# Patient Record
Sex: FEMALE | Race: WHITE | NOT HISPANIC OR LATINO | Employment: OTHER | URBAN - METROPOLITAN AREA
[De-identification: names, ages, dates, MRNs, and addresses within clinical notes are randomized per-mention and may not be internally consistent; named-entity substitution may affect disease eponyms.]

---

## 2017-01-23 ENCOUNTER — TELEPHONE (OUTPATIENT)
Dept: INTERNAL MEDICINE | Facility: CLINIC | Age: 62
End: 2017-01-23

## 2017-01-23 DIAGNOSIS — J02.9 ACUTE PHARYNGITIS, UNSPECIFIED ETIOLOGY: Primary | ICD-10-CM

## 2017-01-23 NOTE — TELEPHONE ENCOUNTER
Spoke with pt.  Advised her of PCP's message below.    Has not been exposed [place] strep [place]     Req lab only appt to r/o strep.    Order in chart and pt will schedule appt.

## 2017-01-23 NOTE — TELEPHONE ENCOUNTER
Has she been exposed to strep? It is most likely this is a virus. If she does a strep as lab only and it is negative there would not be any treatment given, so if she thinks is okay to go home and treat with otc, can do a lab only. If she thinks she may need other advice or evaluation, she would need appt. I can not fit her in.

## 2017-01-23 NOTE — TELEPHONE ENCOUNTER
Pt calling.  C/o sore throat x 2 days, chest leonila x 3 days.  Denies fever.    Asking to come in for a lab only appt today to r/o strep.  Or work pt into schedule today?    No appts available with any other provider.    Last OV 10-12-16     Please advise, thanks.

## 2017-03-12 ENCOUNTER — OFFICE VISIT (OUTPATIENT)
Dept: URGENT CARE | Facility: URGENT CARE | Age: 62
End: 2017-03-12
Payer: COMMERCIAL

## 2017-03-12 VITALS
TEMPERATURE: 98.9 F | DIASTOLIC BLOOD PRESSURE: 96 MMHG | OXYGEN SATURATION: 98 % | HEART RATE: 97 BPM | SYSTOLIC BLOOD PRESSURE: 140 MMHG | BODY MASS INDEX: 29.05 KG/M2 | WEIGHT: 164 LBS

## 2017-03-12 DIAGNOSIS — J01.90 ACUTE SINUSITIS WITH COEXISTING CONDITION, NEED PROPHYLACTIC TREATMENT: Primary | ICD-10-CM

## 2017-03-12 DIAGNOSIS — H10.32 ACUTE BACTERIAL CONJUNCTIVITIS OF LEFT EYE: ICD-10-CM

## 2017-03-12 PROCEDURE — 99214 OFFICE O/P EST MOD 30 MIN: CPT | Performed by: PHYSICIAN ASSISTANT

## 2017-03-12 RX ORDER — DOXYCYCLINE 100 MG/1
100 CAPSULE ORAL 2 TIMES DAILY
Qty: 20 CAPSULE | Refills: 0 | Status: SHIPPED | OUTPATIENT
Start: 2017-03-12 | End: 2017-07-13

## 2017-03-12 RX ORDER — TOBRAMYCIN 3 MG/ML
1 SOLUTION/ DROPS OPHTHALMIC EVERY 4 HOURS
Qty: 1 BOTTLE | Refills: 0 | Status: SHIPPED | OUTPATIENT
Start: 2017-03-12 | End: 2017-03-19

## 2017-03-12 NOTE — MR AVS SNAPSHOT
"              After Visit Summary   3/12/2017    Krystyna Peña    MRN: [id number]           Patient Information     Date Of Birth          1955        Visit Information        Provider Department      3/12/2017 10:00 AM Dorita Bernal PA-C [facility]        Today's Diagnoses     Acute sinusitis with coexisting condition, need prophylactic treatment    -  1    Acute bacterial conjunctivitis of left eye           Follow-ups after your visit        Who to contact     If you have questions or need follow up information about today's clinic visit or your schedule please contact [facility] directly at [phone].  Normal or non-critical lab and imaging results will be communicated to you by MyChart, letter or phone within 4 business days after the clinic has received the results. If you do not hear from us within 7 days, please contact the clinic through MyChart or phone. If you have a critical or abnormal lab result, we will notify you by phone as soon as possible.  Submit refill requests through [employer] or call your pharmacy and they will forward the refill request to us. Please allow 3 business days for your refill to be completed.          Additional Information About Your Visit        MyChart Information     [email or web address] lets you send messages to your doctor, view your test results, renew your prescriptions, schedule appointments and more. To sign up, go to [email or web address] . Click on \"Log in\" on the left side of the screen, which will take you to the Welcome page. Then click on \"Sign up Now\" on the right side of the page.     You will be asked to enter the access code listed below, as well as some personal information. Please follow the directions to create your username and password.     Your access code is: [email or web address]  Expires: 6/10/2017 10:54 AM     Your access code will  in 90 days. If you need help or a new code, please " call your [place] clinic or [phone].        Care EveryWhere ID     This is your Care EveryWhere ID. This could be used by other organizations to access your [place] medical records  [id number]        Your Vitals Were     Pulse Temperature Last Period Pulse Oximetry BMI (Body Mass Index)       97 98.9  F (37.2  C) (Oral) 10/01/2003 98% 29.05 kg/m2        Blood Pressure from Last 3 Encounters:   03/12/17 (!) 140/96   12/14/16 (!) 178/100   10/12/16 124/82    Weight from Last 3 Encounters:   03/12/17 164 lb (74.4 kg)   12/14/16 167 lb 12.8 oz (76.1 kg)   10/12/16 164 lb 11.2 oz (74.7 kg)              Today, you had the following     No orders found for display         Today's Medication Changes          These changes are accurate as of: 3/12/17 10:54 AM.  If you have any questions, ask your nurse or doctor.               Start taking these medicines.        Dose/Directions    doxycycline 100 MG capsule   Commonly known as:  VIBRAMYCIN   Used for:  Acute sinusitis with coexisting condition, need prophylactic treatment   Started by:  Dorita Bernal PA-C        Dose:  100 mg   Take 1 capsule (100 mg) by mouth 2 times daily   Quantity:  20 capsule   Refills:  0       tobramycin 0.3 % ophthalmic solution   Commonly known as:  TOBREX   Used for:  Acute bacterial conjunctivitis of left eye   Started by:  Dorita Bernal PA-C        Dose:  1 drop   Apply 1 drop to eye every 4 hours for 7 days   Quantity:  1 Bottle   Refills:  0         Stop taking these medicines if you haven't already. Please contact your care team if you have questions.     amoxicillin 500 MG capsule   Commonly known as:  AMOXIL   Stopped by:  Dorita Bernal PA-C                Where to get your medicines      These medications were sent to [facility] [address] [facility] [address] [facility] [address] [facility] [address]  [address] [facility] [address]     Phone:  [phone]     doxycycline  100 MG capsule    tobramycin 0.3 % ophthalmic solution                Primary Care Provider Office Phone # Fax #    Viri Savage [phone]       [facility] [address] [facility] [address]  [facility] [address]        Thank you!     Thank you for choosing [facility]  for your care. Our goal is always to provide you with excellent care. Hearing back from our patients is one way we can continue to improve our services. Please take a few minutes to complete the written survey that you may receive in the mail after your visit with us. Thank you!             Your Updated Medication List - Protect others around you: Learn how to safely use, store and throw away your medicines at www.disposemymeds.org.          This list is accurate as of: 3/12/17 10:54 AM.  Always use your most recent med list.                   Brand Name Dispense Instructions for use    ASPIRIN PO      Take 81 mg by mouth daily       celecoxib 200 MG capsule    celeBREX    60 capsule    Take 1 capsule (200 mg) by mouth daily       doxycycline 100 MG capsule    VIBRAMYCIN    20 capsule    Take 1 capsule (100 mg) by mouth 2 times daily       GINKOBA PO          nystatin-triamcinolone ointment    MYCOLOG    15 g    Apply topically 2 times daily       tobramycin 0.3 % ophthalmic solution    TOBREX    1 Bottle    Apply 1 drop to eye every 4 hours for 7 days       triamterene-hydrochlorothiazide 37.5-25 MG per tablet    MAXZIDE-25    30 tablet    Take 1 tablet by mouth daily       valACYclovir 500 MG tablet    VALTREX    6 tablet    Take 1 tablet (500 mg) by mouth 2 times daily

## 2017-03-12 NOTE — NURSING NOTE
"Chief Complaint   Patient presents with     Eye Problem     cough,congestion for past week,lt eye red and swollen started yesterday     URI       Initial BP (!) 140/96 (BP Location: Right arm, Patient Position: Chair, Cuff Size: Adult Regular)  Pulse 97  Temp 98.9  F (37.2  C) (Oral)  Wt 164 lb (74.4 kg)  LMP 10/01/2003  SpO2 98%  BMI 29.05 kg/m2 Estimated body mass index is 29.05 kg/(m^2) as calculated from the following:    Height as of 10/12/16: 5' 3\" (1.6 m).    Weight as of this encounter: 164 lb (74.4 kg).  Medication Reconciliation: complete   Timothy FORMAN      "

## 2017-03-12 NOTE — LETTER
[facility]  [address] [address]  [phone]      March 12, 2017    RE:  Krystyna Peña                                                                                                                                                       [address] [place] [address]            To whom it may concern:    Krystyna Peña was seen in clinic today for illness.  She may return to work on Tuesday.        Sincerely,        Dorita Colin [facility]

## 2017-03-12 NOTE — PROGRESS NOTES
SUBJECTIVE:   Krystyna Peña is a 61 year old female presenting with a chief complaint of   1) sinus congestion for the past week, worsening with sinus pressure  2) left eye is red with yellow drainage since yesterday.  Denies any eye pain or decreased vision  3) slight cough.  Onset of symptoms was as above.  Course of illness is worsening.    Severity moderate  Current and Associated symptoms: as above  Treatment measures tried include OTC meds.  Predisposing factors include none    SH: just returned from a cruise to [place].    Past Medical History   Diagnosis Date     Arthritis      Asymptomatic varicose veins      Benign neoplasm of colon 11/06, 7/13     Adenoma     Cardiac arrest (H) 2003     after knee replacement     Hypertension      Major depression      Pneumonia, organism unspecified 1986     Patient Active Problem List   Diagnosis     Asymptomatic varicose veins     CARDIOVASCULAR SCREENING; LDL GOAL LESS THAN 160     Back pain     Urinary frequency     Benign essential hypertension     Edema, unspecified edema     Advanced directives, counseling/discussion     Social History   Substance Use Topics     Smoking status: Former Smoker     Packs/day: 1.00     Types: Cigarettes, Cigars     Quit date: 4/1/2013     Smokeless tobacco: Never Used     Alcohol use Yes      Comment: 1 per day       ROS:  CONSTITUTIONAL:NEGATIVE for fever, chills, change in weight  INTEGUMENTARY/SKIN: NEGATIVE for worrisome rashes, moles or lesions  EYES: as per HPI  ENT/MOUTH: as per HPI  RESP:as per HPI  CV: NEGATIVE for chest pain, palpitations or peripheral edema  GI: NEGATIVE for nausea, abdominal pain, heartburn, or change in bowel habits  MUSCULOSKELETAL: NEGATIVE for significant arthralgias or myalgia    OBJECTIVE  :BP (!) 140/96 (BP Location: Right arm, Patient Position: Chair, Cuff Size: Adult Regular)  Pulse 97  Temp 98.9  F (37.2  C) (Oral)  Wt 164 lb (74.4 kg)  LMP 10/01/2003  SpO2 98%  BMI 29.05 kg/m2  GENERAL  APPEARANCE: healthy, alert and no distress  EYES: EOMI,  PERRL, conjunctiva clear  EYES: conjunctiva is injected on left with purulent drainage  HENT: ear canals and TM's normal.  Nose with purulent drainage and erythematous turbinated, and mouth without ulcers, erythema or lesions  NECK: supple, nontender, no lymphadenopathy  RESP: lungs clear to auscultation - no rales, rhonchi or wheezes  CV: regular rates and rhythm, normal S1 S2, no murmur noted  ABDOMEN:  soft, nontender, no HSM or masses and bowel sounds normal  NEURO: Normal strength and tone, sensory exam grossly normal,  normal speech and mentation  SKIN: no suspicious lesions or rashes    (J01.90) Acute sinusitis with coexisting condition, need prophylactic treatment  (primary encounter diagnosis)  Comment:   Plan: doxycycline (VIBRAMYCIN) 100 MG capsule        Saline nasal spray    (H10.32) Acute bacterial conjunctivitis of left eye  Comment:   Plan: tobramycin (TOBREX) 0.3 % ophthalmic solution        Warm compress to eye TID    F/U with PCP should symptoms persist or worsen.    Patient expresses understanding and agreement with the assessment and plan as above.

## 2017-05-25 DIAGNOSIS — I10 BENIGN ESSENTIAL HYPERTENSION: ICD-10-CM

## 2017-05-25 NOTE — TELEPHONE ENCOUNTER
triamterene-hydrochlorothiazide       Last Written Prescription Date: 11/17/16  Last Fill Quantity: 30, # refills: 5  Last Office Visit with G, P or [facility] prescribing provider: 10/12/16       Potassium   Date Value Ref Range Status   03/29/2016 4.4 3.4 - 5.3 mmol/L Final     Creatinine   Date Value Ref Range Status   03/29/2016 1.24 (H) 0.52 - 1.04 mg/dL Final     BP Readings from Last 3 Encounters:   03/12/17 (!) 140/96   12/14/16 (!) 178/100   10/12/16 124/82

## 2017-05-26 RX ORDER — TRIAMTERENE/HYDROCHLOROTHIAZID 37.5-25 MG
1 TABLET ORAL DAILY
Qty: 30 TABLET | Refills: 0 | Status: SHIPPED | OUTPATIENT
Start: 2017-05-26 | End: 2017-06-30

## 2017-06-01 NOTE — TELEPHONE ENCOUNTER
Attempted to contact pt. No answer, no VM. Phone rings a few times, then goes dead. Tried calling a couple of times.     Please try again later.

## 2017-06-05 NOTE — TELEPHONE ENCOUNTER
Called pt, states she has a summer cold right now that is making her feel not well. Asking to schedule appt at later time. Relay she may. Pt denies being out of med at this time.

## 2017-06-30 DIAGNOSIS — B00.9 HSV (HERPES SIMPLEX VIRUS) INFECTION: ICD-10-CM

## 2017-06-30 DIAGNOSIS — I10 BENIGN ESSENTIAL HYPERTENSION: ICD-10-CM

## 2017-06-30 RX ORDER — TRIAMTERENE/HYDROCHLOROTHIAZID 37.5-25 MG
1 TABLET ORAL DAILY
Qty: 30 TABLET | Refills: 0 | Status: SHIPPED | OUTPATIENT
Start: 2017-06-30 | End: 2017-07-15

## 2017-06-30 NOTE — TELEPHONE ENCOUNTER
triamterene-hydrochlorothiazide       Last Written Prescription Date: 05/26/17  Last Fill Quantity: 30, # refills: 0  Last Office Visit with G, P or [facility] prescribing provider: 10/12/16       Potassium   Date Value Ref Range Status   03/29/2016 4.4 3.4 - 5.3 mmol/L Final     Creatinine   Date Value Ref Range Status   03/29/2016 1.24 (H) 0.52 - 1.04 mg/dL Final     BP Readings from Last 3 Encounters:   03/12/17 (!) 140/96   12/14/16 (!) 178/100   10/12/16 124/82

## 2017-06-30 NOTE — TELEPHONE ENCOUNTER
Pt still has not scheduled. She was called beginning of June to schedule.    Call to pt and scheduled on 7/13, at 3 pm per pt.     Prescription approved per [facility] Refill Protocol x 1.

## 2017-07-13 ENCOUNTER — OFFICE VISIT (OUTPATIENT)
Dept: INTERNAL MEDICINE | Facility: CLINIC | Age: 62
End: 2017-07-13
Payer: COMMERCIAL

## 2017-07-13 VITALS
TEMPERATURE: 99 F | HEART RATE: 84 BPM | WEIGHT: 168.5 LBS | SYSTOLIC BLOOD PRESSURE: 142 MMHG | DIASTOLIC BLOOD PRESSURE: 92 MMHG | OXYGEN SATURATION: 97 % | BODY MASS INDEX: 29.85 KG/M2

## 2017-07-13 DIAGNOSIS — R60.9 EDEMA, UNSPECIFIED TYPE: ICD-10-CM

## 2017-07-13 DIAGNOSIS — Z11.59 SCREENING FOR VIRAL DISEASE: ICD-10-CM

## 2017-07-13 DIAGNOSIS — M54.5 LOW BACK PAIN, UNSPECIFIED BACK PAIN LATERALITY, UNSPECIFIED CHRONICITY, WITH SCIATICA PRESENCE UNSPECIFIED: ICD-10-CM

## 2017-07-13 DIAGNOSIS — I10 BENIGN ESSENTIAL HYPERTENSION: Primary | ICD-10-CM

## 2017-07-13 PROCEDURE — 99214 OFFICE O/P EST MOD 30 MIN: CPT | Performed by: INTERNAL MEDICINE

## 2017-07-13 PROCEDURE — 80048 BASIC METABOLIC PNL TOTAL CA: CPT | Performed by: INTERNAL MEDICINE

## 2017-07-13 PROCEDURE — 82043 UR ALBUMIN QUANTITATIVE: CPT | Performed by: INTERNAL MEDICINE

## 2017-07-13 PROCEDURE — 86803 HEPATITIS C AB TEST: CPT | Performed by: INTERNAL MEDICINE

## 2017-07-13 PROCEDURE — 36415 COLL VENOUS BLD VENIPUNCTURE: CPT | Performed by: INTERNAL MEDICINE

## 2017-07-13 RX ORDER — TRIAMTERENE/HYDROCHLOROTHIAZID 37.5-25 MG
1 TABLET ORAL DAILY
Status: CANCELLED | OUTPATIENT
Start: 2017-07-13

## 2017-07-13 NOTE — MR AVS SNAPSHOT
"              After Visit Summary   2017    Krystyna Peña    MRN: [id number]           Patient Information     Date Of Birth          1955        Visit Information        Provider Department      2017 3:00 PM Viri Savage MD [facility]        Today's Diagnoses     Benign essential hypertension    -  1    Screening for viral disease           Follow-ups after your visit        Follow-up notes from your care team     Return for BP Recheck at the pharmacy.      Who to contact     If you have questions or need follow up information about today's clinic visit or your schedule please contact [facility] directly at [phone].  Normal or non-critical lab and imaging results will be communicated to you by MyChart, letter or phone within 4 business days after the clinic has received the results. If you do not hear from us within 7 days, please contact the clinic through [employer] or phone. If you have a critical or abnormal lab result, we will notify you by phone as soon as possible.  Submit refill requests through [employer] or call your pharmacy and they will forward the refill request to us. Please allow 3 business days for your refill to be completed.          Additional Information About Your Visit        MyChart Information     [employer] lets you send messages to your doctor, view your test results, renew your prescriptions, schedule appointments and more. To sign up, go to [email or web address] . Click on \"Log in\" on the left side of the screen, which will take you to the Welcome page. Then click on \"Sign up Now\" on the right side of the page.     You will be asked to enter the access code listed below, as well as some personal information. Please follow the directions to create your username and password.     Your access code is: [email or web address]  Expires: 10/11/2017  3:47 PM     Your access code will  in 90 days. If you need help or a new code, please call your " [facility] or [phone].        Care EveryWhere ID     This is your Care EveryWhere ID. This could be used by other organizations to access your [place] medical records  [id number]        Your Vitals Were     Pulse Temperature Last Period Pulse Oximetry BMI (Body Mass Index)       84 99  F (37.2  C) (Oral) 10/01/2003 97% 29.85 kg/m2        Blood Pressure from Last 3 Encounters:   07/13/17 (!) 142/92   03/12/17 (!) 140/96   12/14/16 (!) 178/100    Weight from Last 3 Encounters:   07/13/17 168 lb 8 oz (76.4 kg)   03/12/17 164 lb (74.4 kg)   12/14/16 167 lb 12.8 oz (76.1 kg)              We Performed the Following     Albumin Random Urine Quantitative     Basic metabolic panel     Hepatitis C Screen Reflex to HCV RNA Quant and Genotype        Primary Care Provider Office Phone # Fax #    Viri Savage [phone]       [facility] [address] [facility] [address]  [facility] [address]        Equal Access to Services     ARMIDA HANSON : Hadii aad ku hadasho Soomaali, waaxda luqadaha, qaybta kaalmada adeegyabaudilio, jhoana bustamante . So [facility] [phone].    ATENCIÓN: Si habla español, tiene a arguelles disposición servicios gratuitos de asistencia lingüística. Llame al [phone].    We comply with applicable federal civil rights laws and [place] laws. We do not discriminate on the basis of race, color, national origin, age, disability sex, sexual orientation or gender identity.            Thank you!     Thank you for choosing [facility]  for your care. Our goal is always to provide you with excellent care. Hearing back from our patients is one way we can continue to improve our services. Please take a few minutes to complete the written survey that you may receive in the mail after your visit with us. Thank you!             Your Updated Medication List - Protect others around you: Learn how to safely use, store and throw away your medicines at  www.disposemymeds.org.          This list is accurate as of: 7/13/17  3:47 PM.  Always use your most recent med list.                   Brand Name Dispense Instructions for use Diagnosis    ASPIRIN PO      Take 81 mg by mouth daily        celecoxib 200 MG capsule    celeBREX    60 capsule    Take 1 capsule (200 mg) by mouth daily    Status post total replacement of right hip       nystatin-triamcinolone ointment    MYCOLOG    15 g    Apply topically 2 times daily    Vaginitis and vulvovaginitis       triamterene-hydrochlorothiazide 37.5-25 MG per tablet    MAXZIDE-25    30 tablet    Take 1 tablet by mouth daily    Benign essential hypertension

## 2017-07-13 NOTE — NURSING NOTE
"Chief Complaint   Patient presents with     Hypertension     LOV 9/16/16       Initial BP (!) 142/92 (BP Location: Right arm, Patient Position: Sitting, Cuff Size: Adult Regular)  Pulse 84  Temp 99  F (37.2  C) (Oral)  Wt 168 lb 8 oz (76.4 kg)  LMP 10/01/2003  SpO2 97%  BMI 29.85 kg/m2 Estimated body mass index is 29.85 kg/(m^2) as calculated from the following:    Height as of 10/12/16: 5' 3\" (1.6 m).    Weight as of this encounter: 168 lb 8 oz (76.4 kg).  Medication Reconciliation: complete     "

## 2017-07-13 NOTE — PROGRESS NOTES
SUBJECTIVE:                                                    Krystyna HUMPHREYS White is a 61 year old female who presents to clinic today for the following health issues:      Hypertension Follow-up      [facility] are [facility] but has not been checking otherwise. It was high at other appointment a few months ago.     Low Salt Diet: not monitoring salt      Amount of exercise or physical activity: None    Problems taking medications regularly: No    Medication side effects: none    Diet: regular (no restrictions)    Other problems:   1. Edema: minimal lately  2. Back pain: stable on celebrex.     Current Concerns: none    Patient Active Problem List   Diagnosis     Asymptomatic varicose veins     CARDIOVASCULAR SCREENING; LDL GOAL LESS THAN 160     Back pain     Urinary frequency     Benign essential hypertension     Edema, unspecified edema     Advanced directives, counseling/discussion       Current Outpatient Prescriptions   Medication Sig Dispense Refill     triamterene-hydrochlorothiazide (MAXZIDE-25) 37.5-25 MG per tablet Take 1 tablet by mouth daily 30 tablet 0     ASPIRIN PO Take 81 mg by mouth daily       celecoxib (CELEBREX) 200 MG capsule Take 1 capsule (200 mg) by mouth daily 60 capsule 1     nystatin-triamcinolone (MYCOLOG) ointment Apply topically 2 times daily (Patient not taking: Reported on 3/12/2017) 15 g 1         Social History   Substance Use Topics     Smoking status: Former Smoker     Packs/day: 1.00     Types: Cigarettes, Cigars     Quit date: 4/1/2013     Smokeless tobacco: Never Used     Alcohol use Yes      Comment: 1 per day        Reviewed and updated as needed this visit by clinical staff       Reviewed and updated as needed this visit by Provider         ROS:  No fever, chills, no dyspnea on exertion, no chest pain, palpitations, PND, orthopnea, edema, syncope, headache, abdominal pain     OBJECTIVE:     BP (!) 142/92 (BP Location: Right arm,  Patient Position: Sitting, Cuff Size: Adult Regular)  Pulse 84  Temp 99  F (37.2  C) (Oral)  Wt 168 lb 8 oz (76.4 kg)  LMP 10/01/2003  SpO2 97%  BMI 29.85 kg/m2  Body mass index is 29.85 kg/(m^2).    Lungs: clear  CV: normal S1, S2 without murmur, S3 or S4.       ASSESSMENT/PLAN:             1. Benign essential hypertension  High today and last visit, likely is not well controlled. She is reluctant to change or add med, concerned she has white coat syndrome, so recommend we get more data by having her do BP readings at the pharmacy starting in 2 weeks. Advised that we can get good readings and they will be sent to me per protocol. If persistently over 140/90 over 1-2 months, would add ACE-I.   - Albumin Random Urine Quantitative  - Basic metabolic panel  - triamterene-hydrochlorothiazide (MAXZIDE-25) 37.5-25 MG per tablet; Take 1 tablet by mouth daily  Dispense: 90 tablet; Refill: 1    2. Low back pain, unspecified back pain laterality, unspecified chronicity, with sciatica presence unspecified  stable    3. Edema, unspecified type  No significant edema with med    4. Screening for viral disease    - Hepatitis C Screen Reflex to HCV RNA Quant and Genotype        Viri Savage MD  [facility]

## 2017-07-14 ENCOUNTER — OFFICE VISIT (OUTPATIENT)
Dept: URGENT CARE | Facility: URGENT CARE | Age: 62
End: 2017-07-14
Payer: COMMERCIAL

## 2017-07-14 VITALS
BODY MASS INDEX: 29.76 KG/M2 | TEMPERATURE: 97.8 F | OXYGEN SATURATION: 97 % | SYSTOLIC BLOOD PRESSURE: 140 MMHG | DIASTOLIC BLOOD PRESSURE: 90 MMHG | HEART RATE: 91 BPM | WEIGHT: 168 LBS

## 2017-07-14 DIAGNOSIS — S01.01XA LACERATION OF SCALP WITHOUT FOREIGN BODY, INITIAL ENCOUNTER: Primary | ICD-10-CM

## 2017-07-14 LAB
ANION GAP SERPL CALCULATED.3IONS-SCNC: 13 MMOL/L (ref 3–14)
BUN SERPL-MCNC: 24 MG/DL (ref 7–30)
CALCIUM SERPL-MCNC: 9.4 MG/DL (ref 8.5–10.1)
CHLORIDE SERPL-SCNC: 101 MMOL/L (ref 94–109)
CO2 SERPL-SCNC: 25 MMOL/L (ref 20–32)
CREAT SERPL-MCNC: 1.32 MG/DL (ref 0.52–1.04)
CREAT UR-MCNC: 107 MG/DL
GFR SERPL CREATININE-BSD FRML MDRD: 41 ML/MIN/1.7M2
GLUCOSE SERPL-MCNC: 99 MG/DL (ref 70–99)
HCV AB SERPL QL IA: NORMAL
MICROALBUMIN UR-MCNC: 19 MG/L
MICROALBUMIN/CREAT UR: 17.38 MG/G CR (ref 0–25)
POTASSIUM SERPL-SCNC: 3.9 MMOL/L (ref 3.4–5.3)
SODIUM SERPL-SCNC: 139 MMOL/L (ref 133–144)

## 2017-07-14 PROCEDURE — 12002 RPR S/N/AX/GEN/TRNK2.6-7.5CM: CPT | Performed by: STUDENT IN AN ORGANIZED HEALTH CARE EDUCATION/TRAINING PROGRAM

## 2017-07-14 NOTE — MR AVS SNAPSHOT
"              After Visit Summary   2017    Krystyna Peña    MRN: [id number]           Patient Information     Date Of Birth          1955        Visit Information        Provider Department      2017 7:10 PM Alireza Orellana PA-C [facility]        Today's Diagnoses     Laceration of scalp without foreign body, initial encounter    -  1       Follow-ups after your visit        Who to contact     If you have questions or need follow up information about today's clinic visit or your schedule please contact [facility] directly at [phone].  Normal or non-critical lab and imaging results will be communicated to you by vozerohart, letter or phone within 4 business days after the clinic has received the results. If you do not hear from us within 7 days, please contact the clinic through vozerohart or phone. If you have a critical or abnormal lab result, we will notify you by phone as soon as possible.  Submit refill requests through Stilnest or call your pharmacy and they will forward the refill request to us. Please allow 3 business days for your refill to be completed.          Additional Information About Your Visit        MyChart Information     Stilnest lets you send messages to your doctor, view your test results, renew your prescriptions, schedule appointments and more. To sign up, go to [email or web address] . Click on \"Log in\" on the left side of the screen, which will take you to the Welcome page. Then click on \"Sign up Now\" on the right side of the page.     You will be asked to enter the access code listed below, as well as some personal information. Please follow the directions to create your username and password.     Your access code is: [email or web address]  Expires: 10/11/2017  3:47 PM     Your access code will  in 90 days. If you need help or a new code, please call your [place] clinic or [phone].        Care " EveryWhere ID     This is your Care EveryWhere ID. This could be used by other organizations to access your [place] medical records  [id number]        Your Vitals Were     Pulse Temperature Last Period Pulse Oximetry BMI (Body Mass Index)       91 97.8  F (36.6  C) (Oral) 10/01/2003 97% 29.76 kg/m2        Blood Pressure from Last 3 Encounters:   07/14/17 140/90   07/13/17 (!) 142/92   03/12/17 (!) 140/96    Weight from Last 3 Encounters:   07/14/17 168 lb (76.2 kg)   07/13/17 168 lb 8 oz (76.4 kg)   03/12/17 164 lb (74.4 kg)              We Performed the Following     Between 2.6cm - 7.5 cm        Primary Care Provider Office Phone # Fax #    Viri Savage [phone]       [facility] [address]  [facility] [address]        Equal Access to Services     [facility] Center[facility] : Hadii aad ku hadasho Soomaali, waaxda luqadaha, qaybta kaalmada adeegyada, waxay idiin hayaan darlin bustamante . So [facility] [phone].    ATENCIÓN: Si habla español, tiene a arguelles disposición servicios gratuitos de asistencia lingüística. Llame al [phone].    We comply with applicable federal civil rights laws and [place] laws. We do not discriminate on the basis of race, color, national origin, age, disability sex, sexual orientation or gender identity.            Thank you!     Thank you for choosing [facility]  for your care. Our goal is always to provide you with excellent care. Hearing back from our patients is one way we can continue to improve our services. Please take a few minutes to complete the written survey that you may receive in the mail after your visit with us. Thank you!             Your Updated Medication List - Protect others around you: Learn how to safely use, store and throw away your medicines at www.disposemymeds.org.          This list is accurate as of: 7/14/17  8:15 PM.  Always use your most recent med list.                   Brand Name Dispense  Instructions for use Diagnosis    ASPIRIN PO      Take 81 mg by mouth daily        celecoxib 200 MG capsule    celeBREX    60 capsule    Take 1 capsule (200 mg) by mouth daily    Status post total replacement of right hip       nystatin-triamcinolone ointment    MYCOLOG    15 g    Apply topically 2 times daily    Vaginitis and vulvovaginitis       triamterene-hydrochlorothiazide 37.5-25 MG per tablet    MAXZIDE-25    30 tablet    Take 1 tablet by mouth daily    Benign essential hypertension

## 2017-07-15 PROBLEM — R60.9 EDEMA, UNSPECIFIED TYPE: Status: ACTIVE | Noted: 2017-07-15

## 2017-07-15 RX ORDER — TRIAMTERENE/HYDROCHLOROTHIAZID 37.5-25 MG
1 TABLET ORAL DAILY
Qty: 90 TABLET | Refills: 1 | Status: SHIPPED | OUTPATIENT
Start: 2017-07-15 | End: 2018-02-10

## 2017-07-15 NOTE — PROGRESS NOTES
SUBJECTIVE:   61 year old female sustained laceration of forehead 1/2 hours ago. Nature of injury: patient reports having a drop foot. She was walking and tripped. She ended up hitting her head on the floor. Denies LOC, headache, nausea, vomiting.  was at the house when this occurred. He notes there was no LOC, or changes in cognitive function. Tetanus vaccination status reviewed: tetanus re-vaccination not indicated.    The patient reports taking a 81 mg Aspirin daily. No other blood thinning medications. No history of TIA of CVA. Denies Diabetes. Does not smoke.     OBJECTIVE:   /90 (BP Location: Left arm, Patient Position: Chair, Cuff Size: Adult Regular)  Pulse 91  Temp 97.8  F (36.6  C) (Oral)  Wt 168 lb (76.2 kg)  LMP 10/01/2003  SpO2 97%  BMI 29.76 kg/m2  Patient appears well, vitals are normal. Laceration 3 cm noted.  Description: clean wound edges, no foreign bodies. Neurovascular and tendon structures are intact.  Neuro: CN 2-12 intact.  CV: RRR  Lungs: CTAB      ASSESSMENT:   Laceration as described.    PLAN:   Anesthesia with Lidocaine 1% with epinephrine. Wound cleansed, debrided of visible foreign material and necrotic tissue, and sutured. Dressing applied.  Wound care instructions provided.  Observe for any signs of infection or other problems.  Return for suture removal in 5 days. 6 interrupted sutures placed. The patient was educated on red-flags to present to the ER. These consist of, but are not limited to fever, nausea, vomiting, headache, and changes in cognitive function.      According to the [place] head CT rule, the patient was low risk.    Alireza Orellana PA-C

## 2017-07-15 NOTE — NURSING NOTE
"Chief Complaint   Patient presents with     Laceration     fell and cut forhead        Initial /90 (BP Location: Left arm, Patient Position: Chair, Cuff Size: Adult Regular)  Pulse 91  Temp 97.8  F (36.6  C) (Oral)  Wt 168 lb (76.2 kg)  LMP 10/01/2003  SpO2 97%  BMI 29.76 kg/m2 Estimated body mass index is 29.76 kg/(m^2) as calculated from the following:    Height as of 10/12/16: 5' 3\" (1.6 m).    Weight as of this encounter: 168 lb (76.2 kg).  Medication Reconciliation: complete    "

## 2017-07-16 ASSESSMENT — PATIENT HEALTH QUESTIONNAIRE - PHQ9: SUM OF ALL RESPONSES TO PHQ QUESTIONS 1-9: 0

## 2017-07-21 ENCOUNTER — OFFICE VISIT (OUTPATIENT)
Dept: URGENT CARE | Facility: URGENT CARE | Age: 62
End: 2017-07-21
Payer: COMMERCIAL

## 2017-07-21 VITALS — DIASTOLIC BLOOD PRESSURE: 91 MMHG | SYSTOLIC BLOOD PRESSURE: 167 MMHG | HEART RATE: 93 BPM | TEMPERATURE: 98.7 F

## 2017-07-21 DIAGNOSIS — Z48.02 ENCOUNTER FOR REMOVAL OF SUTURES: Primary | ICD-10-CM

## 2017-07-21 PROCEDURE — 99207 ZZC NO CHARGE NURSE ONLY: CPT

## 2017-07-21 NOTE — NURSING NOTE
"Chief Complaint   Patient presents with     Suture Removal       Initial BP (!) 167/91 (BP Location: Left arm, Patient Position: Chair, Cuff Size: Adult Regular)  Pulse 93  Temp 98.7  F (37.1  C) (Oral)  LMP 10/01/2003 Estimated body mass index is 29.76 kg/(m^2) as calculated from the following:    Height as of 10/12/16: 5' 3\" (1.6 m).    Weight as of 7/14/17: 168 lb (76.2 kg).  Medication Reconciliation: complete    "

## 2017-07-21 NOTE — MR AVS SNAPSHOT
"              After Visit Summary   2017    Krystyna Peña    MRN: [id number]           Patient Information     Date Of Birth          1955        Visit Information        Provider Department      2017 2:00 PM Provider, Hamzah Choudhury MD [facility]        Today's Diagnoses     Encounter for removal of sutures    -  1       Follow-ups after your visit        Who to contact     If you have questions or need follow up information about today's clinic visit or your schedule please contact [facility] directly at [phone].  Normal or non-critical lab and imaging results will be communicated to you by VGBiohart, letter or phone within 4 business days after the clinic has received the results. If you do not hear from us within 7 days, please contact the clinic through VGBiohart or phone. If you have a critical or abnormal lab result, we will notify you by phone as soon as possible.  Submit refill requests through Securant or call your pharmacy and they will forward the refill request to us. Please allow 3 business days for your refill to be completed.          Additional Information About Your Visit        MyChart Information     Securant lets you send messages to your doctor, view your test results, renew your prescriptions, schedule appointments and more. To sign up, go to [email or web address] . Click on \"Log in\" on the left side of the screen, which will take you to the Welcome page. Then click on \"Sign up Now\" on the right side of the page.     You will be asked to enter the access code listed below, as well as some personal information. Please follow the directions to create your username and password.     Your access code is: [email or web address]  Expires: 10/11/2017  3:47 PM     Your access code will  in 90 days. If you need help or a new code, please call your [place] clinic or [phone].        Care EveryWhere ID     This is your Care " EveryWhere ID. This could be used by other organizations to access your [place] medical records  [id number]        Your Vitals Were     Pulse Temperature Last Period             93 98.7  F (37.1  C) (Oral) 10/01/2003          Blood Pressure from Last 3 Encounters:   07/21/17 (!) 167/91   07/14/17 140/90   07/13/17 (!) 142/92    Weight from Last 3 Encounters:   07/14/17 168 lb (76.2 kg)   07/13/17 168 lb 8 oz (76.4 kg)   03/12/17 164 lb (74.4 kg)              Today, you had the following     No orders found for display       Primary Care Provider Office Phone # Fax #    Viri Savage [phone]       [facility] [address] [facility] [address]  [facility] [address]        Equal Access to Services     LEW HANSON : Hadii aad ku hadasho Soomaali, waaxda luqadaha, qaybta kaalmada adeegyada, jhoana gabriel haymandeep bustamante . So [facility] [phone].    ATENCIÓN: Si habla español, tiene a arguelles disposición servicios gratuitos de asistencia lingüística. Juan al [phone].    We comply with applicable federal civil rights laws and [place] laws. We do not discriminate on the basis of race, color, national origin, age, disability sex, sexual orientation or gender identity.            Thank you!     Thank you for choosing [facility]  for your care. Our goal is always to provide you with excellent care. Hearing back from our patients is one way we can continue to improve our services. Please take a few minutes to complete the written survey that you may receive in the mail after your visit with us. Thank you!             Your Updated Medication List - Protect others around you: Learn how to safely use, store and throw away your medicines at www.disposemymeds.org.          This list is accurate as of: 7/21/17  7:46 PM.  Always use your most recent med list.                   Brand Name Dispense Instructions for use Diagnosis    ASPIRIN PO      Take 81 mg by mouth daily         celecoxib 200 MG capsule    celeBREX    60 capsule    Take 1 capsule (200 mg) by mouth daily    Status post total replacement of right hip       nystatin-triamcinolone ointment    MYCOLOG    15 g    Apply topically 2 times daily    Vaginitis and vulvovaginitis       triamterene-hydrochlorothiazide 37.5-25 MG per tablet    MAXZIDE-25    90 tablet    Take 1 tablet by mouth daily    Benign essential hypertension

## 2017-07-22 ENCOUNTER — ALLIED HEALTH/NURSE VISIT (OUTPATIENT)
Dept: INTERNAL MEDICINE | Facility: CLINIC | Age: 62
End: 2017-07-22
Payer: COMMERCIAL

## 2017-07-22 VITALS — SYSTOLIC BLOOD PRESSURE: 142 MMHG | DIASTOLIC BLOOD PRESSURE: 86 MMHG

## 2017-07-22 DIAGNOSIS — Z01.30 BP CHECK: Primary | ICD-10-CM

## 2017-07-22 PROCEDURE — 99207 ZZC NO CHARGE NURSE ONLY: CPT | Performed by: INTERNAL MEDICINE

## 2017-07-22 NOTE — PROGRESS NOTES
Krystyna Peña is enrolled/participating in the retail pharmacy Blood Pressure Goals Achievement Program (BPGAP).  Krystyna Peña was evaluated at [facility] on July 22, 2017 at which time her blood pressure was:    BP Readings from Last 3 Encounters:   07/22/17 142/86   07/21/17 (!) 167/91   07/14/17 140/90     Reviewed lifestyle modifications for blood pressure control and reduction: including making healthy food choices, managing weight, getting regular exercise, smoking cessation, reducing alcohol consumption, monitoring blood pressure regularly.       Completed by: Sonia Reveles xu

## 2017-07-22 NOTE — MR AVS SNAPSHOT
"              After Visit Summary   2017    Krystyna Peña    MRN: [id number]           Patient Information     Date Of Birth          1955        Visit Information        Provider Department      2017 10:48 AM Viri Savage MD [facility]        Today's Diagnoses     BP check    -  1       Follow-ups after your visit        Who to contact     If you have questions or need follow up information about today's clinic visit or your schedule please contact [facility] directly at [phone].  Normal or non-critical lab and imaging results will be communicated to you by MyChart, letter or phone within 4 business days after the clinic has received the results. If you do not hear from us within 7 days, please contact the clinic through Teespringhart or phone. If you have a critical or abnormal lab result, we will notify you by phone as soon as possible.  Submit refill requests through I-Market or call your pharmacy and they will forward the refill request to us. Please allow 3 business days for your refill to be completed.          Additional Information About Your Visit        MyChart Information     I-Market lets you send messages to your doctor, view your test results, renew your prescriptions, schedule appointments and more. To sign up, go to [email or web address] . Click on \"Log in\" on the left side of the screen, which will take you to the Welcome page. Then click on \"Sign up Now\" on the right side of the page.     You will be asked to enter the access code listed below, as well as some personal information. Please follow the directions to create your username and password.     Your access code is: [email or web address]  Expires: 10/11/2017  3:47 PM     Your access code will  in 90 days. If you need help or a new code, please call your [facility] or [phone].        Care EveryWhere ID     This is your Care EveryWhere ID. This could be used by other organizations to " access your [place] medical records  [id number]        Your Vitals Were     Last Period                   10/01/2003            Blood Pressure from Last 3 Encounters:   07/22/17 142/86   07/21/17 (!) 167/91   07/14/17 140/90    Weight from Last 3 Encounters:   07/14/17 76.2 kg (168 lb)   07/13/17 76.4 kg (168 lb 8 oz)   03/12/17 74.4 kg (164 lb)              Today, you had the following     No orders found for display       Primary Care Provider Office Phone # Fax #    Viri Savage [phone]       [facility] [address] [facility] [address]  [facility] [address]        Equal Access to Services     LEW HANSON : Hadii alma morriso Soobdulio, waaxda luqadaha, qaybta kaalmada adeegyada, jhoana bustamante . So [facility] [phone].    ATENCIÓN: Si habla español, tiene a arguelles disposición servicios gratuitos de asistencia lingüística. LlKettering Health [facility] [phone].    We comply with applicable federal civil rights laws and [place] laws. We do not discriminate on the basis of race, color, national origin, age, disability sex, sexual orientation or gender identity.            Thank you!     Thank you for choosing [facility]  for your care. Our goal is always to provide you with excellent care. Hearing back from our patients is one way we can continue to improve our services. Please take a few minutes to complete the written survey that you may receive in the mail after your visit with us. Thank you!             Your Updated Medication List - Protect others around you: Learn how to safely use, store and throw away your medicines at www.disposemymeds.org.          This list is accurate as of: 7/22/17 11:03 AM.  Always use your most recent med list.                   Brand Name Dispense Instructions for use Diagnosis    ASPIRIN PO      Take 81 mg by mouth daily        celecoxib 200 MG capsule    celeBREX    60 capsule    Take 1 capsule (200 mg) by mouth daily    Status  post total replacement of right hip       nystatin-triamcinolone ointment    MYCOLOG    15 g    Apply topically 2 times daily    Vaginitis and vulvovaginitis       triamterene-hydrochlorothiazide 37.5-25 MG per tablet    MAXZIDE-25    90 tablet    Take 1 tablet by mouth daily    Benign essential hypertension

## 2017-07-29 ENCOUNTER — ALLIED HEALTH/NURSE VISIT (OUTPATIENT)
Dept: INTERNAL MEDICINE | Facility: CLINIC | Age: 62
End: 2017-07-29
Payer: COMMERCIAL

## 2017-07-29 VITALS — DIASTOLIC BLOOD PRESSURE: 88 MMHG | SYSTOLIC BLOOD PRESSURE: 158 MMHG

## 2017-07-29 DIAGNOSIS — I10 BENIGN ESSENTIAL HYPERTENSION: Primary | ICD-10-CM

## 2017-07-29 PROCEDURE — 99207 ZZC NO CHARGE NURSE ONLY: CPT | Performed by: INTERNAL MEDICINE

## 2017-07-29 NOTE — Clinical Note
Please review and follow up with patient, she is expecting fu regarding monitoring plan and or starting and ACEI, thanks!  Florence Solis, PharmD  [facility] Phone:  [phone] Fax:  [phone]

## 2017-07-29 NOTE — MR AVS SNAPSHOT
"              After Visit Summary   2017    Krystyna Peña    MRN: [id number]           Patient Information     Date Of Birth          1955        Visit Information        Provider Department      2017 1:49 PM Viri Savage MD [facility]        Today's Diagnoses     Benign essential hypertension    -  1       Follow-ups after your visit        Who to contact     If you have questions or need follow up information about today's clinic visit or your schedule please contact [facility] directly at [phone].  Normal or non-critical lab and imaging results will be communicated to you by MyChart, letter or phone within 4 business days after the clinic has received the results. If you do not hear from us within 7 days, please contact the clinic through Mizzen+Mainhart or phone. If you have a critical or abnormal lab result, we will notify you by phone as soon as possible.  Submit refill requests through Evolution Nutrition or call your pharmacy and they will forward the refill request to us. Please allow 3 business days for your refill to be completed.          Additional Information About Your Visit        MyChart Information     Evolution Nutrition lets you send messages to your doctor, view your test results, renew your prescriptions, schedule appointments and more. To sign up, go to [email or web address] . Click on \"Log in\" on the left side of the screen, which will take you to the Welcome page. Then click on \"Sign up Now\" on the right side of the page.     You will be asked to enter the access code listed below, as well as some personal information. Please follow the directions to create your username and password.     Your access code is: [email or web address]  Expires: 10/11/2017  3:47 PM     Your access code will  in 90 days. If you need help or a new code, please call your [facility] or [phone].        Care EveryWhere ID     This is your Care EveryWhere ID. This could be used by " other organizations to access your [place] medical records  [id number]        Your Vitals Were     Last Period                   10/01/2003            Blood Pressure from Last 3 Encounters:   07/29/17 158/88   07/22/17 142/86   07/21/17 (!) 167/91    Weight from Last 3 Encounters:   07/14/17 76.2 kg (168 lb)   07/13/17 76.4 kg (168 lb 8 oz)   03/12/17 74.4 kg (164 lb)              Today, you had the following     No orders found for display       Primary Care Provider Office Phone # Fax #    Viri Savage [phone]       [facility] [address] [facility] [address]  [facility] [address]        Equal Access to Services     LEW HANSON : Hadii alma ventura hadasho Sokushalali, waaxda luqadaha, qaybta kaalmada adeegyada, waxodilon bustamante . So [facility] [place] [phone].    ATENCIÓN: Si habla español, tiene a arguelles disposición servicios gratuitos de asistencia lingüística. Juan al [phone].    We comply with applicable federal civil rights laws and [place] laws. We do not discriminate on the basis of race, color, national origin, age, disability sex, sexual orientation or gender identity.            Thank you!     Thank you for choosing [facility]  for your care. Our goal is always to provide you with excellent care. Hearing back from our patients is one way we can continue to improve our services. Please take a few minutes to complete the written survey that you may receive in the mail after your visit with us. Thank you!             Your Updated Medication List - Protect others around you: Learn how to safely use, store and throw away your medicines at www.disposemymeds.org.          This list is accurate as of: 7/29/17  2:02 PM.  Always use your most recent med list.                   Brand Name Dispense Instructions for use Diagnosis    ASPIRIN PO      Take 81 mg by mouth daily        celecoxib 200 MG capsule    celeBREX    60 capsule    Take 1 capsule (200 mg) by  mouth daily    Status post total replacement of right hip       nystatin-triamcinolone ointment    MYCOLOG    15 g    Apply topically 2 times daily    Vaginitis and vulvovaginitis       triamterene-hydrochlorothiazide 37.5-25 MG per tablet    MAXZIDE-25    90 tablet    Take 1 tablet by mouth daily    Benign essential hypertension

## 2017-07-29 NOTE — PROGRESS NOTES
Krystyna Peña is enrolled/participating in the retail pharmacy Blood Pressure Goals Achievement Program (BPGAP).  Krystyna Peña was evaluated at [facility] on July 29, 2017 at which time her blood pressure was:    BP Readings from Last 3 Encounters:   07/29/17 158/88   07/22/17 142/86   07/21/17 (!) 167/91     Reviewed lifestyle modifications for blood pressure control and reduction: including making healthy food choices, managing weight, getting regular exercise, smoking cessation, reducing alcohol consumption, monitoring blood pressure regularly.     Krystyna Peña is not experiencing symptoms.    Follow-Up: BP is not at goal of < 150/90 mmHg (patient 60+ years of age without diabetes, MD specified < 140/90 mmHg), Recommended follow-up with PCP.  Routing to PCP for further review.    Recommendation to Provider: BP has consistently been above designated goal, consider adding ACEI as planned or obtaining one more BP and if still elevated then initiate ACEI.  Krystyna is expecting a follow up call from clinic regarding BP monitoring plan and any med changes.     Krystyna Peña was evaluated for enrollment into the PGEN study today.    Patient eligible for enrollment:  No - 2 med categories of med (triam/hctz)  Patient interested in enrollment:  Unknown  - not eligible    Completed by: Florence Solis PharmD    [facility]  Phone:  [phone]  Fax:  [phone]

## 2017-08-07 ENCOUNTER — ALLIED HEALTH/NURSE VISIT (OUTPATIENT)
Dept: INTERNAL MEDICINE | Facility: CLINIC | Age: 62
End: 2017-08-07
Payer: COMMERCIAL

## 2017-08-07 VITALS — SYSTOLIC BLOOD PRESSURE: 154 MMHG | DIASTOLIC BLOOD PRESSURE: 86 MMHG

## 2017-08-07 DIAGNOSIS — I10 BENIGN ESSENTIAL HYPERTENSION: Primary | ICD-10-CM

## 2017-08-07 PROCEDURE — 99207 ZZC NO CHARGE NURSE ONLY: CPT | Performed by: INTERNAL MEDICINE

## 2017-08-07 NOTE — PROGRESS NOTES
Krystyna Peña is enrolled/participating in the retail pharmacy Blood Pressure Goals Achievement Program (BPGAP).  Krystyna Peña was evaluated at [facility] on August 7, 2017 at which time her blood pressure was:    BP Readings from Last 3 Encounters:   08/07/17 154/86   07/29/17 158/88   07/22/17 142/86     Reviewed lifestyle modifications for blood pressure control and reduction: including making healthy food choices, managing weight, getting regular exercise, smoking cessation, reducing alcohol consumption, monitoring blood pressure regularly.     Krystyna Peña is not experiencing symptoms.    Follow-Up: BP is not at goal of < 140/90mmHg (patient 18+ years of age with or without diabetes), Recommended follow-up in 1 month at the pharmacy. Routing to PCP as an FYI.    Recommendation to Provider: patient will continue to monitory bp.    Krystyna Peña was evaluated for enrollment into the PGEN study today.    Patient eligible for enrollment:  No  Patient interested in enrollment:  Unknown    Completed by: Lnidy HALE.Ph.  [facility]   [phone]

## 2017-08-07 NOTE — MR AVS SNAPSHOT
"              After Visit Summary   2017    Krystyna Peña    MRN: [id number]           Patient Information     Date Of Birth          1955        Visit Information        Provider Department      2017 2:01 PM Viri Savage MD [facility]        Today's Diagnoses     Benign essential hypertension    -  1       Follow-ups after your visit        Who to contact     If you have questions or need follow up information about today's clinic visit or your schedule please contact [facility] directly at [phone].  Normal or non-critical lab and imaging results will be communicated to you by MyChart, letter or phone within 4 business days after the clinic has received the results. If you do not hear from us within 7 days, please contact the clinic through PBJ Conciergehart or phone. If you have a critical or abnormal lab result, we will notify you by phone as soon as possible.  Submit refill requests through Data Expedition or call your pharmacy and they will forward the refill request to us. Please allow 3 business days for your refill to be completed.          Additional Information About Your Visit        MyChart Information     Data Expedition lets you send messages to your doctor, view your test results, renew your prescriptions, schedule appointments and more. To sign up, go to [email or web address] . Click on \"Log in\" on the left side of the screen, which will take you to the Welcome page. Then click on \"Sign up Now\" on the right side of the page.     You will be asked to enter the access code listed below, as well as some personal information. Please follow the directions to create your username and password.     Your access code is: [email or web address]  Expires: 10/11/2017  3:47 PM     Your access code will  in 90 days. If you need help or a new code, please call your [facility] or [phone].        Care EveryWhere ID     This is your Care EveryWhere ID. This could be used by other " organizations to access your [place] medical records  [id number]        Your Vitals Were     Last Period                   10/01/2003            Blood Pressure from Last 3 Encounters:   08/07/17 154/86   07/29/17 158/88   07/22/17 142/86    Weight from Last 3 Encounters:   07/14/17 168 lb (76.2 kg)   07/13/17 168 lb 8 oz (76.4 kg)   03/12/17 164 lb (74.4 kg)              Today, you had the following     No orders found for display       Primary Care Provider Office Phone # Fax #    Viri Savage [phone]       [facility] [address] [facility] [address]  [facility] [address]        Equal Access to Services     LEW HANSON : Hadii alma morriso Soobdulio, waaxda luqadaha, qaybta kaalmada adeegyada, jhoana bangura. So [facility] [phone].    ATENCIÓN: Si habla español, tiene a arguelles disposición servicios gratuitos de asistencia lingüística. Llame al [phone].    We comply with applicable federal civil rights laws and [place] laws. We do not discriminate on the basis of race, color, national origin, age, disability sex, sexual orientation or gender identity.            Thank you!     Thank you for choosing [facility]  for your care. Our goal is always to provide you with excellent care. Hearing back from our patients is one way we can continue to improve our services. Please take a few minutes to complete the written survey that you may receive in the mail after your visit with us. Thank you!             Your Updated Medication List - Protect others around you: Learn how to safely use, store and throw away your medicines at www.disposemymeds.org.          This list is accurate as of: 8/7/17  2:02 PM.  Always use your most recent med list.                   Brand Name Dispense Instructions for use Diagnosis    ASPIRIN PO      Take 81 mg by mouth daily        celecoxib 200 MG capsule    celeBREX    60 capsule    Take 1 capsule (200 mg) by mouth daily     Status post total replacement of right hip       nystatin-triamcinolone ointment    MYCOLOG    15 g    Apply topically 2 times daily    Vaginitis and vulvovaginitis       triamterene-hydrochlorothiazide 37.5-25 MG per tablet    MAXZIDE-25    90 tablet    Take 1 tablet by mouth daily    Benign essential hypertension

## 2017-08-25 ENCOUNTER — ALLIED HEALTH/NURSE VISIT (OUTPATIENT)
Dept: INTERNAL MEDICINE | Facility: CLINIC | Age: 62
End: 2017-08-25
Payer: COMMERCIAL

## 2017-08-25 VITALS — SYSTOLIC BLOOD PRESSURE: 144 MMHG | DIASTOLIC BLOOD PRESSURE: 86 MMHG

## 2017-08-25 DIAGNOSIS — Z01.30 BP CHECK: Primary | ICD-10-CM

## 2017-08-25 PROCEDURE — 99207 ZZC NO CHARGE NURSE ONLY: CPT | Performed by: INTERNAL MEDICINE

## 2017-08-25 NOTE — PROGRESS NOTES
Krystyna Peña is enrolled/participating in the retail pharmacy Blood Pressure Goals Achievement Program (BPGAP).  Krystyna Peña was evaluated at [facility] on August 25, 2017 at which time her blood pressure was:    BP Readings from Last 3 Encounters:   08/25/17 144/86   08/07/17 154/86   07/29/17 158/88     Reviewed lifestyle modifications for blood pressure control and reduction: including making healthy food choices, managing weight, getting regular exercise, smoking cessation, reducing alcohol consumption, monitoring blood pressure regularly.     Krystyna Peña is not experiencing symptoms.    Follow-Up: BP is not at goal of < 140/90mmHg (patient 18+ years of age with or without diabetes), Recommended follow-up with PCP.  Routing to PCP for further review.        Completed by: Samanta [facility]ford [facility]   [phone]

## 2017-08-25 NOTE — MR AVS SNAPSHOT
"              After Visit Summary   2017    Krystyna Peña    MRN: [id number]           Patient Information     Date Of Birth          1955        Visit Information        Provider Department      2017 10:20 AM Viri Savage MD [facility]        Today's Diagnoses     BP check    -  1       Follow-ups after your visit        Who to contact     If you have questions or need follow up information about today's clinic visit or your schedule please contact [facility] directly at [phone].  Normal or non-critical lab and imaging results will be communicated to you by MyChart, letter or phone within 4 business days after the clinic has received the results. If you do not hear from us within 7 days, please contact the clinic through Gold Capitalhart or phone. If you have a critical or abnormal lab result, we will notify you by phone as soon as possible.  Submit refill requests through MyCube or call your pharmacy and they will forward the refill request to us. Please allow 3 business days for your refill to be completed.          Additional Information About Your Visit        MyChart Information     MyCube lets you send messages to your doctor, view your test results, renew your prescriptions, schedule appointments and more. To sign up, go to [email or web address] . Click on \"Log in\" on the left side of the screen, which will take you to the Welcome page. Then click on \"Sign up Now\" on the right side of the page.     You will be asked to enter the access code listed below, as well as some personal information. Please follow the directions to create your username and password.     Your access code is: [email or web address]  Expires: 10/11/2017  3:47 PM     Your access code will  in 90 days. If you need help or a new code, please call your [facility] or [phone].        Care EveryWhere ID     This is your Care EveryWhere ID. This could be used by other organizations to " access your [place] medical records  [id number]        Your Vitals Were     Last Period                   10/01/2003            Blood Pressure from Last 3 Encounters:   08/25/17 144/86   08/07/17 154/86   07/29/17 158/88    Weight from Last 3 Encounters:   07/14/17 168 lb (76.2 kg)   07/13/17 168 lb 8 oz (76.4 kg)   03/12/17 164 lb (74.4 kg)              Today, you had the following     No orders found for display       Primary Care Provider Office Phone # Fax #    Viri Savage [phone]       [address] [facility] [address]  [facility] [address]        Equal Access to Services     ARMIDA [facility] : Hadii alma morriso Soobdulio, wabuckda silvano, qaybta kaalmada adekielyada, jhoana bustamante . So [facility] [phone].    ATENCIÓN: Si habla español, tiene a arguelles disposición servicios gratuitos de asistencia lingüística. Llame al [phone].    We comply with applicable federal civil rights laws and [place] laws. We do not discriminate on the basis of race, color, national origin, age, disability sex, sexual orientation or gender identity.            Thank you!     Thank you for choosing [facility]  for your care. Our goal is always to provide you with excellent care. Hearing back from our patients is one way we can continue to improve our services. Please take a few minutes to complete the written survey that you may receive in the mail after your visit with us. Thank you!             Your Updated Medication List - Protect others around you: Learn how to safely use, store and throw away your medicines at www.disposemymeds.org.          This list is accurate as of: 8/25/17 10:25 AM.  Always use your most recent med list.                   Brand Name Dispense Instructions for use Diagnosis    ASPIRIN PO      Take 81 mg by mouth daily        celecoxib 200 MG capsule    celeBREX    60 capsule    Take 1 capsule (200 mg) by mouth daily    Status post total replacement of  right hip       nystatin-triamcinolone ointment    MYCOLOG    15 g    Apply topically 2 times daily    Vaginitis and vulvovaginitis       triamterene-hydrochlorothiazide 37.5-25 MG per tablet    MAXZIDE-25    90 tablet    Take 1 tablet by mouth daily    Benign essential hypertension

## 2017-10-06 ENCOUNTER — ALLIED HEALTH/NURSE VISIT (OUTPATIENT)
Dept: INTERNAL MEDICINE | Facility: CLINIC | Age: 62
End: 2017-10-06
Payer: COMMERCIAL

## 2017-10-06 VITALS — SYSTOLIC BLOOD PRESSURE: 148 MMHG | DIASTOLIC BLOOD PRESSURE: 84 MMHG

## 2017-10-06 DIAGNOSIS — I10 BENIGN ESSENTIAL HYPERTENSION: Primary | ICD-10-CM

## 2017-10-06 PROCEDURE — 99207 ZZC NO CHARGE NURSE ONLY: CPT | Performed by: INTERNAL MEDICINE

## 2017-10-06 NOTE — MR AVS SNAPSHOT
"              After Visit Summary   10/6/2017    Krystyna Peña    MRN: [id number]           Patient Information     Date Of Birth          1955        Visit Information        Provider Department      10/6/2017 12:14 PM Viri Savage MD [facility]        Today's Diagnoses     Benign essential hypertension    -  1       Follow-ups after your visit        Who to contact     If you have questions or need follow up information about today's clinic visit or your schedule please contact [facility] directly at [phone].  Normal or non-critical lab and imaging results will be communicated to you by MyChart, letter or phone within 4 business days after the clinic has received the results. If you do not hear from us within 7 days, please contact the clinic through Medalliahart or phone. If you have a critical or abnormal lab result, we will notify you by phone as soon as possible.  Submit refill requests through [email or web address] or call your pharmacy and they will forward the refill request to us. Please allow 3 business days for your refill to be completed.          Additional Information About Your Visit        MyChart Information     [email or web address] lets you send messages to your doctor, view your test results, renew your prescriptions, schedule appointments and more. To sign up, go to [email or web address] . Click on \"Log in\" on the left side of the screen, which will take you to the Welcome page. Then click on \"Sign up Now\" on the right side of the page.     You will be asked to enter the access code listed below, as well as some personal information. Please follow the directions to create your username and password.     Your access code is: [email or web address]  Expires: 10/11/2017  3:47 PM     Your access code will  in 90 days. If you need help or a new code, please call your [facility]] or [phone].        Care EveryWhere ID     This is your Care EveryWhere ID. This could be used by " other organizations to access your [place] medical records  [id number]        Your Vitals Were     Last Period                   10/01/2003            Blood Pressure from Last 3 Encounters:   10/06/17 148/84   08/25/17 144/86   08/07/17 154/86    Weight from Last 3 Encounters:   07/14/17 168 lb (76.2 kg)   07/13/17 168 lb 8 oz (76.4 kg)   03/12/17 164 lb (74.4 kg)              Today, you had the following     No orders found for display       Primary Care Provider Office Phone # Fax #    Viri Savage [phone]       [address] [facility] [address]  [facility] [address]        Equal Access to Services     LEW HANSON : Hadii alma rahman Soobdulio, wajm schaefer, qaybta kaalmada adekielyabaudilio, jhoana bustamante . So [facility] [phone].    ATENCIÓN: Si habla español, tiene a arguelles disposición servicios gratuitos de asistencia lingüística. Llame al [phone].    We comply with applicable federal civil rights laws and [place] laws. We do not discriminate on the basis of race, color, national origin, age, disability, sex, sexual orientation, or gender identity.            Thank you!     Thank you for choosing [facility]  for your care. Our goal is always to provide you with excellent care. Hearing back from our patients is one way we can continue to improve our services. Please take a few minutes to complete the written survey that you may receive in the mail after your visit with us. Thank you!             Your Updated Medication List - Protect others around you: Learn how to safely use, store and throw away your medicines at www.disposemymeds.org.          This list is accurate as of: 10/6/17 12:17 PM.  Always use your most recent med list.                   Brand Name Dispense Instructions for use Diagnosis    ASPIRIN PO      Take 81 mg by mouth daily        celecoxib 200 MG capsule    celeBREX    60 capsule    Take 1 capsule (200 mg) by mouth daily    Status post  total replacement of right hip       nystatin-triamcinolone ointment    MYCOLOG    15 g    Apply topically 2 times daily    Vaginitis and vulvovaginitis       triamterene-hydrochlorothiazide 37.5-25 MG per tablet    MAXZIDE-25    90 tablet    Take 1 tablet by mouth daily    Benign essential hypertension

## 2017-10-06 NOTE — PROGRESS NOTES
Krystyna Peña is enrolled/participating in the retail pharmacy Blood Pressure Goals Achievement Program (BPGAP).  Krystyna Peña was evaluated at [facility] on October 6, 2017 at which time her blood pressure was:    BP Readings from Last 3 Encounters:   10/06/17 148/84   08/25/17 144/86   08/07/17 154/86     Reviewed lifestyle modifications for blood pressure control and reduction: including making healthy food choices, managing weight, getting regular exercise, smoking cessation, reducing alcohol consumption, monitoring blood pressure regularly.     Krystyna Peña is not experiencing symptoms.    Follow-Up: BP is not at goal of < 140/90mmHg (patient 18+ years of age with or without diabetes), Recommended follow-up in 1 month at the pharmacy.     Recommendation to Provider:     Krystyna Peña was evaluated for enrollment into the PGEN study today.    Patient eligible for enrollment:  Unknown  Patient interested in enrollment:  Unknown    Completed by: Lindy RICEPh.  [facility]   [phone]

## 2017-10-13 ENCOUNTER — OFFICE VISIT (OUTPATIENT)
Dept: INTERNAL MEDICINE | Facility: CLINIC | Age: 62
End: 2017-10-13
Payer: COMMERCIAL

## 2017-10-13 ENCOUNTER — TELEPHONE (OUTPATIENT)
Dept: INTERNAL MEDICINE | Facility: CLINIC | Age: 62
End: 2017-10-13

## 2017-10-13 ENCOUNTER — HOSPITAL ENCOUNTER (OUTPATIENT)
Dept: ULTRASOUND IMAGING | Facility: CLINIC | Age: 62
Discharge: HOME | End: 2017-10-13
Attending: FAMILY MEDICINE | Admitting: FAMILY MEDICINE
Payer: COMMERCIAL

## 2017-10-13 VITALS
BODY MASS INDEX: 29.87 KG/M2 | DIASTOLIC BLOOD PRESSURE: 84 MMHG | WEIGHT: 168.6 LBS | SYSTOLIC BLOOD PRESSURE: 148 MMHG | HEART RATE: 96 BPM | TEMPERATURE: 98.9 F | OXYGEN SATURATION: 96 %

## 2017-10-13 DIAGNOSIS — R22.1 NECK SWELLING: Primary | ICD-10-CM

## 2017-10-13 DIAGNOSIS — R22.1 NECK SWELLING: ICD-10-CM

## 2017-10-13 PROCEDURE — 76536 US EXAM OF HEAD AND NECK: CPT

## 2017-10-13 PROCEDURE — 99214 OFFICE O/P EST MOD 30 MIN: CPT | Performed by: FAMILY MEDICINE

## 2017-10-13 NOTE — NURSING NOTE
"Chief Complaint   Patient presents with     Mass     Pt has a lump on Rt side of her neck. Noticed it 10/11/17       Initial /84  Pulse 96  Temp 98.9  F (37.2  C) (Oral)  Wt 168 lb 9.6 oz (76.5 kg)  LMP 10/01/2003  SpO2 96%  BMI 29.87 kg/m2 Estimated body mass index is 29.87 kg/(m^2) as calculated from the following:    Height as of 10/12/16: 5' 3\" (1.6 m).    Weight as of this encounter: 168 lb 9.6 oz (76.5 kg).  Medication Reconciliation: complete   Ten Robles MA    "

## 2017-10-13 NOTE — PROGRESS NOTES
SUBJECTIVE:   Krystyna Peña is a 62 year old female who presents to clinic today for the following health issues:    Patient noted neck swelling on the R and some odd sensation of fullness in the area with swallowing.   Former smoker.  Been present for a week or so.   Not better with liquids or solids.   No ongoing dyspepsia or dysphagia.   No fever or URI sx and no sore throat.       ROS:  General, neuro, sleep, psych, musculoskeletal system otherwise negative.    GENERAL: healthy, alert and no distress  EYES: Eyes grossly normal to inspection, PERRL and conjunctivae and sclerae normal  HENT: ear canals and TM's normal, nose and mouth without ulcers or lesions  NECK: no adenopathy, no asymmetry, large masses, or scars and thyroid normal to palpation  Some mild tenderness R anterior neck  RESP: lungs clear to auscultation - no rales, rhonchi or wheezes  CV: regular rate and rhythm, normal S1 S2, no S3 or S4, no murmur, click or rub, no peripheral edema and peripheral pulses strong  MS: no gross musculoskeletal defects noted, no edema  SKIN: no suspicious lesions or rashes    ASSESSMENT:  1. Neck swelling  given smoking hx and swallowing issue, will start with USD    Discussed need for possible CT in the future.   Moderate risk  - US Head Neck Soft Tissue; Future

## 2017-10-13 NOTE — MR AVS SNAPSHOT
"              After Visit Summary   10/13/2017    Krystyna Peña    MRN: [id number]           Patient Information     Date Of Birth          1955        Visit Information        Provider Department      10/13/2017 10:40 AM Leonid Reyes MD [facility]        Today's Diagnoses     Neck swelling    -  1       Follow-ups after your visit        Future tests that were ordered for you today     Open Future Orders        Priority Expected Expires Ordered    US Head Neck Soft Tissue Routine  10/13/2018 10/13/2017            Who to contact     If you have questions or need follow up information about today's clinic visit or your schedule please contact [facility] directly at [phone].  Normal or non-critical lab and imaging results will be communicated to you by MyChart, letter or phone within 4 business days after the clinic has received the results. If you do not hear from us within 7 days, please contact the clinic through MyChart or phone. If you have a critical or abnormal lab result, we will notify you by phone as soon as possible.  Submit refill requests through Vdopia or call your pharmacy and they will forward the refill request to us. Please allow 3 business days for your refill to be completed.          Additional Information About Your Visit        MyChart Information     Vdopia lets you send messages to your doctor, view your test results, renew your prescriptions, schedule appointments and more. To sign up, go to [email or web address] . Click on \"Log in\" on the left side of the screen, which will take you to the Welcome page. Then click on \"Sign up Now\" on the right side of the page.     You will be asked to enter the access code listed below, as well as some personal information. Please follow the directions to create your username and password.     Your access code is: [email or web address]  Expires: 2018 10:47 AM     Your access code will  in 90 days. " If you need help or a new code, please call your [place] clinic or [phone].        Care EveryWhere ID     This is your Care EveryWhere ID. This could be used by other organizations to access your [place] medical records  [id number]        Your Vitals Were     Pulse Temperature Last Period Pulse Oximetry BMI (Body Mass Index)       96 98.9  F (37.2  C) (Oral) 10/01/2003 96% 29.87 kg/m2        Blood Pressure from Last 3 Encounters:   10/13/17 148/84   10/06/17 148/84   08/25/17 144/86    Weight from Last 3 Encounters:   10/13/17 168 lb 9.6 oz (76.5 kg)   07/14/17 168 lb (76.2 kg)   07/13/17 168 lb 8 oz (76.4 kg)               Primary Care Provider Office Phone # Fax #    Viri Savage [phone]       [address] [facility] [address]  [facility] [address]        Equal Access to Services     [facility]W [facility] : Hadii aad ku hadasho Soomaali, waaxda luqadaha, qaybta kaalmada adeegyada, waxay idiin hayaan adeeg kharabecky laldea . So [facility] [phone].    ATENCIÓN: Si habla español, tiene a arguelles disposición servicios gratuitos de asistencia lingüística. Juan al [phone].    We comply with applicable federal civil rights laws and [place] laws. We do not discriminate on the basis of race, color, national origin, age, disability, sex, sexual orientation, or gender identity.            Thank you!     Thank you for choosing [facility]  for your care. Our goal is always to provide you with excellent care. Hearing back from our patients is one way we can continue to improve our services. Please take a few minutes to complete the written survey that you may receive in the mail after your visit with us. Thank you!             Your Updated Medication List - Protect others around you: Learn how to safely use, store and throw away your medicines at www.disposemymeds.org.          This list is accurate as of: 10/13/17 10:47 AM.  Always use your most recent med list.                   Brand Name Dispense  Instructions for use Diagnosis    ASPIRIN PO      Take 81 mg by mouth daily        celecoxib 200 MG capsule    celeBREX    60 capsule    Take 1 capsule (200 mg) by mouth daily    Status post total replacement of right hip       nystatin-triamcinolone ointment    MYCOLOG    15 g    Apply topically 2 times daily    Vaginitis and vulvovaginitis       triamterene-hydrochlorothiazide 37.5-25 MG per tablet    MAXZIDE-25    90 tablet    Take 1 tablet by mouth daily    Benign essential hypertension

## 2017-10-17 ENCOUNTER — TELEPHONE (OUTPATIENT)
Dept: INTERNAL MEDICINE | Facility: CLINIC | Age: 62
End: 2017-10-17

## 2017-10-17 NOTE — TELEPHONE ENCOUNTER
Pt calls, states she wants 2nd opinion regarding lump on neck that was evaluated by Dr. Reyes on 10/13. US done 10/13. Indicates CT was discussed, but would like PCP to evaluate before deciding if this is what she wants to do.    Denies change in symptoms since 10/13 appt. Scheduled appt for 10/23. Instruct pt to call clinic if symptoms worsening. Verbalized understanding.

## 2017-10-23 ENCOUNTER — OFFICE VISIT (OUTPATIENT)
Dept: INTERNAL MEDICINE | Facility: CLINIC | Age: 62
End: 2017-10-23
Payer: COMMERCIAL

## 2017-10-23 VITALS
HEART RATE: 101 BPM | SYSTOLIC BLOOD PRESSURE: 150 MMHG | WEIGHT: 169.3 LBS | HEIGHT: 63 IN | RESPIRATION RATE: 20 BRPM | BODY MASS INDEX: 30 KG/M2 | DIASTOLIC BLOOD PRESSURE: 70 MMHG | OXYGEN SATURATION: 98 %

## 2017-10-23 DIAGNOSIS — R68.89 ABNORMAL NECK FINDING: Primary | ICD-10-CM

## 2017-10-23 PROCEDURE — 99213 OFFICE O/P EST LOW 20 MIN: CPT | Performed by: INTERNAL MEDICINE

## 2017-10-23 ASSESSMENT — PAIN SCALES - GENERAL: PAINLEVEL: NO PAIN (0)

## 2017-10-23 NOTE — PROGRESS NOTES
"  SUBJECTIVE:   Krystyna Peña is a 62 year old female who presents to clinic today for the following health issues:    Follow up ultrasound of neck:   She had noted some asymmetry of anterior neck a few weeks ago. She had first felt some discomfort when swallowing, like something was pushing on the right upper throat. When she looked in the mirror it looked asymmetric, like there was a lump.   She was seen here on 10/13 and no nodule was felt. US was done that showed no abnormality. It was suggested to consider CT but she wanted me to check first.     She has not had any further discomfort with swallowing. She does not feel anything hard.       Patient Active Problem List   Diagnosis     Asymptomatic varicose veins     CARDIOVASCULAR SCREENING; LDL GOAL LESS THAN 160     Back pain     Urinary frequency     Benign essential hypertension     Advanced directives, counseling/discussion     Edema, unspecified type     Current Outpatient Prescriptions   Medication Sig Dispense Refill     triamterene-hydrochlorothiazide (MAXZIDE-25) 37.5-25 MG per tablet Take 1 tablet by mouth daily 90 tablet 1     ASPIRIN PO Take 81 mg by mouth daily       celecoxib (CELEBREX) 200 MG capsule Take 1 capsule (200 mg) by mouth daily 60 capsule 1     nystatin-triamcinolone (MYCOLOG) ointment Apply topically 2 times daily (Patient not taking: Reported on 3/12/2017) 15 g 1      Social History   Substance Use Topics     Smoking status: Former Smoker     Packs/day: 1.00     Types: Cigarettes, Cigars     Quit date: 4/1/2013     Smokeless tobacco: Never Used     Alcohol use Yes      Comment: 1 per day          Reviewed and updated as needed this visit by clinical staff  Tobacco  Allergies       Reviewed and updated as needed this visit by Provider         ROS:  negative    OBJECTIVE:     /70 (BP Location: Left arm, Patient Position: Sitting, Cuff Size: Adult Regular)  Pulse 101  Resp 20  Ht 5' 3\" (1.6 m)  Wt 169 lb 4.8 oz (76.8 kg)  " LMP 10/01/2003  SpO2 98%  Breastfeeding? No  BMI 29.99 kg/m2  Body mass index is 29.99 kg/(m^2).    Neck: parotid gland right without enlargement, nodules or tenderness, no adenopathy present. There is very mild asymmetry submandibular gland right compared to left, not tender, no discrete nodules. SCM muscle appears slightly more prominent right, some more loose skin along lower jaw on right.         ASSESSMENT/PLAN:             1. Abnormal neck finding  Advised that I do not feel any masses or nodules and since Dr. Reyes did not feel anything abnormal and the US was normal, I think this is some soft tissue asymmetry. She is comfortable monitoring. If she notices any changes externally would order the CT. If she develops any more symptoms in the throat, can refer ENT for internal exam.           Viri Savage MD  [facility]

## 2017-10-23 NOTE — NURSING NOTE
"Chief Complaint   Patient presents with     Consult     second opinion on mass on neck and results from US on 10.13.17       Initial /70 (BP Location: Left arm, Patient Position: Sitting, Cuff Size: Adult Regular)  Pulse 101  Resp 20  Ht 5' 3\" (1.6 m)  Wt 169 lb 4.8 oz (76.8 kg)  LMP 10/01/2003  SpO2 98%  Breastfeeding? No  BMI 29.99 kg/m2 Estimated body mass index is 29.99 kg/(m^2) as calculated from the following:    Height as of this encounter: 5' 3\" (1.6 m).    Weight as of this encounter: 169 lb 4.8 oz (76.8 kg).  Medication Reconciliation: complete   Jaylin King, Medical Assistant      "

## 2017-10-23 NOTE — MR AVS SNAPSHOT
"              After Visit Summary   10/23/2017    Krystyna Peña    MRN: [id number]           Patient Information     Date Of Birth          1955        Visit Information        Provider Department      10/23/2017 3:20 PM Viri Savage MD [facility]        Today's Diagnoses     Abnormal neck finding    -  1       Follow-ups after your visit        Who to contact     If you have questions or need follow up information about today's clinic visit or your schedule please contact [facility] directly at [phone].  Normal or non-critical lab and imaging results will be communicated to you by MyChart, letter or phone within 4 business days after the clinic has received the results. If you do not hear from us within 7 days, please contact the clinic through [employer] or phone. If you have a critical or abnormal lab result, we will notify you by phone as soon as possible.  Submit refill requests through [employer] or call your pharmacy and they will forward the refill request to us. Please allow 3 business days for your refill to be completed.          Additional Information About Your Visit        MyChart Information     [employer] lets you send messages to your doctor, view your test results, renew your prescriptions, schedule appointments and more. To sign up, go to [email or web address] . Click on \"Log in\" on the left side of the screen, which will take you to the Welcome page. Then click on \"Sign up Now\" on the right side of the page.     You will be asked to enter the access code listed below, as well as some personal information. Please follow the directions to create your username and password.     Your access code is: [email or web address]  Expires: 2018 10:47 AM     Your access code will  in 90 days. If you need help or a new code, please call your [facility] or [phone].        Care EveryWhere ID     This is your Care EveryWhere ID. This could be used by other " "organizations to access your [place] medical records  [id number]        Your Vitals Were     Pulse Respirations Height Last Period Pulse Oximetry Breastfeeding?    101 20 5' 3\" (1.6 m) 10/01/2003 98% No    BMI (Body Mass Index)                   29.99 kg/m2            Blood Pressure from Last 3 Encounters:   10/23/17 150/70   10/13/17 148/84   10/06/17 148/84    Weight from Last 3 Encounters:   10/23/17 169 lb 4.8 oz (76.8 kg)   10/13/17 168 lb 9.6 oz (76.5 kg)   07/14/17 168 lb (76.2 kg)              Today, you had the following     No orders found for display       Primary Care Provider Office Phone # Fax #    Viri Savage [phone]       [address] [place] [address]        Equal Access to Services     [facility]: Hadii aad ku hadasho Soomaali, waaxda luqadaha, qaybta kaalmada adeegyada, waxay heroin haysulemann darlin bustamante . So [facility] [phone].    ATENCIÓN: Si habla español, tiene a arguelles disposición servicios gratuitos de asistencia lingüística. Juan al [phone].    We comply with applicable federal civil rights laws and [place] laws. We do not discriminate on the basis of race, color, national origin, age, disability, sex, sexual orientation, or gender identity.            Thank you!     Thank you for choosing [facility]  for your care. Our goal is always to provide you with excellent care. Hearing back from our patients is one way we can continue to improve our services. Please take a few minutes to complete the written survey that you may receive in the mail after your visit with us. Thank you!             Your Updated Medication List - Protect others around you: Learn how to safely use, store and throw away your medicines at www.disposemymeds.org.          This list is accurate as of: 10/23/17 11:59 PM.  Always use your most recent med list.                   Brand Name Dispense Instructions for use Diagnosis    ASPIRIN PO      Take 81 mg by " mouth daily        celecoxib 200 MG capsule    celeBREX    60 capsule    Take 1 capsule (200 mg) by mouth daily    Status post total replacement of right hip       nystatin-triamcinolone ointment    MYCOLOG    15 g    Apply topically 2 times daily    Vaginitis and vulvovaginitis       triamterene-hydrochlorothiazide 37.5-25 MG per tablet    MAXZIDE-25    90 tablet    Take 1 tablet by mouth daily    Benign essential hypertension

## 2018-01-20 ENCOUNTER — HEALTH MAINTENANCE LETTER (OUTPATIENT)
Age: 63
End: 2018-01-20

## 2018-01-26 ENCOUNTER — TRANSFERRED RECORDS (OUTPATIENT)
Dept: HEALTH INFORMATION MANAGEMENT | Facility: CLINIC | Age: 63
End: 2018-01-26

## 2018-01-29 ENCOUNTER — HOSPITAL ENCOUNTER (OUTPATIENT)
Dept: GENERAL RADIOLOGY | Facility: CLINIC | Age: 63
Discharge: HOME | End: 2018-01-29
Attending: OTOLARYNGOLOGY | Admitting: OTOLARYNGOLOGY
Payer: COMMERCIAL

## 2018-01-29 DIAGNOSIS — R13.10 DYSPHAGIA, UNSPECIFIED TYPE: ICD-10-CM

## 2018-01-29 DIAGNOSIS — R09.A2 SENSATION OF LUMP IN THROAT: ICD-10-CM

## 2018-01-29 PROCEDURE — 25500045 ZZH RX 255: Performed by: OTOLARYNGOLOGY

## 2018-01-29 PROCEDURE — 74220 X-RAY XM ESOPHAGUS 1CNTRST: CPT

## 2018-01-29 RX ADMIN — ANTACID/ANTIFLATULENT 4 G: 380; 550; 10; 10 GRANULE, EFFERVESCENT ORAL at 15:22

## 2018-01-30 ENCOUNTER — TRANSFERRED RECORDS (OUTPATIENT)
Dept: HEALTH INFORMATION MANAGEMENT | Facility: CLINIC | Age: 63
End: 2018-01-30

## 2018-02-01 ENCOUNTER — TELEPHONE (OUTPATIENT)
Dept: INTERNAL MEDICINE | Facility: CLINIC | Age: 63
End: 2018-02-01

## 2018-02-01 DIAGNOSIS — N64.4 BREAST PAIN: Primary | ICD-10-CM

## 2018-02-01 NOTE — TELEPHONE ENCOUNTER
Is she taking anything new otc? Some otc acid blockers can cause breast tenderness. Was she doing any unusual activity like shoveling, lifting? Sometimes that can make the breasts sore. They will need an order for a diagnostic study when there is breast pain and they can do US if needed. She may want to check with insurance, they sometimes do not cover testing for pain, just for screening.   Send back to me to do order.

## 2018-02-02 NOTE — TELEPHONE ENCOUNTER
Call to pt. She has not started any new OTC. No new lifting, etc.    She has had Diagnostic mammogram before and wants to move forward with the order.

## 2018-02-05 ENCOUNTER — HOSPITAL ENCOUNTER (OUTPATIENT)
Dept: MAMMOGRAPHY | Facility: CLINIC | Age: 63
Discharge: HOME | End: 2018-02-05
Attending: INTERNAL MEDICINE | Admitting: INTERNAL MEDICINE
Payer: COMMERCIAL

## 2018-02-05 DIAGNOSIS — N64.4 BREAST PAIN: ICD-10-CM

## 2018-02-05 PROCEDURE — G0279 TOMOSYNTHESIS, MAMMO: HCPCS

## 2018-02-10 DIAGNOSIS — I10 BENIGN ESSENTIAL HYPERTENSION: ICD-10-CM

## 2018-02-11 DIAGNOSIS — I10 BENIGN ESSENTIAL HYPERTENSION: ICD-10-CM

## 2018-02-15 RX ORDER — TRIAMTERENE/HYDROCHLOROTHIAZID 37.5-25 MG
TABLET ORAL
Qty: 90 TABLET | Refills: 0 | Status: SHIPPED | OUTPATIENT
Start: 2018-02-15 | End: 2018-03-12 | Stop reason: ALTCHOICE

## 2018-02-15 RX ORDER — TRIAMTERENE/HYDROCHLOROTHIAZID 37.5-25 MG
TABLET ORAL
Qty: 90 TABLET | Refills: 0 | OUTPATIENT
Start: 2018-02-15

## 2018-02-15 NOTE — TELEPHONE ENCOUNTER
"Requested Prescriptions   Pending Prescriptions Disp Refills     triamterene-hydrochlorothiazide (MAXZIDE-25) 37.5-25 MG per tablet [Pharmacy Med Name: TRIAMTERENE 37.5MG/ HCTZ  25MG TABS] 90 tablet 0     Sig: TAKE 1 TABLET BY MOUTH DAILY    Diuretics (Including Combos) Protocol Failed    2/10/2018  9:29 AM       Failed - Blood pressure under 140/90 in past 12 months    BP Readings from Last 3 Encounters:   10/23/17 150/70   10/13/17 148/84   10/06/17 148/84                Failed - Normal serum creatinine on file in past 12 months    Recent Labs   Lab Test  07/13/17   1552   CR  1.32*             Passed - Recent or future visit with authorizing provider's specialty    Patient had office visit in the last year or has a visit in the next 30 days with authorizing provider.  See \"Patient Info\" tab in inbasket, or \"Choose Columns\" in Meds & Orders section of the refill encounter.            Passed - Patient is age 18 or older       Passed - No active pregancy on record       Passed - Normal serum potassium on file in past 12 months    Recent Labs   Lab Test  07/13/17   1552   POTASSIUM  3.9                   Passed - Normal serum sodium on file in past 12 months    Recent Labs   Lab Test  07/13/17   1552   NA  139             Passed - No positive pregnancy test in past 12 months        Routing refill request to provider for review/approval because:  out of range:  Blood pressure        "

## 2018-03-01 ENCOUNTER — OFFICE VISIT (OUTPATIENT)
Dept: INTERNAL MEDICINE | Facility: CLINIC | Age: 63
End: 2018-03-01
Payer: COMMERCIAL

## 2018-03-01 VITALS
HEART RATE: 72 BPM | BODY MASS INDEX: 29.95 KG/M2 | SYSTOLIC BLOOD PRESSURE: 168 MMHG | OXYGEN SATURATION: 98 % | RESPIRATION RATE: 16 BRPM | WEIGHT: 169.1 LBS | DIASTOLIC BLOOD PRESSURE: 102 MMHG | TEMPERATURE: 98.8 F

## 2018-03-01 DIAGNOSIS — I10 BENIGN ESSENTIAL HYPERTENSION: Primary | ICD-10-CM

## 2018-03-01 DIAGNOSIS — B00.9 HERPES SIMPLEX VIRUS INFECTION: ICD-10-CM

## 2018-03-01 PROCEDURE — 99213 OFFICE O/P EST LOW 20 MIN: CPT | Performed by: INTERNAL MEDICINE

## 2018-03-01 RX ORDER — VALACYCLOVIR HYDROCHLORIDE 500 MG/1
500 TABLET, FILM COATED ORAL 2 TIMES DAILY
Qty: 6 TABLET | Refills: 3 | Status: SHIPPED | OUTPATIENT
Start: 2018-03-01 | End: 2018-04-09

## 2018-03-01 NOTE — PATIENT INSTRUCTIONS
Do 2-3 readings in am and 2-3 readings in afternoon over the next 7-10 days and call them into the nurse.     Take the med in afternoon.     Lower salt ideally around 2000 mg.

## 2018-03-01 NOTE — MR AVS SNAPSHOT
"              After Visit Summary   3/1/2018    Krystyna Peña    MRN: [id number]           Patient Information     Date Of Birth          1955        Visit Information        Provider Department      3/1/2018 3:00 PM Viri Savage MD [facility]        Today's Diagnoses     Herpes simplex virus infection    -  1      Care Instructions    Do 2-3 readings in am and 2-3 readings in afternoon over the next 7-10 days and call them into the nurse.     Take the med in afternoon.     Lower salt ideally around 2000 mg.           Follow-ups after your visit        Who to contact     If you have questions or need follow up information about today's clinic visit or your schedule please contact [facility] directly at [phone].  Normal or non-critical lab and imaging results will be communicated to you by [employer], letter or phone within 4 business days after the clinic has received the results. If you do not hear from us within 7 days, please contact the clinic through [employer] or phone. If you have a critical or abnormal lab result, we will notify you by phone as soon as possible.  Submit refill requests through [employer] or call your pharmacy and they will forward the refill request to us. Please allow 3 business days for your refill to be completed.          Additional Information About Your Visit        MyChart Information     [employer] lets you send messages to your doctor, view your test results, renew your prescriptions, schedule appointments and more. To sign up, go to [email or web address] . Click on \"Log in\" on the left side of the screen, which will take you to the Welcome page. Then click on \"Sign up Now\" on the right side of the page.     You will be asked to enter the access code listed below, as well as some personal information. Please follow the directions to create your username and password.     Your access code is: [email or web address]  Expires: 5/30/2018  3:31 PM     Your " access code will  in 90 days. If you need help or a new code, please call your [place] clinic or [phone].        Care EveryWhere ID     This is your Care EveryWhere ID. This could be used by other organizations to access your [place] medical records  [id number]        Your Vitals Were     Pulse Temperature Respirations Last Period Pulse Oximetry BMI (Body Mass Index)    72 98.8  F (37.1  C) (Oral) 16 10/01/2003 98% 29.95 kg/m2       Blood Pressure from Last 3 Encounters:   18 (!) 168/102   10/23/17 150/70   10/13/17 148/84    Weight from Last 3 Encounters:   18 169 lb 1.6 oz (76.7 kg)   10/23/17 169 lb 4.8 oz (76.8 kg)   10/13/17 168 lb 9.6 oz (76.5 kg)              Today, you had the following     No orders found for display         Today's Medication Changes          These changes are accurate as of 3/1/18  3:31 PM.  If you have any questions, ask your nurse or doctor.               Start taking these medicines.        Dose/Directions    valACYclovir 500 MG tablet   Commonly known as:  VALTREX   Used for:  Herpes simplex virus infection   Started by:  Viri Savage MD        Dose:  500 mg   Take 1 tablet (500 mg) by mouth 2 times daily   Quantity:  6 tablet   Refills:  3            Where to get your medicines      [facility] [address] [place] [address] AT [facility] [address]  [address] [place] [address], [facility] [address]     Phone:  [phone]     valACYclovir 500 MG tablet                Primary Care Provider Office Phone # Fax #    Viri Savage [phone]       [address]  [facility] [address]        Equal Access to Services     [facility] : Mariam Breen, wamj luqadaha, qaybta kaalmada yadi, jhoana bangura. So [facility] [phone].    ATENCIÓN: Si habla español, tiene a arguelles disposición servicios gratuitos de asistencia lingüística. Llame al  [phone].    We comply with applicable federal civil rights laws and [place] laws. We do not discriminate on the basis of race, color, national origin, age, disability, sex, sexual orientation, or gender identity.            Thank you!     Thank you for choosing [facility]  for your care. Our goal is always to provide you with excellent care. Hearing back from our patients is one way we can continue to improve our services. Please take a few minutes to complete the written survey that you may receive in the mail after your visit with us. Thank you!             Your Updated Medication List - Protect others around you: Learn how to safely use, store and throw away your medicines at www.disposemymeds.org.          This list is accurate as of 3/1/18  3:31 PM.  Always use your most recent med list.                   Brand Name Dispense Instructions for use Diagnosis    ASPIRIN PO      Take 81 mg by mouth daily        celecoxib 200 MG capsule    celeBREX    60 capsule    Take 1 capsule (200 mg) by mouth daily    Status post total replacement of right hip       triamterene-hydrochlorothiazide 37.5-25 MG per tablet    MAXZIDE-25    90 tablet    TAKE 1 TABLET BY MOUTH DAILY    Benign essential hypertension       valACYclovir 500 MG tablet    VALTREX    6 tablet    Take 1 tablet (500 mg) by mouth 2 times daily    Herpes simplex virus infection

## 2018-03-01 NOTE — PROGRESS NOTES
SUBJECTIVE:   Krystyna Peña is a 62 year old female who presents to clinic today for the following health issues:      Hypertension Follow-up      Outpatient blood pressures are being checked at home.  She did run out of her medications and so started checking her blood pressures, she has been back on them for over a week and her blood pressures been running high.  She had not been doing any checks prior to running out of the medication since her last visit in October.    She has been checking at about 4 in the morning when she gets up with blood pressure readings of 130/92, 125/87, 118/87.  She has checked a few in the mid to late morning which are 142/108, 141/88.  When she checks them in the afternoon fairly soon after getting home, about 3 PM, the blood pressures are 173/102, 159/100, 170/112.  She is using an arm cuff that is new and it has not been calibrated.        Low Salt Diet: not monitoring salt      Amount of exercise or physical activity: very active job but not outside of work    Problems taking medications regularly: No    Medication side effects: none    Diet: regular (no restrictions)          Patient Active Problem List   Diagnosis     Asymptomatic varicose veins     CARDIOVASCULAR SCREENING; LDL GOAL LESS THAN 160     Back pain     Urinary frequency     Benign essential hypertension     Advanced directives, counseling/discussion     Edema, unspecified type       Current Outpatient Prescriptions   Medication Sig Dispense Refill     triamterene-hydrochlorothiazide (MAXZIDE-25) 37.5-25 MG per tablet TAKE 1 TABLET BY MOUTH DAILY 90 tablet 0     ASPIRIN PO Take 81 mg by mouth daily       celecoxib (CELEBREX) 200 MG capsule Take 1 capsule (200 mg) by mouth daily 60 capsule 1         Social History   Substance Use Topics     Smoking status: Former Smoker     Packs/day: 1.00     Types: Cigarettes, Cigars     Quit date: 4/1/2013     Smokeless tobacco: Never Used     Alcohol use Yes      Comment: 1  per day          Reviewed and updated as needed this visit by clinical staff       Reviewed and updated as needed this visit by Provider         ROS:  No headache, dyspnea, chest pain    OBJECTIVE:     BP (!) 168/102  Pulse 72  Temp 98.8  F (37.1  C) (Oral)  Resp 16  Wt 169 lb 1.6 oz (76.7 kg)  LMP 10/01/2003  SpO2 98%  BMI 29.95 kg/m2  Body mass index is 29.95 kg/(m^2).    Not examined.       ASSESSMENT/PLAN:       (I10) Benign essential hypertension  (primary encounter diagnosis)  Comment: She has had some good readings in the morning but high in the afternoon, probably is not under good control in general and has been gradually increasing.  Her last kidney function tests were normal.  Discussed salt, recommend lower salt in diet  Plan: Recommend she take her medication around noon to 1 PM and monitor her readings in the morning and afternoon.  She can call in those readings in 2 weeks.  Advised I will likely add medication at that time, probably lisinopril.  Discussed lisinopril's benefits, side effects, expected benefits.  The future may have her do a nurse check and bring her home machine to make sure it is accurate, but today's reading is very close to what she has been getting at home so suspect it is valid.    (B00.9) Herpes simplex virus infection  Comment:   Plan: valACYclovir (VALTREX) 500 MG tablet        Requests refill.      Viri Savage MD  [facility]      20 minutes spent with the patient, >50% of time spent counseling about blood pressure goals, medications,

## 2018-03-01 NOTE — NURSING NOTE
"Chief Complaint   Patient presents with     Hypertension     LOV 10/23/2017: Medication     Refill Request     acyclovir refill       Initial BP (!) 168/102  Pulse 72  Temp 98.8  F (37.1  C) (Oral)  Resp 16  Wt 169 lb 1.6 oz (76.7 kg)  LMP 10/01/2003  SpO2 98%  BMI 29.95 kg/m2 Estimated body mass index is 29.95 kg/(m^2) as calculated from the following:    Height as of 10/23/17: 5' 3\" (1.6 m).    Weight as of this encounter: 169 lb 1.6 oz (76.7 kg).  Medication Reconciliation: bianca Smith CMA    Discussed Health Maintenance:  Pt is due for pap, have not had one for a long time. Will discuss with Dr Savage if pap is needed.       "

## 2018-03-12 ENCOUNTER — TELEPHONE (OUTPATIENT)
Dept: INTERNAL MEDICINE | Facility: CLINIC | Age: 63
End: 2018-03-12

## 2018-03-12 DIAGNOSIS — I10 BENIGN ESSENTIAL HYPERTENSION: Primary | ICD-10-CM

## 2018-03-12 RX ORDER — LISINOPRIL 10 MG/1
10 TABLET ORAL DAILY
Qty: 30 TABLET | Refills: 1 | Status: SHIPPED | OUTPATIENT
Start: 2018-03-12 | End: 2018-04-10

## 2018-03-12 NOTE — TELEPHONE ENCOUNTER
Calling with BP readings, taking Triamterene/HCTZ 37.5-25 mg at noon rather than 4:30 a.m.  Readings are early AM (4:30 am) and then afternoon (2-3:00 pm).  BAM Maddox R.N.    Date                 AM                            Afternoon  3/2/18     173/108  3/3/18  138/95   179/109  3/4/18  144/99   133/88  3/5/18  121/88   156/103  3/6/18  114/84   163/103  3/7/18  150/100  168/105  3/8/18  111/85   167/106  3/9/18  132/89   161/102  3/10/18 158/107  3/11/18 149/103

## 2018-03-12 NOTE — TELEPHONE ENCOUNTER
Recommend start lisinopril 10 mg daily; start at bedtime for the first 3-4 days, then can move to morning if easier. If BP still over 140/90 in 2 weeks, go up to 2 tabs daily. rx done, update me with some readings before refill needed, if she has increased I can change the dose.

## 2018-03-15 NOTE — TELEPHONE ENCOUNTER
Patient stopped Triamterene-HCTZ and took first dose of Lisinopril 10 mg last evening 6:30 p.m. when going to bed. Woke up at 3:50 a.m., checked BP and it was 81/57, pt felt weak, dizzy and lasted until about 7:00 a.m. She started feeling better.  Took BP at 2:30 p.m. When she got home and it was 138/83.       She gets up at 3:50 a.m. every day and goes to bed 6:30 p.m.  When should she take the Lisinopril?  She won't take it until she hears from clinic.   BAM Maddox R.N.     10/28/17

## 2018-03-15 NOTE — TELEPHONE ENCOUNTER
The low BP is a first dose effect. Suggest she skip it tonight to let the effects of the triamterene HCTZ go away, can cut in half for 2-3 nights and she should not have that problem. She should continue taking before bed until BP more stable.

## 2018-03-20 ENCOUNTER — TELEPHONE (OUTPATIENT)
Dept: INTERNAL MEDICINE | Facility: CLINIC | Age: 63
End: 2018-03-20

## 2018-03-21 ENCOUNTER — TELEPHONE (OUTPATIENT)
Dept: INTERNAL MEDICINE | Facility: CLINIC | Age: 63
End: 2018-03-21

## 2018-03-21 DIAGNOSIS — R60.9 EDEMA, UNSPECIFIED TYPE: Primary | ICD-10-CM

## 2018-03-21 NOTE — TELEPHONE ENCOUNTER
Copied from the other encounter:   She states the stockings she has are prescription compression stockings that she got a few years ago from Cardiology in [place]. She cannot get them pulled on anymore. She recalls getting them at a medical supply store.   She is going to call the supply store to see what size/compression they are.     Pt calls again:  The Stockings are 20-30 mm Hg. Prescribed from Dr Dolan in 2015.   She used [employer].

## 2018-03-22 NOTE — TELEPHONE ENCOUNTER
Pt calls back. She would like the Rx mailed to her directly. Then she will take to the med store herself. Will mail

## 2018-03-22 NOTE — TELEPHONE ENCOUNTER
Printed DME. She could get them from [employer]; can mail to her or fax to [employer].

## 2018-03-28 ENCOUNTER — TELEPHONE (OUTPATIENT)
Dept: INTERNAL MEDICINE | Facility: CLINIC | Age: 63
End: 2018-03-28

## 2018-03-28 DIAGNOSIS — R60.9 EDEMA, UNSPECIFIED TYPE: ICD-10-CM

## 2018-03-28 DIAGNOSIS — I10 BENIGN ESSENTIAL HYPERTENSION: Primary | ICD-10-CM

## 2018-03-28 NOTE — TELEPHONE ENCOUNTER
Pt calls, reports she's had bilat leg swelling since she started lisinopril 03/12. She's frustrated by the tightness from the significant edema in her bilat feet, ankles, and calves. Indicates she was offered [employer] stock[employer] rx, but doesn't want the stockings. She's wondering why she is getting this edema. States her legs were like this when she was on triamterene-HCTZ. She works on her feet all day with [employer] so can't elevate legs t/o the day and her legs are uncomfortable.    Denies redness, weeping, SOB, difficulty breathing.     She went from 5mg to 10mg Lisinopril on Sunday.     BPs:  03/25 - 122/65 (4:32PM)  03/26 - 161/93 (3:50AM)  03/27 - 166/99 (3:52AM)  03/28 - 164/93 (AM), 181/100 (PM)    Pt asking for recommendations to manage edema without stockings and why she's getting this edema. Please advise, thanks.    Pt aware PCP is out of the office today and will return tomorrow. Instruct pt to call if new/worsening symptoms. Verbalized understanding.

## 2018-03-29 NOTE — TELEPHONE ENCOUNTER
The swelling is likely due to vein insufficiency which will cause worsening swelling when standing all day. There is no specific treatment. The diuretic she was on is weak and will not get rid of all the fluid. Stockings or a more potent diuretic would be the ways to manage. I would recommend she come for repeat labs to be sure it is not due to an acute change in kidneys; blood and urine.

## 2018-04-03 DIAGNOSIS — R60.9 EDEMA, UNSPECIFIED TYPE: ICD-10-CM

## 2018-04-03 DIAGNOSIS — I10 BENIGN ESSENTIAL HYPERTENSION: ICD-10-CM

## 2018-04-03 LAB
ANION GAP SERPL CALCULATED.3IONS-SCNC: 7 MMOL/L (ref 3–14)
BUN SERPL-MCNC: 38 MG/DL (ref 7–30)
CALCIUM SERPL-MCNC: 9.5 MG/DL (ref 8.5–10.1)
CHLORIDE SERPL-SCNC: 103 MMOL/L (ref 94–109)
CO2 SERPL-SCNC: 28 MMOL/L (ref 20–32)
CREAT SERPL-MCNC: 1.29 MG/DL (ref 0.52–1.04)
CREAT UR-MCNC: 134 MG/DL
GFR SERPL CREATININE-BSD FRML MDRD: 42 ML/MIN/1.7M2
GLUCOSE SERPL-MCNC: 102 MG/DL (ref 70–99)
MICROALBUMIN UR-MCNC: 32 MG/L
MICROALBUMIN/CREAT UR: 23.66 MG/G CR (ref 0–25)
POTASSIUM SERPL-SCNC: 4.3 MMOL/L (ref 3.4–5.3)
SODIUM SERPL-SCNC: 138 MMOL/L (ref 133–144)

## 2018-04-03 PROCEDURE — 82043 UR ALBUMIN QUANTITATIVE: CPT | Performed by: INTERNAL MEDICINE

## 2018-04-03 PROCEDURE — 36415 COLL VENOUS BLD VENIPUNCTURE: CPT | Performed by: INTERNAL MEDICINE

## 2018-04-03 PROCEDURE — 80048 BASIC METABOLIC PNL TOTAL CA: CPT | Performed by: INTERNAL MEDICINE

## 2018-04-10 ENCOUNTER — OFFICE VISIT (OUTPATIENT)
Dept: INTERNAL MEDICINE | Facility: CLINIC | Age: 63
End: 2018-04-10
Payer: COMMERCIAL

## 2018-04-10 VITALS
HEART RATE: 85 BPM | RESPIRATION RATE: 16 BRPM | TEMPERATURE: 98.4 F | DIASTOLIC BLOOD PRESSURE: 80 MMHG | OXYGEN SATURATION: 96 % | HEIGHT: 63 IN | SYSTOLIC BLOOD PRESSURE: 174 MMHG | BODY MASS INDEX: 30.12 KG/M2 | WEIGHT: 170 LBS

## 2018-04-10 DIAGNOSIS — Z01.818 PRE-OP EXAM: Primary | ICD-10-CM

## 2018-04-10 PROCEDURE — 93000 ELECTROCARDIOGRAM COMPLETE: CPT | Performed by: INTERNAL MEDICINE

## 2018-04-10 PROCEDURE — 99214 OFFICE O/P EST MOD 30 MIN: CPT | Performed by: INTERNAL MEDICINE

## 2018-04-10 RX ORDER — LISINOPRIL 10 MG/1
20 TABLET ORAL DAILY
Qty: 60 TABLET | Refills: 1 | Status: SHIPPED | OUTPATIENT
Start: 2018-04-10 | End: 2018-06-16

## 2018-04-10 NOTE — PROGRESS NOTES
[place] [address]  [facility] [address]  [phone]  Dept: [phone]    PRE-OP EVALUATION:  Today's date: 4/10/2018    Krystyna Peña (: 1955) presents for pre-operative evaluation assessment as requested by Dr. Brush.  She requires evaluation and anesthesia risk assessment prior to undergoing surgery/procedure for treatment of left  3rd toe hammertoe    Fax number for surgical facility: [facility]  Primary Physician: Viri Savage  Type of Anesthesia Anticipated: General    Patient has a Health Care Directive or Living Will:  YES     Preop Questions 4/10/2018   Who is doing your surgery? dr brush   What are you having done? amputation left foot 3rd toe   Date of Surgery/Procedure: 830   Facility or Hospital where procedure/surgery will be performed: [facility]   1.  Do you have a history of Heart attack, stroke, stent, coronary bypass surgery, or other heart surgery? No   2.  Do you ever have any pain or discomfort in your chest? No   3.  Do you have a history of  Heart Failure? No   4.   Are you troubled by shortness of breath when:  walking on a level surface, or up a slight hill, or at night? No   5.  Do you currently have a cold, bronchitis or other respiratory infection? No   6.  Do you have a cough, shortness of breath, or wheezing? No   7.  Do you sometimes get pains in the calves of your legs when you walk? No   8. Do you or anyone in your family have previous history of blood clots? No   9.  Do you or does anyone in your family have a serious bleeding problem such as prolonged bleeding following surgeries or cuts? No   10. Have you ever had problems with anemia or been told to take iron pills? No   11. Have you had any abnormal blood loss such as black, tarry or bloody stools, or abnormal vaginal bleeding? No   12. Have you ever had a blood transfusion? No   13. Have you or any of your relatives ever had problems with anesthesia? No   14. Do  you have sleep apnea, excessive snoring or daytime drowsiness? YES - snoring but no other symptoms of sleep apnea    15. Do you have any prosthetic heart valves? No   16. Do you have prosthetic joints? No   17. Is there any chance that you may be pregnant? No         HPI:     HPI related to upcoming procedure: she has hammertoe causing pain, will have the toe removed      HYPERTENSION - Patient has longstanding history of HTN , currently denies any symptoms referable to elevated blood pressure. Specifically denies chest pain, palpitations, dyspnea, orthopnea, PND or peripheral edema. Blood pressure readings have not been in normal range. Her home readings have been better than the reading here today. Her lisinopril dose has been increased.     MEDICAL HISTORY:     Patient Active Problem List    Diagnosis Date Noted     Edema, unspecified type 07/15/2017     Priority: Medium     Advanced directives, counseling/discussion 10/12/2016     Priority: Medium     Patient states has Advance Directive and will bring in a copy to clinic.         Benign essential hypertension 11/27/2015     Priority: Medium     Urinary frequency 09/21/2014     Priority: Medium     Back pain 03/24/2014     Priority: Medium     CARDIOVASCULAR SCREENING; LDL GOAL LESS THAN 160 10/31/2010     Priority: Medium     Asymptomatic varicose veins 06/24/2003     Priority: Medium      Past Medical History:   Diagnosis Date     Arthritis      Asymptomatic varicose veins      Benign neoplasm of colon 11/06, 7/13    Adenoma     Cardiac arrest (H) 2003    after knee replacement     Gastroesophageal reflux disease      Hypertension      Major depression      Pneumonia, organism unspecified(486) 1986     Past Surgical History:   Procedure Laterality Date     ARTHROPLASTY HIP Right 3/28/2016    Procedure: ARTHROPLASTY HIP;  Surgeon: Perez Meza MD;  Location: [facility] OR     C NONSPECIFIC PROCEDURE  1972    Right arm plastic surgery     C NONSPECIFIC  PROCEDURE  1972    Right knee surgery     C NONSPECIFIC PROCEDURE  10/03    L popliteal AVM repair     C NONSPECIFIC PROCEDURE  11/04    Removal bone spur left foot     C NONSPECIFIC PROCEDURE  4/07    amputation of toes left & right toes     C REPAIR SPIEGELIAN HERNIA  2002     C TOTAL KNEE ARTHROPLASTY  10/03    LT     COLONOSCOPY       HC CORRECT BUNION,SIMPLE  1998    RT     HC CORRECT BUNION,SIMPLE  2001    LT     HC KNEE SCOPE, DIAGNOSTIC      LT     ROTATOR CUFF REPAIR RT/LT  7/07    right     VASCULAR SURGERY  left leg bypass 2004     Current Outpatient Prescriptions   Medication Sig Dispense Refill     omeprazole (PRILOSEC) 20 MG CR capsule Take 1 capsule (20 mg) by mouth daily       lisinopril (PRINIVIL/ZESTRIL) 10 MG tablet Take 2 tablet (10 mg) by mouth daily 30 tablet 1     ASPIRIN PO Take 81 mg by mouth daily       celecoxib (CELEBREX) 200 MG capsule Take 1 capsule (200 mg) by mouth daily 60 capsule 1       OTC products: None, except as noted above    Allergies   Allergen Reactions     Morphine      respiratory distress     Cephalexin Rash      Latex Allergy: NO    Social History   Substance Use Topics     Smoking status: Former Smoker     Packs/day: 1.00     Types: Cigarettes, Cigars     Quit date: 4/1/2013     Smokeless tobacco: Never Used     Alcohol use Yes      Comment: 1 per day     History   Drug Use No       REVIEW OF SYSTEMS:   GENERAL: negative for, fever, chills, weight loss, weight gain  EYES: negative  ENT: negative  RESPIRATORY: No dyspnea on exertion and No cough  CARDIOVASCULAR: negative for, palpitations, tachycardia, irregular heart beat and chest pain  GI: negative for, nausea, vomiting, abdominal pain, melena and hematochezia  : negative for, dysuria and hematuria  MUSCULOSKELETAL: foot pain  NEUROLOGIC: negative for, headaches, seizures, local weakness, numbness or tingling of hands and numbness or tingling of feet  SKIN: negative  ENDOCRINE: negative         EXAM:     Patient is  "alert, oriented, cooperative in no acute distress.  /80 (BP Location: Right arm, Patient Position: Sitting, Cuff Size: Adult Large)  Pulse 85  Temp 98.4  F (36.9  C) (Oral)  Resp 16  Ht 5' 3\" (1.6 m)  Wt 170 lb (77.1 kg)  LMP 10/01/2003  SpO2 96%  BMI 30.11 kg/m2    HEENT: PERRL, EOMI, TM's are normal. Oropharynx is clear.  NECK: No lymphadenopathy or thyromegaly. Carotid pulses full without bruits.  LUNGS: clear  CV: normal S1, S2 without murmur, S3 or S4 present. Pulses are 2/2 throughout. No JVD.  ABDOMEN: Bowel sounds present, nontender without hepatosplenomegaly. Liver is normal size to percussion.  EXTREMITIES: no edema present, unremarkable joints  NEUROLOGIC: Cranial nerves II-XII intact, reflexes 2/4 throughout, strength 5/5, sensation grossly intact, gait normal.  SKIN: without rashes or significant lesions       DIAGNOSTICS:   EKG: appears normal, NSR, normal axis, normal intervals, no acute ST/T changes c/w ischemia, no LVH by voltage criteria, unchanged from previous tracings    Recent Labs   Lab Test  04/03/18   1415  07/13/17   1552  03/30/16   0739  03/29/16   0620  03/22/16   1510   11/17/15   1008  09/17/14   1021   09/12/11   1202   HGB   --    --   11.3*  10.9*  12.8   --   14.4   --    < >  15.9*   PLT   --    --    --    --   291   --   226   --    < >  265   INR   --    --    --    --   0.96   --    --    --    --   0.90   NA  138  139   --   137   --    < >  141  140   < >   --    POTASSIUM  4.3  3.9   --   4.4  3.7   < >  3.8  4.6   < >   --    CR  1.29*  1.32*   --   1.24*  1.38*   < >  0.72  0.92   < >   --    A1C   --    --    --    --    --    --    --   5.4   --    --     < > = values in this interval not displayed.        IMPRESSION:   Reason for surgery/procedure: hammer toe  Diagnosis/reason for consult: preop    The proposed surgical procedure is considered LOW risk.    REVISED CARDIAC RISK INDEX  The patient has the following serious cardiovascular risks for " perioperative complications such as (MI, PE, VFib and 3  AV Block):  No serious cardiac risks  INTERPRETATION: 0 risks: Class I (very low risk - 0.4% complication rate)    The patient has the following additional risks for perioperative complications:  No identified additional risks      ICD-10-CM    1. Pre-op exam Z01.818 EKG 12-lead complete w/read - Clinics   2. Preop general physical exam Z01.818        RECOMMENDATIONS:         She is taking lisinopril at bedtime and will take her dose the night before due to elevated BP.   She will hold prilosec.     APPROVAL GIVEN to proceed with proposed procedure, without further diagnostic evaluation       Signed Electronically by: Viri Savage MD    Copy of this evaluation report is provided to requesting physician.    [facility] Preop Guidelines    Revised Cardiac Risk Index

## 2018-04-10 NOTE — PATIENT INSTRUCTIONS
Clotrimazole cream (brand name Lotrimin).     Take one lisinopril tonight, do not take any in am then take 2 tomorrow night and continue 2 every night.       Before Your Surgery      Call your surgeon if there is any change in your health. This includes signs of a cold or flu (such as a sore throat, runny nose, cough, rash or fever).    Do not smoke, drink alcohol or take over the counter medicine (unless your surgeon or primary care doctor tells you to) for the 24 hours before and after surgery.    If you take prescribed drugs: Follow your doctor s orders about which medicines to take and which to stop until after surgery.    Eating and drinking prior to surgery: follow the instructions from your surgeon    Take a shower or bath the night before surgery. Use the soap your surgeon gave you to gently clean your skin. If you do not have soap from your surgeon, use your regular soap. Do not shave or scrub the surgery site.  Wear clean pajamas and have clean sheets on your bed.

## 2018-04-10 NOTE — NURSING NOTE
"Chief Complaint   Patient presents with     Pre-Op Exam     amputation left foot 3rd toe.       Initial /80 (BP Location: Right arm, Patient Position: Sitting, Cuff Size: Adult Large)  Pulse 85  Temp 98.4  F (36.9  C) (Oral)  Resp 16  Ht 5' 3\" (1.6 m)  Wt 170 lb (77.1 kg)  LMP 10/01/2003  SpO2 96%  BMI 30.11 kg/m2 Estimated body mass index is 30.11 kg/(m^2) as calculated from the following:    Height as of this encounter: 5' 3\" (1.6 m).    Weight as of this encounter: 170 lb (77.1 kg).  Medication Reconciliation: complete    "

## 2018-04-10 NOTE — MR AVS SNAPSHOT
After Visit Summary   4/10/2018    Krystyna Peña    MRN: [id number]           Patient Information     Date Of Birth          1955        Visit Information        Provider Department      4/10/2018 2:40 PM Viri Savage MD [facility]        Today's Diagnoses     Pre-op exam    -  1      Care Instructions    Clotrimazole cream (brand name Lotrimin).     Take one lisinopril tonight, do not take any in am then take 2 tomorrow night and continue 2 every night.       Before Your Surgery      Call your surgeon if there is any change in your health. This includes signs of a cold or flu (such as a sore throat, runny nose, cough, rash or fever).    Do not smoke, drink alcohol or take over the counter medicine (unless your surgeon or primary care doctor tells you to) for the 24 hours before and after surgery.    If you take prescribed drugs: Follow your doctor s orders about which medicines to take and which to stop until after surgery.    Eating and drinking prior to surgery: follow the instructions from your surgeon    Take a shower or bath the night before surgery. Use the soap your surgeon gave you to gently clean your skin. If you do not have soap from your surgeon, use your regular soap. Do not shave or scrub the surgery site.  Wear clean pajamas and have clean sheets on your bed.           Follow-ups after your visit        Your next 10 appointments already scheduled     Apr 12, 2018   Procedure with Herb Michael DPM   [facility] (--)    [address] [facility] [id number] [phone]              Who to contact     If you have questions or need follow up information about today's clinic visit or your schedule please contact [facility] directly at [phone].  Normal or non-critical lab and imaging results will be communicated to you by MyChart, letter or phone within 4 business days after the clinic has received the results. If  "you do not hear from us within 7 days, please contact the clinic through [employer] or phone. If you have a critical or abnormal lab result, we will notify you by phone as soon as possible.  Submit refill requests through [employer] or call your pharmacy and they will forward the refill request to us. Please allow 3 business days for your refill to be completed.          Additional Information About Your Visit        [employer] Information     [employer] lets you send messages to your doctor, view your test results, renew your prescriptions, schedule appointments and more. To sign up, go to [email or web address] . Click on \"Log in\" on the left side of the screen, which will take you to the Welcome page. Then click on \"Sign up Now\" on the right side of the page.     You will be asked to enter the access code listed below, as well as some personal information. Please follow the directions to create your username and password.     Your access code is: [email or web address]  Expires: 2018  4:31 PM     Your access code will  in 90 days. If you need help or a new code, please call your [place] clinic or [phone].        Care EveryWhere ID     This is your Care EveryWhere ID. This could be used by other organizations to access your [place] medical records  [id number]        Your Vitals Were     Pulse Temperature Respirations Height Last Period Pulse Oximetry    85 98.4  F (36.9  C) (Oral) 16 5' 3\" (1.6 m) 10/01/2003 96%    BMI (Body Mass Index)                   30.11 kg/m2            Blood Pressure from Last 3 Encounters:   04/10/18 174/80   18 (!) 168/102   10/23/17 150/70    Weight from Last 3 Encounters:   04/10/18 170 lb (77.1 kg)   18 169 lb 1.6 oz (76.7 kg)   10/23/17 169 lb 4.8 oz (76.8 kg)              We Performed the Following     EKG 12-lead complete w/read - Clinics          Today's Medication Changes          These changes are accurate as of 4/10/18  3:22 PM.  If you have any questions, ask your " nurse or doctor.               These medicines have changed or have updated prescriptions.        Dose/Directions    lisinopril 10 MG tablet   Commonly known as:  PRINIVIL/ZESTRIL   This may have changed:  how much to take   Changed by:  Viri Savage MD        Dose:  20 mg   Take 2 tablets (20 mg) by mouth daily   Quantity:  60 tablet   Refills:  1            Where to get your medicines      These medications were sent to [id number] - [address] AT [facility] [address]  [address] [place] [address] [address], [facility] [address]     Phone:  [phone]     lisinopril 10 MG tablet                Primary Care Provider Office Phone # Fax #    Viri Savage [phone]       [address] [facility] [address]  [facility] [address]        Equal Access to Services     LEW HANSON : Hadii aad ku hadasho Soomaali, waaxda luqadaha, qaybta kaalmada adeegyada, jhoana monahanin haymandeep bustamante . So [facility] [phone].    ATENCIÓN: Si habla español, tiene a arguelles disposición servicios gratuitos de asistencia lingüística. [facility] [phone].    We comply with applicable federal civil rights laws and [place] laws. We do not discriminate on the basis of race, color, national origin, age, disability, sex, sexual orientation, or gender identity.            Thank you!     Thank you for choosing [facility]  for your care. Our goal is always to provide you with excellent care. Hearing back from our patients is one way we can continue to improve our services. Please take a few minutes to complete the written survey that you may receive in the mail after your visit with us. Thank you!             Your Updated Medication List - Protect others around you: Learn how to safely use, store and throw away your medicines at www.disposemymeds.org.          This list is accurate as of 4/10/18  3:22 PM.  Always use your most recent med list.                   Brand Name Dispense Instructions  for use Diagnosis    ASPIRIN PO      Take 81 mg by mouth daily        celecoxib 200 MG capsule    celeBREX    60 capsule    Take 1 capsule (200 mg) by mouth daily    Status post total replacement of right hip       lisinopril 10 MG tablet    PRINIVIL/ZESTRIL    60 tablet    Take 2 tablets (20 mg) by mouth daily        omeprazole 20 MG CR capsule    priLOSEC     Take 1 capsule (20 mg) by mouth daily        order for DME     1 Device    Equipment being ordered: manuel stockings, knee high 20-30 mmHg,    Edema, unspecified type

## 2018-04-11 NOTE — H&P (VIEW-ONLY)
[place] [address]  [facility] [address]  [phone]  Dept: [phone]    PRE-OP EVALUATION:  Today's date: 4/10/2018    Krystyna Peña (: 1955) presents for pre-operative evaluation assessment as requested by Dr. Brush.  She requires evaluation and anesthesia risk assessment prior to undergoing surgery/procedure for treatment of left  3rd toe hammertoe    Fax number for surgical facility: [facility]  Primary Physician: Viri Savage  Type of Anesthesia Anticipated: General    Patient has a Health Care Directive or Living Will:  YES     Preop Questions 4/10/2018   Who is doing your surgery? dr brush   What are you having done? amputation left foot 3rd toe   Date of Surgery/Procedure: 830   Facility or Hospital where procedure/surgery will be performed: [facility]   1.  Do you have a history of Heart attack, stroke, stent, coronary bypass surgery, or other heart surgery? No   2.  Do you ever have any pain or discomfort in your chest? No   3.  Do you have a history of  Heart Failure? No   4.   Are you troubled by shortness of breath when:  walking on a level surface, or up a slight hill, or at night? No   5.  Do you currently have a cold, bronchitis or other respiratory infection? No   6.  Do you have a cough, shortness of breath, or wheezing? No   7.  Do you sometimes get pains in the calves of your legs when you walk? No   8. Do you or anyone in your family have previous history of blood clots? No   9.  Do you or does anyone in your family have a serious bleeding problem such as prolonged bleeding following surgeries or cuts? No   10. Have you ever had problems with anemia or been told to take iron pills? No   11. Have you had any abnormal blood loss such as black, tarry or bloody stools, or abnormal vaginal bleeding? No   12. Have you ever had a blood transfusion? No   13. Have you or any of your relatives ever had problems with anesthesia? No   14. Do  you have sleep apnea, excessive snoring or daytime drowsiness? YES - snoring but no other symptoms of sleep apnea    15. Do you have any prosthetic heart valves? No   16. Do you have prosthetic joints? No   17. Is there any chance that you may be pregnant? No         HPI:     HPI related to upcoming procedure: she has hammertoe causing pain, will have the toe removed      HYPERTENSION - Patient has longstanding history of HTN , currently denies any symptoms referable to elevated blood pressure. Specifically denies chest pain, palpitations, dyspnea, orthopnea, PND or peripheral edema. Blood pressure readings have not been in normal range. Her home readings have been better than the reading here today. Her lisinopril dose has been increased.     MEDICAL HISTORY:     Patient Active Problem List    Diagnosis Date Noted     Edema, unspecified type 07/15/2017     Priority: Medium     Advanced directives, counseling/discussion 10/12/2016     Priority: Medium     Patient states has Advance Directive and will bring in a copy to clinic.         Benign essential hypertension 11/27/2015     Priority: Medium     Urinary frequency 09/21/2014     Priority: Medium     Back pain 03/24/2014     Priority: Medium     CARDIOVASCULAR SCREENING; LDL GOAL LESS THAN 160 10/31/2010     Priority: Medium     Asymptomatic varicose veins 06/24/2003     Priority: Medium      Past Medical History:   Diagnosis Date     Arthritis      Asymptomatic varicose veins      Benign neoplasm of colon 11/06, 7/13    Adenoma     Cardiac arrest (H) 2003    after knee replacement     Gastroesophageal reflux disease      Hypertension      Major depression      Pneumonia, organism unspecified(486) 1986     Past Surgical History:   Procedure Laterality Date     ARTHROPLASTY HIP Right 3/28/2016    Procedure: ARTHROPLASTY HIP;  Surgeon: Perez Meza MD;  Location: [facility] OR     C NONSPECIFIC PROCEDURE  1972    Right arm plastic surgery     C NONSPECIFIC  PROCEDURE  1972    Right knee surgery     C NONSPECIFIC PROCEDURE  10/03    L popliteal AVM repair     C NONSPECIFIC PROCEDURE  11/04    Removal bone spur left foot     C NONSPECIFIC PROCEDURE  4/07    amputation of toes left & right toes     C REPAIR SPIEGELIAN HERNIA  2002     C TOTAL KNEE ARTHROPLASTY  10/03    LT     COLONOSCOPY       HC CORRECT BUNION,SIMPLE  1998    RT     HC CORRECT BUNION,SIMPLE  2001    LT     HC KNEE SCOPE, DIAGNOSTIC      LT     ROTATOR CUFF REPAIR RT/LT  7/07    right     VASCULAR SURGERY  left leg bypass 2004     Current Outpatient Prescriptions   Medication Sig Dispense Refill     omeprazole (PRILOSEC) 20 MG CR capsule Take 1 capsule (20 mg) by mouth daily       lisinopril (PRINIVIL/ZESTRIL) 10 MG tablet Take 2 tablet (10 mg) by mouth daily 30 tablet 1     ASPIRIN PO Take 81 mg by mouth daily       celecoxib (CELEBREX) 200 MG capsule Take 1 capsule (200 mg) by mouth daily 60 capsule 1       OTC products: None, except as noted above    Allergies   Allergen Reactions     Morphine      respiratory distress     Cephalexin Rash      Latex Allergy: NO    Social History   Substance Use Topics     Smoking status: Former Smoker     Packs/day: 1.00     Types: Cigarettes, Cigars     Quit date: 4/1/2013     Smokeless tobacco: Never Used     Alcohol use Yes      Comment: 1 per day     History   Drug Use No       REVIEW OF SYSTEMS:   GENERAL: negative for, fever, chills, weight loss, weight gain  EYES: negative  ENT: negative  RESPIRATORY: No dyspnea on exertion and No cough  CARDIOVASCULAR: negative for, palpitations, tachycardia, irregular heart beat and chest pain  GI: negative for, nausea, vomiting, abdominal pain, melena and hematochezia  : negative for, dysuria and hematuria  MUSCULOSKELETAL: foot pain  NEUROLOGIC: negative for, headaches, seizures, local weakness, numbness or tingling of hands and numbness or tingling of feet  SKIN: negative  ENDOCRINE: negative         EXAM:     Patient is  "alert, oriented, cooperative in no acute distress.  /80 (BP Location: Right arm, Patient Position: Sitting, Cuff Size: Adult Large)  Pulse 85  Temp 98.4  F (36.9  C) (Oral)  Resp 16  Ht 5' 3\" (1.6 m)  Wt 170 lb (77.1 kg)  LMP 10/01/2003  SpO2 96%  BMI 30.11 kg/m2    HEENT: PERRL, EOMI, TM's are normal. Oropharynx is clear.  NECK: No lymphadenopathy or thyromegaly. Carotid pulses full without bruits.  LUNGS: clear  CV: normal S1, S2 without murmur, S3 or S4 present. Pulses are 2/2 throughout. No JVD.  ABDOMEN: Bowel sounds present, nontender without hepatosplenomegaly. Liver is normal size to percussion.  EXTREMITIES: no edema present, unremarkable joints  NEUROLOGIC: Cranial nerves II-XII intact, reflexes 2/4 throughout, strength 5/5, sensation grossly intact, gait normal.  SKIN: without rashes or significant lesions       DIAGNOSTICS:   EKG: appears normal, NSR, normal axis, normal intervals, no acute ST/T changes c/w ischemia, no LVH by voltage criteria, unchanged from previous tracings    Recent Labs   Lab Test  04/03/18   1415  07/13/17   1552  03/30/16   0739  03/29/16   0620  03/22/16   1510   11/17/15   1008  09/17/14   1021   09/12/11   1202   HGB   --    --   11.3*  10.9*  12.8   --   14.4   --    < >  15.9*   PLT   --    --    --    --   291   --   226   --    < >  265   INR   --    --    --    --   0.96   --    --    --    --   0.90   NA  138  139   --   137   --    < >  141  140   < >   --    POTASSIUM  4.3  3.9   --   4.4  3.7   < >  3.8  4.6   < >   --    CR  1.29*  1.32*   --   1.24*  1.38*   < >  0.72  0.92   < >   --    A1C   --    --    --    --    --    --    --   5.4   --    --     < > = values in this interval not displayed.        IMPRESSION:   Reason for surgery/procedure: hammer toe  Diagnosis/reason for consult: preop    The proposed surgical procedure is considered LOW risk.    REVISED CARDIAC RISK INDEX  The patient has the following serious cardiovascular risks for " perioperative complications such as (MI, PE, VFib and 3  AV Block):  No serious cardiac risks  INTERPRETATION: 0 risks: Class I (very low risk - 0.4% complication rate)    The patient has the following additional risks for perioperative complications:  No identified additional risks      ICD-10-CM    1. Pre-op exam Z01.818 EKG 12-lead complete w/read - Clinics   2. Preop general physical exam Z01.818        RECOMMENDATIONS:         She is taking lisinopril at bedtime and will take her dose the night before due to elevated BP.   She will hold prilosec.     APPROVAL GIVEN to proceed with proposed procedure, without further diagnostic evaluation       Signed Electronically by: Viri Savage MD    Copy of this evaluation report is provided to requesting physician.    [facility] Preop Guidelines    Revised Cardiac Risk Index

## 2018-04-12 ENCOUNTER — ANESTHESIA EVENT (OUTPATIENT)
Dept: SURGERY | Facility: CLINIC | Age: 63
End: 2018-04-12
Payer: COMMERCIAL

## 2018-04-12 ENCOUNTER — ANESTHESIA (OUTPATIENT)
Dept: SURGERY | Facility: CLINIC | Age: 63
End: 2018-04-12
Payer: COMMERCIAL

## 2018-04-12 ENCOUNTER — HOSPITAL ENCOUNTER (OUTPATIENT)
Facility: CLINIC | Age: 63
Discharge: HOME | End: 2018-04-12
Attending: PODIATRIST | Admitting: PODIATRIST
Payer: COMMERCIAL

## 2018-04-12 ENCOUNTER — APPOINTMENT (OUTPATIENT)
Dept: GENERAL RADIOLOGY | Facility: CLINIC | Age: 63
End: 2018-04-12
Attending: PODIATRIST
Payer: COMMERCIAL

## 2018-04-12 VITALS
HEIGHT: 63 IN | BODY MASS INDEX: 29.15 KG/M2 | TEMPERATURE: 98.6 F | SYSTOLIC BLOOD PRESSURE: 130 MMHG | WEIGHT: 164.5 LBS | RESPIRATION RATE: 18 BRPM | OXYGEN SATURATION: 98 % | DIASTOLIC BLOOD PRESSURE: 82 MMHG

## 2018-04-12 LAB — COPATH REPORT: NORMAL

## 2018-04-12 PROCEDURE — 73630 X-RAY EXAM OF FOOT: CPT | Mod: LT

## 2018-04-12 PROCEDURE — 25000125 ZZHC RX 250: Performed by: PODIATRIST

## 2018-04-12 PROCEDURE — 36000050 ZZH SURGERY LEVEL 2 1ST 30 MIN: Performed by: PODIATRIST

## 2018-04-12 PROCEDURE — 25000125 ZZHC RX 250: Performed by: NURSE ANESTHETIST, CERTIFIED REGISTERED

## 2018-04-12 PROCEDURE — 37000009 ZZH ANESTHESIA TECHNICAL FEE, EACH ADDTL 15 MIN: Performed by: PODIATRIST

## 2018-04-12 PROCEDURE — 40000306 ZZH STATISTIC PRE PROC ASSESS II: Performed by: PODIATRIST

## 2018-04-12 PROCEDURE — 88305 TISSUE EXAM BY PATHOLOGIST: CPT | Mod: 26 | Performed by: PODIATRIST

## 2018-04-12 PROCEDURE — 27210794 ZZH OR GENERAL SUPPLY STERILE: Performed by: PODIATRIST

## 2018-04-12 PROCEDURE — 25000128 H RX IP 250 OP 636: Performed by: NURSE ANESTHETIST, CERTIFIED REGISTERED

## 2018-04-12 PROCEDURE — 25000128 H RX IP 250 OP 636: Performed by: ANESTHESIOLOGY

## 2018-04-12 PROCEDURE — 88305 TISSUE EXAM BY PATHOLOGIST: CPT | Performed by: PODIATRIST

## 2018-04-12 PROCEDURE — 27210995 ZZH RX 272: Performed by: PODIATRIST

## 2018-04-12 PROCEDURE — 37000008 ZZH ANESTHESIA TECHNICAL FEE, 1ST 30 MIN: Performed by: PODIATRIST

## 2018-04-12 PROCEDURE — 71000027 ZZH RECOVERY PHASE 2 EACH 15 MINS: Performed by: PODIATRIST

## 2018-04-12 PROCEDURE — 36000052 ZZH SURGERY LEVEL 2 EA 15 ADDTL MIN: Performed by: PODIATRIST

## 2018-04-12 RX ORDER — BUPIVACAINE HYDROCHLORIDE 5 MG/ML
INJECTION, SOLUTION EPIDURAL; INTRACAUDAL PRN
Status: DISCONTINUED | OUTPATIENT
Start: 2018-04-12 | End: 2018-04-12 | Stop reason: HOSPADM

## 2018-04-12 RX ORDER — FENTANYL CITRATE 50 UG/ML
INJECTION, SOLUTION INTRAMUSCULAR; INTRAVENOUS PRN
Status: DISCONTINUED | OUTPATIENT
Start: 2018-04-12 | End: 2018-04-12

## 2018-04-12 RX ORDER — MAGNESIUM HYDROXIDE 1200 MG/15ML
LIQUID ORAL PRN
Status: DISCONTINUED | OUTPATIENT
Start: 2018-04-12 | End: 2018-04-12 | Stop reason: HOSPADM

## 2018-04-12 RX ORDER — ONDANSETRON 4 MG/1
4 TABLET, ORALLY DISINTEGRATING ORAL EVERY 30 MIN PRN
Status: DISCONTINUED | OUTPATIENT
Start: 2018-04-12 | End: 2018-04-12 | Stop reason: HOSPADM

## 2018-04-12 RX ORDER — MEPERIDINE HYDROCHLORIDE 25 MG/ML
12.5 INJECTION INTRAMUSCULAR; INTRAVENOUS; SUBCUTANEOUS
Status: DISCONTINUED | OUTPATIENT
Start: 2018-04-12 | End: 2018-04-12 | Stop reason: HOSPADM

## 2018-04-12 RX ORDER — PROPOFOL 10 MG/ML
INJECTION, EMULSION INTRAVENOUS PRN
Status: DISCONTINUED | OUTPATIENT
Start: 2018-04-12 | End: 2018-04-12

## 2018-04-12 RX ORDER — FENTANYL CITRATE 50 UG/ML
25-50 INJECTION, SOLUTION INTRAMUSCULAR; INTRAVENOUS
Status: DISCONTINUED | OUTPATIENT
Start: 2018-04-12 | End: 2018-04-12 | Stop reason: HOSPADM

## 2018-04-12 RX ORDER — PROPOFOL 10 MG/ML
INJECTION, EMULSION INTRAVENOUS CONTINUOUS PRN
Status: DISCONTINUED | OUTPATIENT
Start: 2018-04-12 | End: 2018-04-12

## 2018-04-12 RX ORDER — ONDANSETRON 2 MG/ML
INJECTION INTRAMUSCULAR; INTRAVENOUS PRN
Status: DISCONTINUED | OUTPATIENT
Start: 2018-04-12 | End: 2018-04-12

## 2018-04-12 RX ORDER — HYDROMORPHONE HYDROCHLORIDE 1 MG/ML
.3-.5 INJECTION, SOLUTION INTRAMUSCULAR; INTRAVENOUS; SUBCUTANEOUS EVERY 10 MIN PRN
Status: DISCONTINUED | OUTPATIENT
Start: 2018-04-12 | End: 2018-04-12 | Stop reason: HOSPADM

## 2018-04-12 RX ORDER — ONDANSETRON 2 MG/ML
4 INJECTION INTRAMUSCULAR; INTRAVENOUS EVERY 30 MIN PRN
Status: DISCONTINUED | OUTPATIENT
Start: 2018-04-12 | End: 2018-04-12 | Stop reason: HOSPADM

## 2018-04-12 RX ORDER — SODIUM CHLORIDE, SODIUM LACTATE, POTASSIUM CHLORIDE, CALCIUM CHLORIDE 600; 310; 30; 20 MG/100ML; MG/100ML; MG/100ML; MG/100ML
INJECTION, SOLUTION INTRAVENOUS CONTINUOUS
Status: DISCONTINUED | OUTPATIENT
Start: 2018-04-12 | End: 2018-04-12 | Stop reason: HOSPADM

## 2018-04-12 RX ORDER — LIDOCAINE 40 MG/G
CREAM TOPICAL
Status: DISCONTINUED | OUTPATIENT
Start: 2018-04-12 | End: 2018-04-12 | Stop reason: HOSPADM

## 2018-04-12 RX ORDER — BACITRACIN ZINC 500 [USP'U]/G
OINTMENT TOPICAL PRN
Status: DISCONTINUED | OUTPATIENT
Start: 2018-04-12 | End: 2018-04-12 | Stop reason: HOSPADM

## 2018-04-12 RX ORDER — NALOXONE HYDROCHLORIDE 0.4 MG/ML
.1-.4 INJECTION, SOLUTION INTRAMUSCULAR; INTRAVENOUS; SUBCUTANEOUS
Status: DISCONTINUED | OUTPATIENT
Start: 2018-04-12 | End: 2018-04-12 | Stop reason: HOSPADM

## 2018-04-12 RX ORDER — VANCOMYCIN HYDROCHLORIDE 1 G/200ML
1000 INJECTION, SOLUTION INTRAVENOUS
Status: DISCONTINUED | OUTPATIENT
Start: 2018-04-12 | End: 2018-04-12 | Stop reason: CLARIF

## 2018-04-12 RX ADMIN — PROPOFOL 30 MG: 10 INJECTION, EMULSION INTRAVENOUS at 08:55

## 2018-04-12 RX ADMIN — PROPOFOL 20 MG: 10 INJECTION, EMULSION INTRAVENOUS at 08:58

## 2018-04-12 RX ADMIN — PROPOFOL 100 MCG/KG/MIN: 10 INJECTION, EMULSION INTRAVENOUS at 09:02

## 2018-04-12 RX ADMIN — PROPOFOL 20 MG: 10 INJECTION, EMULSION INTRAVENOUS at 08:54

## 2018-04-12 RX ADMIN — PROPOFOL 20 MG: 10 INJECTION, EMULSION INTRAVENOUS at 08:57

## 2018-04-12 RX ADMIN — VANCOMYCIN HYDROCHLORIDE 1000 MG: 1 INJECTION, POWDER, LYOPHILIZED, FOR SOLUTION INTRAVENOUS at 08:50

## 2018-04-12 RX ADMIN — PROPOFOL 20 MG: 10 INJECTION, EMULSION INTRAVENOUS at 08:56

## 2018-04-12 RX ADMIN — ONDANSETRON 4 MG: 2 INJECTION INTRAMUSCULAR; INTRAVENOUS at 09:21

## 2018-04-12 RX ADMIN — SODIUM CHLORIDE, POTASSIUM CHLORIDE, SODIUM LACTATE AND CALCIUM CHLORIDE: 600; 310; 30; 20 INJECTION, SOLUTION INTRAVENOUS at 09:23

## 2018-04-12 RX ADMIN — MIDAZOLAM 2 MG: 1 INJECTION INTRAMUSCULAR; INTRAVENOUS at 08:50

## 2018-04-12 RX ADMIN — SODIUM CHLORIDE, POTASSIUM CHLORIDE, SODIUM LACTATE AND CALCIUM CHLORIDE: 600; 310; 30; 20 INJECTION, SOLUTION INTRAVENOUS at 08:50

## 2018-04-12 RX ADMIN — FENTANYL CITRATE 50 MCG: 50 INJECTION, SOLUTION INTRAMUSCULAR; INTRAVENOUS at 08:57

## 2018-04-12 RX ADMIN — PROPOFOL 10 MG: 10 INJECTION, EMULSION INTRAVENOUS at 09:12

## 2018-04-12 RX ADMIN — FENTANYL CITRATE 50 MCG: 50 INJECTION, SOLUTION INTRAMUSCULAR; INTRAVENOUS at 08:53

## 2018-04-12 RX ADMIN — MIDAZOLAM 2 MG: 1 INJECTION INTRAMUSCULAR; INTRAVENOUS at 09:14

## 2018-04-12 RX ADMIN — PROPOFOL 20 MG: 10 INJECTION, EMULSION INTRAVENOUS at 09:00

## 2018-04-12 ASSESSMENT — LIFESTYLE VARIABLES: TOBACCO_USE: 0

## 2018-04-12 ASSESSMENT — ENCOUNTER SYMPTOMS: DYSRHYTHMIAS: 0

## 2018-04-12 NOTE — IP AVS SNAPSHOT
MRN:[id number]                      After Visit Summary   4/12/2018    Krystyna Peña    MRN: [id number]           Thank you!     Thank you for choosing [facility] for your care. Our goal is always to provide you with excellent care. Hearing back from our patients is one way we can continue to improve our services. Please take a few minutes to complete the written survey that you may receive in the mail after you visit. If you would like to speak to someone directly about your visit please contact Patient Relations at [phone]. Thank you!          Patient Information     Date Of Birth          1955        About your hospital stay     You were admitted on:  April 12, 2018 You last received care in the:  [facility] PreOP/PostOP    You were discharged on:  April 12, 2018       Who to Call     For medical emergencies, please call 911.  For non-urgent questions about your medical care, please call your primary care provider or clinic, [phone]  For questions related to your surgery, please call your surgery clinic        Attending Provider     Provider Specialty    Herb Michael DPM Podiatry       Primary Care Provider Office Phone # Fax #    Viri Savage [phone]      Your next 10 appointments already scheduled     Apr 12, 2018  9:50 AM CDT   (Arrive by 9:35 AM)   XR FOOT PORT LEFT 3 VIEWS with [id number]   [facility] ([facility]    [address] [facility] [id number] [phone]           Please bring a list of your current medicines to your exam. (Include vitamins, minerals and over-thecounter medicines.) Leave your valuables at home.  Tell your doctor if there is a chance you may be pregnant.  You do not need to do anything special for this exam.              Pending Results     Date and Time Order Name Status Description    4/12/2018 0947 XR Foot Port Left 3 Views In process     4/12/2018 0914 Surgical  "pathology exam In process             Admission Information     Date & Time Provider Department Dept. Phone    2018 Herb Michael, [facility] PreOP/PostOP [phone]      Your Vitals Were     Blood Pressure Temperature Respirations Height Weight Last Period    133/80 99.7  F (37.6  C) (Temporal) 16 1.6 m (5' 3\") 74.6 kg (164 lb 8 oz) 10/01/2003    Pulse Oximetry BMI (Body Mass Index)                98% 29.14 kg/m2          [employer] Information     [employer] lets you send messages to your doctor, view your test results, renew your prescriptions, schedule appointments and more. To sign up, go to [email or web address] . Click on \"Log in\" on the left side of the screen, which will take you to the Welcome page. Then click on \"Sign up Now\" on the right side of the page.     You will be asked to enter the access code listed below, as well as some personal information. Please follow the directions to create your username and password.     Your access code is: [email or web address]  Expires: 2018  4:31 PM     Your access code will  in 90 days. If you need help or a new code, please call your [place] clinic or [phone].        Care EveryWhere ID     This is your Care EveryWhere ID. This could be used by other organizations to access your [place] medical records  [id number]        Equal Access to Services     [facility] VALENTIN AH: Hadii alma ventura hadthompsono Soobdulio, waaxda luqadaha, qaybta kaalmada adekielyada, jhoana bustamante . So [facility] [phone].    ATENCIÓN: Si habla español, tiene a arguelles disposición servicios gratuitos de asistencia lingüística. Llame al [phone].    We comply with applicable federal civil rights laws and [place] laws. We do not discriminate on the basis of race, color, national origin, age, disability, sex, sexual orientation, or gender identity.               Review of your medicines      UNREVIEWED medicines. Ask your doctor about these medicines        Dose / " Directions    ASPIRIN PO        Dose:  81 mg   Take 81 mg by mouth daily   Refills:  0       celecoxib 200 MG capsule   Commonly known as:  celeBREX   Used for:  Status post total replacement of right hip        Dose:  200 mg   Take 1 capsule (200 mg) by mouth daily   Quantity:  60 capsule   Refills:  1       lisinopril 10 MG tablet   Commonly known as:  PRINIVIL/ZESTRIL        Dose:  20 mg   Take 2 tablets (20 mg) by mouth daily   Quantity:  60 tablet   Refills:  1       omeprazole 20 MG CR capsule   Commonly known as:  priLOSEC        Dose:  20 mg   Take 1 capsule (20 mg) by mouth daily   Refills:  0         CONTINUE these medicines which have NOT CHANGED        Dose / Directions    order for DME   Used for:  Edema, unspecified type        Equipment being ordered: manuel stockings, knee high 20-30 mmHg,   Quantity:  1 Device   Refills:  1                Protect others around you: Learn how to safely use, store and throw away your medicines at www.disposemymeds.org.             Medication List: This is a list of all your medications and when to take them. Check marks below indicate your daily home schedule. Keep this list as a reference.      Medications           Morning Afternoon Evening Bedtime As Needed    ASPIRIN PO   Take 81 mg by mouth daily                                celecoxib 200 MG capsule   Commonly known as:  celeBREX   Take 1 capsule (200 mg) by mouth daily                                lisinopril 10 MG tablet   Commonly known as:  PRINIVIL/ZESTRIL   Take 2 tablets (20 mg) by mouth daily                                omeprazole 20 MG CR capsule   Commonly known as:  priLOSEC   Take 1 capsule (20 mg) by mouth daily                                order for DME   Equipment being ordered: manuel stockings, knee high 20-30 mmHg,

## 2018-04-12 NOTE — ANESTHESIA PREPROCEDURE EVALUATION
Anesthesia Evaluation     .             ROS/MED HX    ENT/Pulmonary:      (-) tobacco use, asthma, sleep apnea and Other pulmonary disease   Neurologic:      (-) TIA, Other neuro hx and Dementia   Cardiovascular:     (+) Dyslipidemia, hypertension----. : . . . :. .      (-) CAD, CHF, arrhythmias and pulmonary hypertension   METS/Exercise Tolerance:     Hematologic:        (-) anemia   Musculoskeletal:   (+) arthritis, , , -       GI/Hepatic:     (+) GERD      (-) hepatitis   Renal/Genitourinary:      (-) renal disease   Endo:      (-) Type I DM, Type II DM, thyroid disease, chronic steroid usage, other endocrine disorder and obesity   Psychiatric:     (+) psychiatric history depression      Infectious Disease:  - neg infectious disease ROS       Malignancy:      - no malignancy   Other:    - neg other ROS                 Physical Exam      Airway   Mallampati: II  TM distance: >3 FB  Neck ROM: full    Dental     Cardiovascular   Rhythm and rate: regular and normal  (-) no murmur    Pulmonary    breath sounds clear to auscultation    Other findings: Lab Test        03/30/16     03/29/16     03/22/16     11/17/15     09/16/14      --          09/12/11                       0739          0620          1510          1008          1300           --           1202          WBC           --           --          7.0          4.3          8.9            < >        7.8           HGB          11.3*        10.9*        12.8         14.4         16.2*          < >        15.9*         MCV           --           --          102*         101*         100            < >        100           PLT           --           --          291          226          245            < >        265           INR           --           --          0.96          --           --           --          0.90           < > = values in this interval not displayed.                  Lab Test        04/03/18 07/13/17 03/30/16 03/29/16                        1415          1552          0739          0620          NA           138          139           --          137           POTASSIUM    4.3          3.9           --          4.4           CHLORIDE     103          101           --          103           CO2          28           25            --          30            BUN          38*          24            --          15            CR           1.29*        1.32*         --          1.24*         ANIONGAP     7            13            --          4             YANA          9.5          9.4           --          8.2*          GLC          102*         99           96           95                          Anesthesia Plan      History & Physical Review  History and physical reviewed and following examination; no interval change.    ASA Status:  3 .    NPO Status:  > 8 hours    Plan for MAC with Propofol induction. Reason for MAC:  Deep or markedly invasive procedure (G8)  PONV prophylaxis:  Ondansetron (or other 5HT-3)       Postoperative Care  Postoperative pain management:  IV analgesics and Oral pain medications.      Consents  Anesthetic plan, risks, benefits and alternatives discussed with:  Patient..                          .

## 2018-04-12 NOTE — OP NOTE
Procedure Date: 04/12/2018      DATE OF PROCEDURE:   04/12/2018      PREOPERATIVE DIAGNOSIS:  Painful hammertoe left third digit.      POSTOPERATIVE DIAGNOSIS:  Painful hammertoe left third digit.      PROCEDURE:  Amputation left third digit.      TOURNIQUET TIME: 15 minutes using an ankle tourniquet at a pressure of 250 mmHg.      ESTIMATED BLOOD LOSS:  Less than 2 mL      PREOPERATIVE MEDICATIONS:  Modified Knowles block was performed using a total of 20 mL of 50/50 mixture of 0.5% bupivacaine plain and 1% Xylocaine plain.  The patient also received 1 g vancomycin.  Postoperatively the patient received additional 10 mL of 0.5% bupivacaine plain.      DESCRIPTION OF PROCEDURE:  The patient was brought into the Operating Room and placed on the operating table in supine position.  After adequate anesthesia, the left foot was prepped and draped using the usual sterile and aseptic technique.  The left foot was then exsanguinated with Esmarch bandage and ankle tourniquet was inflated to 250 mmHg.  A pavan incision was then placed around the base of the left third digit.  Sharp and blunt dissection were then performed to expose the flexor and extensor tendons, which were transected as well as the collaterals, resulting in an disarticulation amputation of the left third digit.  The operative site was flushed with copious amounts of sterile saline.  The skin incision was closed with 2-0 Ethilon in simple interrupted suture pattern.  Prior to closure, all bleeders were bovied as deemed necessary.  Bacitracin, Adaptic and dry sterile compression dressings were applied.  The patient tolerated the procedure well and left the Operating Room to Recovery in satisfactory condition.  The patient relates she has oxycodone at home.  Therefore, a prescription was not written.  The patient was given 1 g of vancomycin as she is allergic to cephalexin, as well as morphine.         JOY SABILLON DPM             D: 04/12/2018   T:  2018   MT: IVANA      Name:     JOSHUA REYNOLDS   MRN:      [id number]        Account:        [id number]   :      1955           Procedure Date: 2018      Document: [id number]

## 2018-04-12 NOTE — ANESTHESIA CARE TRANSFER NOTE
Patient: Krystyna Peña    Procedure(s):  amputation of the left third toe - Wound Class: III-Contaminated    Diagnosis:   hammer toe  Diagnosis Additional Information: No value filed.    Anesthesia Type:   MAC     Note:  Airway :Room Air  Patient transferred to:Phase II  Comments: Patients meets criteria for phase 2 recovery. VSS. Report to RN      Vitals: (Last set prior to Anesthesia Care Transfer)    CRNA VITALS  4/12/2018 0859 - 4/12/2018 0935      4/12/2018             Pulse: 90    SpO2: 100 %                Electronically Signed By: JOCELYN Young CRNA  April 12, 2018  9:35 AM

## 2018-04-12 NOTE — ANESTHESIA POSTPROCEDURE EVALUATION
Patient: Krystyna Peña    Procedure(s):  amputation of the left third toe - Wound Class: III-Contaminated    Diagnosis:  hammer toe  Diagnosis Additional Information: DATE OF PROCEDURE:   04/12/2018       PREOPERATIVE DIAGNOSIS:  Painful hammertoe left third digit.       POSTOPERATIVE DIAGNOSIS:  Painful hammertoe left third digit.       PROCEDURE:  Amputation left third digit.         Anesthesia Type:  MAC    Note:  Anesthesia Post Evaluation    Patient location during evaluation: PACU  Patient participation: Able to fully participate in evaluation  Level of consciousness: awake  Pain management: adequate  Airway patency: patent  Cardiovascular status: acceptable  Respiratory status: acceptable  Hydration status: euvolemic  PONV: controlled     Anesthetic complications: None          Last vitals:  Vitals:    04/12/18 0930 04/12/18 0940 04/12/18 1051   BP: (!) 131/92 133/80 130/82   Resp: 16 16 18   Temp: 99.7  F (37.6  C)  98.6  F (37  C)   SpO2: 98% 98% 98%         Electronically Signed By: Herb Amin MD  April 12, 2018  4:59 PM

## 2018-04-12 NOTE — IP AVS SNAPSHOT
[facility] PreOP/PostOP    [address]    [facility] [address]    Phone:  [phone]    Fax:  [phone]                                       After Visit Summary   4/12/2018    Krystyna Peña    MRN: [id number]           After Visit Summary Signature Page     I have received my discharge instructions, and my questions have been answered. I have discussed any challenges I see with this plan with the nurse or doctor.    ..........................................................................................................................................  Patient/Patient Representative Signature      ..........................................................................................................................................  Patient Representative Print Name and Relationship to Patient    ..................................................               ................................................  Date                                            Time    ..........................................................................................................................................  Reviewed by Signature/Title    ...................................................              ..............................................  Date                                                            Time

## 2018-04-13 ENCOUNTER — TRANSFERRED RECORDS (OUTPATIENT)
Dept: HEALTH INFORMATION MANAGEMENT | Facility: CLINIC | Age: 63
End: 2018-04-13

## 2018-04-17 ENCOUNTER — TRANSFERRED RECORDS (OUTPATIENT)
Dept: HEALTH INFORMATION MANAGEMENT | Facility: CLINIC | Age: 63
End: 2018-04-17

## 2018-04-21 ENCOUNTER — HOSPITAL ENCOUNTER (EMERGENCY)
Facility: CLINIC | Age: 63
Discharge: HOME | End: 2018-04-21
Attending: EMERGENCY MEDICINE | Admitting: EMERGENCY MEDICINE
Payer: COMMERCIAL

## 2018-04-21 ENCOUNTER — APPOINTMENT (OUTPATIENT)
Dept: CT IMAGING | Facility: CLINIC | Age: 63
End: 2018-04-21
Attending: EMERGENCY MEDICINE
Payer: COMMERCIAL

## 2018-04-21 VITALS
HEIGHT: 63 IN | DIASTOLIC BLOOD PRESSURE: 62 MMHG | HEART RATE: 100 BPM | TEMPERATURE: 98.6 F | OXYGEN SATURATION: 94 % | SYSTOLIC BLOOD PRESSURE: 114 MMHG | RESPIRATION RATE: 18 BRPM | BODY MASS INDEX: 28.88 KG/M2 | WEIGHT: 163 LBS

## 2018-04-21 DIAGNOSIS — R55 SYNCOPE, VASOVAGAL: ICD-10-CM

## 2018-04-21 DIAGNOSIS — R06.00 DYSPNEA, UNSPECIFIED TYPE: ICD-10-CM

## 2018-04-21 DIAGNOSIS — G89.18 ACUTE POST-OPERATIVE PAIN: ICD-10-CM

## 2018-04-21 LAB
ALBUMIN UR-MCNC: NEGATIVE MG/DL
ANION GAP SERPL CALCULATED.3IONS-SCNC: 10 MMOL/L (ref 3–14)
APPEARANCE UR: CLEAR
BASOPHILS # BLD AUTO: 0 10E9/L (ref 0–0.2)
BASOPHILS NFR BLD AUTO: 0.5 %
BILIRUB UR QL STRIP: NEGATIVE
BUN SERPL-MCNC: 17 MG/DL (ref 7–30)
CALCIUM SERPL-MCNC: 8.4 MG/DL (ref 8.5–10.1)
CHLORIDE SERPL-SCNC: 107 MMOL/L (ref 94–109)
CO2 SERPL-SCNC: 24 MMOL/L (ref 20–32)
COLOR UR AUTO: YELLOW
CREAT BLD-MCNC: 1.4 MG/DL (ref 0.52–1.04)
CREAT SERPL-MCNC: 1.31 MG/DL (ref 0.52–1.04)
DIFFERENTIAL METHOD BLD: ABNORMAL
EOSINOPHIL # BLD AUTO: 0 10E9/L (ref 0–0.7)
EOSINOPHIL NFR BLD AUTO: 0.6 %
ERYTHROCYTE [DISTWIDTH] IN BLOOD BY AUTOMATED COUNT: 12.5 % (ref 10–15)
GFR SERPL CREATININE-BSD FRML MDRD: 38 ML/MIN/1.7M2
GFR SERPL CREATININE-BSD FRML MDRD: 41 ML/MIN/1.7M2
GLUCOSE SERPL-MCNC: 78 MG/DL (ref 70–99)
GLUCOSE UR STRIP-MCNC: NEGATIVE MG/DL
HCT VFR BLD AUTO: 38.7 % (ref 35–47)
HGB BLD-MCNC: 12.8 G/DL (ref 11.7–15.7)
HGB UR QL STRIP: NEGATIVE
IMM GRANULOCYTES # BLD: 0.1 10E9/L (ref 0–0.4)
IMM GRANULOCYTES NFR BLD: 1.2 %
KETONES UR STRIP-MCNC: NEGATIVE MG/DL
LEUKOCYTE ESTERASE UR QL STRIP: NEGATIVE
LYMPHOCYTES # BLD AUTO: 1.6 10E9/L (ref 0.8–5.3)
LYMPHOCYTES NFR BLD AUTO: 24.2 %
MCH RBC QN AUTO: 37.8 PG (ref 26.5–33)
MCHC RBC AUTO-ENTMCNC: 33.1 G/DL (ref 31.5–36.5)
MCV RBC AUTO: 114 FL (ref 78–100)
MONOCYTES # BLD AUTO: 0.6 10E9/L (ref 0–1.3)
MONOCYTES NFR BLD AUTO: 8.8 %
NEUTROPHILS # BLD AUTO: 4.3 10E9/L (ref 1.6–8.3)
NEUTROPHILS NFR BLD AUTO: 64.7 %
NITRATE UR QL: NEGATIVE
NRBC # BLD AUTO: 0 10*3/UL
NRBC BLD AUTO-RTO: 0 /100
NT-PROBNP SERPL-MCNC: 390 PG/ML (ref 0–900)
PH UR STRIP: 5 PH (ref 5–7)
PLATELET # BLD AUTO: 198 10E9/L (ref 150–450)
POTASSIUM SERPL-SCNC: 3.9 MMOL/L (ref 3.4–5.3)
RBC # BLD AUTO: 3.39 10E12/L (ref 3.8–5.2)
RBC #/AREA URNS AUTO: <1 /HPF (ref 0–2)
SODIUM SERPL-SCNC: 141 MMOL/L (ref 133–144)
SOURCE: NORMAL
SP GR UR STRIP: 1.02 (ref 1–1.03)
SQUAMOUS #/AREA URNS AUTO: <1 /HPF (ref 0–1)
TROPONIN I BLD-MCNC: 0 UG/L (ref 0–0.1)
TROPONIN I SERPL-MCNC: <0.015 UG/L (ref 0–0.04)
UROBILINOGEN UR STRIP-MCNC: 0 MG/DL (ref 0–2)
WBC # BLD AUTO: 6.6 10E9/L (ref 4–11)
WBC #/AREA URNS AUTO: 1 /HPF (ref 0–5)

## 2018-04-21 PROCEDURE — 80048 BASIC METABOLIC PNL TOTAL CA: CPT | Performed by: EMERGENCY MEDICINE

## 2018-04-21 PROCEDURE — 36415 COLL VENOUS BLD VENIPUNCTURE: CPT | Performed by: EMERGENCY MEDICINE

## 2018-04-21 PROCEDURE — 25000128 H RX IP 250 OP 636: Performed by: EMERGENCY MEDICINE

## 2018-04-21 PROCEDURE — 82565 ASSAY OF CREATININE: CPT

## 2018-04-21 PROCEDURE — 83880 ASSAY OF NATRIURETIC PEPTIDE: CPT | Performed by: EMERGENCY MEDICINE

## 2018-04-21 PROCEDURE — 96376 TX/PRO/DX INJ SAME DRUG ADON: CPT

## 2018-04-21 PROCEDURE — 85025 COMPLETE CBC W/AUTO DIFF WBC: CPT | Performed by: EMERGENCY MEDICINE

## 2018-04-21 PROCEDURE — 96374 THER/PROPH/DIAG INJ IV PUSH: CPT | Mod: 59

## 2018-04-21 PROCEDURE — 96361 HYDRATE IV INFUSION ADD-ON: CPT

## 2018-04-21 PROCEDURE — 84484 ASSAY OF TROPONIN QUANT: CPT | Mod: 91 | Performed by: EMERGENCY MEDICINE

## 2018-04-21 PROCEDURE — 81001 URINALYSIS AUTO W/SCOPE: CPT | Performed by: EMERGENCY MEDICINE

## 2018-04-21 PROCEDURE — 71260 CT THORAX DX C+: CPT

## 2018-04-21 PROCEDURE — 99285 EMERGENCY DEPT VISIT HI MDM: CPT | Mod: 25

## 2018-04-21 PROCEDURE — 84484 ASSAY OF TROPONIN QUANT: CPT

## 2018-04-21 PROCEDURE — 93005 ELECTROCARDIOGRAM TRACING: CPT

## 2018-04-21 RX ORDER — HYDROMORPHONE HYDROCHLORIDE 1 MG/ML
0.5 INJECTION, SOLUTION INTRAMUSCULAR; INTRAVENOUS; SUBCUTANEOUS
Status: DISCONTINUED | OUTPATIENT
Start: 2018-04-21 | End: 2018-04-21 | Stop reason: HOSPADM

## 2018-04-21 RX ORDER — HYDROMORPHONE HYDROCHLORIDE 1 MG/ML
0.5 INJECTION, SOLUTION INTRAMUSCULAR; INTRAVENOUS; SUBCUTANEOUS
Status: COMPLETED | OUTPATIENT
Start: 2018-04-21 | End: 2018-04-21

## 2018-04-21 RX ORDER — IOPAMIDOL 755 MG/ML
500 INJECTION, SOLUTION INTRAVASCULAR ONCE
Status: COMPLETED | OUTPATIENT
Start: 2018-04-21 | End: 2018-04-21

## 2018-04-21 RX ORDER — LIDOCAINE 40 MG/G
CREAM TOPICAL
Status: DISCONTINUED | OUTPATIENT
Start: 2018-04-21 | End: 2018-04-21 | Stop reason: HOSPADM

## 2018-04-21 RX ADMIN — HYDROMORPHONE HYDROCHLORIDE 0.5 MG: 1 INJECTION, SOLUTION INTRAMUSCULAR; INTRAVENOUS; SUBCUTANEOUS at 09:33

## 2018-04-21 RX ADMIN — SODIUM CHLORIDE 79 ML: 9 INJECTION, SOLUTION INTRAVENOUS at 10:18

## 2018-04-21 RX ADMIN — HYDROMORPHONE HYDROCHLORIDE 0.5 MG: 1 INJECTION, SOLUTION INTRAMUSCULAR; INTRAVENOUS; SUBCUTANEOUS at 08:56

## 2018-04-21 RX ADMIN — IOPAMIDOL 63 ML: 755 INJECTION, SOLUTION INTRAVENOUS at 10:18

## 2018-04-21 RX ADMIN — HYDROMORPHONE HYDROCHLORIDE 0.5 MG: 1 INJECTION, SOLUTION INTRAMUSCULAR; INTRAVENOUS; SUBCUTANEOUS at 10:05

## 2018-04-21 RX ADMIN — HYDROMORPHONE HYDROCHLORIDE 0.5 MG: 1 INJECTION, SOLUTION INTRAMUSCULAR; INTRAVENOUS; SUBCUTANEOUS at 10:48

## 2018-04-21 RX ADMIN — SODIUM CHLORIDE, POTASSIUM CHLORIDE, SODIUM LACTATE AND CALCIUM CHLORIDE 1000 ML: 600; 310; 30; 20 INJECTION, SOLUTION INTRAVENOUS at 12:51

## 2018-04-21 RX ADMIN — SODIUM CHLORIDE, POTASSIUM CHLORIDE, SODIUM LACTATE AND CALCIUM CHLORIDE 1000 ML: 600; 310; 30; 20 INJECTION, SOLUTION INTRAVENOUS at 10:06

## 2018-04-21 RX ADMIN — HYDROMORPHONE HYDROCHLORIDE 0.5 MG: 1 INJECTION, SOLUTION INTRAMUSCULAR; INTRAVENOUS; SUBCUTANEOUS at 12:53

## 2018-04-21 ASSESSMENT — ENCOUNTER SYMPTOMS
WEAKNESS: 1
DYSURIA: 0
CHEST TIGHTNESS: 0
LIGHT-HEADEDNESS: 1
FACIAL ASYMMETRY: 0
COUGH: 0
COLOR CHANGE: 1
DIAPHORESIS: 1
FEVER: 0
SHORTNESS OF BREATH: 1

## 2018-04-21 NOTE — ED NOTES
Ortho done pt symptomatic with position changes, reports pain unchanged with pain meds given. Pt reports lightheadedness was able to stand but standby assist.

## 2018-04-21 NOTE — ED AVS SNAPSHOT
[facility] Emergency Department    [address]    [facility] [address]    Phone:  [phone]    Fax:  [phone]                                       Krystyna Peña   MRN: [id number]    Department:  [facility] Emergency Department   Date of Visit:  4/21/2018           After Visit Summary Signature Page     I have received my discharge instructions, and my questions have been answered. I have discussed any challenges I see with this plan with the nurse or doctor.    ..........................................................................................................................................  Patient/Patient Representative Signature      ..........................................................................................................................................  Patient Representative Print Name and Relationship to Patient    ..................................................               ................................................  Date                                            Time    ..........................................................................................................................................  Reviewed by Signature/Title    ...................................................              ..............................................  Date                                                            Time

## 2018-04-21 NOTE — ED PROVIDER NOTES
History     Chief Complaint:  Shortness of breath and loss of consciousness     HPI:   The history is provided by the patient and the spouse.      Krystyna Peña is a 62 year old female with a history of hypertension and cardiac arrest who presents via EMS to the emergency department with  for evaluation of shortness of breath and dizziness with possible loss of consciousness. Of note, the patient underwent a left toe amputation on 4/12 due to pain from a hammer toe. She also had right rotator cuff surgery 2 days ago.  Per , the patient was walking down the stairs to him when she endorsed feeling lightheaded. He grabbed a chair for her and helped her to sit down. She then began to make gargling sounds with saliva dripping with a green coloration around her mouth. He notes that it appeared she momentarily had loss consciousness with urinary incontinence and diaphoresis. He did not notice any facial asymmetry. He immediately activated EMS following this. En route, the patient's BP was 149-94, HR 80, and O2 sats 95% room air. Here in the ED, the patient endorses that she only remembers being lightheaded and feeling very short of breath, which has continued into the emergency department today, although shortness of breath has nearly resolved. She states that she only used her narcotics yesterday with none today even though she is in severe pain, which she reports as expected after surgery. She states that she has not been eating or drinking well. She denies any chest pain, fever, leg pain or swelling, chest tightness/pressure, cough, fever, or dysuria. She has no history of heart disease, though went into cardiac arrest when put on morphine. She has no history of clotting/bleeding disorders. There was no trauma or injury from the event.    CARDIAC RISK FACTORS:  Sex:    Female   Tobacco:   Positive (former)   Hypertension:   Positive   Hyperlipidemia:  Negative   Diabetes:   Negative   Family History:   Negative     Allergies:  Morphine  Cephalexin      Medications:    Aspirin   Celebrex   Lisinopril   Omeprazole   Codeine   Order for DME     Past Medical History:    Arthritis   Asymptomatic varicose veins   Benign neoplasm of colon   Cardiac arrest   GERD  Hypertension   Major depression   Pneumonia   Edema     Past Surgical History:    Amputate toes left   Amputate left and right toes  Arthroplasty hip right   Right arm plast surgery   Right knee surgery   Repair Spiegelian hernia   Left popliteal AVM repair   Total knee arthroplasty left knee   Correct bunion right 1998, left 2001  Right rotator cuff repair   Vascular surgery     Family History:    Mother - breast cancer   Father - colorectal cancer   Brother - diabetes mellitus     Social History:  Presents via EMS with  following in    Tobacco use: Former smoker, quit 2013 at 1 PPD   Alcohol use: Yes, one per day   PCP: Viri Savage    Marital Status:       Review of Systems   Constitutional: Positive for diaphoresis. Negative for fever.   Respiratory: Positive for shortness of breath. Negative for cough and chest tightness.    Cardiovascular: Negative for chest pain and leg swelling.   Genitourinary: Negative for dysuria.   Skin: Positive for color change.   Neurological: Positive for syncope, weakness and light-headedness. Negative for facial asymmetry.   All other systems reviewed and are negative.      Physical Exam     Patient Vitals for the past 24 hrs:   BP Temp Temp src Pulse Heart Rate Resp SpO2 Height Weight   04/21/18 1324 - - - - - 18 - - -   04/21/18 1230 114/62 - - 100 100 - 94 % - -   04/21/18 1100 137/69 - - - 86 - - - -   04/21/18 1001 - - - - 79 12 96 % - -   04/21/18 1000 118/61 - - - 81 17 98 % - -   04/21/18 0951 - - - - - 16 99 % - -   04/21/18 0948 - - - - - 12 - - -   04/21/18 0945 138/76 - - - 80 19 - - -   04/21/18 0940 132/61 - - - 83 9 95 % - -   04/21/18 0930 132/69 - - - 75 9 98 % - -   04/21/18 0915 - - - - 75 13  "97 % - -   18 0853 (!) 162/91 98.6  F (37  C) Oral 76 - 16 95 % 1.6 m (5' 3\") 73.9 kg (163 lb)     Orthostatics:   Layin/69 HR 82  Sitin/66 HR 78  Standin/76 HR 98     Physical Exam  General: Uncomfortable appearing adult female sitting upright  Eyes: PERRL, Conjunctive within normal limits  ENT: Moist mucous membranes, oropharynx clear.   CV: Normal S1S2, no murmur, rub or gallop. Regular rate and rhythm  Resp: Clear to auscultation bilaterally, no wheezes, rales or rhonchi. Normal respiratory effort.  GI: Abdomen is soft, nontender and nondistended. No palpable masses. No rebound or guarding.  MSK:  Holds left arm close to body and flexed at elbow. Appropriate tenderness of left lateral shoulder. No significant edema. No calf asymmetry or tenderness to palpation. There is an ace wrap and post-op shoe on left lower leg.   Skin: Warm and dry. Surgical site over left lateral shoulder. Appropriately aligned, no discharge. Slight erythema around wound edges.  Neuro: Alert and oriented. Responds appropriately to all questions and commands. No focal findings appreciated. Normal muscle tone.  Psych: Normal mood and affect. Pleasant.    Emergency Department Course   ECG (9:00:53):  Rate 75 bpm. IL interval 108. QRS duration 70. QT/QTc 374/417. P-R-T axes -3 -1 9. Sinus rhythm with short IL. Otherwise normal ECG. Agree with computer interpretation.  Interpreted at 0909 by Ana Luisa Ball MD.     Imaging:  Radiographic findings were communicated with the patient and family who voiced understanding of the findings.     CT Chest PE with contrast:  IMPRESSION:   1. No pulmonary embolism is seen.  2. Mild atelectasis or scarring of the left lower lobe of the lung.    LEONEL STEVENSON MD    Imaging independently reviewed and agree with radiologist interpretation.     Laboratory:  0911: Troponin POCT: 0.00    0926: Troponin I: <0.015    Creatinine POCT: 1.4 (high), GFR 38 (low)   BMP: Creatinine " 1.31 (high), GFR 41 (low), Calcium 8.4 (low), ow  WNL    CBC: WNL (WBC 6.6, HGB 12.8, )    Nt probnp inpatient (BNP): 390   UA with Microscopic: Negative     Interventions:  0933: Dilaudid 0.5 mg IV   1005: Dilaudid 0.5 mg IV   1006: Lactated ringers 1000 mL IV Bolus    1251: Lactated ringers 1000 mL IV Bolus    1253: Dilaudid 0.5 mg IV     The patient's symptoms were somewhat improved with parenteral narcotics.       Emergency Department Course:  Past medical records, nursing notes, and vitals reviewed.  0849: I performed an exam of the patient and obtained history, as documented above.     IV inserted and blood drawn. This was sent to the lab for further testing, results above.   Above interventions provided.      The patient was sent for a CT while in the emergency department, findings above.     Patient ambulates with minimal assistance, noting she feels much better.    I personally reviewed the laboratory results with the Patient and spouse and answered all related questions prior to discharge.        1256: I rechecked the patient. Findings and plan explained to the Patient and spouse. Patient discharged home with instructions regarding supportive care, medications, and reasons to return. The importance of close follow-up was reviewed.      Impression & Plan      Medical Decision Making:  Krystyna Peña is a 62 year old female recently status post left shoulder and foot surgeries for concerns of an episode in which she did or nearly did pass out, which symptoms of dizziness, sweating and nausea. Here in the ED, she had no focal neurologic deficit. She denies any chest pain. She notes she was feeling short of breath, but it was resolving on arrival. She did have urinary incontinence. Her  witnessed what happened and denies any seizure-like activity or asymmetric movements. In the ED labs were obtained and are unremarkable. She felt better and better as her pain was controlled and she was  administered IV fluids. Her symptoms were in the setting of poor oral intake and I suspect, although it could be multifactorial, that this is most likely vasovagal orthostatic syncope related to hypovolemia and/or pain. In light of her surgery, there did not appear to be an acute infectious or neurovascular finding that would be concerning. She ambulated and was feeling well, noting that she was at her baseline with use of cane. She is recommended to follow up with her PCP and/or her surgeon in the next 3 days for reassessment. She should return to the ED with any new or worsening symptoms. All questions were answered prior to discharge.    Diagnosis:    ICD-10-CM    1. Syncope, vasovagal R55 UA with Microscopic   2. Dyspnea, unspecified type R06.00    3. Acute post-operative pain G89.18        Disposition:  Discharged to home with plan as outlined.      Scribe Disclosure:   ILuke, am serving as a scribe at 8:49 AM on 4/21/2018 to document services personally performed by No att. providers found based on my observations and the provider's statements to me.       Luke Cadena  4/21/2018   [facility] EMERGENCY DEPARTMENT       Ana Luisa Ball MD  04/22/18 3161

## 2018-04-21 NOTE — ED NOTES
TRIAGE  Pt arrives via EMS she has brief syncopal episode this am. Recent Right shoulder and foot surgery last wk. Pt taking pain meds at home. Pain uncontrolled on arrival. Pt a/ox 3. ABC's intact. Reports dizziness, fatigue and SOB.

## 2018-04-21 NOTE — ED AVS SNAPSHOT
[facility] Emergency Department    [address]    [facility] [address]    Phone:  [phone]    Fax:  [phone]                                       Krystyna Peña   MRN: [id number]    Department:  [facility] Emergency Department   Date of Visit:  4/21/2018           Patient Information     Date Of Birth          1955        Your diagnoses for this visit were:     Syncope, vasovagal     Dyspnea, unspecified type     Acute post-operative pain        You were seen by Ana Luisa Ball MD.      Follow-up Information     Follow up with Primary clinic and/or surgeon.    Why:  within 3 days as needed        Follow up with [facility] Emergency Department.    Specialty:  EMERGENCY MEDICINE    Why:  As needed, If symptoms worsen    Contact information:    [address]  [facility] [address]  [phone]        Discharge Instructions       Discharge Instructions  Syncope    Syncope (fainting) is a sudden, short loss of consciousness (passing out spell). People will usually fall to the ground when they faint or slump over if seated.  People may also shake when this happens, and it can sometimes be difficult to tell the difference between syncope and a seizure. At this time, your provider does not find a reason to suspect that your fainting spell is a sign of anything dangerous or life-threatening.  However, sometimes the signs of serious illness do not show up right away.     Generally, every Emergency Department visit should have a follow-up clinic visit with either a primary or a specialty clinic/provider. Please follow-up as instructed by your emergency provider today.    Return to the Emergency Department if:    You faint again.     You have any significant bleeding.    You have chest pain or a fast or irregular heartbeat.    You feel short of breath.    You cough up any blood.    You have abdominal (belly) pain or unusual back  pain.    You have ongoing vomiting (throwing up) or diarrhea (loose stools).    You have a black or tarry bowel movement, or blood in the stool or in your vomit.    You have a fever over 101 F.    You lose feeling or cannot move a part of your body or cannot talk normally.    You are confused, have a headache, cannot see well, or have a seizure.    DO NOT DRIVE. CALL 911 INSTEAD!    What can I do to help myself?    Follow any specific instructions that your provider discussed with you.    If you feel light-headed, make sure to sit down right away, even if you have to sit on the floor.    Follow up with your regular medical provider as discussed for further management. This may include lowering your blood pressure medications, insulin or other diabetic medications, checking your blood sugar more frequently, and drinking more fluids, taking medicines for vomiting or diarrhea or getting up slower.  If you were given a prescription for medicine here today, be sure to read all of the information (including the package insert) that comes with your prescription.  This will include important information about the medicine, its side effects, and any warnings that you need to know about.  The pharmacist who fills the prescription can provide more information and answer questions you may have about the medicine.  If you have questions or concerns that the pharmacist cannot address, please call or return to the Emergency Department.   Remember that you can always come back to the Emergency Department if you are not able to see your regular provider in the amount of time listed above, if you get any new symptoms, or if there is anything that worries you.      Shortness of Breath (Dyspnea)  Shortness of breath is the feeling that you can't catch your breath or get enough air. It is also known as dyspnea.  Dyspnea can be caused by many different conditions. They include:    Acute asthma attack    Worsening of chronic lung diseases  such as chronic bronchitis and emphysema    Heart failure. This is when weak heart muscle allows extra fluid to collect in the lungs.    Panic attacks or anxiety. Fear can cause rapid breathing (hyperventilation).    Pneumonia, or an infection in the lung tissue    Exposure to toxic substances, fumes, smoke, or certain medicines    Blood clot in the lung (pulmonary embolism). This is often from a piece of blood clot in a deep vein of the leg (deep vein thrombosis) that breaks off and travels to the lungs.    Heart attack or heart-related chest pain (angina)    Anemia    Collapsed lung (pneumothorax)    Dehydration    Pregnancy  Based on your visit today, the exact cause of your shortness of breath is not certain. Your tests don t show any of the serious causes of dyspnea. You may need other tests to find out if you have a serious problem. It s important to watch for any new symptoms or symptoms that get worse. Follow up with your healthcare provider as directed.  Home care  Follow these tips to take care of yourself at home:    When your symptoms are better, go back to your usual activities.    If you smoke, you should stop. Join a quit-smoking program or ask your healthcare provider for help.    Eat a healthy diet and get plenty of sleep.    Get regular exercise. Talk with your healthcare provider before starting to exercise, especially if you have other medical problems.    Cut down on the amount of caffeine and stimulants you consume.  Follow-up care  Follow up with your healthcare provider, or as advised.  If tests were done, you will be told if your treatment needs to be changed. You can call as directed for the results.  If an X-ray was taken, a specialist will review it. You will be notified of any new findings that may affect your care.  Call 911  Shortness of breath may be a sign of a serious medical problem. For example, it may be a problem with your heart or lungs. Call 911 if you have worsening shortness  of breath or trouble breathing, especially with any of the symptoms below:    You are confused or it s difficult to wake you.    You faint or lose consciousness.    You have a fast heartbeat, or your heartbeat is irregular.    You are coughing up blood.    You have pain in your chest, arm, shoulder, neck, or upper back.    You break out in a sweat.  When to seek medical advice  Call your healthcare provider right away if any of these occur:    Slight shortness of breath or wheezing    Redness, pain or swelling in your leg, arm, or other body area    Swelling in both legs or ankles    Fast weight gain    Dizziness or weakness    Fever of 100.4 F (38 C) or higher, or as directed by your healthcare provider  Date Last Reviewed: 9/13/2015 2000-2017 [employer]. [address] [place] [address], [place] [id number]. All rights reserved. This information is not intended as a substitute for professional medical care. Always follow your healthcare professional's instructions.          Taking Opioid Medicines  For your health and safety, it s important to take opioids exactly as directed. This helps ensure that the medicines work as they should. It also lowers the chances of side effects. And it lowers the risk of taking too high a dose (overdose). Each opioid medicine has its own instructions for use. Your healthcare provider will explain the ones you re prescribed and tell you how to take them. If you have questions or concerns, talk with your healthcare provider.   Using opioids safely  Opioids can work very well to relieve pain. But taking too much, taking them too long, or taking them in the wrong way can be harmful. To help reduce the risks to your health, follow these safety tips:    Make sure you know if you are to take the medicine on a regular basis or only as needed.    If your medicine is taken on a regular basis, take it on time and at the right dose. If you miss a dose, don t double up the next  dose.    Use a medicine log, marleny, or calendar to keep track of when you take your medicine. This helps you stay on schedule and not miss doses or take extra doses.    When taking liquid doses, use a measuring spoon or dropper. This ensures you take the correct dose.    Tell your healthcare provider right away if you have any side effects.    Don t cut, crush, or change your medicine in any way.    Don t take someone else s opioids. Don t share yours with other people.    Don t drive while taking opioids.    Don t use dangerous equipment or power tools while taking opioids.    Check expiration dates regularly. Dispose of all  medicines properly.   Beware of combining medicines  Some medicines can be dangerous when used with opioids. In some cases, combining medicines can cause death. Make sure to tell your healthcare provider and pharmacist about all of the medicines you re taking. This includes over-the-counter medicines. It also includes vitamins, herbs, and other supplements. And it includes illegal or street drugs. Medicines that may be unsafe to use with opioids include:    Over-the-counter pain relievers, such as acetaminophen    Other prescription opioids    Benzodiazepines such as clonazepam or alprazolam    Muscle relaxants such as cyclobenzaprine or carisoprodol    Hypnotics such as sleep aids like zolpidem  WARNING: Don t take opioids with alcohol or street drugs. This can cause death.     Symptoms of opioid overdose  Opioids act on the part of the brain that affects breathing. An overdose of opioids can slow breathing down too much. It can even stop your breathing. This can cause death. Call 911 right away if you think you or someone else has had an overdose.   Look for these 3 key symptoms:    The dark circles in the middle of the eyes are very small (pinpoint pupils)    Breathing is slow or stopped    The person has passed out and does not respond(is not conscious)  Other symptoms to look for  include:    Limp body    Pale face    Cool, damp skin    Purple or blue tint to lips and fingernails    Vomiting   Storing opioids safely  Opioids need to be stored safely. This helps protect anyone else from accidentally taking the medicine. It also helps prevent the theft and misuse of the medicine. If possible, store the medicine in a locked container or cupboard that others cannot get to. Store the medicine in a cool dry place. Don t store it in a damp place, such as a bathroom. Always put the medicine back in its secure place after each use.   How to dispose opioids  Dispose of unused or  opioids in a safe way. This is to prevent harm to other people. Don t save your medicine or give it to other people for any reason. Even a single dose of opioids can lead to death if it used by someone else. To dispose of your medicine safely:    Find your [facility] medicine take-back program. You may need to drop the medicine off at a local police station or pharmacy.    Ask your pharmacy about a mail-back program. You may be able to send the medicine through the mail. This is done using a special envelope.  If these options are not available to you, ask your healthcare provider for help.                           The FDA also has guidelines for disposing of medicines. You may be able to flush them down the toilet. Or you may be able to put them in the trash. Check with your local water and waste management company to see what is allowed in your city or state. You can learn more here: www.fda.gov/drugs/resourcesforyou/consumers/buyingusingmedicinesafely/ensuringsafeuseofmedicine/safedisposalofmedicines/qtb605766.htm. Before using these options, check with your local water and waste management company to determine if this is allowed in your city or state.    Stopping opioid treatment  If you have been taking an opioid for more than a few weeks, your body gets used to having it. When you stop taking the medicine, you  can have withdrawal symptoms. There are many kinds of withdrawal symptoms. They can range from mild to severe. They can include:    Restlessness    Anxiety    Muscle aches    Sweating    Large (dilated) pupils    Watery eyes    Runny nose    Trouble sleeping    Nausea and vomiting    Abdominal cramping    Diarrhea    Fast heartbeat  Don t stop opioid medicine without help from your healthcare provider. You will need a plan to safely stop taking it. This is to help manage withdrawal symptoms. In most cases, the amount of medicine you take will be cut down. You will take less and less over several weeks. If needed, you may need to take other medicines and treatments to help with this process. As the opioid medicine leaves your body, your body will adapt. Your withdrawal symptoms should then go away. How long this takes can vary for each person.  Date Last Reviewed: 8/1/2017 2000-2017 [employer]. [address] [place], [place] [id number]. All rights reserved. This information is not intended as a substitute for professional medical care. Always follow your healthcare professional's instructions.          24 Hour Appointment Hotline       To make an appointment at any [facility], call [phone] ([phone]). If you don't have a family doctor or clinic, we will help you find one. [facility] clinics are conveniently located to serve the needs of you and your family.             Review of your medicines      Our records show that you are taking the medicines listed below. If these are incorrect, please call your family doctor or clinic.        Dose / Directions Last dose taken    ASPIRIN PO   Dose:  81 mg        Take 81 mg by mouth daily   Refills:  0        celecoxib 200 MG capsule   Commonly known as:  celeBREX   Dose:  200 mg   Quantity:  60 capsule        Take 1 capsule (200 mg) by mouth daily   Refills:  1        lisinopril 10 MG tablet   Commonly known as:  PRINIVIL/ZESTRIL   Dose:  20  mg   Quantity:  60 tablet        Take 2 tablets (20 mg) by mouth daily   Refills:  1        omeprazole 20 MG CR capsule   Commonly known as:  priLOSEC   Dose:  20 mg        Take 1 capsule (20 mg) by mouth daily   Refills:  0        order for DME   Quantity:  1 Device        Equipment being ordered: manuel stockings, knee high 20-30 mmHg,   Refills:  1                Procedures and tests performed during your visit     Basic metabolic panel    CBC with platelets differential    Cardiac Continuous Monitoring    Chest CT, IV contrast only - PE protocol    Creatinine POCT    EKG 12 lead    ISTAT troponin nursing POCT    Nt probnp inpatient (BNP)    Orthostatic blood pressure and pulse    Peripheral IV catheter    Pulse oximetry nursing    Troponin I    Troponin POCT    UA with Microscopic      Orders Needing Specimen Collection     None      Pending Results     Date and Time Order Name Status Description    4/21/2018 1157 EKG 12 lead Preliminary             Pending Culture Results     No orders found from 4/19/2018 to 4/22/2018.            Pending Results Instructions     If you had any lab results that were not finalized at the time of your Discharge, you can call the ED Lab Result RN at [phone]. You will be contacted by this team for any positive Lab results or changes in treatment. The nurses are available 7 days a week from 10A to 6:30P.  You can leave a message 24 hours per day and they will return your call.        Test Results From Your Hospital Stay        4/21/2018 12:54 PM      Component Results     Component Value Ref Range & Units Status    Color Urine Yellow  Final    Appearance Urine Clear  Final    Glucose Urine Negative NEG^Negative mg/dL Final    Bilirubin Urine Negative NEG^Negative Final    Ketones Urine Negative NEG^Negative mg/dL Final    Specific Gravity Urine 1.025 1.003 - 1.035 Final    Blood Urine Negative NEG^Negative Final    pH Urine 5.0 5.0 - 7.0 pH Final    Protein Albumin Urine Negative  NEG^Negative mg/dL Final    Urobilinogen mg/dL 0.0 0.0 - 2.0 mg/dL Final    Nitrite Urine Negative NEG^Negative Final    Leukocyte Esterase Urine Negative NEG^Negative Final    Source Midstream Urine  Final    WBC Urine 1 0 - 5 /HPF Final    RBC Urine <1 0 - 2 /HPF Final    Squamous Epithelial /HPF Urine <1 0 - 1 /HPF Final         4/21/2018 10:54 AM      Narrative     CT CHEST WITH CONTRAST  4/21/2018 10:28 AM    HISTORY: Syncope and dyspnea. Left third toe amputation on 4/12/2018.  Evaluate for pulmonary embolism.    COMPARISON: Radiographs on 3/12/2014.    TECHNIQUE: Routine transverse CT imaging of the chest was performed  following the uneventful intravenous administration of 63 mL  Isovue-370 contrast. A pulmonary embolism protocol was utilized.  Radiation dose for this scan was reduced using automated exposure  control, adjustment of the mA and/or kV according to patient size, or  iterative reconstruction technique.    FINDINGS: The heart size is normal. No enlarged lymph node or other  abnormal mediastinal mass is seen. The thoracic aorta is unremarkable.  There is very good opacification of the pulmonary arteries with  contrast. No pulmonary embolism is seen. There are small areas of  scarring or atelectasis in the posterior and lateral aspect of the  inferior portion of the left lower lobe. The lungs are otherwise  clear. No pneumothorax is demonstrated. No pleural effusion is  identified. There are degenerative changes in the spine. No other  osseous abnormality is demonstrated. No chest wall pathology is seen.  The visualized upper abdomen is unremarkable.        Impression     IMPRESSION:   1. No pulmonary embolism is seen.  2. Mild atelectasis or scarring of the left lower lobe of the lung.    LEONEL STEVENSON MD         4/21/2018  9:51 AM      Component Results     Component Value Ref Range & Units Status    Sodium 141 133 - 144 mmol/L Final    Potassium 3.9 3.4 - 5.3 mmol/L Final    Chloride 107  94 - 109 mmol/L Final    Carbon Dioxide 24 20 - 32 mmol/L Final    Anion Gap 10 3 - 14 mmol/L Final    Glucose 78 70 - 99 mg/dL Final    Urea Nitrogen 17 7 - 30 mg/dL Final    Creatinine 1.31 (H) 0.52 - 1.04 mg/dL Final    GFR Estimate 41 (L) >60 mL/min/1.7m2 Final    Non  GFR Calc    GFR Estimate If Black 50 (L) >60 mL/min/1.7m2 Final    African American GFR Calc    Calcium 8.4 (L) 8.5 - 10.1 mg/dL Final         4/21/2018  9:32 AM      Component Results     Component Value Ref Range & Units Status    WBC 6.6 4.0 - 11.0 10e9/L Final    RBC Count 3.39 (L) 3.8 - 5.2 10e12/L Final    Hemoglobin 12.8 11.7 - 15.7 g/dL Final    Hematocrit 38.7 35.0 - 47.0 % Final     (H) 78 - 100 fl Final    MCH 37.8 (H) 26.5 - 33.0 pg Final    MCHC 33.1 31.5 - 36.5 g/dL Final    RDW 12.5 10.0 - 15.0 % Final    Platelet Count 198 150 - 450 10e9/L Final    Diff Method Automated Method  Final    % Neutrophils 64.7 % Final    % Lymphocytes 24.2 % Final    % Monocytes 8.8 % Final    % Eosinophils 0.6 % Final    % Basophils 0.5 % Final    % Immature Granulocytes 1.2 % Final    Nucleated RBCs 0 0 /100 Final    Absolute Neutrophil 4.3 1.6 - 8.3 10e9/L Final    Absolute Lymphocytes 1.6 0.8 - 5.3 10e9/L Final    Absolute Monocytes 0.6 0.0 - 1.3 10e9/L Final    Absolute Eosinophils 0.0 0.0 - 0.7 10e9/L Final    Absolute Basophils 0.0 0.0 - 0.2 10e9/L Final    Abs Immature Granulocytes 0.1 0 - 0.4 10e9/L Final    Absolute Nucleated RBC 0.0  Final         4/21/2018  9:51 AM      Component Results     Component Value Ref Range & Units Status    N-Terminal Pro BNP Inpatient 390 0 - 900 pg/mL Final       Reference range shown and results flagged as abnormal are suggested inpatient   cut points for confirming diagnosis if CHF in an acute setting. Establishing a   baseline value for each individual patient is useful for follow-up. An   inpatient or emergency department NT-proPBNP <300 pg/mL effectively rules out   acute CHF, with  99% negative predictive value.  The outpatient non-acute reference range for ruling out CHF is:   0-125 pg/mL (age 18 to less than 75)   0-450 pg/mL (age 75 yrs and older)           4/21/2018  9:51 AM      Component Results     Component Value Ref Range & Units Status    Troponin I ES <0.015 0.000 - 0.045 ug/L Final    The 99th percentile for upper reference range is 0.045 ug/L.  Troponin values   in the range of 0.045 - 0.120 ug/L may be associated with risks of adverse   clinical events.           4/21/2018  9:08 AM      Component Results     Component Value Ref Range & Units Status    Creatinine 1.4 (H) 0.52 - 1.04 mg/dL Final    GFR Estimate 38 (L) >60 mL/min/1.7m2 Final    GFR Estimate If Black 46 (L) >60 mL/min/1.7m2 Final         4/21/2018  9:24 AM      Component Results     Component Value Ref Range & Units Status    Troponin I 0.00 0.00 - 0.10 ug/L Final                Clinical Quality Measure: Blood Pressure Screening     Your blood pressure was checked while you were in the emergency department today. The last reading we obtained was  BP: 114/62 . Please read the guidelines below about what these numbers mean and what you should do about them.  If your systolic blood pressure (the top number) is less than 120 and your diastolic blood pressure (the bottom number) is less than 80, then your blood pressure is normal. There is nothing more that you need to do about it.  If your systolic blood pressure (the top number) is 120-139 or your diastolic blood pressure (the bottom number) is 80-89, your blood pressure may be higher than it should be. You should have your blood pressure rechecked within a year by a primary care provider.  If your systolic blood pressure (the top number) is 140 or greater or your diastolic blood pressure (the bottom number) is 90 or greater, you may have high blood pressure. High blood pressure is treatable, but if left untreated over time it can put you at risk for heart attack,  "stroke, or kidney failure. You should have your blood pressure rechecked by a primary care provider within the next 4 weeks.  If your provider in the emergency department today gave you specific instructions to follow-up with your doctor or provider even sooner than that, you should follow that instruction and not wait for up to 4 weeks for your follow-up visit.        Thank you for choosing [facility]       Thank you for choosing [facility] for your care. Our goal is always to provide you with excellent care. Hearing back from our patients is one way we can continue to improve our services. Please take a few minutes to complete the written survey that you may receive in the mail after you visit with us. Thank you!        [employer] Information     [employer] lets you send messages to your doctor, view your test results, renew your prescriptions, schedule appointments and more. To sign up, go to [email or web address] . Click on \"Log in\" on the left side of the screen, which will take you to the Welcome page. Then click on \"Sign up Now\" on the right side of the page.     You will be asked to enter the access code listed below, as well as some personal information. Please follow the directions to create your username and password.     Your access code is: [email or web address]  Expires: 2018  4:31 PM     Your access code will  in 90 days. If you need help or a new code, please call your [facility] clinic or [phone].        Care EveryWhere ID     This is your Care EveryWhere ID. This could be used by other organizations to access your [facility] medical records  [id number]        Equal Access to Services     ARMIDA HANSON : Hadii alma rahman Soobdulio, waaxda luqadaha, qaybta kaalmada adebarbara, jhoana bustamante . So [facility] [phone].    ATENCIÓN: Si habla español, tiene a arguelles disposición servicios gratuitos de asistencia lingüística. Llame al [phone].    We comply with applicable federal " civil rights laws and [place] laws. We do not discriminate on the basis of race, color, national origin, age, disability, sex, sexual orientation, or gender identity.            After Visit Summary       This is your record. Keep this with you and show to your community pharmacist(s) and doctor(s) at your next visit.

## 2018-04-21 NOTE — ED NOTES
Bed: ED10  Expected date: 4/21/18  Expected time: 8:42 AM  Means of arrival: Ambulance  Comments:  GEORGI 53 yo F

## 2018-04-21 NOTE — DISCHARGE INSTRUCTIONS
Discharge Instructions  Syncope    Syncope (fainting) is a sudden, short loss of consciousness (passing out spell). People will usually fall to the ground when they faint or slump over if seated.  People may also shake when this happens, and it can sometimes be difficult to tell the difference between syncope and a seizure. At this time, your provider does not find a reason to suspect that your fainting spell is a sign of anything dangerous or life-threatening.  However, sometimes the signs of serious illness do not show up right away.     Generally, every Emergency Department visit should have a follow-up clinic visit with either a primary or a specialty clinic/provider. Please follow-up as instructed by your emergency provider today.    Return to the Emergency Department if:    You faint again.     You have any significant bleeding.    You have chest pain or a fast or irregular heartbeat.    You feel short of breath.    You cough up any blood.    You have abdominal (belly) pain or unusual back pain.    You have ongoing vomiting (throwing up) or diarrhea (loose stools).    You have a black or tarry bowel movement, or blood in the stool or in your vomit.    You have a fever over 101 F.    You lose feeling or cannot move a part of your body or cannot talk normally.    You are confused, have a headache, cannot see well, or have a seizure.    DO NOT DRIVE. CALL 911 INSTEAD!    What can I do to help myself?    Follow any specific instructions that your provider discussed with you.    If you feel light-headed, make sure to sit down right away, even if you have to sit on the floor.    Follow up with your regular medical provider as discussed for further management. This may include lowering your blood pressure medications, insulin or other diabetic medications, checking your blood sugar more frequently, and drinking more fluids, taking medicines for vomiting or diarrhea or getting up slower.  If you were given a  prescription for medicine here today, be sure to read all of the information (including the package insert) that comes with your prescription.  This will include important information about the medicine, its side effects, and any warnings that you need to know about.  The pharmacist who fills the prescription can provide more information and answer questions you may have about the medicine.  If you have questions or concerns that the pharmacist cannot address, please call or return to the Emergency Department.   Remember that you can always come back to the Emergency Department if you are not able to see your regular provider in the amount of time listed above, if you get any new symptoms, or if there is anything that worries you.      Shortness of Breath (Dyspnea)  Shortness of breath is the feeling that you can't catch your breath or get enough air. It is also known as dyspnea.  Dyspnea can be caused by many different conditions. They include:    Acute asthma attack    Worsening of chronic lung diseases such as chronic bronchitis and emphysema    Heart failure. This is when weak heart muscle allows extra fluid to collect in the lungs.    Panic attacks or anxiety. Fear can cause rapid breathing (hyperventilation).    Pneumonia, or an infection in the lung tissue    Exposure to toxic substances, fumes, smoke, or certain medicines    Blood clot in the lung (pulmonary embolism). This is often from a piece of blood clot in a deep vein of the leg (deep vein thrombosis) that breaks off and travels to the lungs.    Heart attack or heart-related chest pain (angina)    Anemia    Collapsed lung (pneumothorax)    Dehydration    Pregnancy  Based on your visit today, the exact cause of your shortness of breath is not certain. Your tests don t show any of the serious causes of dyspnea. You may need other tests to find out if you have a serious problem. It s important to watch for any new symptoms or symptoms that get worse.  Follow up with your healthcare provider as directed.  Home care  Follow these tips to take care of yourself at home:    When your symptoms are better, go back to your usual activities.    If you smoke, you should stop. Join a quit-smoking program or ask your healthcare provider for help.    Eat a healthy diet and get plenty of sleep.    Get regular exercise. Talk with your healthcare provider before starting to exercise, especially if you have other medical problems.    Cut down on the amount of caffeine and stimulants you consume.  Follow-up care  Follow up with your healthcare provider, or as advised.  If tests were done, you will be told if your treatment needs to be changed. You can call as directed for the results.  If an X-ray was taken, a specialist will review it. You will be notified of any new findings that may affect your care.  Call 911  Shortness of breath may be a sign of a serious medical problem. For example, it may be a problem with your heart or lungs. Call 911 if you have worsening shortness of breath or trouble breathing, especially with any of the symptoms below:    You are confused or it s difficult to wake you.    You faint or lose consciousness.    You have a fast heartbeat, or your heartbeat is irregular.    You are coughing up blood.    You have pain in your chest, arm, shoulder, neck, or upper back.    You break out in a sweat.  When to seek medical advice  Call your healthcare provider right away if any of these occur:    Slight shortness of breath or wheezing    Redness, pain or swelling in your leg, arm, or other body area    Swelling in both legs or ankles    Fast weight gain    Dizziness or weakness    Fever of 100.4 F (38 C) or higher, or as directed by your healthcare provider  Date Last Reviewed: 9/13/2015 2000-2017 [employer]. [address] [place] [address], [place] [address]. All rights reserved. This information is not intended as a substitute for professional medical  care. Always follow your healthcare professional's instructions.          Taking Opioid Medicines  For your health and safety, it s important to take opioids exactly as directed. This helps ensure that the medicines work as they should. It also lowers the chances of side effects. And it lowers the risk of taking too high a dose (overdose). Each opioid medicine has its own instructions for use. Your healthcare provider will explain the ones you re prescribed and tell you how to take them. If you have questions or concerns, talk with your healthcare provider.   Using opioids safely  Opioids can work very well to relieve pain. But taking too much, taking them too long, or taking them in the wrong way can be harmful. To help reduce the risks to your health, follow these safety tips:    Make sure you know if you are to take the medicine on a regular basis or only as needed.    If your medicine is taken on a regular basis, take it on time and at the right dose. If you miss a dose, don t double up the next dose.    Use a medicine log, marleny, or calendar to keep track of when you take your medicine. This helps you stay on schedule and not miss doses or take extra doses.    When taking liquid doses, use a measuring spoon or dropper. This ensures you take the correct dose.    Tell your healthcare provider right away if you have any side effects.    Don t cut, crush, or change your medicine in any way.    Don t take someone else s opioids. Don t share yours with other people.    Don t drive while taking opioids.    Don t use dangerous equipment or power tools while taking opioids.    Check expiration dates regularly. Dispose of all  medicines properly.   Beware of combining medicines  Some medicines can be dangerous when used with opioids. In some cases, combining medicines can cause death. Make sure to tell your healthcare provider and pharmacist about all of the medicines you re taking. This includes over-the-counter  medicines. It also includes vitamins, herbs, and other supplements. And it includes illegal or street drugs. Medicines that may be unsafe to use with opioids include:    Over-the-counter pain relievers, such as acetaminophen    Other prescription opioids    Benzodiazepines such as clonazepam or alprazolam    Muscle relaxants such as cyclobenzaprine or carisoprodol    Hypnotics such as sleep aids like zolpidem  WARNING: Don t take opioids with alcohol or street drugs. This can cause death.     Symptoms of opioid overdose  Opioids act on the part of the brain that affects breathing. An overdose of opioids can slow breathing down too much. It can even stop your breathing. This can cause death. Call 911 right away if you think you or someone else has had an overdose.   Look for these 3 key symptoms:    The dark circles in the middle of the eyes are very small (pinpoint pupils)    Breathing is slow or stopped    The person has passed out and does not respond(is not conscious)  Other symptoms to look for include:    Limp body    Pale face    Cool, damp skin    Purple or blue tint to lips and fingernails    Vomiting   Storing opioids safely  Opioids need to be stored safely. This helps protect anyone else from accidentally taking the medicine. It also helps prevent the theft and misuse of the medicine. If possible, store the medicine in a locked container or cupboard that others cannot get to. Store the medicine in a cool dry place. Don t store it in a damp place, such as a bathroom. Always put the medicine back in its secure place after each use.   How to dispose opioids  Dispose of unused or  opioids in a safe way. This is to prevent harm to other people. Don t save your medicine or give it to other people for any reason. Even a single dose of opioids can lead to death if it used by someone else. To dispose of your medicine safely:    Find your [facility] s medicine take-back program. You may need to drop the  medicine off at a local police station or pharmacy.    Ask your pharmacy about a mail-back program. You may be able to send the medicine through the mail. This is done using a special envelope.  If these options are not available to you, ask your healthcare provider for help.                           The FDA also has guidelines for disposing of medicines. You may be able to flush them down the toilet. Or you may be able to put them in the trash. Check with your local water and waste management company to see what is allowed in your city or state. You can learn more here: www.fda.gov/drugs/resourcesforyou/consumers/buyingusingmedicinesafely/ensuringsafeuseofmedicine/safedisposalofmedicines/fdj061438.htm. Before using these options, check with your local water and waste management company to determine if this is allowed in your city or state.    Stopping opioid treatment  If you have been taking an opioid for more than a few weeks, your body gets used to having it. When you stop taking the medicine, you can have withdrawal symptoms. There are many kinds of withdrawal symptoms. They can range from mild to severe. They can include:    Restlessness    Anxiety    Muscle aches    Sweating    Large (dilated) pupils    Watery eyes    Runny nose    Trouble sleeping    Nausea and vomiting    Abdominal cramping    Diarrhea    Fast heartbeat  Don t stop opioid medicine without help from your healthcare provider. You will need a plan to safely stop taking it. This is to help manage withdrawal symptoms. In most cases, the amount of medicine you take will be cut down. You will take less and less over several weeks. If needed, you may need to take other medicines and treatments to help with this process. As the opioid medicine leaves your body, your body will adapt. Your withdrawal symptoms should then go away. How long this takes can vary for each person.  Date Last Reviewed: 8/1/2017 2000-2017 [employer] [address]  [place] [address]. All rights reserved. This information is not intended as a substitute for professional medical care. Always follow your healthcare professional's instructions.

## 2018-04-22 LAB — INTERPRETATION ECG - MUSE: NORMAL

## 2018-05-03 ENCOUNTER — TRANSFERRED RECORDS (OUTPATIENT)
Dept: HEALTH INFORMATION MANAGEMENT | Facility: CLINIC | Age: 63
End: 2018-05-03

## 2018-05-07 ENCOUNTER — TELEPHONE (OUTPATIENT)
Dept: INTERNAL MEDICINE | Facility: CLINIC | Age: 63
End: 2018-05-07

## 2018-05-07 NOTE — TELEPHONE ENCOUNTER
Patient has an appt at [facility], [address] 5/10/18.  They would like any records pertinent to this visit.   Include last office visit, med list, scans.  Fax records to [phone].    If any questions call them at [phone].  BAM Maddox R.N.

## 2018-05-07 NOTE — TELEPHONE ENCOUNTER
She needs to go through the proper process for this. Francisca please advise her how to get records, I did not refer her to this clinic.

## 2018-05-08 NOTE — TELEPHONE ENCOUNTER
Left message for patient to call clinic.  Dr. Savage did not refer patient to the [facility].  We will need a HIPPA compliant ENRIQUE in order to send any records.  Please advise patient when she returns call.

## 2018-05-08 NOTE — TELEPHONE ENCOUNTER
Pt calls back. She will stop by the clinic. Station coordinator states she will help her with the ENRIQUE and give her the OV notes, etc. Pt advised and will ask for Francisca.

## 2018-05-15 ENCOUNTER — TRANSFERRED RECORDS (OUTPATIENT)
Dept: HEALTH INFORMATION MANAGEMENT | Facility: CLINIC | Age: 63
End: 2018-05-15

## 2018-05-22 ENCOUNTER — TELEPHONE (OUTPATIENT)
Dept: INTERNAL MEDICINE | Facility: CLINIC | Age: 63
End: 2018-05-22

## 2018-05-22 DIAGNOSIS — M54.5 LOW BACK PAIN, UNSPECIFIED BACK PAIN LATERALITY, UNSPECIFIED CHRONICITY, WITH SCIATICA PRESENCE UNSPECIFIED: Primary | ICD-10-CM

## 2018-05-22 NOTE — TELEPHONE ENCOUNTER
Name of caller:   Krystyna Casey  Relationship to pt:  Self    Reason for call:   Patient recently had an epidural in her back for pain but hasn't gotten relief. She'd like a prescription for pain before she leaves for [place] on Mon 5/28. Please advise    Best phone number to reach pt at is:   [phone]  Ok to leave a message with medical info?   Yes  Pharmacy preferred (if calling for a refill)   NA

## 2018-05-23 ENCOUNTER — TRANSFERRED RECORDS (OUTPATIENT)
Dept: HEALTH INFORMATION MANAGEMENT | Facility: CLINIC | Age: 63
End: 2018-05-23

## 2018-05-23 RX ORDER — TRAMADOL HYDROCHLORIDE 50 MG/1
50-100 TABLET ORAL EVERY 8 HOURS PRN
Qty: 60 TABLET | Refills: 1 | Status: CANCELLED | OUTPATIENT
Start: 2018-05-23

## 2018-05-23 NOTE — TELEPHONE ENCOUNTER
"Pt calling again for status on refill.    Advised of Dr. Hickey's message below.    She is asking for a refill on Tramadol.  Was given rx \" a long time ago\" for low back pain.  States epidural was ordered by [facility]--and not currently getting relief of pain.  Advised pt to call pain clinic for Tramadol rx.  States she \"is running out of time\"--leaves for [place] on Monday and would like rx to come from PCP.    Tramadol      Last Written Prescription Date:  1-26-16  Last Fill Quantity: 60,   # refills: 1  Last Office Visit: 4-10-18  Future Office visit:       Routing refill request to provider for review/approval because:  Drug not on the [facility], P or  Health refill protocol or controlled substance    No signed CSA form in chart.    [place] reviewed:  reviewed- no concerns--no controlled meds have been prescribed.    [place]  [email or web address]    Please advise, thanks.            "

## 2018-05-23 NOTE — TELEPHONE ENCOUNTER
Pt calling back.  States she called the pain clinic for Tramadol but unable to do over the phone.  She was told to schedule an appt.

## 2018-05-24 RX ORDER — TRAMADOL HYDROCHLORIDE 50 MG/1
50 TABLET ORAL 3 TIMES DAILY PRN
Qty: 90 TABLET | Refills: 0 | Status: ON HOLD | OUTPATIENT
Start: 2018-05-24 | End: 2018-11-01

## 2018-05-24 NOTE — TELEPHONE ENCOUNTER
Pt returning call.  Advised of PCP's message below.  Not taking Tyl.  Cannot take Nsaids d/t upset stomach.    Would be taking Tramadol during the day too--will be doing a lot of walking.    Please advise, thanks.

## 2018-05-24 NOTE — TELEPHONE ENCOUNTER
Is she taking tylenol, if so how much? Has she been taking any ibuprofen, advil, aleve? If so how much? Is this something she thinks she would take just at night? Advise in future, she should expect that appointments will be needed for new pain medication prescriptions.

## 2018-06-16 DIAGNOSIS — I10 BENIGN ESSENTIAL HYPERTENSION: Primary | ICD-10-CM

## 2018-06-21 NOTE — TELEPHONE ENCOUNTER
"Requested Prescriptions   Pending Prescriptions Disp Refills     lisinopril (PRINIVIL/ZESTRIL) 10 MG tablet [Pharmacy Med Name: LISINOPRIL 10MG TABLETS]  Last Written Prescription Date:  4/10/2018  Last Fill Quantity: 60 tablet,  # refills: 1   Last office visit: 4/10/2018 with prescribing provider:  Hussein     Future Office Visit:     60 tablet 0     Sig: TAKE 2 TABLETS(20 MG) BY MOUTH DAILY    ACE Inhibitors (Including Combos) Protocol Failed    6/16/2018 10:30 AM       Failed - Normal serum creatinine on file in past 12 months    Recent Labs   Lab Test  04/21/18   0926   CR  1.31*          Passed - Blood pressure under 140/90 in past 12 months    BP Readings from Last 3 Encounters:   04/21/18 114/62   04/12/18 130/82   04/10/18 174/80          Passed - Recent (12 mo) or future (30 days) visit within the authorizing provider's specialty    Patient had office visit in the last 12 months or has a visit in the next 30 days with authorizing provider or within the authorizing provider's specialty.  See \"Patient Info\" tab in inbasket, or \"Choose Columns\" in Meds & Orders section of the refill encounter.           Passed - Patient is age 18 or older       Passed - No active pregnancy on record       Passed - Normal serum potassium on file in past 12 months    Recent Labs   Lab Test  04/21/18   0926   POTASSIUM  3.9            Passed - No positive pregnancy test in past 12 months          "

## 2018-06-22 RX ORDER — LISINOPRIL 10 MG/1
TABLET ORAL
Qty: 180 TABLET | Refills: 1 | Status: ON HOLD | OUTPATIENT
Start: 2018-06-22 | End: 2019-01-06

## 2018-06-23 ENCOUNTER — OFFICE VISIT (OUTPATIENT)
Dept: URGENT CARE | Facility: URGENT CARE | Age: 63
End: 2018-06-23
Payer: COMMERCIAL

## 2018-06-23 VITALS
SYSTOLIC BLOOD PRESSURE: 150 MMHG | HEART RATE: 92 BPM | WEIGHT: 166.3 LBS | TEMPERATURE: 99.1 F | DIASTOLIC BLOOD PRESSURE: 90 MMHG | BODY MASS INDEX: 29.46 KG/M2 | OXYGEN SATURATION: 100 % | RESPIRATION RATE: 12 BRPM

## 2018-06-23 DIAGNOSIS — H10.9 BACTERIAL CONJUNCTIVITIS OF LEFT EYE: ICD-10-CM

## 2018-06-23 DIAGNOSIS — R05.8 PRODUCTIVE COUGH: Primary | ICD-10-CM

## 2018-06-23 PROCEDURE — 99214 OFFICE O/P EST MOD 30 MIN: CPT | Performed by: PHYSICIAN ASSISTANT

## 2018-06-23 RX ORDER — TOBRAMYCIN 3 MG/ML
1 SOLUTION/ DROPS OPHTHALMIC EVERY 4 HOURS
Qty: 1 BOTTLE | Refills: 0 | Status: SHIPPED | OUTPATIENT
Start: 2018-06-23 | End: 2018-06-30

## 2018-06-23 RX ORDER — CODEINE PHOSPHATE AND GUAIFENESIN 10; 100 MG/5ML; MG/5ML
1-2 SOLUTION ORAL EVERY 4 HOURS PRN
Qty: 240 ML | Refills: 0 | Status: SHIPPED | OUTPATIENT
Start: 2018-06-23 | End: 2018-07-13

## 2018-06-23 RX ORDER — DOXYCYCLINE 100 MG/1
100 CAPSULE ORAL 2 TIMES DAILY
Qty: 20 CAPSULE | Refills: 0 | Status: SHIPPED | OUTPATIENT
Start: 2018-06-23 | End: 2018-07-23

## 2018-06-23 NOTE — MR AVS SNAPSHOT
"              After Visit Summary   6/23/2018    Krystyna Peña    MRN: [id number]           Patient Information     Date Of Birth          1955        Visit Information        Provider Department      6/23/2018 9:25 AM Dorita Bernal PA-C [facility]        Today's Diagnoses     Productive cough    -  1    Bacterial conjunctivitis of left eye          Care Instructions    (R05) Productive cough  (primary encounter diagnosis)  Comment:   Plan: doxycycline (VIBRAMYCIN) 100 MG capsule,         guaiFENesin-codeine (ROBITUSSIN AC) 100-10         MG/5ML SOLN solution            (H10.9) Bacterial conjunctivitis of left eye  Comment:   Plan: tobramycin (TOBREX) 0.3 % ophthalmic solution        Warm compress to eye prior to placing drops    Follow up with primary clinic should symptoms persist or worsen.                Follow-ups after your visit        Who to contact     If you have questions or need follow up information about today's clinic visit or your schedule please contact [facility] directly at [phone].  Normal or non-critical lab and imaging results will be communicated to you by baseclickhart, letter or phone within 4 business days after the clinic has received the results. If you do not hear from us within 7 days, please contact the clinic through baseclickhart or phone. If you have a critical or abnormal lab result, we will notify you by phone as soon as possible.  Submit refill requests through [employer] or call your pharmacy and they will forward the refill request to us. Please allow 3 business days for your refill to be completed.          Additional Information About Your Visit        MyChart Information     [employer] lets you send messages to your doctor, view your test results, renew your prescriptions, schedule appointments and more. To sign up, go to [email or web address] . Click on \"Log in\" on the left side of the screen, which will " "take you to the Welcome page. Then click on \"Sign up Now\" on the right side of the page.     You will be asked to enter the access code listed below, as well as some personal information. Please follow the directions to create your username and password.     Your access code is: [email or web address]  Expires: 2018 10:22 AM     Your access code will  in 90 days. If you need help or a new code, please call your [facility] clinic or [phone].        Care EveryWhere ID     This is your Care EveryWhere ID. This could be used by other organizations to access your [facility] medical records  [id number]        Your Vitals Were     Pulse Temperature Respirations Last Period Pulse Oximetry BMI (Body Mass Index)    92 99.1  F (37.3  C) (Oral) 12 10/01/2003 100% 29.46 kg/m2       Blood Pressure from Last 3 Encounters:   18 150/90   18 114/62   18 130/82    Weight from Last 3 Encounters:   18 166 lb 4.8 oz (75.4 kg)   18 163 lb (73.9 kg)   18 164 lb 8 oz (74.6 kg)              Today, you had the following     No orders found for display         Today's Medication Changes          These changes are accurate as of 18 10:22 AM.  If you have any questions, ask your nurse or doctor.               Start taking these medicines.        Dose/Directions    doxycycline 100 MG capsule   Commonly known as:  VIBRAMYCIN   Used for:  Productive cough   Started by:  Dorita Bernal PA-C        Dose:  100 mg   Take 1 capsule (100 mg) by mouth 2 times daily   Quantity:  20 capsule   Refills:  0       guaiFENesin-codeine 100-10 MG/5ML Soln solution   Commonly known as:  ROBITUSSIN AC   Used for:  Productive cough   Started by:  Dorita Bernal PA-C        Dose:  1-2 tsp.   Take 5-10 mLs by mouth every 4 hours as needed for cough   Quantity:  240 mL   Refills:  0         These medicines have changed or have updated prescriptions.        Dose/Directions    * tobramycin 0.3 % Oint " ophthalmic ointment   Commonly known as:  TOBREX   This may have changed:  Another medication with the same name was added. Make sure you understand how and when to take each.   Changed by:  Dorita Bernal PA-C        Dose:  0.5 inch   0.5 inches 3 times daily   Refills:  0       * tobramycin 0.3 % ophthalmic solution   Commonly known as:  TOBREX   This may have changed:  You were already taking a medication with the same name, and this prescription was added. Make sure you understand how and when to take each.   Used for:  Bacterial conjunctivitis of left eye   Changed by:  Dorita Bernal PA-C        Dose:  1 drop   Place 1 drop Into the left eye every 4 hours for 7 days   Quantity:  1 Bottle   Refills:  0       * Notice:  This list has 2 medication(s) that are the same as other medications prescribed for you. Read the directions carefully, and ask your doctor or other care provider to review them with you.         Where to get your medicines      These medications were sent to [facility] [address] [place] [address] AT [facility] [address]  [address] [place] [address], [facility] [address]     Phone:  [phone]     doxycycline 100 MG capsule    tobramycin 0.3 % ophthalmic solution         Some of these will need a paper prescription and others can be bought over the counter.  Ask your nurse if you have questions.     Bring a paper prescription for each of these medications     guaiFENesin-codeine 100-10 MG/5ML Soln solution                Primary Care Provider Office Phone # Fax #    Viri Savage [phone]       [address]  [facility] [address]        Equal Access to Services     [facility] AH: Hadii aad ku hadasho Soomaali, waaxda luqadaha, qaybta kaalmada adeegyada, jhoana bangura. So [facility] [phone].    ATENCIÓN: Si habla español, tiene a arguelles disposición servicios gratuitos de asistencia lingüística. Llame  al [phone].    We comply with applicable federal civil rights laws and [place] laws. We do not discriminate on the basis of race, color, national origin, age, disability, sex, sexual orientation, or gender identity.            Thank you!     Thank you for choosing [facility]  for your care. Our goal is always to provide you with excellent care. Hearing back from our patients is one way we can continue to improve our services. Please take a few minutes to complete the written survey that you may receive in the mail after your visit with us. Thank you!             Your Updated Medication List - Protect others around you: Learn how to safely use, store and throw away your medicines at www.disposemymeds.org.          This list is accurate as of 6/23/18 10:22 AM.  Always use your most recent med list.                   Brand Name Dispense Instructions for use Diagnosis    ASPIRIN PO      Take 81 mg by mouth daily        celecoxib 200 MG capsule    celeBREX    60 capsule    Take 1 capsule (200 mg) by mouth daily    Status post total replacement of right hip       doxycycline 100 MG capsule    VIBRAMYCIN    20 capsule    Take 1 capsule (100 mg) by mouth 2 times daily    Productive cough       guaiFENesin-codeine 100-10 MG/5ML Soln solution    ROBITUSSIN AC    240 mL    Take 5-10 mLs by mouth every 4 hours as needed for cough    Productive cough       lisinopril 10 MG tablet    PRINIVIL/ZESTRIL    180 tablet    TAKE 2 TABLETS(20 MG) BY MOUTH DAILY    Benign essential hypertension       omeprazole 20 MG CR capsule    priLOSEC     Take 1 capsule (20 mg) by mouth daily        order for DME     1 Device    Equipment being ordered: manuel stockings, knee high 20-30 mmHg,    Edema, unspecified type       * tobramycin 0.3 % Oint ophthalmic ointment    TOBREX     0.5 inches 3 times daily        * tobramycin 0.3 % ophthalmic solution    TOBREX    1 Bottle    Place 1 drop Into the left eye every 4  hours for 7 days    Bacterial conjunctivitis of left eye       traMADol 50 MG tablet    ULTRAM    90 tablet    Take 1 tablet (50 mg) by mouth 3 times daily as needed for severe pain    Low back pain, unspecified back pain laterality, unspecified chronicity, with sciatica presence unspecified       * Notice:  This list has 2 medication(s) that are the same as other medications prescribed for you. Read the directions carefully, and ask your doctor or other care provider to review them with you.

## 2018-06-23 NOTE — PATIENT INSTRUCTIONS
(R05) Productive cough  (primary encounter diagnosis)  Comment:   Plan: doxycycline (VIBRAMYCIN) 100 MG capsule,         guaiFENesin-codeine (ROBITUSSIN AC) 100-10         MG/5ML SOLN solution            (H10.9) Bacterial conjunctivitis of left eye  Comment:   Plan: tobramycin (TOBREX) 0.3 % ophthalmic solution        Warm compress to eye prior to placing drops    Follow up with primary clinic should symptoms persist or worsen.

## 2018-06-23 NOTE — PROGRESS NOTES
SUBJECTIVE:   Krystyna Peña is a 62 year old female presenting with a chief complaint of   1) left eye redness and sticking together in the morning for the past x 2 days.  Used left over tobramycin from a previous eye infection.  No eye pain or injury.    2) runny nose and congestion for the past week  3) productive cough for one week.  Onset of symptoms was as above.  Course of illness is worsening.    Severity moderate  Current and Associated symptoms: as above  Treatment measures tried include as above.  Predisposing factors include None.    SH: just returned from a trip to [place]    Past Medical History:   Diagnosis Date     Arthritis      Asymptomatic varicose veins      Benign neoplasm of colon 11/06, 7/13    Adenoma     Cardiac arrest (H) 2003    after knee replacement     Gastroesophageal reflux disease      Hypertension      Major depression      Pneumonia, organism unspecified(486) 1986     Patient Active Problem List   Diagnosis     Asymptomatic varicose veins     CARDIOVASCULAR SCREENING; LDL GOAL LESS THAN 160     Back pain     Urinary frequency     Benign essential hypertension     Advanced directives, counseling/discussion     Edema, unspecified type     Social History   Substance Use Topics     Smoking status: Former Smoker     Packs/day: 1.00     Types: Cigarettes, Cigars     Quit date: 4/1/2013     Smokeless tobacco: Never Used     Alcohol use Yes      Comment: 1 per day       ROS:  CONSTITUTIONAL:NEGATIVE for fever, chills, change in weight  INTEGUMENTARY/SKIN: NEGATIVE for worrisome rashes, moles or lesions  EYES: as per HPI  ENT/MOUTH: as per HPI  RESP:as per HPI  CV: NEGATIVE for chest pain, palpitations or peripheral edema  GI: NEGATIVE for nausea, abdominal pain, heartburn, or change in bowel habits  MUSCULOSKELETAL: NEGATIVE for significant arthralgias or myalgia  NEURO: NEGATIVE for weakness, dizziness or paresthesias  Review of systems negative except as stated above.    OBJECTIVE  :BP  150/90  Pulse 92  Temp 99.1  F (37.3  C) (Oral)  Resp 12  Wt 166 lb 4.8 oz (75.4 kg)  LMP 10/01/2003  SpO2 100%  BMI 29.46 kg/m2  GENERAL APPEARANCE: healthy, alert and no distress  EYES: EOMI,  PERRL, conjunctiva clear on right  EYES: left conjunctiva is injected with yellow drainage  HENT: ear canals and TM's normal.  Nose with purulent coryza and mouth without ulcers, erythema or lesions  NECK: supple, nontender, no lymphadenopathy  RESP: lungs with a few scattered rhonchi  CV: regular rates and rhythm, normal S1 S2, no murmur noted  ABDOMEN:  soft, nontender, no HSM or masses and bowel sounds normal  NEURO: Normal strength and tone, sensory exam grossly normal,  normal speech and mentation  SKIN: no suspicious lesions or rashes    (R05) Productive cough  (primary encounter diagnosis)  Comment:   Plan: doxycycline (VIBRAMYCIN) 100 MG capsule,         guaiFENesin-codeine (ROBITUSSIN AC) 100-10         MG/5ML SOLN solution            (H10.9) Bacterial conjunctivitis of left eye  Comment:   Plan: tobramycin (TOBREX) 0.3 % ophthalmic solution        Warm compress to eye prior to placing drops    Follow up with primary clinic should symptoms persist or worsen.      Patient expresses understanding and agreement with the assessment and plan as above.

## 2018-07-11 ENCOUNTER — HOSPITAL ENCOUNTER (EMERGENCY)
Facility: CLINIC | Age: 63
Discharge: HOME | End: 2018-07-11
Attending: EMERGENCY MEDICINE | Admitting: EMERGENCY MEDICINE
Payer: COMMERCIAL

## 2018-07-11 VITALS
TEMPERATURE: 97.6 F | SYSTOLIC BLOOD PRESSURE: 129 MMHG | OXYGEN SATURATION: 99 % | DIASTOLIC BLOOD PRESSURE: 66 MMHG | RESPIRATION RATE: 18 BRPM

## 2018-07-11 DIAGNOSIS — E86.0 DEHYDRATION: ICD-10-CM

## 2018-07-11 DIAGNOSIS — M54.50 BILATERAL LOW BACK PAIN WITHOUT SCIATICA, UNSPECIFIED CHRONICITY: ICD-10-CM

## 2018-07-11 LAB
ALBUMIN SERPL-MCNC: 3.4 G/DL (ref 3.4–5)
ALBUMIN UR-MCNC: NEGATIVE MG/DL
ALP SERPL-CCNC: 96 U/L (ref 40–150)
ALT SERPL W P-5'-P-CCNC: 30 U/L (ref 0–50)
ANION GAP SERPL CALCULATED.3IONS-SCNC: 10 MMOL/L (ref 3–14)
APPEARANCE UR: CLEAR
AST SERPL W P-5'-P-CCNC: 19 U/L (ref 0–45)
BACTERIA #/AREA URNS HPF: ABNORMAL /HPF
BASOPHILS # BLD AUTO: 0 10E9/L (ref 0–0.2)
BASOPHILS NFR BLD AUTO: 0.4 %
BILIRUB SERPL-MCNC: 0.7 MG/DL (ref 0.2–1.3)
BILIRUB UR QL STRIP: NEGATIVE
BUN SERPL-MCNC: 45 MG/DL (ref 7–30)
CALCIUM SERPL-MCNC: 8.6 MG/DL (ref 8.5–10.1)
CHLORIDE SERPL-SCNC: 105 MMOL/L (ref 94–109)
CO2 SERPL-SCNC: 23 MMOL/L (ref 20–32)
COLOR UR AUTO: YELLOW
CREAT SERPL-MCNC: 2.09 MG/DL (ref 0.52–1.04)
DIFFERENTIAL METHOD BLD: ABNORMAL
EOSINOPHIL # BLD AUTO: 0.1 10E9/L (ref 0–0.7)
EOSINOPHIL NFR BLD AUTO: 1.7 %
ERYTHROCYTE [DISTWIDTH] IN BLOOD BY AUTOMATED COUNT: 14.3 % (ref 10–15)
GFR SERPL CREATININE-BSD FRML MDRD: 24 ML/MIN/1.7M2
GLUCOSE BLDC GLUCOMTR-MCNC: 84 MG/DL (ref 70–99)
GLUCOSE SERPL-MCNC: 93 MG/DL (ref 70–99)
GLUCOSE UR STRIP-MCNC: NEGATIVE MG/DL
HCT VFR BLD AUTO: 37.9 % (ref 35–47)
HGB BLD-MCNC: 12.5 G/DL (ref 11.7–15.7)
HGB UR QL STRIP: NEGATIVE
HYALINE CASTS #/AREA URNS LPF: 4 /LPF (ref 0–2)
IMM GRANULOCYTES # BLD: 0.1 10E9/L (ref 0–0.4)
IMM GRANULOCYTES NFR BLD: 1.1 %
INTERPRETATION ECG - MUSE: NORMAL
KETONES UR STRIP-MCNC: NEGATIVE MG/DL
LEUKOCYTE ESTERASE UR QL STRIP: NEGATIVE
LYMPHOCYTES # BLD AUTO: 1.1 10E9/L (ref 0.8–5.3)
LYMPHOCYTES NFR BLD AUTO: 22.7 %
MCH RBC QN AUTO: 37.4 PG (ref 26.5–33)
MCHC RBC AUTO-ENTMCNC: 33 G/DL (ref 31.5–36.5)
MCV RBC AUTO: 114 FL (ref 78–100)
MONOCYTES # BLD AUTO: 0.6 10E9/L (ref 0–1.3)
MONOCYTES NFR BLD AUTO: 12.7 %
MUCOUS THREADS #/AREA URNS LPF: PRESENT /LPF
NEUTROPHILS # BLD AUTO: 2.9 10E9/L (ref 1.6–8.3)
NEUTROPHILS NFR BLD AUTO: 61.4 %
NITRATE UR QL: NEGATIVE
NRBC # BLD AUTO: 0 10*3/UL
NRBC BLD AUTO-RTO: 0 /100
PH UR STRIP: 6 PH (ref 5–7)
PLATELET # BLD AUTO: 218 10E9/L (ref 150–450)
POTASSIUM SERPL-SCNC: 4.9 MMOL/L (ref 3.4–5.3)
PROT SERPL-MCNC: 6.6 G/DL (ref 6.8–8.8)
RBC # BLD AUTO: 3.34 10E12/L (ref 3.8–5.2)
RBC #/AREA URNS AUTO: <1 /HPF (ref 0–2)
SODIUM SERPL-SCNC: 138 MMOL/L (ref 133–144)
SOURCE: ABNORMAL
SP GR UR STRIP: 1.01 (ref 1–1.03)
SQUAMOUS #/AREA URNS AUTO: 1 /HPF (ref 0–1)
TRANS CELLS #/AREA URNS HPF: <1 /HPF (ref 0–1)
TROPONIN I SERPL-MCNC: <0.015 UG/L (ref 0–0.04)
UROBILINOGEN UR STRIP-MCNC: NEGATIVE MG/DL (ref 0–2)
WBC # BLD AUTO: 4.7 10E9/L (ref 4–11)
WBC #/AREA URNS AUTO: <1 /HPF (ref 0–5)

## 2018-07-11 PROCEDURE — 36415 COLL VENOUS BLD VENIPUNCTURE: CPT | Performed by: EMERGENCY MEDICINE

## 2018-07-11 PROCEDURE — 25000128 H RX IP 250 OP 636: Performed by: EMERGENCY MEDICINE

## 2018-07-11 PROCEDURE — 81001 URINALYSIS AUTO W/SCOPE: CPT | Performed by: EMERGENCY MEDICINE

## 2018-07-11 PROCEDURE — 96360 HYDRATION IV INFUSION INIT: CPT

## 2018-07-11 PROCEDURE — 85025 COMPLETE CBC W/AUTO DIFF WBC: CPT | Performed by: EMERGENCY MEDICINE

## 2018-07-11 PROCEDURE — 96361 HYDRATE IV INFUSION ADD-ON: CPT

## 2018-07-11 PROCEDURE — 99285 EMERGENCY DEPT VISIT HI MDM: CPT | Mod: 25

## 2018-07-11 PROCEDURE — 84484 ASSAY OF TROPONIN QUANT: CPT | Performed by: EMERGENCY MEDICINE

## 2018-07-11 PROCEDURE — 80053 COMPREHEN METABOLIC PANEL: CPT | Performed by: EMERGENCY MEDICINE

## 2018-07-11 PROCEDURE — 93005 ELECTROCARDIOGRAM TRACING: CPT

## 2018-07-11 PROCEDURE — 00000146 ZZHCL STATISTIC GLUCOSE BY METER IP

## 2018-07-11 RX ORDER — SODIUM CHLORIDE 9 MG/ML
1000 INJECTION, SOLUTION INTRAVENOUS CONTINUOUS
Status: DISCONTINUED | OUTPATIENT
Start: 2018-07-11 | End: 2018-07-11 | Stop reason: HOSPADM

## 2018-07-11 RX ORDER — ONDANSETRON 4 MG/1
4 TABLET, ORALLY DISINTEGRATING ORAL EVERY 8 HOURS PRN
Qty: 10 TABLET | Refills: 0 | Status: SHIPPED | OUTPATIENT
Start: 2018-07-11 | End: 2018-07-13

## 2018-07-11 RX ORDER — HYDROMORPHONE HYDROCHLORIDE 1 MG/ML
0.5 INJECTION, SOLUTION INTRAMUSCULAR; INTRAVENOUS; SUBCUTANEOUS
Status: DISCONTINUED | OUTPATIENT
Start: 2018-07-11 | End: 2018-07-11 | Stop reason: HOSPADM

## 2018-07-11 RX ORDER — ONDANSETRON 2 MG/ML
4 INJECTION INTRAMUSCULAR; INTRAVENOUS EVERY 30 MIN PRN
Status: DISCONTINUED | OUTPATIENT
Start: 2018-07-11 | End: 2018-07-11 | Stop reason: HOSPADM

## 2018-07-11 RX ORDER — LIDOCAINE 40 MG/G
CREAM TOPICAL
Status: DISCONTINUED | OUTPATIENT
Start: 2018-07-11 | End: 2018-07-11 | Stop reason: HOSPADM

## 2018-07-11 RX ADMIN — SODIUM CHLORIDE 1000 ML: 9 INJECTION, SOLUTION INTRAVENOUS at 09:27

## 2018-07-11 ASSESSMENT — ENCOUNTER SYMPTOMS
CHILLS: 1
DIZZINESS: 1
BACK PAIN: 1
VOMITING: 1
DIAPHORESIS: 1
NAUSEA: 1
ARTHRALGIAS: 1

## 2018-07-11 NOTE — ED AVS SNAPSHOT
[facility] Emergency Department    [address]    [facility] [address]    Phone:  [phone]    Fax:  [phone]                                       Krytsyna Peña   MRN: [id number]    Department:  [facility] Emergency Department   Date of Visit:  7/11/2018           Patient Information     Date Of Birth          1955        Your diagnoses for this visit were:     Dehydration     Bilateral low back pain without sciatica, unspecified chronicity        You were seen by Gertrude Avila MD.      Follow-up Information     Follow up with Viri Savage MD. Go in 2 days.    Specialty:  Internal Medicine    Why:  recheck kidney function    Contact information:    [address]  [facility] [id number] [phone]          Discharge Instructions         Dehydration (Adult)  Dehydration occurs when your body loses too much fluid. This may be the result of prolonged vomiting or diarrhea, excessive sweating, or a high fever. It may also happen if you don t drink enough fluid when you re sick or out in the heat. Misuse of diuretics (water pills) can also be a cause.  Symptoms include thirst and decreased urine output. You may also feel dizzy, weak, fatigued, or very drowsy. The diet described below is usually enough to treat dehydration. In some cases, you may need medicine.  Home care    Drink at least 12 8-ounce glasses of fluid every day to resolve the dehydration. Fluid may include water; orange juice; lemonade; apple, grape, or cranberry juice; clear fruit drinks; electrolyte replacement and sports drinks; and teas and coffee without caffeine. Don't drink alcohol. If you have been diagnosed with a kidney disease, ask your doctor how much and what types of fluids you should drink to prevent dehydration. If you have kidney disease, fluid can build up in the body. This can be dangerous to your health.    If you have a fever, muscle aches, or a headache as a result of  a cold or flu, you may take acetaminophen or ibuprofen, unless another medicine was prescribed. If you have chronic liver or kidney disease, or have ever had a stomach ulcer or gastrointestinal bleeding, talk with your healthcare provider before using these medicines. Don't take aspirin if you are younger than 18 and have a fever. Aspirin raises the chance for severe liver injury.  Follow-up care  Follow up with your healthcare provider, or as advised.  When to seek medical advice  Call your healthcare provider right away if any of these occur:    Continued vomiting    Frequent diarrhea (more than 5 times a day); blood (red or black color) or mucus in diarrhea    Blood in vomit or stool    Swollen abdomen or increasing abdominal pain    Weakness, dizziness, or fainting    Unusual drowsiness or confusion    Reduced urine output or extreme thirst    Fever of 100.4 F (38 C) or higher  Date Last Reviewed: 5/1/2017 2000-2017 [employer]. [address] [place] [address]. All rights reserved. This information is not intended as a substitute for professional medical care. Always follow your healthcare professional's instructions.          24 Hour Appointment Hotline       To make an appointment at any [facility], call [phone] ([phone]). If you don't have a family doctor or clinic, we will help you find one. [facility] clinics are conveniently located to serve the needs of you and your family.             Review of your medicines      START taking        Dose / Directions Last dose taken    ondansetron 4 MG ODT tab   Commonly known as:  ZOFRAN ODT   Dose:  4 mg   Quantity:  10 tablet        Take 1 tablet (4 mg) by mouth every 8 hours as needed   Refills:  0          Our records show that you are taking the medicines listed below. If these are incorrect, please call your family doctor or clinic.        Dose / Directions Last dose taken    ASPIRIN PO   Dose:  81 mg        Take 81 mg  by mouth daily   Refills:  0        celecoxib 200 MG capsule   Commonly known as:  celeBREX   Dose:  200 mg   Quantity:  60 capsule        Take 1 capsule (200 mg) by mouth daily   Refills:  1        doxycycline 100 MG capsule   Commonly known as:  VIBRAMYCIN   Dose:  100 mg   Quantity:  20 capsule        Take 1 capsule (100 mg) by mouth 2 times daily   Refills:  0        guaiFENesin-codeine 100-10 MG/5ML Soln solution   Commonly known as:  ROBITUSSIN AC   Dose:  1-2 tsp.   Quantity:  240 mL        Take 5-10 mLs by mouth every 4 hours as needed for cough   Refills:  0        lisinopril 10 MG tablet   Commonly known as:  PRINIVIL/ZESTRIL   Quantity:  180 tablet        TAKE 2 TABLETS(20 MG) BY MOUTH DAILY   Refills:  1        omeprazole 20 MG CR capsule   Commonly known as:  priLOSEC   Dose:  20 mg        Take 1 capsule (20 mg) by mouth daily   Refills:  0        order for DME   Quantity:  1 Device        Equipment being ordered: manuel stockings, knee high 20-30 mmHg,   Refills:  1        tobramycin 0.3 % Oint ophthalmic ointment   Commonly known as:  TOBREX   Dose:  0.5 inch        0.5 inches 3 times daily   Refills:  0        traMADol 50 MG tablet   Commonly known as:  ULTRAM   Dose:  50 mg   Quantity:  90 tablet        Take 1 tablet (50 mg) by mouth 3 times daily as needed for severe pain   Refills:  0                Prescriptions were sent or printed at these locations (1 Prescription)                   Other Prescriptions                Printed at Department/Unit printer (1 of 1)         ondansetron (ZOFRAN ODT) 4 MG ODT tab                Procedures and tests performed during your visit     CBC with platelets differential    Cardiac Continuous Monitoring    Comprehensive metabolic panel    EKG 12 lead    Glucose by meter    Orthostatic blood pressure and pulse    Peripheral IV catheter    Pulse oximetry nursing    Troponin I    UA with Microscopic      Orders Needing Specimen Collection     None      Pending  Results     Date and Time Order Name Status Description    7/11/2018 0836 EKG 12 lead Preliminary             Pending Culture Results     No orders found from 7/9/2018 to 7/12/2018.            Pending Results Instructions     If you had any lab results that were not finalized at the time of your Discharge, you can call the ED Lab Result RN at [phone]. You will be contacted by this team for any positive Lab results or changes in treatment. The nurses are available 7 days a week from 10A to 6:30P.  You can leave a message 24 hours per day and they will return your call.        Test Results From Your Hospital Stay        7/11/2018 10:50 AM      Component Results     Component Value Ref Range & Units Status    Color Urine Yellow  Final    Appearance Urine Clear  Final    Glucose Urine Negative NEG^Negative mg/dL Final    Bilirubin Urine Negative NEG^Negative Final    Ketones Urine Negative NEG^Negative mg/dL Final    Specific Gravity Urine 1.010 1.003 - 1.035 Final    Blood Urine Negative NEG^Negative Final    pH Urine 6.0 5.0 - 7.0 pH Final    Protein Albumin Urine Negative NEG^Negative mg/dL Final    Urobilinogen mg/dL Negative 0.0 - 2.0 mg/dL Final    Nitrite Urine Negative NEG^Negative Final    Leukocyte Esterase Urine Negative NEG^Negative Final    Source Midstream Urine  Final    WBC Urine <1 0 - 5 /HPF Final    RBC Urine <1 0 - 2 /HPF Final    Bacteria Urine Few (A) NEG^Negative /HPF Final    Squamous Epithelial /HPF Urine 1 0 - 1 /HPF Final    Transitional Epi <1 0 - 1 /HPF Final    Mucous Urine Present (A) NEG^Negative /LPF Final    Hyaline Casts 4 (H) 0 - 2 /LPF Final         7/11/2018  9:04 AM      Component Results     Component Value Ref Range & Units Status    Glucose 84 70 - 99 mg/dL Final    Dr/RN Notified         7/11/2018  9:53 AM      Component Results     Component Value Ref Range & Units Status    WBC 4.7 4.0 - 11.0 10e9/L Final    RBC Count 3.34 (L) 3.8 - 5.2 10e12/L Final    Hemoglobin 12.5  11.7 - 15.7 g/dL Final    Hematocrit 37.9 35.0 - 47.0 % Final     (H) 78 - 100 fl Final    MCH 37.4 (H) 26.5 - 33.0 pg Final    MCHC 33.0 31.5 - 36.5 g/dL Final    RDW 14.3 10.0 - 15.0 % Final    Platelet Count 218 150 - 450 10e9/L Final    Diff Method Automated Method  Final    % Neutrophils 61.4 % Final    % Lymphocytes 22.7 % Final    % Monocytes 12.7 % Final    % Eosinophils 1.7 % Final    % Basophils 0.4 % Final    % Immature Granulocytes 1.1 % Final    Nucleated RBCs 0 0 /100 Final    Absolute Neutrophil 2.9 1.6 - 8.3 10e9/L Final    Absolute Lymphocytes 1.1 0.8 - 5.3 10e9/L Final    Absolute Monocytes 0.6 0.0 - 1.3 10e9/L Final    Absolute Eosinophils 0.1 0.0 - 0.7 10e9/L Final    Absolute Basophils 0.0 0.0 - 0.2 10e9/L Final    Abs Immature Granulocytes 0.1 0 - 0.4 10e9/L Final    Absolute Nucleated RBC 0.0  Final         7/11/2018 10:19 AM      Component Results     Component Value Ref Range & Units Status    Sodium 138 133 - 144 mmol/L Final    Potassium 4.9 3.4 - 5.3 mmol/L Final    Chloride 105 94 - 109 mmol/L Final    Carbon Dioxide 23 20 - 32 mmol/L Final    Anion Gap 10 3 - 14 mmol/L Final    Glucose 93 70 - 99 mg/dL Final    Urea Nitrogen 45 (H) 7 - 30 mg/dL Final    Creatinine 2.09 (H) 0.52 - 1.04 mg/dL Final    GFR Estimate 24 (L) >60 mL/min/1.7m2 Final    Non  GFR Calc    GFR Estimate If Black 29 (L) >60 mL/min/1.7m2 Final    African American GFR Calc    Calcium 8.6 8.5 - 10.1 mg/dL Final    Bilirubin Total 0.7 0.2 - 1.3 mg/dL Final    Albumin 3.4 3.4 - 5.0 g/dL Final    Protein Total 6.6 (L) 6.8 - 8.8 g/dL Final    Alkaline Phosphatase 96 40 - 150 U/L Final    ALT 30 0 - 50 U/L Final    AST 19 0 - 45 U/L Final         7/11/2018 10:19 AM      Component Results     Component Value Ref Range & Units Status    Troponin I ES <0.015 0.000 - 0.045 ug/L Final    The 99th percentile for upper reference range is 0.045 ug/L.  Troponin values   in the range of 0.045 - 0.120 ug/L may be  associated with risks of adverse   clinical events.                  Clinical Quality Measure: Blood Pressure Screening     Your blood pressure was checked while you were in the emergency department today. The last reading we obtained was  BP: 129/66 . Please read the guidelines below about what these numbers mean and what you should do about them.  If your systolic blood pressure (the top number) is less than 120 and your diastolic blood pressure (the bottom number) is less than 80, then your blood pressure is normal. There is nothing more that you need to do about it.  If your systolic blood pressure (the top number) is 120-139 or your diastolic blood pressure (the bottom number) is 80-89, your blood pressure may be higher than it should be. You should have your blood pressure rechecked within a year by a primary care provider.  If your systolic blood pressure (the top number) is 140 or greater or your diastolic blood pressure (the bottom number) is 90 or greater, you may have high blood pressure. High blood pressure is treatable, but if left untreated over time it can put you at risk for heart attack, stroke, or kidney failure. You should have your blood pressure rechecked by a primary care provider within the next 4 weeks.  If your provider in the emergency department today gave you specific instructions to follow-up with your doctor or provider even sooner than that, you should follow that instruction and not wait for up to 4 weeks for your follow-up visit.        Thank you for choosing [facility]       Thank you for choosing [facility] for your care. Our goal is always to provide you with excellent care. Hearing back from our patients is one way we can continue to improve our services. Please take a few minutes to complete the written survey that you may receive in the mail after you visit with us. Thank you!        [employer] [employer] Information     [employer] lets you send messages to your doctor, view your test results,  "renew your prescriptions, schedule appointments and more. To sign up, go to [email or web address] . Click on \"Log in\" on the left side of the screen, which will take you to the Welcome page. Then click on \"Sign up Now\" on the right side of the page.     You will be asked to enter the access code listed below, as well as some personal information. Please follow the directions to create your username and password.     Your access code is: [email or web address]  Expires: 2018 10:22 AM     Your access code will  in 90 days. If you need help or a new code, please call your [facility] clinic or [phone].        Care EveryWhere ID     This is your Care EveryWhere ID. This could be used by other organizations to access your [facility] medical records  [id number]        Equal Access to Services     LEW HANSON : Mariam Breen, samra schaefer, nadir grullon, jhoana bustamante . So [facility] [phone].    ATENCIÓN: Si habla español, tiene a arguelles disposición servicios gratuitos de asistencia lingüística. Llame al [phone].    We comply with applicable federal civil rights laws and [place] laws. We do not discriminate on the basis of race, color, national origin, age, disability, sex, sexual orientation, or gender identity.            After Visit Summary       This is your record. Keep this with you and show to your community pharmacist(s) and doctor(s) at your next visit.                  "

## 2018-07-11 NOTE — ED AVS SNAPSHOT
[facility] Emergency Department    [address]    [facility] [address]    Phone:  [phone]    Fax:  [phone]                                       Krystyna Peña   MRN: [id number]    Department:  [facility] Emergency Department   Date of Visit:  7/11/2018           After Visit Summary Signature Page     I have received my discharge instructions, and my questions have been answered. I have discussed any challenges I see with this plan with the nurse or doctor.    ..........................................................................................................................................  Patient/Patient Representative Signature      ..........................................................................................................................................  Patient Representative Print Name and Relationship to Patient    ..................................................               ................................................  Date                                            Time    ..........................................................................................................................................  Reviewed by Signature/Title    ...................................................              ..............................................  Date                                                            Time

## 2018-07-11 NOTE — ED TRIAGE NOTES
Arrived at work, while standing felt dizzy. Coworkers called ambulance.  7 lb wt loss in past week for no reason.  Dizziness, nausea.  Vomited yesterday.  Was bile. Decreased appetite.  .  107/80. HR 90s.  Stood up for orthos was 98/68 .  EKG NSR with Ambulance.  Celebrex for arthritis.  Prilosec, Lisinopril.  20g in right hand.  Fluids running.

## 2018-07-11 NOTE — ED NOTES
"Orthostatics completed on pt. Pt was fine in between laying and sitting, no comments of light headedness or dizziness. Upon standing pt stated \"i feel slightly lightheaded and dizzy.\"  "

## 2018-07-11 NOTE — ED NOTES
Offered nausea and pain meds.  Patient declined.  Asked for food and drink.  Gave lunch box and juice.  No complaints of nausea at this time.

## 2018-07-11 NOTE — ED PROVIDER NOTES
"  History     Chief Complaint:  Nausea and Vomiting    HPI   Krystyna Peña is a 62 year old female, with history of GERD, and hypertension, who presents with nausea and vomiting. She also notes low back pain which she attributes to a low disc(unspecified). Her pain started 0700, and she took two tramadol this morning around 0415. Standing makes her pain worse. She states that her pain is related to a lower disc. Issue has been a couple weeks, had procedure done radiofrequency test at the pain clinic on June 18th. The pain makes it difficult for her to walk long distances. She is nauseated in the morning associated with her pain, and she had an episode of emesis with yellow bile in it (this is new for her). She became sweaty, dizzy, and felt like she was going to pass out, so she sat down. Denies chest pain or shortness of breath. Her blood sugar was 103 per EMS when she was brought in.    She also has complaints of trouble eating or drinking due to her \"esophagus closing up\" when she eats, she stated that \"it just does not go down\". She was seen for this by a primary care doctor and prescribed Prilosec.   Another concern was her 7 pound weight loss in the past week, which she was unable to attribute to a specific reason.    Allergies:  Morphine  Cephalexin     Medications:    Aspirin  Celebrex  Lisinopril  Prilosec  Tobrex  Ultram     Past Medical History:    Arthritis  Asymptomatic varicose veins  Benign neoplasm of colon  Cardiac arrest  GERD  Hypertension  Major depression  Pneumonia    Past Surgical History:    Amputate left third digit  Arthroplasty hip  Right arm plastic surgery  Right knee surgery  Removal bone spur left foot  Amputation of toes left and right  Repair spiegelian hernia  Total knee arthroplasty  Colonoscopy  Bunion surgery left and right foot  Vascular surgery    Family History:    Breast cancer  Cancer - colorectal  Diabetes    Social History:  Relationship status:   Tobacco use: " Former, quit date 2013  Alcohol use: Yes  The patient presents via EMS.   Marital Status:   [2]     Review of Systems   Constitutional: Positive for chills and diaphoresis.   Gastrointestinal: Positive for nausea and vomiting.   Musculoskeletal: Positive for arthralgias and back pain.   Neurological: Positive for dizziness.   All other systems reviewed and are negative.    Physical Exam   Vitals:  Patient Vitals for the past 24 hrs:   BP Temp Temp src Heart Rate Resp SpO2   07/11/18 1200 129/66 - - 84 - 99 %   07/11/18 1145 131/63 - - - - 91 %   07/11/18 1130 97/65 - - - - 99 %   07/11/18 1115 116/72 - - - - 98 %   07/11/18 1100 135/68 - - - - 100 %   07/11/18 1045 130/68 - - 88 - 95 %   07/11/18 1030 139/73 - - 87 - 100 %   07/11/18 1015 127/65 - - - - -   07/11/18 1000 134/68 - - - - -   07/11/18 0945 145/69 - - 84 - 98 %   07/11/18 0930 136/68 - - - - 100 %   07/11/18 0915 142/49 - - - - 97 %   07/11/18 0900 - - - 82 - 99 %   07/11/18 0855 - 97.6  F (36.4  C) Oral - - 99 %   07/11/18 0851 144/78 - - 81 18 -       Physical Exam   Constitutional: She appears well-developed and well-nourished.   HENT:   Right Ear: External ear normal.   Left Ear: External ear normal.   Mouth/Throat: Oropharynx is clear and moist. No oropharyngeal exudate.   TM's clear bilaterally   Eyes: Conjunctivae are normal. Pupils are equal, round, and reactive to light. No scleral icterus.   Neck: Normal range of motion. Neck supple.   Cardiovascular: Normal rate, regular rhythm, normal heart sounds and intact distal pulses.  Exam reveals no gallop and no friction rub.    No murmur heard.  Pulmonary/Chest: Effort normal and breath sounds normal. No respiratory distress. She has no wheezes. She has no rales.   Abdominal: Soft. Bowel sounds are normal. She exhibits no distension and no mass. There is no tenderness.   Musculoskeletal: She exhibits no edema.   Neurological: She is alert. No cranial nerve deficit.   Skin: Skin is warm and  dry. No rash noted.   Psychiatric: She has a normal mood and affect.     Emergency Department Course     ECG:  ECG taken at 0844, ECG read at 0845  Normal sinus rhythm  Normal ECG  Rate 88 bpm. WV interval 128. QRS duration 72. QT/QTc 352/425. P-R-T axes 48,  4,  27.    Imaging:  Radiology findings were communicated with the patient who voiced understanding of the findings.    CT CHEST WITH CONTRAST  4/21/2018 10:28 AM  IMPRESSION:   1. No pulmonary embolism is seen.  2. Mild atelectasis or scarring of the left lower lobe of the lung.  Reading per radiology.   LEONEL STEVENSON MD    Laboratory:  Laboratory findings were communicated with the patient who voiced understanding of the findings.  UA: Bacteria: few, Mucous: present, Hyaline casts: 4 o/w WNL  CBC: RBC: 3.34, MCV: 114, MCH: 37.4 o/w WNL (WBC 4.7, HGB 12.5, )  CMP: BUN: 45, Creatinine: 2.09, GFR: 24, Protein total: 6.6 o/w WNL  Troponin (Collected 0932): <0.015  0852 Glucose by meter: 84    Laboratory results from 4/21/18  Color Urine Yellow      Appearance Urine Clear      Glucose Urine Negative   Bilirubin Urine Negative    Ketones Urine Negative    Specific Gravity Urine 1.025    Blood Urine Negative    pH Urine 5.0    Protein Albumin Urine Negative    Urobilinogen mg/dL 0.0    Nitrite Urine Negative    Leukocyte Esterase Urine Negative     Source Midstream Urine      WBC Urine 1    RBC Urine <1    Squamous Epithelial /HPF Urine <1      WBC 6.6    RBC Count 3.39 (L)   Hemoglobin 12.8    Hematocrit 38.7    MCV             114 (H)   MCH             37.8 (H)   MCHC             33.1    RDW            12.5    Platelet Count 198    Diff Method Automated Method  % Neutrophils 64.7     % Lymphocytes 24.2     % Monocytes 8.8     % Eosinophils 0.6     % Basophils 0.5     % Immature Granulocyte1.2     Nucleated RBCs 0    Absolute Neutrophil 4.3    Absolute Lymphocytes1.6    Absolute Monocytes 0.6    Absolute Eosinophils 0.0    Absolute Basophils 0.0    Abs  Immature Granulocytes 0.1    Absolute Nucleated RBC 0.0        Sodium 141    Potassium 3.9    Chloride 107    Carbon Dioxide 24    Anion Gap 10    Glucose 78    Urea Nitrogen 17    Creatinine 1.31 (H)   GFR Estimate 41 (L)   Comment:  Non  GFR Calc  GFR Estimate If Black 50 (L)   Calcium 8.4 (L)     Interventions:  0927 Normal Saline 1000 mL IV     Emergency Department Course:  Nursing notes and vitals reviewed.  I performed an exam of the patient as documented above.   EKG obtained in the ED, see results above.     1115 I rechecked with the patient, and she stated that she feels a lot better.    Findings and plan explained to the Patient. Patient discharged home with instructions regarding supportive care, medications, and reasons to return. The importance of close follow-up was reviewed.     I personally reviewed the laboratory results with the Patient and answered all related questions prior to discharge.    Impression & Plan      Medical Decision Making:  Krystyna Peña is a 62 year old female with nausea, dizziness, and feeling faint. Patient has been dealing with a lot of back pain issues, and is taking tramadol which apparently was not helping. It appeared that her symptoms stemmed from having to deal with back pain from prolonged standing today. She has no neurologic deficits to require imaging studies of her back, she initially was mildly hypotensive and tachycardic, along with pain, but is now saying that she feel a lot better. Evaluations show normal EKG, and troponin, and labs. She does have pain clinic that she is following up on and getting her back pain sorted out, so I do believe that she needs to continue to follow up to get this evaluated. Otherwise she is tolerating PO, her orthostatics are negative at this point. She was given a work note for 2 days off. She voiced understanding and will see Dr. Savage on Friday so that she can get her labs rechecked.     Diagnosis:    ICD-10-CM     1. Dehydration E86.0    2. Bilateral low back pain without sciatica, unspecified chronicity M54.5        Disposition:   Discharged      Discharge Medications:  New Prescriptions    ONDANSETRON (ZOFRAN ODT) 4 MG ODT TAB    Take 1 tablet (4 mg) by mouth every 8 hours as needed       Scribe Disclosure:  Yen DAVALOS, am serving as a scribe at 8:57 AM on 7/11/2018 to document services personally performed by Gertrude Avila MD, based on my observations and the provider's statements to me.      [facility] EMERGENCY DEPARTMENT       Gertrude Avila MD  07/11/18 6443

## 2018-07-11 NOTE — DISCHARGE INSTRUCTIONS
Dehydration (Adult)  Dehydration occurs when your body loses too much fluid. This may be the result of prolonged vomiting or diarrhea, excessive sweating, or a high fever. It may also happen if you don t drink enough fluid when you re sick or out in the heat. Misuse of diuretics (water pills) can also be a cause.  Symptoms include thirst and decreased urine output. You may also feel dizzy, weak, fatigued, or very drowsy. The diet described below is usually enough to treat dehydration. In some cases, you may need medicine.  Home care    Drink at least 12 8-ounce glasses of fluid every day to resolve the dehydration. Fluid may include water; orange juice; lemonade; apple, grape, or cranberry juice; clear fruit drinks; electrolyte replacement and sports drinks; and teas and coffee without caffeine. Don't drink alcohol. If you have been diagnosed with a kidney disease, ask your doctor how much and what types of fluids you should drink to prevent dehydration. If you have kidney disease, fluid can build up in the body. This can be dangerous to your health.    If you have a fever, muscle aches, or a headache as a result of a cold or flu, you may take acetaminophen or ibuprofen, unless another medicine was prescribed. If you have chronic liver or kidney disease, or have ever had a stomach ulcer or gastrointestinal bleeding, talk with your healthcare provider before using these medicines. Don't take aspirin if you are younger than 18 and have a fever. Aspirin raises the chance for severe liver injury.  Follow-up care  Follow up with your healthcare provider, or as advised.  When to seek medical advice  Call your healthcare provider right away if any of these occur:    Continued vomiting    Frequent diarrhea (more than 5 times a day); blood (red or black color) or mucus in diarrhea    Blood in vomit or stool    Swollen abdomen or increasing abdominal pain    Weakness, dizziness, or fainting    Unusual drowsiness or  confusion    Reduced urine output or extreme thirst    Fever of 100.4 F (38 C) or higher  Date Last Reviewed: 5/1/2017 2000-2017 [employer]. [address] [place] [address], [place] [address]. All rights reserved. This information is not intended as a substitute for professional medical care. Always follow your healthcare professional's instructions.

## 2018-07-13 ENCOUNTER — OFFICE VISIT (OUTPATIENT)
Dept: FAMILY MEDICINE | Facility: CLINIC | Age: 63
End: 2018-07-13
Payer: COMMERCIAL

## 2018-07-13 VITALS
DIASTOLIC BLOOD PRESSURE: 76 MMHG | HEART RATE: 75 BPM | TEMPERATURE: 98.5 F | SYSTOLIC BLOOD PRESSURE: 118 MMHG | HEIGHT: 63 IN | BODY MASS INDEX: 29.77 KG/M2 | WEIGHT: 168 LBS | RESPIRATION RATE: 16 BRPM

## 2018-07-13 DIAGNOSIS — M54.5 LOW BACK PAIN, UNSPECIFIED BACK PAIN LATERALITY, UNSPECIFIED CHRONICITY, WITH SCIATICA PRESENCE UNSPECIFIED: ICD-10-CM

## 2018-07-13 DIAGNOSIS — Z13.6 CARDIOVASCULAR SCREENING; LDL GOAL LESS THAN 160: ICD-10-CM

## 2018-07-13 DIAGNOSIS — Z11.4 SCREENING FOR HIV (HUMAN IMMUNODEFICIENCY VIRUS): ICD-10-CM

## 2018-07-13 DIAGNOSIS — E86.0 DEHYDRATION: Primary | ICD-10-CM

## 2018-07-13 PROCEDURE — 80048 BASIC METABOLIC PNL TOTAL CA: CPT | Performed by: FAMILY MEDICINE

## 2018-07-13 PROCEDURE — 36415 COLL VENOUS BLD VENIPUNCTURE: CPT | Performed by: FAMILY MEDICINE

## 2018-07-13 PROCEDURE — 87389 HIV-1 AG W/HIV-1&-2 AB AG IA: CPT | Performed by: FAMILY MEDICINE

## 2018-07-13 PROCEDURE — 99214 OFFICE O/P EST MOD 30 MIN: CPT | Performed by: FAMILY MEDICINE

## 2018-07-13 PROCEDURE — 80061 LIPID PANEL: CPT | Performed by: FAMILY MEDICINE

## 2018-07-13 NOTE — PROGRESS NOTES
SUBJECTIVE:   Krystyna HUMPHREYS White is a 62 year old female who presents to clinic today for the following health issues:      History of Present Illness     Diet:  Regular (no restrictions)  Frequency of exercise:  4-5 days/week  Duration of exercise:  Less than 15 minutes  Taking medications regularly:  Yes  Medication side effects:  None  Additional concerns today:  No    ED/UC Followup:    Facility:  [facility]   Date of visit: 7/11/18  Reason for visit: Dehydration   Current Status: Stable        Dehydration  (primary encounter diagnosis) - Drank a quart of power aid the other day, does not like water.    CARDIOVASCULAR SCREENING; LDL GOAL LESS THAN 160  LDL Cholesterol Calculated   Date Value Ref Range Status   10/01/2010 123 0 - 129 mg/dL Final     Comment:     LDL Cholesterol is the primary guide to therapy: LDL-cholesterol goal in high   risk patients is <100 mg/dL and in very high risk patients is <70 mg/dL.     Low back pain, unspecified back pain laterality, unspecified chronicity, with sciatica presence unspecified - no current pain .  Dehydration and GI distress was attributed to tramadol use in the ED      Problem list and histories reviewed & adjusted, as indicated.  Additional history: as documented        Past Medical History:   Diagnosis Date     Arthritis      Asymptomatic varicose veins      Benign neoplasm of colon 11/06, 7/13    Adenoma     Cardiac arrest (H) 2003    after knee replacement     Gastroesophageal reflux disease      Hypertension      Major depression      Pneumonia, organism unspecified(486) 1986       Past Surgical History:   Procedure Laterality Date     AMPUTATE TOE(S) Left 4/12/2018    Procedure: AMPUTATE TOE(S);  Amputation left third digit;  Surgeon: Herb Michael DPM;  Location: [facility] OR     ARTHROPLASTY HIP Right 3/28/2016    Procedure: ARTHROPLASTY HIP;  Surgeon: Perez Meza MD;  Location: [facility] OR     C NONSPECIFIC PROCEDURE  1972    Right arm plastic surgery     C  "NONSPECIFIC PROCEDURE  1972    Right knee surgery     C NONSPECIFIC PROCEDURE  10/03    L popliteal AVM repair     C NONSPECIFIC PROCEDURE  11/04    Removal bone spur left foot     C NONSPECIFIC PROCEDURE  4/07    amputation of toes left & right toes     C REPAIR SPIEGELIAN HERNIA  2002     C TOTAL KNEE ARTHROPLASTY  10/03    LT     COLONOSCOPY       HC CORRECT BUNION,SIMPLE  1998    RT     HC CORRECT BUNION,SIMPLE  2001    LT     HC KNEE SCOPE, DIAGNOSTIC      LT     ROTATOR CUFF REPAIR RT/LT  7/07    right     VASCULAR SURGERY  left leg bypass 2004       Family History   Problem Relation Age of Onset     Breast Cancer Mother      Cancer - colorectal Father      66     Diabetes Brother        Social History   Substance Use Topics     Smoking status: Former Smoker     Packs/day: 1.00     Types: Cigarettes, Cigars     Quit date: 4/1/2013     Smokeless tobacco: Never Used     Alcohol use Yes      Comment: 1 per day         ROS:  No dysuria  NEGATIVE GI symptoms no edema.  Wears high hose    This document serves as a record of the services and decisions personally performed and made by Bautista Agudelo MD. It was created on his behalf by Carlos Rodriguez, a trained medical scribe.  The creation of this document is based on the scribe's personal observations and the provider's statements to the medical scribe.  Carlos Rodriguez, July 13, 2018 2:22 PM    OBJECTIVE:     /76 (BP Location: Right arm, Patient Position: Chair, Cuff Size: Adult Regular)  Pulse 75  Temp 98.5  F (36.9  C) (Oral)  Resp 16  Ht 1.6 m (5' 3\")  Wt 76.2 kg (168 lb)  LMP 10/01/2003  Breastfeeding? No  BMI 29.76 kg/m2  Body mass index is 29.76 kg/(m^2).  GENERAL: healthy, alert and no distress mucous membranes moist  MS: Fairly notable pitting edema above her socks    ASSESSMENT/PLAN:   ASSESSMENT / PLAN:  (E86.0) Dehydration  (primary encounter diagnosis)  Comment: Discussed fluid needs as well as dehydrating sources of fluid.  Plan: Basic metabolic " panel  (Ca, Cl, CO2, Creat,         Gluc, K, Na, BUN) she needs to drink more fluids            (Z13.6) CARDIOVASCULAR SCREENING; LDL GOAL LESS THAN 160  Comment: Status unknown  Plan: Lipid panel reflex to direct LDL Non-fasting            (M54.5) Low back pain, unspecified back pain laterality, unspecified chronicity, with sciatica presence unspecified  Comment: Pain clinic as mentioned in the ED notes  Plan:      (Z11.4) Screening for HIV (human immunodeficiency virus)  Comment:Universal HIV testing introduced, rationale reviewed, all questions answered, permission granted to test    Plan: HIV Antigen Antibody Combo            RTC for wellness visit  PCP      Bautista Agudelo MD      The information in this document, created by the medical scribe for me, accurately reflects the services I personally performed and the decisions made by me. I have reviewed and approved this document for accuracy prior to leaving the patient care area.  Bautista Agudelo MD July 13, 2018 2:23 PM      Answers for HPI/ROS submitted by the patient on 7/13/2018   PHQ-2 Score: 0

## 2018-07-13 NOTE — MR AVS SNAPSHOT
"              After Visit Summary   7/13/2018    Krystyna Peña    MRN: [id number]           Patient Information     Date Of Birth          1955        Visit Information        Provider Department      7/13/2018 2:45 PM Bautista Agudelo MD [facility]        Today's Diagnoses     Dehydration    -  1    CARDIOVASCULAR SCREENING; LDL GOAL LESS THAN 160        Low back pain, unspecified back pain laterality, unspecified chronicity, with sciatica presence unspecified        Screening for HIV (human immunodeficiency virus)           Follow-ups after your visit        Follow-up notes from your care team     Return in about 3 months (around 10/13/2018) for Physical Exam.      Who to contact     If you have questions or need follow up information about today's clinic visit or your schedule please contact [facility] directly at [phone].  Normal or non-critical lab and imaging results will be communicated to you by MyChart, letter or phone within 4 business days after the clinic has received the results. If you do not hear from us within 7 days, please contact the clinic through MyChart or phone. If you have a critical or abnormal lab result, we will notify you by phone as soon as possible.  Submit refill requests through [employer] or call your pharmacy and they will forward the refill request to us. Please allow 3 business days for your refill to be completed.          Additional Information About Your Visit        MyChart Information     [employer] lets you send messages to your doctor, view your test results, renew your prescriptions, schedule appointments and more. To sign up, go to [email or web address] . Click on \"Log in\" on the left side of the screen, which will take you to the Welcome page. Then click on \"Sign up Now\" on the right side of the page.     You will be asked to enter the access code listed below, as well as some personal information. Please follow the " "directions to create your username and password.     Your access code is: [email or web address]  Expires: 2018 10:22 AM     Your access code will  in 90 days. If you need help or a new code, please call your [facility] clinic or [phone].        Care EveryWhere ID     This is your [facility] EveryWhere ID. This could be used by other organizations to access your [facility] medical records  [id number]        Your Vitals Were     Pulse Temperature Respirations Height Last Period Breastfeeding?    75 98.5  F (36.9  C) (Oral) 16 5' 3\" (1.6 m) 10/01/2003 No    BMI (Body Mass Index)                   29.76 kg/m2            Blood Pressure from Last 3 Encounters:   18 118/76   18 129/66   18 150/90    Weight from Last 3 Encounters:   18 168 lb (76.2 kg)   18 166 lb 4.8 oz (75.4 kg)   18 163 lb (73.9 kg)              We Performed the Following     Basic metabolic panel  (Ca, Cl, CO2, Creat, Gluc, K, Na, BUN)     HIV Antigen Antibody Combo     Lipid panel reflex to direct LDL Non-fasting        Primary Care Provider Office Phone # Fax #    Viri Savage [phone]       [address] [facility] [address]  [facility] [address]        Equal Access to Services     [facility] AH: Hadii aad ku hadasho Soomaali, waaxda luqadaha, qaybta kaalmada adekielyada, jhoana bustamante . So [facility] [phone].    ATENCIÓN: Si habla español, tiene a arguelles disposición servicios gratuitos de asistencia lingüística. Llame al [phone].    We comply with applicable federal civil rights laws and [place] laws. We do not discriminate on the basis of race, color, national origin, age, disability, sex, sexual orientation, or gender identity.            Thank you!     Thank you for choosing [facility]  for your care. Our goal is always to provide you with excellent care. Hearing back from our patients is one way we can continue to improve our services. Please take a few " minutes to complete the written survey that you may receive in the mail after your visit with us. Thank you!             Your Updated Medication List - Protect others around you: Learn how to safely use, store and throw away your medicines at www.disposemymeds.org.          This list is accurate as of 7/13/18  3:45 PM.  Always use your most recent med list.                   Brand Name Dispense Instructions for use Diagnosis    ASPIRIN PO      Take 81 mg by mouth daily        celecoxib 200 MG capsule    celeBREX    60 capsule    Take 1 capsule (200 mg) by mouth daily    Status post total replacement of right hip       doxycycline 100 MG capsule    VIBRAMYCIN    20 capsule    Take 1 capsule (100 mg) by mouth 2 times daily    Productive cough       lisinopril 10 MG tablet    PRINIVIL/ZESTRIL    180 tablet    TAKE 2 TABLETS(20 MG) BY MOUTH DAILY    Benign essential hypertension       omeprazole 20 MG CR capsule    priLOSEC     Take 1 capsule (20 mg) by mouth daily        order for DME     1 Device    Equipment being ordered: manuel stockings, knee high 20-30 mmHg,    Edema, unspecified type       traMADol 50 MG tablet    ULTRAM    90 tablet    Take 1 tablet (50 mg) by mouth 3 times daily as needed for severe pain    Low back pain, unspecified back pain laterality, unspecified chronicity, with sciatica presence unspecified

## 2018-07-13 NOTE — LETTER
July 18, 2018      Krystyna HUMPHREYS Casey  [address] [place] [address]      Dear ,    We are writing to inform you of your test results.    Your kidneys are improving. I would check with Dr Savage in a few weeks and make sure they keep returning to normal. Your HDL cholesterol, the good stuff, is quite high. GREAT!       Resulted Orders   Basic metabolic panel  (Ca, Cl, CO2, Creat, Gluc, K, Na, BUN)   Result Value Ref Range    Sodium 136 133 - 144 mmol/L    Potassium 5.4 (H) 3.4 - 5.3 mmol/L    Chloride 104 94 - 109 mmol/L    Carbon Dioxide 23 20 - 32 mmol/L    Anion Gap 9 3 - 14 mmol/L    Glucose 105 (H) 70 - 99 mg/dL      Comment:      Non Fasting    Urea Nitrogen 27 7 - 30 mg/dL    Creatinine 1.64 (H) 0.52 - 1.04 mg/dL    GFR Estimate 32 (L) >60 mL/min/1.7m2      Comment:      Non  GFR Calc    GFR Estimate If Black 38 (L) >60 mL/min/1.7m2      Comment:       GFR Calc    Calcium 8.8 8.5 - 10.1 mg/dL   HIV Antigen Antibody Combo   Result Value Ref Range    HIV Antigen Antibody Combo Nonreactive NR^Nonreactive          Comment:      HIV-1 p24 Ag & HIV-1/HIV-2 Ab Not Detected   Lipid panel reflex to direct LDL Non-fasting   Result Value Ref Range    Cholesterol 282 (H) <200 mg/dL      Comment:      Desirable:       <200 mg/dl    Triglycerides 106 <150 mg/dL      Comment:      Non Fasting    HDL Cholesterol 134 >49 mg/dL    LDL Cholesterol Calculated 127 (H) <100 mg/dL      Comment:      Above desirable:  100-129 mg/dl  Borderline High:  130-159 mg/dL  High:             160-189 mg/dL  Very high:       >189 mg/dl      Non HDL Cholesterol 148 (H) <130 mg/dL      Comment:      Above Desirable:  130-159 mg/dl  Borderline high:  160-189 mg/dl  High:             190-219 mg/dl  Very high:       >219 mg/dl         If you have any questions or concerns, please call the clinic at the number listed above.       Sincerely,        Bautista Agudelo MD/STEWART

## 2018-07-14 LAB
ANION GAP SERPL CALCULATED.3IONS-SCNC: 9 MMOL/L (ref 3–14)
BUN SERPL-MCNC: 27 MG/DL (ref 7–30)
CALCIUM SERPL-MCNC: 8.8 MG/DL (ref 8.5–10.1)
CHLORIDE SERPL-SCNC: 104 MMOL/L (ref 94–109)
CHOLEST SERPL-MCNC: 282 MG/DL
CO2 SERPL-SCNC: 23 MMOL/L (ref 20–32)
CREAT SERPL-MCNC: 1.64 MG/DL (ref 0.52–1.04)
GFR SERPL CREATININE-BSD FRML MDRD: 32 ML/MIN/1.7M2
GLUCOSE SERPL-MCNC: 105 MG/DL (ref 70–99)
HDLC SERPL-MCNC: 134 MG/DL
LDLC SERPL CALC-MCNC: 127 MG/DL
NONHDLC SERPL-MCNC: 148 MG/DL
POTASSIUM SERPL-SCNC: 5.4 MMOL/L (ref 3.4–5.3)
SODIUM SERPL-SCNC: 136 MMOL/L (ref 133–144)
TRIGL SERPL-MCNC: 106 MG/DL

## 2018-07-17 LAB — HIV 1+2 AB+HIV1 P24 AG SERPL QL IA: NONREACTIVE

## 2018-07-17 NOTE — PROGRESS NOTES
Your kidneys are improving. I would check with Dr Savage in a few weeks and make sure they keep returning to normal. Your HDL cholesterol, the good stuff, is quite high. GREAT!  KAREN JIMENEZ

## 2018-07-18 ENCOUNTER — TRANSFERRED RECORDS (OUTPATIENT)
Dept: HEALTH INFORMATION MANAGEMENT | Facility: CLINIC | Age: 63
End: 2018-07-18

## 2018-07-23 ENCOUNTER — TRANSFERRED RECORDS (OUTPATIENT)
Dept: HEALTH INFORMATION MANAGEMENT | Facility: CLINIC | Age: 63
End: 2018-07-23

## 2018-07-24 ENCOUNTER — HOSPITAL ENCOUNTER (OUTPATIENT)
Dept: MRI IMAGING | Facility: CLINIC | Age: 63
Discharge: HOME | End: 2018-07-24
Attending: PHYSICIAN ASSISTANT | Admitting: PHYSICIAN ASSISTANT
Payer: COMMERCIAL

## 2018-07-24 DIAGNOSIS — M51.379 OTHER INTERVERTEBRAL DISC DEGENERATION, LUMBOSACRAL REGION: ICD-10-CM

## 2018-07-24 PROCEDURE — 72148 MRI LUMBAR SPINE W/O DYE: CPT

## 2018-07-26 ENCOUNTER — TELEPHONE (OUTPATIENT)
Dept: INTERNAL MEDICINE | Facility: CLINIC | Age: 63
End: 2018-07-26

## 2018-07-26 DIAGNOSIS — M62.838 MUSCLE SPASM: Primary | ICD-10-CM

## 2018-07-26 RX ORDER — CYCLOBENZAPRINE HCL 5 MG
5 TABLET ORAL 3 TIMES DAILY PRN
Qty: 60 TABLET | Refills: 1 | Status: SHIPPED | OUTPATIENT
Start: 2018-07-26 | End: 2019-01-04

## 2018-07-26 NOTE — TELEPHONE ENCOUNTER
Patient called. Stated she has a lot of back pain, and cant get in with pain clinic for her epi injection til 8/6.       Patient has been taking her  muscle relaxer (pt knows she is not suppose to use other people's meds) and it is helping a lot. Med is cyclobenzaprine 10mg tabs-patient is taking 1/2 tab bid. Patient is hoping Hussein can do prescription for her?

## 2018-07-30 ENCOUNTER — TRANSFERRED RECORDS (OUTPATIENT)
Dept: HEALTH INFORMATION MANAGEMENT | Facility: CLINIC | Age: 63
End: 2018-07-30

## 2018-08-25 ENCOUNTER — HEALTH MAINTENANCE LETTER (OUTPATIENT)
Age: 63
End: 2018-08-25

## 2018-08-27 ENCOUNTER — TRANSFERRED RECORDS (OUTPATIENT)
Dept: HEALTH INFORMATION MANAGEMENT | Facility: CLINIC | Age: 63
End: 2018-08-27

## 2018-08-28 ENCOUNTER — TRANSFERRED RECORDS (OUTPATIENT)
Dept: HEALTH INFORMATION MANAGEMENT | Facility: CLINIC | Age: 63
End: 2018-08-28

## 2018-09-27 ENCOUNTER — TELEPHONE (OUTPATIENT)
Dept: INTERNAL MEDICINE | Facility: CLINIC | Age: 63
End: 2018-09-27

## 2018-09-27 NOTE — TELEPHONE ENCOUNTER
Pt calls stating she has bilateral edema in her Lower legs for ONE week. She had this about 6 months ago. Lower legs are tight, little burning. Not discolored at all. She can't use her Jobst stockings. But is using stockings that she bought, that have zippers.   Denies Chest pain, sob.   Scheduled tomorrow with Dr Savage. She agrees.

## 2018-09-28 ENCOUNTER — OFFICE VISIT (OUTPATIENT)
Dept: INTERNAL MEDICINE | Facility: CLINIC | Age: 63
End: 2018-09-28
Payer: COMMERCIAL

## 2018-09-28 VITALS
BODY MASS INDEX: 30.06 KG/M2 | SYSTOLIC BLOOD PRESSURE: 132 MMHG | HEART RATE: 86 BPM | DIASTOLIC BLOOD PRESSURE: 80 MMHG | TEMPERATURE: 98.5 F | OXYGEN SATURATION: 99 % | RESPIRATION RATE: 24 BRPM | WEIGHT: 169.7 LBS

## 2018-09-28 DIAGNOSIS — B37.31 YEAST INFECTION OF THE VAGINA: ICD-10-CM

## 2018-09-28 DIAGNOSIS — R60.9 EDEMA, UNSPECIFIED TYPE: Primary | ICD-10-CM
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
## 2018-09-28 DIAGNOSIS — I10 BENIGN ESSENTIAL HYPERTENSION: ICD-10-CM

## 2018-09-28 PROCEDURE — 36415 COLL VENOUS BLD VENIPUNCTURE: CPT | Performed by: INTERNAL MEDICINE

## 2018-09-28 PROCEDURE — 82043 UR ALBUMIN QUANTITATIVE: CPT | Performed by: INTERNAL MEDICINE

## 2018-09-28 PROCEDURE — 80048 BASIC METABOLIC PNL TOTAL CA: CPT | Performed by: INTERNAL MEDICINE

## 2018-09-28 PROCEDURE — 99214 OFFICE O/P EST MOD 30 MIN: CPT | Performed by: INTERNAL MEDICINE

## 2018-09-28 RX ORDER — FUROSEMIDE 20 MG
20 TABLET ORAL DAILY
Qty: 30 TABLET | Refills: 1 | Status: SHIPPED | OUTPATIENT
Start: 2018-09-28 | End: 2019-01-04

## 2018-09-28 RX ORDER — LISINOPRIL 10 MG/1
TABLET ORAL
Qty: 180 TABLET | Refills: 1 | Status: CANCELLED | OUTPATIENT
Start: 2018-09-28

## 2018-09-28 RX ORDER — FLUCONAZOLE 150 MG/1
150 TABLET ORAL ONCE
Qty: 1 TABLET | Refills: 0 | Status: SHIPPED | OUTPATIENT
Start: 2018-09-28 | End: 2018-09-28

## 2018-09-28 NOTE — MR AVS SNAPSHOT
After Visit Summary   9/28/2018    Krystyna Peña    MRN: [id number]           Patient Information     Date Of Birth          1955        Visit Information        Provider Department      9/28/2018 3:40 PM Viri Savage MD [facility]        Today's Diagnoses     Edema, unspecified type    -  1    Benign essential hypertension        Cat bite, initial encounter        Yeast infection of the vagina           Follow-ups after your visit        Your next 10 appointments already scheduled     Oct 15, 2018  2:40 PM CDT   SHORT with Viri Savage MD   [facility] ([facility])    [address]  [facility] [id number] [phone]            Oct 30, 2018   Procedure with Aaron Christianson MD   [facility] (--)    [address]  [facility] [id number] [phone]              Who to contact     If you have questions or need follow up information about today's clinic visit or your schedule please contact [facility] directly at [phone].  Normal or non-critical lab and imaging results will be communicated to you by MyChart, letter or phone within 4 business days after the clinic has received the results. If you do not hear from us within 7 days, please contact the clinic through MyChart or phone. If you have a critical or abnormal lab result, we will notify you by phone as soon as possible.  Submit refill requests through [employer] or call your pharmacy and they will forward the refill request to us. Please allow 3 business days for your refill to be completed.          Additional Information About Your Visit        Care EveryWhere ID     This is your Care EveryWhere ID. This could be used by other organizations to access your [place] medical records  [id number]        Your Vitals Were     Pulse Temperature Respirations Last Period Pulse Oximetry BMI (Body Mass Index)    86 98.5  F (36.9  C) (Oral)  24 10/01/2003 99% 30.06 kg/m2       Blood Pressure from Last 3 Encounters:   09/28/18 132/80   07/13/18 118/76   07/11/18 129/66    Weight from Last 3 Encounters:   09/28/18 169 lb 11.2 oz (77 kg)   07/13/18 168 lb (76.2 kg)   06/23/18 166 lb 4.8 oz (75.4 kg)              We Performed the Following     Albumin Random Urine Quantitative with Creat Ratio     Basic metabolic panel          Today's Medication Changes          These changes are accurate as of 9/28/18 11:59 PM.  If you have any questions, ask your nurse or doctor.               Start taking these medicines.        Dose/Directions    amoxicillin-clavulanate 875-125 MG per tablet   Commonly known as:  AUGMENTIN   Used for:  Cat bite, initial encounter   Started by:  Viri Savage MD        Dose:  1 tablet   Take 1 tablet by mouth 2 times daily   Quantity:  10 tablet   Refills:  0       fluconazole 150 MG tablet   Commonly known as:  DIFLUCAN   Used for:  Yeast infection of the vagina   Started by:  Viri Savage MD        Dose:  150 mg   Take 1 tablet (150 mg) by mouth once for 1 dose   Quantity:  1 tablet   Refills:  0       furosemide 20 MG tablet   Commonly known as:  LASIX   Used for:  Edema, unspecified type   Started by:  Viri Savage MD        Dose:  20 mg   Take 1 tablet (20 mg) by mouth daily   Quantity:  30 tablet   Refills:  1         Stop taking these medicines if you haven't already. Please contact your care team if you have questions.     order for DME   Stopped by:  Viri Savage MD                Where to get your medicines      These medications were sent to [facility] [address] [facility] [address] [place] [address] [address] [facility] [address]  [address] [facility] [address], [facility] [address]     Phone:  [phone]     amoxicillin-clavulanate 875-125 MG per tablet    fluconazole 150 MG tablet    furosemide 20 MG tablet                Primary Care Provider Office Phone # Fax #    Viri Savage [phone]       [address]  [facility] [address]        Equal Access to Services     LEW HANSON : Hadii alma ventura hadthompsondavid Sokushalali, waaxda luqadaha, qaybta karobertbaudilio grullon, jhoana arevaloflorbecky bangura. So [facility] [phone].    ATENCIÓN: Si habla español, tiene a arguelles disposición servicios gratuitos de asistencia lingüística. Llame al [phone].    We comply with applicable federal civil rights laws and [place] laws. We do not discriminate on the basis of race, color, national origin, age, disability, sex, sexual orientation, or gender identity.            Thank you!     Thank you for choosing [facility]  for your care. Our goal is always to provide you with excellent care. Hearing back from our patients is one way we can continue to improve our services. Please take a few minutes to complete the written survey that you may receive in the mail after your visit with us. Thank you!             Your Updated Medication List - Protect others around you: Learn how to safely use, store and throw away your medicines at www.disposemymeds.org.          This list is accurate as of 9/28/18 11:59 PM.  Always use your most recent med list.                   Brand Name Dispense Instructions for use Diagnosis    amoxicillin-clavulanate 875-125 MG per tablet    AUGMENTIN    10 tablet    Take 1 tablet by mouth 2 times daily    Cat bite, initial encounter       ASPIRIN PO      Take 81 mg by mouth daily        celecoxib 200 MG capsule    celeBREX    60 capsule    Take 1 capsule (200 mg) by mouth daily    Status post total replacement of right hip       cyclobenzaprine 5 MG tablet    FLEXERIL    60 tablet    Take 1 tablet (5 mg) by mouth 3 times daily as needed for muscle spasms    Muscle spasm       fluconazole 150 MG tablet    DIFLUCAN    1 tablet    Take 1 tablet (150 mg) by mouth once for 1 dose    Yeast infection of the vagina       furosemide 20 MG tablet    LASIX    30 tablet    Take 1 tablet (20 mg) by mouth  daily    Edema, unspecified type       lisinopril 10 MG tablet    PRINIVIL/ZESTRIL    180 tablet    TAKE 2 TABLETS(20 MG) BY MOUTH DAILY    Benign essential hypertension       omeprazole 20 MG CR capsule    priLOSEC     Take 1 capsule (20 mg) by mouth daily        traMADol 50 MG tablet    ULTRAM    90 tablet    Take 1 tablet (50 mg) by mouth 3 times daily as needed for severe pain    Low back pain, unspecified back pain laterality, unspecified chronicity, with sciatica presence unspecified

## 2018-09-28 NOTE — NURSING NOTE
/80  Pulse 86  Temp 98.5  F (36.9  C) (Oral)  Resp 24  Wt 169 lb 11.2 oz (77 kg)  LMP 10/01/2003  SpO2 99%  BMI 30.06 kg/m2    Reviewed health maintenance- pt due for Pap.     Patient decline Pap at this time due to having other health problems that needs to be taken care of first. . Provider have been notified and health maintenance have been updated.

## 2018-09-28 NOTE — LETTER
[place] [address]  [facility] [address]  [phone]    9/28/2018   Krystyna HUMPHREYS Casey   1955     To Whom it May Concern:     Ms. Peña is being treated with a diuretic for swelling in her legs. This will require her to use the rest room more frequently. Please allow her to use the restroom when needed.     Sincerely,         Viri Savage MD

## 2018-09-28 NOTE — PROGRESS NOTES
SUBJECTIVE:   Krystyna Peña is a 63 year old female who presents to clinic today for the following health issues:      Edema: She is had issues on and off with edema and I ordered support stockings however she cannot wear them as she cannot pull them on because of shoulder pain.  The past week or so she reports increased edema in both legs.  Swelling goes down overnight and develops through the day as she is up on her feet.  Legs are uncomfortable because of the swelling, feel tight, sore.  She reports no new medications, over-the-counter supplements, does not think she is change the salt in her diet.  She does stand all day as she works for [employer]. She reports no dyspnea on exertion, PND, orthopnea, increased abdominal girth, significant pain in the legs.    She also complains of being bit by her cat this morning both on the left upper lip/cheek and her finger.  There is no swelling or significant pain at this time.    Patient Active Problem List   Diagnosis     Asymptomatic varicose veins     CARDIOVASCULAR SCREENING; LDL GOAL LESS THAN 160     Back pain     Urinary frequency     Benign essential hypertension     Advanced directives, counseling/discussion     Edema, unspecified type     Current Outpatient Prescriptions   Medication Sig Dispense Refill     ASPIRIN PO Take 81 mg by mouth daily       celecoxib (CELEBREX) 200 MG capsule Take 1 capsule (200 mg) by mouth daily 60 capsule 1     cyclobenzaprine (FLEXERIL) 5 MG tablet Take 1 tablet (5 mg) by mouth 3 times daily as needed for muscle spasms 60 tablet 1     lisinopril (PRINIVIL/ZESTRIL) 10 MG tablet TAKE 2 TABLETS(20 MG) BY MOUTH DAILY 180 tablet 1     omeprazole (PRILOSEC) 20 MG CR capsule Take 1 capsule (20 mg) by mouth daily       traMADol (ULTRAM) 50 MG tablet Take 1 tablet (50 mg) by mouth 3 times daily as needed for severe pain 90 tablet 0      Social History   Substance Use Topics     Smoking status: Former Smoker     Packs/day: 1.00     Types:  Cigarettes, Cigars     Quit date: 4/1/2013     Smokeless tobacco: Never Used     Alcohol use Yes      Comment: 1 per day        Reviewed and updated as needed this visit by clinical staff  Tobacco  Allergies  Meds  Med Hx  Surg Hx  Fam Hx  Soc Hx      Reviewed and updated as needed this visit by Provider         ROS:  No fever, chills, palpitations, chest pain, dyspnea on exertion, D, orthopnea, abdominal pain, increased abdominal girth, urinary changes, elevated blood pressure    OBJECTIVE:     /80  Pulse 86  Temp 98.5  F (36.9  C) (Oral)  Resp 24  Wt 169 lb 11.2 oz (77 kg)  LMP 10/01/2003  SpO2 99%  BMI 30.06 kg/m2  Body mass index is 30.06 kg/(m^2).    Face: Small puncture with eschar left upper lip/cheek  Finger: Small puncture very slight erythema, no significant swelling  Lungs: Clear  CV: normal S1, S2 without murmur, S3 or S4.   Legs: 2+ edema, no tenderness or firmness in the calves      ASSESSMENT/PLAN:             1. Edema, unspecified type  Likely this still represents venous insufficiency, she is on her legs all day, cannot were stockings.  We will recheck her labs and start Lasix, advised she may be able to use occasionally,  - furosemide (LASIX) 20 MG tablet; Take 1 tablet (20 mg) by mouth daily  Dispense: 30 tablet; Refill: 1  - Basic metabolic panel  - Albumin Random Urine Quantitative with Creat Ratio    2. Benign essential hypertension  stable  - Basic metabolic panel  - Albumin Random Urine Quantitative with Creat Ratio    3. Cat bite, initial encounter  Recommend treatment of the cat bite with oral antibiotics  - amoxicillin-clavulanate (AUGMENTIN) 875-125 MG per tablet; Take 1 tablet by mouth 2 times daily  Dispense: 10 tablet; Refill: 0        Viri Savage MD  [facility]

## 2018-09-29 LAB
ANION GAP SERPL CALCULATED.3IONS-SCNC: 9 MMOL/L (ref 3–14)
BUN SERPL-MCNC: 16 MG/DL (ref 7–30)
CALCIUM SERPL-MCNC: 8.8 MG/DL (ref 8.5–10.1)
CHLORIDE SERPL-SCNC: 104 MMOL/L (ref 94–109)
CO2 SERPL-SCNC: 24 MMOL/L (ref 20–32)
CREAT SERPL-MCNC: 1.23 MG/DL (ref 0.52–1.04)
CREAT UR-MCNC: 78 MG/DL
GFR SERPL CREATININE-BSD FRML MDRD: 44 ML/MIN/1.7M2
GLUCOSE SERPL-MCNC: 87 MG/DL (ref 70–99)
MICROALBUMIN UR-MCNC: <5 MG/L
MICROALBUMIN/CREAT UR: NORMAL MG/G CR (ref 0–25)
POTASSIUM SERPL-SCNC: 4.5 MMOL/L (ref 3.4–5.3)
SODIUM SERPL-SCNC: 137 MMOL/L (ref 133–144)

## 2018-10-01 ENCOUNTER — TRANSFERRED RECORDS (OUTPATIENT)
Dept: HEALTH INFORMATION MANAGEMENT | Facility: CLINIC | Age: 63
End: 2018-10-01

## 2018-10-01 ENCOUNTER — TELEPHONE (OUTPATIENT)
Dept: INTERNAL MEDICINE | Facility: CLINIC | Age: 63
End: 2018-10-01

## 2018-10-01 NOTE — TELEPHONE ENCOUNTER
Call received from patient inquiring about 9/28 lab results. Updated tests were done late on 9/28 and MD has not entered a result note yet. Please advise.

## 2018-10-01 NOTE — TELEPHONE ENCOUNTER
Advised that the urine protein level is basically 0.  Her kidney tests are actually improved, sodium and potassium are normal.  Ask how her legs are doing.

## 2018-10-02 NOTE — TELEPHONE ENCOUNTER
Call to pt. She states her legs are very tight. They are swelling,  and is painful.  Cannot get the stockings on.   Still taking the One lasix daily, but doesn't seem to urinate very much.     She reports she saw Dr Sharma at [facility] yesterday, for her hand, and she was told she has Rheumatoid Arthritis. She was instructed to see Rheumatology. They are faxing over report. They want Dr Savage to refer her. Informed her that she has to wait for the report.     She is getting shoulder replacement on 10/30.

## 2018-10-04 NOTE — TELEPHONE ENCOUNTER
Attempted to contact pt. Left message with details on identified VM.     Will keep open to watch for Ortho report.

## 2018-10-04 NOTE — TELEPHONE ENCOUNTER
Patient calling--she plans to increase the lasix as advised.  However, she is concerned about what is causing the edema and questions if there is any additional testing that should be done at this time.  Anne Arrington RN

## 2018-10-05 NOTE — TELEPHONE ENCOUNTER
I did an assessment for causes and she does not have any kidney changes, no changes in heart so her issues are vein related.

## 2018-10-09 NOTE — TELEPHONE ENCOUNTER
Attempted to contact pt. Left message to call clinic.   Pt has appt on Monday with Dr Savage if doesn't call back, OK to close.

## 2018-10-10 NOTE — TELEPHONE ENCOUNTER
Pt call back. Advised. She has appt on Monday.     [facility] report scanned in now. 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     Dr Deal recommended follow up with PCP to discuss Rheumatology consult.     OK to wait until Monday when she sees Dr Savage for her Preop to discuss.

## 2018-10-15 ENCOUNTER — OFFICE VISIT (OUTPATIENT)
Dept: INTERNAL MEDICINE | Facility: CLINIC | Age: 63
End: 2018-10-15
Payer: COMMERCIAL

## 2018-10-15 VITALS
TEMPERATURE: 98.2 F | SYSTOLIC BLOOD PRESSURE: 136 MMHG | OXYGEN SATURATION: 99 % | RESPIRATION RATE: 24 BRPM | HEART RATE: 118 BPM | WEIGHT: 169.9 LBS | DIASTOLIC BLOOD PRESSURE: 84 MMHG | BODY MASS INDEX: 30.1 KG/M2

## 2018-10-15 DIAGNOSIS — M19.019 SHOULDER ARTHRITIS: ICD-10-CM

## 2018-10-15 DIAGNOSIS — M19.90 INFLAMMATORY ARTHRITIS: ICD-10-CM

## 2018-10-15 DIAGNOSIS — Z01.818 PREOP GENERAL PHYSICAL EXAM: Primary | ICD-10-CM

## 2018-10-15 PROCEDURE — 87081 CULTURE SCREEN ONLY: CPT | Performed by: INTERNAL MEDICINE

## 2018-10-15 PROCEDURE — 93000 ELECTROCARDIOGRAM COMPLETE: CPT | Performed by: INTERNAL MEDICINE

## 2018-10-15 PROCEDURE — 99214 OFFICE O/P EST MOD 30 MIN: CPT | Performed by: INTERNAL MEDICINE

## 2018-10-15 NOTE — PROGRESS NOTES
[place] [address]  [facility] [address]  [phone]  Dept: [phone]    PRE-OP EVALUATION:  Today's date: 10/15/2018    Krystyna Peña (: 1955) presents for pre-operative evaluation assessment as requested by Dr. Christianson  She requires evaluation and anesthesia risk assessment prior to undergoing surgery/procedure for treatment of osteoarthritis of shoulder .    Proposed Surgery/ Procedure: Left reverse arthroplasty shoulder  Date of Surgery/ Procedure: 10/30/2018  Time of Surgery/ Procedure: 07:30 am  Hospital/Surgical Facility: [facility]  Fax number for surgical facility:   Primary Physician: Viri Savage  Type of Anesthesia Anticipated: to be determined    Patient has a Health Care Directive or Living Will:  YES     1. NO - Do you have a history of heart attack, stroke, stent, bypass or surgery on an artery in the head, neck, heart or legs?  2. NO - Do you ever have any pain or discomfort in your chest?  3. NO - Do you have a history of  Heart Failure?  4. NO - Are you troubled by shortness of breath when: walking on the level, up a slight hill or at night?  5. NO - Do you currently have a cold, bronchitis or other respiratory infection?  6. NO - Do you have a cough, shortness of breath or wheezing?  7. NO - Do you sometimes get pains in the calves of your legs when you walk?  8. NO - Do you or anyone in your family have previous history of blood clots?  9. NO - Do you or does anyone in your family have a serious bleeding problem such as prolonged bleeding following surgeries or cuts?  10. NO - Have you ever had problems with anemia or been told to take iron pills?  11. NO - Have you had any abnormal blood loss such as black, tarry or bloody stools, or abnormal vaginal bleeding?  12. NO - Have you ever had a blood transfusion?  13. NO - Have you or any of your relatives ever had problems with anesthesia?  14. YES - DO YOU HAVE SLEEP APNEA, EXCESSIVE SNORING OR  DAYTIME DROWSINESS? Snoring only   15. NO - Do you have any prosthetic heart valves?  16. NO - Do you have prosthetic joints?  17. NO - Is there any chance that you may be pregnant?        HPI:     HPI related to upcoming procedure: Is osteoarthritis of the shoulder, not managed by conservative treatment.      HYPERTENSION - Patient has longstanding history of HTN , currently denies any symptoms referable to elevated blood pressure. Specifically denies chest pain, palpitations, dyspnea, orthopnea, PND or peripheral edema. Blood pressure readings have been in normal range. Current medication regimen is as listed below. Patient denies any side effects of medication.                                                                                                                                                                                          .    MEDICAL HISTORY:     Patient Active Problem List    Diagnosis Date Noted     Edema, unspecified type 07/15/2017     Priority: Medium     Advanced directives, counseling/discussion 10/12/2016     Priority: Medium     Patient states has Advance Directive and will bring in a copy to clinic.         Benign essential hypertension 11/27/2015     Priority: Medium     Urinary frequency 09/21/2014     Priority: Medium     Back pain 03/24/2014     Priority: Medium     CARDIOVASCULAR SCREENING; LDL GOAL LESS THAN 160 10/31/2010     Priority: Medium     Asymptomatic varicose veins 06/24/2003     Priority: Medium      Past Medical History:   Diagnosis Date     Arthritis      Asymptomatic varicose veins      Benign neoplasm of colon 11/06, 7/13    Adenoma     Cardiac arrest (H) 2003    after knee replacement     Gastroesophageal reflux disease      Hypertension      Major depression      Past Surgical History:   Procedure Laterality Date     AMPUTATE TOE(S) Left 4/12/2018    Procedure: AMPUTATE TOE(S);  Amputation left third digit;  Surgeon: Herb Michael DPM;  Location:  OR      ARTHROPLASTY HIP Right 3/28/2016    Procedure: ARTHROPLASTY HIP;  Surgeon: Perez Meza MD;  Location: [facility] OR     C NONSPECIFIC PROCEDURE  1972    Right arm plastic surgery     C NONSPECIFIC PROCEDURE  1972    Right knee surgery     C NONSPECIFIC PROCEDURE  10/03    L popliteal AVM repair     C NONSPECIFIC PROCEDURE  11/04    Removal bone spur left foot     C NONSPECIFIC PROCEDURE  4/07    amputation of toes left & right toes     C REPAIR SPIEGELIAN HERNIA  2002     C TOTAL KNEE ARTHROPLASTY  10/03    LT     COLONOSCOPY       HC CORRECT BUNION,SIMPLE  1998    RT     HC CORRECT BUNION,SIMPLE  2001    LT     HC KNEE SCOPE, DIAGNOSTIC      LT     ROTATOR CUFF REPAIR RT/LT  7/07    right     VASCULAR SURGERY  left leg bypass 2004     Current Outpatient Prescriptions   Medication Sig Dispense Refill     ASPIRIN PO Take 81 mg by mouth daily       celecoxib (CELEBREX) 200 MG capsule Take 1 capsule (200 mg) by mouth daily 60 capsule 1     cyclobenzaprine (FLEXERIL) 5 MG tablet Take 1 tablet (5 mg) by mouth 3 times daily as needed for muscle spasms 60 tablet 1     furosemide (LASIX) 20 MG tablet Take 1 tablet (20 mg) by mouth daily 30 tablet 1     lisinopril (PRINIVIL/ZESTRIL) 10 MG tablet TAKE 2 TABLETS(20 MG) BY MOUTH DAILY 180 tablet 1     omeprazole (PRILOSEC) 20 MG CR capsule Take 1 capsule (20 mg) by mouth daily       traMADol (ULTRAM) 50 MG tablet Take 1 tablet (50 mg) by mouth 3 times daily as needed for severe pain 90 tablet 0     OTC products: None, except as noted above    Allergies   Allergen Reactions     Morphine      respiratory distress     Cephalexin Rash      Latex Allergy: NO    Social History   Substance Use Topics     Smoking status: Former Smoker     Packs/day: 1.00     Types: Cigarettes, Cigars     Quit date: 4/1/2013     Smokeless tobacco: Never Used     Alcohol use Yes      Comment: 1 per day     History   Drug Use No       REVIEW OF SYSTEMS:   GENERAL: negative for, fever, chills, weight  loss, weight gain  EYES: negative  ENT: negative  RESPIRATORY: No dyspnea on exertion and No cough  CARDIOVASCULAR: positive for lower extremity edema, negative for, palpitations, tachycardia, irregular heart beat and chest pain  GI: negative for, nausea, vomiting, abdominal pain and melena  : negative  MUSCULOSKELETAL: joint pain and shoulder pain   NEUROLOGIC: negative for, headaches, local weakness, numbness or tingling of hands and numbness or tingling of feet  SKIN: negative  ENDOCRINE: negative         EXAM:   LMP 10/01/2003    Patient is alert, oriented, cooperative in no acute distress.  /84  Pulse 118  Temp 98.2  F (36.8  C) (Oral)  Resp 24  Wt 169 lb 14.4 oz (77.1 kg)  LMP 10/01/2003  SpO2 99%  BMI 30.1 kg/m2    HEENT: PERRL, EOMI, TM's are normal. Oropharynx is clear.  NECK: No lymphadenopathy or thyromegaly. Carotid pulses full without bruits.  LUNGS: clear  CV: normal S1, S2 without murmur, S3 or S4 present. Pulses are 2/2 throughout. No JVD.  ABDOMEN: Bowel sounds present, nontender without hepatosplenomegaly. Liver is normal size to percussion.  EXTREMITIES: no edema present, unremarkable joints  NEUROLOGIC: Cranial nerves II-XII intact, reflexes 2/4 throughout, strength 5/5, sensation grossly intact, gait normal.  SKIN: without rashes or significant lesions     DIAGNOSTICS:   EKG: appears normal, NSR, normal axis, normal intervals, no acute ST/T changes c/w ischemia, no LVH by voltage criteria, unchanged from previous tracings  [facility]    Recent Labs   Lab Test  09/28/18   1628  07/13/18   1427  07/11/18   0932  04/21/18   0926   03/22/16   1510   09/17/14   1021   09/12/11   1202   HGB   --    --   12.5  12.8   < >  12.8   < >   --    < >  15.9*   PLT   --    --   218  198   --   291   < >   --    < >  265   INR   --    --    --    --    --   0.96   --    --    --   0.90   NA  137  136  138  141   < >   --    < >  140   < >   --    POTASSIUM  4.5  5.4*  4.9  3.9   < >  3.7   < >   4.6   < >   --    CR  1.23*  1.64*  2.09*  1.31*   < >  1.38*   < >  0.92   < >   --    A1C   --    --    --    --    --    --    --   5.4   --    --     < > = values in this interval not displayed.        IMPRESSION:   Reason for surgery/procedure: osteoarthritis shoulder  Diagnosis/reason for consult: preop    The proposed surgical procedure is considered INTERMEDIATE risk.    REVISED CARDIAC RISK INDEX  The patient has the following serious cardiovascular risks for perioperative complications such as (MI, PE, VFib and 3  AV Block):  No serious cardiac risks  INTERPRETATION: 0 risks: Class I (very low risk - 0.4% complication rate)    The patient has the following additional risks for perioperative complications:  No identified additional risks      ICD-10-CM    1. Preop general physical exam Z01.818 EKG 12-lead complete w/read - Clinics     Methicillin resistant staph aureus cult   2. Shoulder arthritis M19.019    3. Inflammatory arthritis M19.90 RHEUMATOLOGY REFERRAL       RECOMMENDATIONS:     --Consult hospital rounder / IM to assist post-op medical management    --Patient is to take all scheduled medications on the day of surgery EXCEPT for modifications listed below.    ACE Inhibitor or Angiotensin Receptor Blocker (ARB) Use  Ace inhibitor or Angiotensin Receptor Blocker (ARB) and should HOLD this medication for the 24 hours prior to surgery.    Hold NSAID prior to surgery per surgeon    APPROVAL GIVEN to proceed with proposed procedure, without further diagnostic evaluation       Signed Electronically by: Viri Savage MD    Copy of this evaluation report is provided to requesting physician.    [facility] Preop Guidelines    Revised Cardiac Risk Index

## 2018-10-15 NOTE — MR AVS SNAPSHOT
After Visit Summary   10/15/2018    Krystyna Peña    MRN: [id number]           Patient Information     Date Of Birth          1955        Visit Information        Provider Department      10/15/2018 2:40 PM Viri Savage MD [facility]        Today's Diagnoses     Preop general physical exam    -  1    Shoulder arthritis        Inflammatory arthritis          Care Instructions    Stop the celebrex per the surgeon (same time as aspirin or nsaids).     Do not take the lisinopril the day before or the day of surgery.           Before Your Surgery      Call your surgeon if there is any change in your health. This includes signs of a cold or flu (such as a sore throat, runny nose, cough, rash or fever).    Do not smoke, drink alcohol or take over the counter medicine (unless your surgeon or primary care doctor tells you to) for the 24 hours before and after surgery.    If you take prescribed drugs: Follow your doctor s orders about which medicines to take and which to stop until after surgery.    Eating and drinking prior to surgery: follow the instructions from your surgeon    Take a shower or bath the night before surgery. Use the soap your surgeon gave you to gently clean your skin. If you do not have soap from your surgeon, use your regular soap. Do not shave or scrub the surgery site.  Wear clean pajamas and have clean sheets on your bed.           Follow-ups after your visit        Additional Services     RHEUMATOLOGY REFERRAL       Your provider has referred you to: JOSE ANTONIOG:  [facility].  [phone] [email or web address]    G:  [facility]   [phone] [email or web address]    Please be aware that coverage of these services is subject to the terms and limitations of your health insurance plan.  Call member services at your health plan with any benefit or coverage questions.      Please bring the  following with you to your appointment:    (1) Any X-Rays, CTs or MRIs which have been performed.  Contact the facility where they were done to arrange for  prior to your scheduled appointment.    (2) List of current medications   (3) This referral request   (4) Any documents/labs given to you for this referral                  Your next 10 appointments already scheduled     Oct 30, 2018   Procedure with Aaron Christianson MD   [facility] (--)    [address] [facility] [address] [phone]              Who to contact     If you have questions or need follow up information about today's clinic visit or your schedule please contact [facility] directly at [phone].  Normal or non-critical lab and imaging results will be communicated to you by MyChart, letter or phone within 4 business days after the clinic has received the results. If you do not hear from us within 7 days, please contact the clinic through MyChart or phone. If you have a critical or abnormal lab result, we will notify you by phone as soon as possible.  Submit refill requests through [employer] or call your pharmacy and they will forward the refill request to us. Please allow 3 business days for your refill to be completed.          Additional Information About Your Visit        Care EveryWhere ID     This is your Care EveryWhere ID. This could be used by other organizations to access your [place] medical records  [id number]        Your Vitals Were     Pulse Temperature Respirations Last Period Pulse Oximetry BMI (Body Mass Index)    118 98.2  F (36.8  C) (Oral) 24 10/01/2003 99% 30.1 kg/m2       Blood Pressure from Last 3 Encounters:   10/15/18 136/84   09/28/18 132/80   07/13/18 118/76    Weight from Last 3 Encounters:   10/15/18 169 lb 14.4 oz (77.1 kg)   09/28/18 169 lb 11.2 oz (77 kg)   07/13/18 168 lb (76.2 kg)              We Performed the Following     EKG 12-lead complete w/read -  Clinics     Methicillin resistant staph aureus cult     RHEUMATOLOGY REFERRAL        Primary Care Provider Office Phone # Fax #    Viri Savage [phone]       [address]  [facility] [address]        Equal Access to Services     LEW HANSON : Mariam alma ventura josiah Soobdulio, waaxda luqadaha, qaybta kaalmada adekielyada, jhoana glovern darlin tineo robby bangura. So [facility] [phone].    ATENCIÓN: Si habla español, tiene a arguelles disposición servicios gratuitos de asistencia lingüística. Llame al [phone].    We comply with applicable federal civil rights laws and [place] laws. We do not discriminate on the basis of race, color, national origin, age, disability, sex, sexual orientation, or gender identity.            Thank you!     Thank you for choosing [facility]  for your care. Our goal is always to provide you with excellent care. Hearing back from our patients is one way we can continue to improve our services. Please take a few minutes to complete the written survey that you may receive in the mail after your visit with us. Thank you!             Your Updated Medication List - Protect others around you: Learn how to safely use, store and throw away your medicines at www.disposemymeds.org.          This list is accurate as of 10/15/18  3:22 PM.  Always use your most recent med list.                   Brand Name Dispense Instructions for use Diagnosis    ASPIRIN PO      Take 81 mg by mouth daily        celecoxib 200 MG capsule    celeBREX    60 capsule    Take 1 capsule (200 mg) by mouth daily    Status post total replacement of right hip       cyclobenzaprine 5 MG tablet    FLEXERIL    60 tablet    Take 1 tablet (5 mg) by mouth 3 times daily as needed for muscle spasms    Muscle spasm       furosemide 20 MG tablet    LASIX    30 tablet    Take 1 tablet (20 mg) by mouth daily    Edema, unspecified type       lisinopril 10 MG tablet    PRINIVIL/ZESTRIL    180 tablet     TAKE 2 TABLETS(20 MG) BY MOUTH DAILY    Benign essential hypertension       omeprazole 20 MG CR capsule    priLOSEC     Take 1 capsule (20 mg) by mouth daily        traMADol 50 MG tablet    ULTRAM    90 tablet    Take 1 tablet (50 mg) by mouth 3 times daily as needed for severe pain    Low back pain, unspecified back pain laterality, unspecified chronicity, with sciatica presence unspecified

## 2018-10-15 NOTE — PATIENT INSTRUCTIONS
Stop the celebrex per the surgeon (same time as aspirin or nsaids).     Do not take the lisinopril the day before or the day of surgery.           Before Your Surgery      Call your surgeon if there is any change in your health. This includes signs of a cold or flu (such as a sore throat, runny nose, cough, rash or fever).    Do not smoke, drink alcohol or take over the counter medicine (unless your surgeon or primary care doctor tells you to) for the 24 hours before and after surgery.    If you take prescribed drugs: Follow your doctor s orders about which medicines to take and which to stop until after surgery.    Eating and drinking prior to surgery: follow the instructions from your surgeon    Take a shower or bath the night before surgery. Use the soap your surgeon gave you to gently clean your skin. If you do not have soap from your surgeon, use your regular soap. Do not shave or scrub the surgery site.  Wear clean pajamas and have clean sheets on your bed.

## 2018-10-15 NOTE — NURSING NOTE
/84  Pulse 118  Temp 98.2  F (36.8  C) (Oral)  Resp 24  Wt 169 lb 14.4 oz (77.1 kg)  LMP 10/01/2003  SpO2 99%  BMI 30.1 kg/m2

## 2018-10-16 ENCOUNTER — DOCUMENTATION ONLY (OUTPATIENT)
Dept: LAB | Facility: CLINIC | Age: 63
End: 2018-10-16

## 2018-10-16 DIAGNOSIS — Z01.89 LABORATORY EXAMINATION: Primary | ICD-10-CM

## 2018-10-18 LAB
BACTERIA SPEC CULT: NORMAL
SPECIMEN SOURCE: NORMAL

## 2018-10-23 ENCOUNTER — TELEPHONE (OUTPATIENT)
Dept: INTERNAL MEDICINE | Facility: CLINIC | Age: 63
End: 2018-10-23

## 2018-10-23 NOTE — LETTER
[facility]  [address], [address]  [place], [place]  [address]                                            TEL:[phone]  FAX:[phone]      Krystyna Peña  [address] [place] [address]      October 23, 2018    Dear Krystyna,    In order to ensure we are providing the best quality care, we have reviewed your chart and see that you are due for:     1. Pap    Please call the clinic at your earliest convenience to schedule an appointment.   Thank you for trusting us with your health care.          Sincerely,      Viri Savage M.D.

## 2018-10-23 NOTE — LETTER
[facility]  [address], [address]  [place], [place]  [address]                                            TEL:[phone]  FAX:[phone]      Krystyna Peña  [address] [place] [address]      November 19, 2018    Dear Krystyna,    We sent you a letter a couple of weeks ago informing you of what you are due for, we hope that you did receive it. Your health is extremely important to us. Please take the time to consider calling the clinic to discuss your preventative health measures.?   Thank you for allowing us to help you take care of your health!       Sincerely,      Viri Savage M.D.                  ?

## 2018-10-23 NOTE — PROGRESS NOTES
Not sure what labs she needs, she just had BMP on 09/28/18 , may need HGb check as pt is having surgery . Ordered CBC. Hold for PCP if pt needs additional labs

## 2018-10-23 NOTE — TELEPHONE ENCOUNTER
Panel Management Review      Patient has the following on her problem list: None      Composite cancer screening  Chart review shows that this patient is due/due soon for the following Pap Smear  Summary:    Patient is due/failing the following:   PAP    Action needed:   Patient needs office visit for pap.    Type of outreach:    Sent letter.    Questions for provider review:    None                                                                                                                                     Ryanne Smith [facility]       Chart routed to Care Team .

## 2018-10-24 DIAGNOSIS — Z01.89 LABORATORY EXAMINATION: ICD-10-CM

## 2018-10-24 LAB
ERYTHROCYTE [DISTWIDTH] IN BLOOD BY AUTOMATED COUNT: 12.4 % (ref 10–15)
HCT VFR BLD AUTO: 39.3 % (ref 35–47)
HGB BLD-MCNC: 13.6 G/DL (ref 11.7–15.7)
MCH RBC QN AUTO: 37.9 PG (ref 26.5–33)
MCHC RBC AUTO-ENTMCNC: 34.6 G/DL (ref 31.5–36.5)
MCV RBC AUTO: 110 FL (ref 78–100)
PLATELET # BLD AUTO: 223 10E9/L (ref 150–450)
RBC # BLD AUTO: 3.59 10E12/L (ref 3.8–5.2)
WBC # BLD AUTO: 4.6 10E9/L (ref 4–11)

## 2018-10-24 PROCEDURE — 36415 COLL VENOUS BLD VENIPUNCTURE: CPT | Performed by: INTERNAL MEDICINE

## 2018-10-24 PROCEDURE — 85027 COMPLETE CBC AUTOMATED: CPT | Performed by: INTERNAL MEDICINE

## 2018-10-24 RX ORDER — HYDROCODONE BITARTRATE AND ACETAMINOPHEN 5; 325 MG/1; MG/1
1 TABLET ORAL EVERY 4 HOURS PRN
Status: ON HOLD | COMMUNITY
End: 2018-10-31

## 2018-10-25 NOTE — H&P (VIEW-ONLY)
[place] [address]  [facility] [address]  [phone]  Dept: [phone]    PRE-OP EVALUATION:  Today's date: 10/15/2018    Krystyna Peña (: 1955) presents for pre-operative evaluation assessment as requested by Dr. Christianson  She requires evaluation and anesthesia risk assessment prior to undergoing surgery/procedure for treatment of osteoarthritis of shoulder .    Proposed Surgery/ Procedure: Left reverse arthroplasty shoulder  Date of Surgery/ Procedure: 10/30/2018  Time of Surgery/ Procedure: 07:30 am  Hospital/Surgical Facility: [facility]  Fax number for surgical facility:   Primary Physician: Viri Savage  Type of Anesthesia Anticipated: to be determined    Patient has a Health Care Directive or Living Will:  YES     1. NO - Do you have a history of heart attack, stroke, stent, bypass or surgery on an artery in the head, neck, heart or legs?  2. NO - Do you ever have any pain or discomfort in your chest?  3. NO - Do you have a history of  Heart Failure?  4. NO - Are you troubled by shortness of breath when: walking on the level, up a slight hill or at night?  5. NO - Do you currently have a cold, bronchitis or other respiratory infection?  6. NO - Do you have a cough, shortness of breath or wheezing?  7. NO - Do you sometimes get pains in the calves of your legs when you walk?  8. NO - Do you or anyone in your family have previous history of blood clots?  9. NO - Do you or does anyone in your family have a serious bleeding problem such as prolonged bleeding following surgeries or cuts?  10. NO - Have you ever had problems with anemia or been told to take iron pills?  11. NO - Have you had any abnormal blood loss such as black, tarry or bloody stools, or abnormal vaginal bleeding?  12. NO - Have you ever had a blood transfusion?  13. NO - Have you or any of your relatives ever had problems with anesthesia?  14. YES - DO YOU HAVE SLEEP APNEA, EXCESSIVE SNORING OR  DAYTIME DROWSINESS? Snoring only   15. NO - Do you have any prosthetic heart valves?  16. NO - Do you have prosthetic joints?  17. NO - Is there any chance that you may be pregnant?        HPI:     HPI related to upcoming procedure: Is osteoarthritis of the shoulder, not managed by conservative treatment.      HYPERTENSION - Patient has longstanding history of HTN , currently denies any symptoms referable to elevated blood pressure. Specifically denies chest pain, palpitations, dyspnea, orthopnea, PND or peripheral edema. Blood pressure readings have been in normal range. Current medication regimen is as listed below. Patient denies any side effects of medication.                                                                                                                                                                                          .    MEDICAL HISTORY:     Patient Active Problem List    Diagnosis Date Noted     Edema, unspecified type 07/15/2017     Priority: Medium     Advanced directives, counseling/discussion 10/12/2016     Priority: Medium     Patient states has Advance Directive and will bring in a copy to clinic.         Benign essential hypertension 11/27/2015     Priority: Medium     Urinary frequency 09/21/2014     Priority: Medium     Back pain 03/24/2014     Priority: Medium     CARDIOVASCULAR SCREENING; LDL GOAL LESS THAN 160 10/31/2010     Priority: Medium     Asymptomatic varicose veins 06/24/2003     Priority: Medium      Past Medical History:   Diagnosis Date     Arthritis      Asymptomatic varicose veins      Benign neoplasm of colon 11/06, 7/13    Adenoma     Cardiac arrest (H) 2003    after knee replacement     Gastroesophageal reflux disease      Hypertension      Major depression      Past Surgical History:   Procedure Laterality Date     AMPUTATE TOE(S) Left 4/12/2018    Procedure: AMPUTATE TOE(S);  Amputation left third digit;  Surgeon: Herb Michael DPM;  Location:  OR      ARTHROPLASTY HIP Right 3/28/2016    Procedure: ARTHROPLASTY HIP;  Surgeon: Perez Meza MD;  Location: [facility] OR     C NONSPECIFIC PROCEDURE  1972    Right arm plastic surgery     C NONSPECIFIC PROCEDURE  1972    Right knee surgery     C NONSPECIFIC PROCEDURE  10/03    L popliteal AVM repair     C NONSPECIFIC PROCEDURE  11/04    Removal bone spur left foot     C NONSPECIFIC PROCEDURE  4/07    amputation of toes left & right toes     C REPAIR SPIEGELIAN HERNIA  2002     C TOTAL KNEE ARTHROPLASTY  10/03    LT     COLONOSCOPY       HC CORRECT BUNION,SIMPLE  1998    RT     HC CORRECT BUNION,SIMPLE  2001    LT     HC KNEE SCOPE, DIAGNOSTIC      LT     ROTATOR CUFF REPAIR RT/LT  7/07    right     VASCULAR SURGERY  left leg bypass 2004     Current Outpatient Prescriptions   Medication Sig Dispense Refill     ASPIRIN PO Take 81 mg by mouth daily       celecoxib (CELEBREX) 200 MG capsule Take 1 capsule (200 mg) by mouth daily 60 capsule 1     cyclobenzaprine (FLEXERIL) 5 MG tablet Take 1 tablet (5 mg) by mouth 3 times daily as needed for muscle spasms 60 tablet 1     furosemide (LASIX) 20 MG tablet Take 1 tablet (20 mg) by mouth daily 30 tablet 1     lisinopril (PRINIVIL/ZESTRIL) 10 MG tablet TAKE 2 TABLETS(20 MG) BY MOUTH DAILY 180 tablet 1     omeprazole (PRILOSEC) 20 MG CR capsule Take 1 capsule (20 mg) by mouth daily       traMADol (ULTRAM) 50 MG tablet Take 1 tablet (50 mg) by mouth 3 times daily as needed for severe pain 90 tablet 0     OTC products: None, except as noted above    Allergies   Allergen Reactions     Morphine      respiratory distress     Cephalexin Rash      Latex Allergy: NO    Social History   Substance Use Topics     Smoking status: Former Smoker     Packs/day: 1.00     Types: Cigarettes, Cigars     Quit date: 4/1/2013     Smokeless tobacco: Never Used     Alcohol use Yes      Comment: 1 per day     History   Drug Use No       REVIEW OF SYSTEMS:   GENERAL: negative for, fever, chills, weight  loss, weight gain  EYES: negative  ENT: negative  RESPIRATORY: No dyspnea on exertion and No cough  CARDIOVASCULAR: positive for lower extremity edema, negative for, palpitations, tachycardia, irregular heart beat and chest pain  GI: negative for, nausea, vomiting, abdominal pain and melena  : negative  MUSCULOSKELETAL: joint pain and shoulder pain   NEUROLOGIC: negative for, headaches, local weakness, numbness or tingling of hands and numbness or tingling of feet  SKIN: negative  ENDOCRINE: negative         EXAM:   LMP 10/01/2003    Patient is alert, oriented, cooperative in no acute distress.  /84  Pulse 118  Temp 98.2  F (36.8  C) (Oral)  Resp 24  Wt 169 lb 14.4 oz (77.1 kg)  LMP 10/01/2003  SpO2 99%  BMI 30.1 kg/m2    HEENT: PERRL, EOMI, TM's are normal. Oropharynx is clear.  NECK: No lymphadenopathy or thyromegaly. Carotid pulses full without bruits.  LUNGS: clear  CV: normal S1, S2 without murmur, S3 or S4 present. Pulses are 2/2 throughout. No JVD.  ABDOMEN: Bowel sounds present, nontender without hepatosplenomegaly. Liver is normal size to percussion.  EXTREMITIES: no edema present, unremarkable joints  NEUROLOGIC: Cranial nerves II-XII intact, reflexes 2/4 throughout, strength 5/5, sensation grossly intact, gait normal.  SKIN: without rashes or significant lesions     DIAGNOSTICS:   EKG: appears normal, NSR, normal axis, normal intervals, no acute ST/T changes c/w ischemia, no LVH by voltage criteria, unchanged from previous tracings  Lab: see [facility]    Recent Labs   Lab Test  09/28/18   1628  07/13/18   1427  07/11/18   0932  04/21/18   0926   03/22/16   1510   09/17/14   1021   09/12/11   1202   HGB   --    --   12.5  12.8   < >  12.8   < >   --    < >  15.9*   PLT   --    --   218  198   --   291   < >   --    < >  265   INR   --    --    --    --    --   0.96   --    --    --   0.90   NA  137  136  138  141   < >   --    < >  140   < >   --    POTASSIUM  4.5  5.4*  4.9  3.9   < >  3.7   < >   4.6   < >   --    CR  1.23*  1.64*  2.09*  1.31*   < >  1.38*   < >  0.92   < >   --    A1C   --    --    --    --    --    --    --   5.4   --    --     < > = values in this interval not displayed.        IMPRESSION:   Reason for surgery/procedure: osteoarthritis shoulder  Diagnosis/reason for consult: preop    The proposed surgical procedure is considered INTERMEDIATE risk.    REVISED CARDIAC RISK INDEX  The patient has the following serious cardiovascular risks for perioperative complications such as (MI, PE, VFib and 3  AV Block):  No serious cardiac risks  INTERPRETATION: 0 risks: Class I (very low risk - 0.4% complication rate)    The patient has the following additional risks for perioperative complications:  No identified additional risks      ICD-10-CM    1. Preop general physical exam Z01.818 EKG 12-lead complete w/read - Clinics     Methicillin resistant staph aureus cult   2. Shoulder arthritis M19.019    3. Inflammatory arthritis M19.90 RHEUMATOLOGY REFERRAL       RECOMMENDATIONS:     --Consult hospital rounder / IM to assist post-op medical management    --Patient is to take all scheduled medications on the day of surgery EXCEPT for modifications listed below.    ACE Inhibitor or Angiotensin Receptor Blocker (ARB) Use  Ace inhibitor or Angiotensin Receptor Blocker (ARB) and should HOLD this medication for the 24 hours prior to surgery.    Hold NSAID prior to surgery per surgeon    APPROVAL GIVEN to proceed with proposed procedure, without further diagnostic evaluation       Signed Electronically by: Viri Savage MD    Copy of this evaluation report is provided to requesting physician.    [facility] Preop Guidelines    Revised Cardiac Risk Index

## 2018-10-27 ENCOUNTER — HEALTH MAINTENANCE LETTER (OUTPATIENT)
Age: 63
End: 2018-10-27

## 2018-10-27 NOTE — PHARMACY-ADMISSION MEDICATION HISTORY
Admission medication history interview status for this patient is complete. See [facility] admission navigator for allergy information, prior to admission medications and immunization status.     PTA meds completed by pre-admitting nurse Breanna Perez and reviewed by pharmacy       Prior to Admission medications    Medication Sig Last Dose Taking? Auth Provider   ASPIRIN PO Take 81 mg by mouth daily  Yes Reported, Patient   celecoxib (CELEBREX) 200 MG capsule Take 1 capsule (200 mg) by mouth daily  Yes Alem Cloud PA-C   cyclobenzaprine (FLEXERIL) 5 MG tablet Take 1 tablet (5 mg) by mouth 3 times daily as needed for muscle spasms  Yes Viri Savage MD   furosemide (LASIX) 20 MG tablet Take 1 tablet (20 mg) by mouth daily  Yes Viri Savage MD   HYDROcodone-acetaminophen (NORCO) 5-325 MG per tablet Take 1 tablet by mouth every 4 hours as needed for severe pain  Yes Reported, Patient   lisinopril (PRINIVIL/ZESTRIL) 10 MG tablet TAKE 2 TABLETS(20 MG) BY MOUTH DAILY  Yes Viri Savage MD   omeprazole (PRILOSEC) 20 MG CR capsule Take 1 capsule (20 mg) by mouth daily  Yes Viri Savage MD   OXYCODONE HCL PO Take 5-10 mg by mouth every 4 hours as needed  Yes Reported, Patient   traMADol (ULTRAM) 50 MG tablet Take 1 tablet (50 mg) by mouth 3 times daily as needed for severe pain  Yes Viri Savage MD

## 2018-10-29 ENCOUNTER — HOSPITAL ENCOUNTER (OUTPATIENT)
Dept: LAB | Facility: CLINIC | Age: 63
Discharge: HOME | End: 2018-10-29
Attending: ORTHOPAEDIC SURGERY | Admitting: ORTHOPAEDIC SURGERY
Payer: COMMERCIAL

## 2018-10-29 DIAGNOSIS — Z01.812 PRE-OPERATIVE LABORATORY EXAMINATION: ICD-10-CM

## 2018-10-29 LAB
ABO + RH BLD: NORMAL
ABO + RH BLD: NORMAL
BLD GP AB SCN SERPL QL: NORMAL
BLOOD BANK CMNT PATIENT-IMP: NORMAL
CREAT SERPL-MCNC: 1.35 MG/DL (ref 0.52–1.04)
GFR SERPL CREATININE-BSD FRML MDRD: 39 ML/MIN/1.7M2
POTASSIUM SERPL-SCNC: 5 MMOL/L (ref 3.4–5.3)
SPECIMEN EXP DATE BLD: NORMAL

## 2018-10-29 PROCEDURE — 86850 RBC ANTIBODY SCREEN: CPT | Performed by: ORTHOPAEDIC SURGERY

## 2018-10-29 PROCEDURE — 36415 COLL VENOUS BLD VENIPUNCTURE: CPT | Performed by: ORTHOPAEDIC SURGERY

## 2018-10-29 PROCEDURE — 86901 BLOOD TYPING SEROLOGIC RH(D): CPT | Performed by: ORTHOPAEDIC SURGERY

## 2018-10-29 PROCEDURE — 82565 ASSAY OF CREATININE: CPT | Performed by: ORTHOPAEDIC SURGERY

## 2018-10-29 PROCEDURE — 86900 BLOOD TYPING SEROLOGIC ABO: CPT | Performed by: ORTHOPAEDIC SURGERY

## 2018-10-29 PROCEDURE — 84132 ASSAY OF SERUM POTASSIUM: CPT | Performed by: ORTHOPAEDIC SURGERY

## 2018-10-29 RX ORDER — CEFAZOLIN SODIUM 2 G/100ML
2 INJECTION, SOLUTION INTRAVENOUS
Status: CANCELLED | OUTPATIENT
Start: 2018-10-29

## 2018-10-29 RX ORDER — CEFAZOLIN SODIUM 1 G/3ML
1 INJECTION, POWDER, FOR SOLUTION INTRAMUSCULAR; INTRAVENOUS SEE ADMIN INSTRUCTIONS
Status: CANCELLED | OUTPATIENT
Start: 2018-10-29

## 2018-10-30 ENCOUNTER — HOSPITAL ENCOUNTER (INPATIENT)
Facility: CLINIC | Age: 63
LOS: 2 days | Discharge: HOME | End: 2018-11-01
Attending: ORTHOPAEDIC SURGERY | Admitting: ORTHOPAEDIC SURGERY
Payer: COMMERCIAL

## 2018-10-30 ENCOUNTER — ANESTHESIA (OUTPATIENT)
Dept: SURGERY | Facility: CLINIC | Age: 63
End: 2018-10-30
Payer: COMMERCIAL

## 2018-10-30 ENCOUNTER — APPOINTMENT (OUTPATIENT)
Dept: GENERAL RADIOLOGY | Facility: CLINIC | Age: 63
End: 2018-10-30
Attending: ORTHOPAEDIC SURGERY
Payer: COMMERCIAL

## 2018-10-30 ENCOUNTER — ANESTHESIA EVENT (OUTPATIENT)
Dept: SURGERY | Facility: CLINIC | Age: 63
End: 2018-10-30
Payer: COMMERCIAL

## 2018-10-30 DIAGNOSIS — Z96.612 S/P REVERSE TOTAL SHOULDER ARTHROPLASTY, LEFT: Primary | ICD-10-CM

## 2018-10-30 PROCEDURE — 25000128 H RX IP 250 OP 636: Performed by: ORTHOPAEDIC SURGERY

## 2018-10-30 PROCEDURE — 25000566 ZZH SEVOFLURANE, EA 15 MIN: Performed by: ORTHOPAEDIC SURGERY

## 2018-10-30 PROCEDURE — 36000063 ZZH SURGERY LEVEL 4 EA 15 ADDTL MIN: Performed by: ORTHOPAEDIC SURGERY

## 2018-10-30 PROCEDURE — 25000128 H RX IP 250 OP 636: Performed by: ANESTHESIOLOGY

## 2018-10-30 PROCEDURE — 27110028 ZZH OR GENERAL SUPPLY NON-STERILE: Performed by: ORTHOPAEDIC SURGERY

## 2018-10-30 PROCEDURE — 0RRK00Z REPLACEMENT OF LEFT SHOULDER JOINT WITH REVERSE BALL AND SOCKET SYNTHETIC SUBSTITUTE, OPEN APPROACH: ICD-10-PCS | Performed by: ORTHOPAEDIC SURGERY

## 2018-10-30 PROCEDURE — 25000125 ZZHC RX 250: Performed by: NURSE ANESTHETIST, CERTIFIED REGISTERED

## 2018-10-30 PROCEDURE — 27210794 ZZH OR GENERAL SUPPLY STERILE: Performed by: ORTHOPAEDIC SURGERY

## 2018-10-30 PROCEDURE — 36000093 ZZH SURGERY LEVEL 4 1ST 30 MIN: Performed by: ORTHOPAEDIC SURGERY

## 2018-10-30 PROCEDURE — 25000125 ZZHC RX 250: Performed by: ANESTHESIOLOGY

## 2018-10-30 PROCEDURE — 12000011 ZZH R&B MS OVERFLOW

## 2018-10-30 PROCEDURE — 27211024 ZZHC OR SUPPLY OTHER OPNP: Performed by: ORTHOPAEDIC SURGERY

## 2018-10-30 PROCEDURE — C1776 JOINT DEVICE (IMPLANTABLE): HCPCS | Performed by: ORTHOPAEDIC SURGERY

## 2018-10-30 PROCEDURE — 37000008 ZZH ANESTHESIA TECHNICAL FEE, 1ST 30 MIN: Performed by: ORTHOPAEDIC SURGERY

## 2018-10-30 PROCEDURE — 25000125 ZZHC RX 250: Performed by: ORTHOPAEDIC SURGERY

## 2018-10-30 PROCEDURE — 25000132 ZZH RX MED GY IP 250 OP 250 PS 637: Performed by: ORTHOPAEDIC SURGERY

## 2018-10-30 PROCEDURE — 40000171 ZZH STATISTIC PRE-PROCEDURE ASSESSMENT III: Performed by: ORTHOPAEDIC SURGERY

## 2018-10-30 PROCEDURE — 37000009 ZZH ANESTHESIA TECHNICAL FEE, EACH ADDTL 15 MIN: Performed by: ORTHOPAEDIC SURGERY

## 2018-10-30 PROCEDURE — C1713 ANCHOR/SCREW BN/BN,TIS/BN: HCPCS | Performed by: ORTHOPAEDIC SURGERY

## 2018-10-30 PROCEDURE — 25800025 ZZH RX 258: Performed by: ORTHOPAEDIC SURGERY

## 2018-10-30 PROCEDURE — 71000012 ZZH RECOVERY PHASE 1 LEVEL 1 FIRST HR: Performed by: ORTHOPAEDIC SURGERY

## 2018-10-30 PROCEDURE — 25000128 H RX IP 250 OP 636: Performed by: NURSE ANESTHETIST, CERTIFIED REGISTERED

## 2018-10-30 PROCEDURE — 40000986 XR SHOULDER LT PORT G/E 2 VW: Mod: LT

## 2018-10-30 RX ORDER — CLINDAMYCIN PHOSPHATE 900 MG/50ML
900 INJECTION, SOLUTION INTRAVENOUS
Status: COMPLETED | OUTPATIENT
Start: 2018-10-30 | End: 2018-10-30

## 2018-10-30 RX ORDER — CLINDAMYCIN PHOSPHATE 900 MG/50ML
900 INJECTION, SOLUTION INTRAVENOUS EVERY 8 HOURS
Status: COMPLETED | OUTPATIENT
Start: 2018-10-30 | End: 2018-10-31

## 2018-10-30 RX ORDER — NALOXONE HYDROCHLORIDE 0.4 MG/ML
.1-.4 INJECTION, SOLUTION INTRAMUSCULAR; INTRAVENOUS; SUBCUTANEOUS
Status: DISCONTINUED | OUTPATIENT
Start: 2018-10-30 | End: 2018-10-30

## 2018-10-30 RX ORDER — ONDANSETRON 2 MG/ML
4 INJECTION INTRAMUSCULAR; INTRAVENOUS EVERY 6 HOURS PRN
Status: DISCONTINUED | OUTPATIENT
Start: 2018-10-30 | End: 2018-11-01 | Stop reason: HOSPADM

## 2018-10-30 RX ORDER — ACETAMINOPHEN 325 MG/1
650 TABLET ORAL EVERY 4 HOURS PRN
Status: DISCONTINUED | OUTPATIENT
Start: 2018-11-02 | End: 2018-11-01 | Stop reason: HOSPADM

## 2018-10-30 RX ORDER — HYDROMORPHONE HYDROCHLORIDE 1 MG/ML
.3-.5 INJECTION, SOLUTION INTRAMUSCULAR; INTRAVENOUS; SUBCUTANEOUS
Status: DISCONTINUED | OUTPATIENT
Start: 2018-10-30 | End: 2018-11-01 | Stop reason: HOSPADM

## 2018-10-30 RX ORDER — METOPROLOL TARTRATE 1 MG/ML
INJECTION, SOLUTION INTRAVENOUS PRN
Status: DISCONTINUED | OUTPATIENT
Start: 2018-10-30 | End: 2018-10-30

## 2018-10-30 RX ORDER — DIPHENHYDRAMINE HYDROCHLORIDE 50 MG/ML
25 INJECTION INTRAMUSCULAR; INTRAVENOUS EVERY 6 HOURS PRN
Status: DISCONTINUED | OUTPATIENT
Start: 2018-10-30 | End: 2018-11-01 | Stop reason: HOSPADM

## 2018-10-30 RX ORDER — CLINDAMYCIN PHOSPHATE 900 MG/50ML
900 INJECTION, SOLUTION INTRAVENOUS SEE ADMIN INSTRUCTIONS
Status: DISCONTINUED | OUTPATIENT
Start: 2018-10-30 | End: 2018-10-30 | Stop reason: HOSPADM

## 2018-10-30 RX ORDER — OXYCODONE HYDROCHLORIDE 5 MG/1
5-10 TABLET ORAL EVERY 4 HOURS PRN
Status: DISCONTINUED | OUTPATIENT
Start: 2018-10-30 | End: 2018-10-31 | Stop reason: ALTCHOICE

## 2018-10-30 RX ORDER — FENTANYL CITRATE 50 UG/ML
25-50 INJECTION, SOLUTION INTRAMUSCULAR; INTRAVENOUS
Status: DISCONTINUED | OUTPATIENT
Start: 2018-10-30 | End: 2018-10-30 | Stop reason: HOSPADM

## 2018-10-30 RX ORDER — DIMENHYDRINATE 50 MG/ML
25 INJECTION, SOLUTION INTRAMUSCULAR; INTRAVENOUS
Status: DISCONTINUED | OUTPATIENT
Start: 2018-10-30 | End: 2018-10-30 | Stop reason: HOSPADM

## 2018-10-30 RX ORDER — AMOXICILLIN 250 MG
2 CAPSULE ORAL 2 TIMES DAILY
Status: DISCONTINUED | OUTPATIENT
Start: 2018-10-30 | End: 2018-11-01 | Stop reason: HOSPADM

## 2018-10-30 RX ORDER — DIPHENHYDRAMINE HCL 25 MG
25 CAPSULE ORAL EVERY 6 HOURS PRN
Status: DISCONTINUED | OUTPATIENT
Start: 2018-10-30 | End: 2018-11-01 | Stop reason: HOSPADM

## 2018-10-30 RX ORDER — SODIUM CHLORIDE 9 MG/ML
INJECTION, SOLUTION INTRAVENOUS CONTINUOUS
Status: DISCONTINUED | OUTPATIENT
Start: 2018-10-30 | End: 2018-11-01 | Stop reason: HOSPADM

## 2018-10-30 RX ORDER — ONDANSETRON 2 MG/ML
INJECTION INTRAMUSCULAR; INTRAVENOUS PRN
Status: DISCONTINUED | OUTPATIENT
Start: 2018-10-30 | End: 2018-10-30

## 2018-10-30 RX ORDER — ACETAMINOPHEN 325 MG/1
975 TABLET ORAL EVERY 8 HOURS
Status: DISCONTINUED | OUTPATIENT
Start: 2018-10-30 | End: 2018-11-01 | Stop reason: HOSPADM

## 2018-10-30 RX ORDER — NALOXONE HYDROCHLORIDE 0.4 MG/ML
.1-.4 INJECTION, SOLUTION INTRAMUSCULAR; INTRAVENOUS; SUBCUTANEOUS
Status: DISCONTINUED | OUTPATIENT
Start: 2018-10-30 | End: 2018-11-01 | Stop reason: HOSPADM

## 2018-10-30 RX ORDER — CYCLOBENZAPRINE HCL 5 MG
5 TABLET ORAL 3 TIMES DAILY PRN
Status: DISCONTINUED | OUTPATIENT
Start: 2018-10-30 | End: 2018-11-01 | Stop reason: HOSPADM

## 2018-10-30 RX ORDER — FUROSEMIDE 20 MG
20 TABLET ORAL DAILY
Status: DISCONTINUED | OUTPATIENT
Start: 2018-10-31 | End: 2018-11-01 | Stop reason: HOSPADM

## 2018-10-30 RX ORDER — LISINOPRIL 10 MG/1
10 TABLET ORAL DAILY
Status: DISCONTINUED | OUTPATIENT
Start: 2018-10-30 | End: 2018-11-01 | Stop reason: HOSPADM

## 2018-10-30 RX ORDER — FENTANYL CITRATE 50 UG/ML
INJECTION, SOLUTION INTRAMUSCULAR; INTRAVENOUS PRN
Status: DISCONTINUED | OUTPATIENT
Start: 2018-10-30 | End: 2018-10-30

## 2018-10-30 RX ORDER — ONDANSETRON 4 MG/1
4 TABLET, ORALLY DISINTEGRATING ORAL EVERY 6 HOURS PRN
Status: DISCONTINUED | OUTPATIENT
Start: 2018-10-30 | End: 2018-11-01 | Stop reason: HOSPADM

## 2018-10-30 RX ORDER — TRAMADOL HYDROCHLORIDE 50 MG/1
50 TABLET ORAL 3 TIMES DAILY PRN
Status: DISCONTINUED | OUTPATIENT
Start: 2018-10-30 | End: 2018-10-30

## 2018-10-30 RX ORDER — LIDOCAINE 40 MG/G
CREAM TOPICAL
Status: DISCONTINUED | OUTPATIENT
Start: 2018-10-30 | End: 2018-10-30 | Stop reason: HOSPADM

## 2018-10-30 RX ORDER — AMOXICILLIN 250 MG
1 CAPSULE ORAL 2 TIMES DAILY
Status: DISCONTINUED | OUTPATIENT
Start: 2018-10-30 | End: 2018-11-01 | Stop reason: HOSPADM

## 2018-10-30 RX ORDER — PROPOFOL 10 MG/ML
INJECTION, EMULSION INTRAVENOUS PRN
Status: DISCONTINUED | OUTPATIENT
Start: 2018-10-30 | End: 2018-10-30

## 2018-10-30 RX ORDER — CELECOXIB 200 MG/1
400 CAPSULE ORAL ONCE
Status: COMPLETED | OUTPATIENT
Start: 2018-10-30 | End: 2018-10-30

## 2018-10-30 RX ORDER — LIDOCAINE 40 MG/G
CREAM TOPICAL
Status: DISCONTINUED | OUTPATIENT
Start: 2018-10-30 | End: 2018-11-01 | Stop reason: HOSPADM

## 2018-10-30 RX ORDER — ONDANSETRON 2 MG/ML
4 INJECTION INTRAMUSCULAR; INTRAVENOUS EVERY 30 MIN PRN
Status: DISCONTINUED | OUTPATIENT
Start: 2018-10-30 | End: 2018-10-30 | Stop reason: HOSPADM

## 2018-10-30 RX ORDER — LIDOCAINE HYDROCHLORIDE 10 MG/ML
INJECTION, SOLUTION INFILTRATION; PERINEURAL PRN
Status: DISCONTINUED | OUTPATIENT
Start: 2018-10-30 | End: 2018-10-30

## 2018-10-30 RX ORDER — ONDANSETRON 4 MG/1
4 TABLET, ORALLY DISINTEGRATING ORAL EVERY 30 MIN PRN
Status: DISCONTINUED | OUTPATIENT
Start: 2018-10-30 | End: 2018-10-30 | Stop reason: HOSPADM

## 2018-10-30 RX ORDER — BUPIVACAINE HYDROCHLORIDE AND EPINEPHRINE 5; 5 MG/ML; UG/ML
INJECTION, SOLUTION PERINEURAL PRN
Status: DISCONTINUED | OUTPATIENT
Start: 2018-10-30 | End: 2018-10-30

## 2018-10-30 RX ORDER — HYDROMORPHONE HYDROCHLORIDE 1 MG/ML
.3-.5 INJECTION, SOLUTION INTRAMUSCULAR; INTRAVENOUS; SUBCUTANEOUS EVERY 5 MIN PRN
Status: DISCONTINUED | OUTPATIENT
Start: 2018-10-30 | End: 2018-10-30 | Stop reason: HOSPADM

## 2018-10-30 RX ORDER — GLYCOPYRROLATE 0.2 MG/ML
INJECTION, SOLUTION INTRAMUSCULAR; INTRAVENOUS PRN
Status: DISCONTINUED | OUTPATIENT
Start: 2018-10-30 | End: 2018-10-30

## 2018-10-30 RX ORDER — SODIUM CHLORIDE, SODIUM LACTATE, POTASSIUM CHLORIDE, CALCIUM CHLORIDE 600; 310; 30; 20 MG/100ML; MG/100ML; MG/100ML; MG/100ML
INJECTION, SOLUTION INTRAVENOUS CONTINUOUS
Status: DISCONTINUED | OUTPATIENT
Start: 2018-10-30 | End: 2018-10-30 | Stop reason: HOSPADM

## 2018-10-30 RX ORDER — BUPIVACAINE HYDROCHLORIDE AND EPINEPHRINE 5; 5 MG/ML; UG/ML
INJECTION, SOLUTION PERINEURAL PRN
Status: DISCONTINUED | OUTPATIENT
Start: 2018-10-30 | End: 2018-10-30 | Stop reason: HOSPADM

## 2018-10-30 RX ORDER — NEOSTIGMINE METHYLSULFATE 1 MG/ML
VIAL (ML) INJECTION PRN
Status: DISCONTINUED | OUTPATIENT
Start: 2018-10-30 | End: 2018-10-30

## 2018-10-30 RX ORDER — ASPIRIN 81 MG/1
81 TABLET ORAL DAILY
Status: DISCONTINUED | OUTPATIENT
Start: 2018-10-30 | End: 2018-11-01 | Stop reason: HOSPADM

## 2018-10-30 RX ORDER — HYDRALAZINE HYDROCHLORIDE 20 MG/ML
2.5-5 INJECTION INTRAMUSCULAR; INTRAVENOUS EVERY 10 MIN PRN
Status: DISCONTINUED | OUTPATIENT
Start: 2018-10-30 | End: 2018-10-30 | Stop reason: HOSPADM

## 2018-10-30 RX ORDER — LABETALOL HYDROCHLORIDE 5 MG/ML
10 INJECTION, SOLUTION INTRAVENOUS
Status: DISCONTINUED | OUTPATIENT
Start: 2018-10-30 | End: 2018-10-30 | Stop reason: HOSPADM

## 2018-10-30 RX ORDER — DEXAMETHASONE SODIUM PHOSPHATE 4 MG/ML
INJECTION, SOLUTION INTRA-ARTICULAR; INTRALESIONAL; INTRAMUSCULAR; INTRAVENOUS; SOFT TISSUE PRN
Status: DISCONTINUED | OUTPATIENT
Start: 2018-10-30 | End: 2018-10-30

## 2018-10-30 RX ADMIN — PHENYLEPHRINE HYDROCHLORIDE 100 MCG: 10 INJECTION, SOLUTION INTRAMUSCULAR; INTRAVENOUS; SUBCUTANEOUS at 08:17

## 2018-10-30 RX ADMIN — Medication 1 G: at 09:57

## 2018-10-30 RX ADMIN — ROCURONIUM BROMIDE 20 MG: 10 INJECTION INTRAVENOUS at 08:03

## 2018-10-30 RX ADMIN — ONDANSETRON 4 MG: 2 INJECTION INTRAMUSCULAR; INTRAVENOUS at 07:38

## 2018-10-30 RX ADMIN — Medication 1 G: at 07:53

## 2018-10-30 RX ADMIN — GLYCOPYRROLATE 0.2 MG: 0.2 INJECTION, SOLUTION INTRAMUSCULAR; INTRAVENOUS at 07:38

## 2018-10-30 RX ADMIN — DEXAMETHASONE SODIUM PHOSPHATE 4 MG: 4 INJECTION, SOLUTION INTRA-ARTICULAR; INTRALESIONAL; INTRAMUSCULAR; INTRAVENOUS; SOFT TISSUE at 07:38

## 2018-10-30 RX ADMIN — PHENYLEPHRINE HYDROCHLORIDE 100 MCG: 10 INJECTION, SOLUTION INTRAMUSCULAR; INTRAVENOUS; SUBCUTANEOUS at 07:52

## 2018-10-30 RX ADMIN — OMEPRAZOLE 20 MG: 20 CAPSULE, DELAYED RELEASE ORAL at 13:58

## 2018-10-30 RX ADMIN — BUPIVACAINE HYDROCHLORIDE AND EPINEPHRINE BITARTRATE 30 ML: 5; .005 INJECTION, SOLUTION EPIDURAL; INTRACAUDAL; PERINEURAL at 07:27

## 2018-10-30 RX ADMIN — PHENYLEPHRINE HYDROCHLORIDE: 10 INJECTION, SOLUTION INTRAMUSCULAR; INTRAVENOUS; SUBCUTANEOUS at 08:25

## 2018-10-30 RX ADMIN — SENNOSIDES AND DOCUSATE SODIUM 1 TABLET: 8.6; 5 TABLET ORAL at 21:01

## 2018-10-30 RX ADMIN — ACETAMINOPHEN 975 MG: 325 TABLET, FILM COATED ORAL at 13:58

## 2018-10-30 RX ADMIN — CLINDAMYCIN PHOSPHATE 900 MG: 18 INJECTION, SOLUTION INTRAVENOUS at 07:36

## 2018-10-30 RX ADMIN — MIDAZOLAM 2 MG: 1 INJECTION INTRAMUSCULAR; INTRAVENOUS at 07:16

## 2018-10-30 RX ADMIN — PHENYLEPHRINE HYDROCHLORIDE 100 MCG: 10 INJECTION, SOLUTION INTRAMUSCULAR; INTRAVENOUS; SUBCUTANEOUS at 08:03

## 2018-10-30 RX ADMIN — GLYCOPYRROLATE 0.2 MG: 0.2 INJECTION, SOLUTION INTRAMUSCULAR; INTRAVENOUS at 10:22

## 2018-10-30 RX ADMIN — SODIUM CHLORIDE: 9 INJECTION, SOLUTION INTRAVENOUS at 13:35

## 2018-10-30 RX ADMIN — SODIUM CHLORIDE, POTASSIUM CHLORIDE, SODIUM LACTATE AND CALCIUM CHLORIDE: 600; 310; 30; 20 INJECTION, SOLUTION INTRAVENOUS at 08:41

## 2018-10-30 RX ADMIN — CELECOXIB 400 MG: 200 CAPSULE ORAL at 06:43

## 2018-10-30 RX ADMIN — FENTANYL CITRATE 100 MCG: 50 INJECTION, SOLUTION INTRAMUSCULAR; INTRAVENOUS at 07:41

## 2018-10-30 RX ADMIN — ROCURONIUM BROMIDE 30 MG: 10 INJECTION INTRAVENOUS at 07:38

## 2018-10-30 RX ADMIN — ASPIRIN 81 MG: 81 TABLET, COATED ORAL at 13:58

## 2018-10-30 RX ADMIN — Medication 2 MG: at 10:22

## 2018-10-30 RX ADMIN — LISINOPRIL 10 MG: 10 TABLET ORAL at 13:58

## 2018-10-30 RX ADMIN — CLINDAMYCIN PHOSPHATE 900 MG: 900 INJECTION, SOLUTION INTRAVENOUS at 15:45

## 2018-10-30 RX ADMIN — ACETAMINOPHEN 975 MG: 325 TABLET, FILM COATED ORAL at 22:37

## 2018-10-30 RX ADMIN — METOPROLOL TARTRATE 1 MG: 5 INJECTION INTRAVENOUS at 07:56

## 2018-10-30 RX ADMIN — PHENYLEPHRINE HYDROCHLORIDE 200 MCG: 10 INJECTION, SOLUTION INTRAMUSCULAR; INTRAVENOUS; SUBCUTANEOUS at 07:55

## 2018-10-30 RX ADMIN — SODIUM CHLORIDE, POTASSIUM CHLORIDE, SODIUM LACTATE AND CALCIUM CHLORIDE: 600; 310; 30; 20 INJECTION, SOLUTION INTRAVENOUS at 07:09

## 2018-10-30 RX ADMIN — HYDROMORPHONE HYDROCHLORIDE 1 MG: 1 INJECTION, SOLUTION INTRAMUSCULAR; INTRAVENOUS; SUBCUTANEOUS at 08:11

## 2018-10-30 RX ADMIN — LIDOCAINE HYDROCHLORIDE 50 MG: 10 INJECTION, SOLUTION INFILTRATION; PERINEURAL at 07:38

## 2018-10-30 RX ADMIN — PROPOFOL 200 MG: 10 INJECTION, EMULSION INTRAVENOUS at 07:38

## 2018-10-30 ASSESSMENT — LIFESTYLE VARIABLES: TOBACCO_USE: 1

## 2018-10-30 ASSESSMENT — ENCOUNTER SYMPTOMS
DYSRHYTHMIAS: 0
STRIDOR: 0
SEIZURES: 0

## 2018-10-30 ASSESSMENT — COPD QUESTIONNAIRES: COPD: 0

## 2018-10-30 NOTE — ANESTHESIA PREPROCEDURE EVALUATION
PAC NOTE:       ANESTHESIA PRE EVALUATION:  Anesthesia Evaluation     . Pt has had prior anesthetic. Type: General    No history of anesthetic complications          ROS/MED HX    ENT/Pulmonary:  - neg pulmonary ROS   (+)DORIAN risk factors snores loudly, hypertension, tobacco use, Past use , . .   (-) asthma, COPD and recent URI   Neurologic:  - neg neurologic ROS    (-) seizures and CVA   Cardiovascular: Comment: Respiratory  arrest after TKA    (+) hypertension----. : . . . :. . Previous cardiac testing date:results:date: results:ECG reviewed date:10/18 results:NSR.  LAE. date: results:         (-) CAD, arrhythmias and valvular problems/murmurs   METS/Exercise Tolerance:     Hematologic: Comments: Lab Test        10/24/18     07/11/18     04/21/18      --          03/22/16      --          09/12/11                       1106          0932          0926           --           1510           --           1202          WBC          4.6          4.7          6.6           --          7.0            < >        7.8           HGB          13.6         12.5         12.8           < >        12.8           < >        15.9*         MCV          110*         114*         114*          --          102*           < >        100           PLT          223          218          198           --          291            < >        265           INR           --           --           --           --          0.96          --          0.90           < > = values in this interval not displayed.                  Lab Test        10/29/18     09/28/18     07/13/18     07/11/18                       1430          1628          1427          0932          NA            --          137          136          138           POTASSIUM    5.0          4.5          5.4*         4.9           CHLORIDE      --          104          104          105           CO2           --          24           23           23            BUN           --           16           27           45*           CR           1.35*        1.23*        1.64*        2.09*         ANIONGAP      --          9            9            10            YANA           --          8.8          8.8          8.6           GLC           --          87           105*         93           - neg hematologic  ROS       Musculoskeletal:   (+) arthritis, , , other musculoskeletal- LBP      GI/Hepatic:     (+) GERD Asymptomatic on medication,       Renal/Genitourinary:  - ROS Renal section negative       Endo:  - neg endo ROS    (-) Type I DM, Type II DM, thyroid disease, chronic steroid usage and obesity   Psychiatric:     (+) psychiatric history depression      Infectious Disease:  - neg infectious disease ROS       Malignancy:      - no malignancy   Other:    - neg other ROS                 Physical Exam  Normal systems: cardiovascular, pulmonary and dental    Airway   Mallampati: II  TM distance: >3 FB  Neck ROM: full    Dental     Cardiovascular   Rhythm and rate: regular and normal  (-) no friction rub, no systolic click and no murmur    Pulmonary    breath sounds clear to auscultation(-) no rhonchi, no decreased breath sounds, no wheezes, no rales and no stridor             Anesthesia Plan      History & Physical Review  History and physical reviewed and following examination; no interval change.    ASA Status:  2 .    NPO Status:  > 8 hours    Plan for General, ETT and Periph. Nerve Block for postop pain with Intravenous induction. Maintenance will be Balanced.    PONV prophylaxis:  Ondansetron (or other 5HT-3) and Dexamethasone or Solumedrol       Postoperative Care  Postoperative pain management:  IV analgesics, Multi-modal analgesia and Peripheral nerve block (Single Shot).      Consents  Anesthetic plan, risks, benefits and alternatives discussed with:  Patient or representative, Patient and Spouse..                            .

## 2018-10-30 NOTE — PLAN OF CARE
"Problem: Patient Care Overview  Goal: Plan of Care/Patient Progress Review  Outcome: No Change  Admitted from PACU at 1220 today.  VS /74  Pulse 71  Temp 98  F (36.7  C) (Oral)  Resp 15  Ht 1.6 m (5' 3\")  Wt 76.2 kg (168 lb)  LMP 10/01/2003  SpO2 94%  BMI 29.76 kg/m2  Lung sounds clear  O2 room air, capnography WDL  Bowel sounds hypoactive  Advanced to full liquid diet, advance as tolerating  IVF Maintenance fluids infusing  Dressings L shoulder aquacel CDI  CMS intact  Activity bedrest thus far  Pain patient denies pain   Patient/family centered care spouse at bedside following surgery  Discharge plan continue with post surgical plan of care        "

## 2018-10-30 NOTE — ANESTHESIA CARE TRANSFER NOTE
Patient: Krystyna Peña    Procedure(s):  Left reverse total shoulder arthroplasty    Diagnosis: Left shoulder necrosis, rotator cuff tear  Diagnosis Additional Information: No value filed.    Anesthesia Type:   General, ETT, Periph. Nerve Block for postop pain     Note:  Airway :Face Mask  Patient transferred to:PACU  Comments: [place] Report: Identifed the Patient, Identified the Reponsible Provider, Reviewed the pertinent medical history, Discussed the surgical course, Reviewed Intra-OP anesthesia mangement and issues during anesthesia, Set expectations for post-procedure period and Allowed opportunity for questions and acknowledgement of understanding      Vitals: (Last set prior to Anesthesia Care Transfer)    CRNA VITALS  10/30/2018 1007 - 10/30/2018 1045      10/30/2018             Pulse: 104    SpO2: 100 %    Resp Rate (observed): (!)  6                Electronically Signed By: JOCELYN Greene CRNA  October 30, 2018  10:45 AM

## 2018-10-30 NOTE — BRIEF OP NOTE
[facility] Brief Operative Note    Pre-operative diagnosis: Left shoulder necrosis, rotator cuff tear   Post-operative diagnosis same   Procedure: Procedure(s):  Left reverse total shoulder arthroplasty   Surgeon(s): Surgeon(s) and Role:     * Aaron Christianson MD - Primary     * Berkley Sarkar PA-C - Assisting   Estimated blood loss: 100 mL    Specimens: * No specimens in log *   Findings: See operative dictation    NWB GENET Robins and pendulums  Pain control

## 2018-10-30 NOTE — ANESTHESIA POSTPROCEDURE EVALUATION
Patient: Krystyna Peña    Procedure(s):  Left reverse total shoulder arthroplasty    Diagnosis:Left shoulder necrosis, rotator cuff tear  Diagnosis Additional Information: Left shoulder necrosis, rotator cuff tear    Anesthesia Type:  General, ETT, Periph. Nerve Block for postop pain    Note:  Anesthesia Post Evaluation    Patient location during evaluation: PACU  Patient participation: Able to participate in evaluation but full recovery from regional anesthesia has not yet ocurrred but is anticipated to occur within 48 hours  Level of consciousness: awake  Pain management: adequate  Airway patency: patent  Cardiovascular status: acceptable  Respiratory status: acceptable  Hydration status: acceptable  PONV: controlled     Anesthetic complications: None          Last vitals:  Vitals:    10/30/18 1256 10/30/18 1300 10/30/18 1400   BP: (!) 139/95 147/80 139/74   Pulse:      Resp: 12 19 15   Temp:  98  F (36.7  C)    SpO2: 95% 94%          Electronically Signed By: Prateek Anand MD  October 30, 2018  2:09 PM

## 2018-10-30 NOTE — IP AVS SNAPSHOT
[facility]    [address]    [facility] [address]    Phone:  [phone]    Fax:  [phone]                                       After Visit Summary   10/30/2018    Krystyna Peña    MRN: [id number]           After Visit Summary Signature Page     I have received my discharge instructions, and my questions have been answered. I have discussed any challenges I see with this plan with the nurse or doctor.    ..........................................................................................................................................  Patient/Patient Representative Signature      ..........................................................................................................................................  Patient Representative Print Name and Relationship to Patient    ..................................................               ................................................  Date                                   Time    ..........................................................................................................................................  Reviewed by Signature/Title    ...................................................              ..............................................  Date                                               Time          22EPIC Rev 08/18

## 2018-10-30 NOTE — ANESTHESIA PROCEDURE NOTES
Peripheral nerve/Neuraxial procedure note : Brachial plexus (isb)  Pre-Procedure  Performed by MELISSA BLISS  Referred by BILLIE  Location: pre-op    Procedure Times:10/30/2018 7:16 AM and 10/30/2018 7:27 AM  Pre-Anesthestic Checklist: patient identified, IV checked, site marked, risks and benefits discussed, informed consent, monitors and equipment checked, pre-op evaluation, at physician/surgeon's request and post-op pain management    Timeout  Correct Patient: Yes   Correct Procedure: Yes   Correct Site: Yes   Correct Laterality: Yes   Correct Position: Yes   Site Marked: Yes   .   Procedure Documentation    .    Procedure:  left  Brachial plexus (isb).     Ultrasound used to identify targeted nerve, plexus, or vascular marker and placed a needle adjacent to it., Ultrasound was used to visualize the spread of the anesthetic in close proximity to the above stated nerve.   Patient Prep;mask, sterile gloves, povidone-iodine 7.5% surgical scrub.  Nerve Stim: Initial Level 0.6 mA. Lowest motor response mA..  Needle: insulated Needle Gauge: 22.    Needle Length (Inches) 2  Insertion Method: Single Shot (incrementally).       Assessment/Narrative  Paresthesias: Resolved.  .  The placement was negative for: blood aspirated, painful injection and site bleeding.  Bolus given via..   Secured via.   Complications: none. Comments:  The surgeon has given a verbal order transferring care of this patient to me for the performance of a regional analgesia block for post-op pain control. It is requested of me because I am uniquely trained and qualified to perform this block and the surgeon is neither trained nor qualified to perform this procedure.

## 2018-10-30 NOTE — IP AVS SNAPSHOT
MRN:[id number]                      After Visit Summary   10/30/2018    Krystyna Peña    MRN: [id number]           Thank you!     Thank you for choosing [facility] for your care. Our goal is always to provide you with excellent care. Hearing back from our patients is one way we can continue to improve our services. Please take a few minutes to complete the written survey that you may receive in the mail after you visit. If you would like to speak to someone directly about your visit please contact Patient Relations at [phone]. Thank you!          Patient Information     Date Of Birth          1955        Designated Caregiver       Most Recent Value    Caregiver    Will someone help with your care after discharge? yes    Name of designated caregiver Buzz    Phone number of caregiver [phone]    Caregiver address [facility]      About your hospital stay     You were admitted on:  October 30, 2018 You last received care in the:  [facility]    You were discharged on:  November 1, 2018        Reason for your hospital stay       Left reverse total shoulder arthroplasty                  Who to Call     For medical emergencies, please call 911.  For non-urgent questions about your medical care, please call your primary care provider or clinic, [phone]  For questions related to your surgery, please call your surgery clinic        Attending Provider     Provider Specialty    Aaron Christianson MD Orthopaedic Surgery       Primary Care Provider Office Phone # Fax #    Viri Savage [phone]      After Care Instructions     Activity       Your activity upon discharge: activity as tolerated, no heavy lifting, pushing, pulling with the implant side for 2 months and no driving while on analgesics            Diet       Follow this diet upon discharge: Orders Placed This Encounter      Advance Diet as Tolerated: Regular Diet Adult            Wound  "care and dressings       Instructions to care for your wound at home: as directed, ice to area for comfort, keep wound clean and dry and none needed.                  Follow-up Appointments     Follow-up and recommended labs and tests        Follow up with Dr. Christianson , at (location with clinic name or city) [facility], within 2 weeks to evaluate after surgery. No follow up labs or test are needed.  Call Nikki for appointment [phone]            Follow-up and recommended labs and tests        Follow up with primary care provider, Viri Saavge, within 7 days to follow up on results.  No follow up labs or test are needed.  Kidney function decreased  Please do not resume Celebrex until seeing Primary Care                  Pending Results     No orders found from 10/28/2018 to 10/31/2018.            Statement of Approval     Ordered          10/31/18 0729  I have reviewed and agree with all the recommendations and orders detailed in this document.  EFFECTIVE NOW     Approved and electronically signed by:  Berkley Sarkar PA-C           10/31/18 0728  I have reviewed and agree with all the recommendations and orders detailed in this document.     Approved and electronically signed by:  Berkley Sarkar PA-C             Admission Information     Date & Time Provider Department Dept. Phone    10/30/2018 Aaron Christianson MD [facility] [phone]      Your Vitals Were     Blood Pressure Pulse Temperature Respirations Height Weight    97/54 73 98.1  F (36.7  C) (Oral) 16 1.6 m (5' 3\") 76.2 kg (168 lb)    Last Period Pulse Oximetry BMI (Body Mass Index)             10/01/2003 99% 29.76 kg/m2         Care EveryWhere ID     This is your Care EveryWhere ID. This could be used by other organizations to access your [facility] medical records  [id number]        Equal Access to Services     LEW HANSON AH: samra Patel, jhoana osuna " nery martinezsulemanskyla darlin sandrash la'aan ah. So [facility] [phone].    ATENCIÓN: Si jacyla oni, tiene a arguelles disposición servicios gratuitos de asistencia lingüística. Juan al [phone].    We comply with applicable federal civil rights laws and [place] laws. We do not discriminate on the basis of race, color, national origin, age, disability, sex, sexual orientation, or gender identity.               Review of your medicines      START taking        Dose / Directions    acetaminophen 325 MG tablet   Commonly known as:  TYLENOL        Dose:  650 mg   Start taking on:  11/2/2018   Take 2 tablets (650 mg) by mouth every 4 hours as needed for pain   Quantity:  100 tablet   Refills:  0       HYDROmorphone 2 MG tablet   Commonly known as:  DILAUDID        Dose:  2 mg   Take 1 tablet (2 mg) by mouth every 4 hours as needed for moderate to severe pain   Quantity:  30 tablet   Refills:  0       senna-docusate 8.6-50 MG per tablet   Commonly known as:  SENOKOT-S;PERICOLACE        Dose:  2 tablet   Take 2 tablets by mouth 2 times daily   Quantity:  100 tablet   Refills:  0         CONTINUE these medicines which have NOT CHANGED        Dose / Directions    ASPIRIN PO        Dose:  81 mg   Take 81 mg by mouth daily   Refills:  0       cyclobenzaprine 5 MG tablet   Commonly known as:  FLEXERIL   Used for:  Muscle spasm        Dose:  5 mg   Take 1 tablet (5 mg) by mouth 3 times daily as needed for muscle spasms   Quantity:  60 tablet   Refills:  1       furosemide 20 MG tablet   Commonly known as:  LASIX   Used for:  Edema, unspecified type        Dose:  20 mg   Take 1 tablet (20 mg) by mouth daily   Quantity:  30 tablet   Refills:  1       lisinopril 10 MG tablet   Commonly known as:  PRINIVIL/ZESTRIL   Used for:  Benign essential hypertension        TAKE 2 TABLETS(20 MG) BY MOUTH DAILY   Quantity:  180 tablet   Refills:  1       omeprazole 20 MG CR capsule   Commonly known as:  priLOSEC        Dose:  20 mg   Take 1 capsule (20  mg) by mouth daily   Refills:  0         STOP taking     celecoxib 200 MG capsule   Commonly known as:  celeBREX           HYDROcodone-acetaminophen 5-325 MG per tablet   Commonly known as:  NORCO           OXYCODONE HCL PO           traMADol 50 MG tablet   Commonly known as:  ULTRAM                Where to get your medicines      Some of these will need a paper prescription and others can be bought over the counter. Ask your nurse if you have questions.     Bring a paper prescription for each of these medications     acetaminophen 325 MG tablet    HYDROmorphone 2 MG tablet    senna-docusate 8.6-50 MG per tablet                Protect others around you: Learn how to safely use, store and throw away your medicines at www.disposemymeds.org.        Information about OPIOIDS     PRESCRIPTION OPIOIDS: WHAT YOU NEED TO KNOW   We gave you an opioid (narcotic) pain medicine. It is important to manage your pain, but opioids are not always the best choice. You should first try all the other options your care team gave you. Take this medicine for as short a time (and as few doses) as possible.    Some activities can increase your pain, such as bandage changes or therapy sessions. It may help to take your pain medicine 30 to 60 minutes before these activities. Reduce your stress by getting enough sleep, working on hobbies you enjoy and practicing relaxation or meditation. Talk to your care team about ways to manage your pain beyond prescription opioids.    These medicines have risks:    DO NOT drive when on new or higher doses of pain medicine. These medicines can affect your alertness and reaction times, and you could be arrested for driving under the influence (DUI). If you need to use opioids long-term, talk to your care team about driving.    DO NOT operate heavy machinery    DO NOT do any other dangerous activities while taking these medicines.    DO NOT drink any alcohol while taking these medicines.     If the opioid  prescribed includes acetaminophen, DO NOT take with any other medicines that contain acetaminophen. Read all labels carefully. Look for the word  acetaminophen  or  Tylenol.  Ask your pharmacist if you have questions or are unsure.    You can get addicted to pain medicines, especially if you have a history of addiction (chemical, alcohol or substance dependence). Talk to your care team about ways to reduce this risk.    All opioids tend to cause constipation. Drink plenty of water and eat foods that have a lot of fiber, such as fruits, vegetables, prune juice, apple juice and high-fiber cereal. Take a laxative (Miralax, milk of magnesia, Colace, Senna) if you don t move your bowels at least every other day. Other side effects include upset stomach, sleepiness, dizziness, throwing up, tolerance (needing more of the medicine to have the same effect), physical dependence and slowed breathing.    Store your pills in a secure place, locked if possible. We will not replace any lost or stolen medicine. If you don t finish your medicine, please throw away (dispose) as directed by your pharmacist. The [place] Pollution Control Agency has more information about safe disposal: https://www.pca.[facility].[place].us/living-green/managing-unwanted-medications             Medication List: This is a list of all your medications and when to take them. Check marks below indicate your daily home schedule. Keep this list as a reference.      Medications           Morning Afternoon Evening Bedtime As Needed    acetaminophen 325 MG tablet   Commonly known as:  TYLENOL   Take 2 tablets (650 mg) by mouth every 4 hours as needed for pain   Start taking on:  11/2/2018   Last time this was given:  975 mg on 11/1/2018  2:55 PM                                ASPIRIN PO   Take 81 mg by mouth daily   Last time this was given:  81 mg on 11/1/2018  7:27 AM                                cyclobenzaprine 5 MG tablet   Commonly known as:  FLEXERIL   Take 1  tablet (5 mg) by mouth 3 times daily as needed for muscle spasms   Last time this was given:  5 mg on 10/31/2018  9:43 AM                                furosemide 20 MG tablet   Commonly known as:  LASIX   Take 1 tablet (20 mg) by mouth daily   Last time this was given:  20 mg on 11/1/2018  7:28 AM                                HYDROmorphone 2 MG tablet   Commonly known as:  DILAUDID   Take 1 tablet (2 mg) by mouth every 4 hours as needed for moderate to severe pain   Last time this was given:  4 mg on 11/1/2018  1:17 PM                                lisinopril 10 MG tablet   Commonly known as:  PRINIVIL/ZESTRIL   TAKE 2 TABLETS(20 MG) BY MOUTH DAILY   Last time this was given:  10 mg on 11/1/2018  7:27 AM                                omeprazole 20 MG CR capsule   Commonly known as:  priLOSEC   Take 1 capsule (20 mg) by mouth daily   Last time this was given:  20 mg on 11/1/2018  7:27 AM                                senna-docusate 8.6-50 MG per tablet   Commonly known as:  SENOKOT-S;PERICOLACE   Take 2 tablets by mouth 2 times daily   Last time this was given:  2 tablets on 11/1/2018  7:28 AM

## 2018-10-31 ENCOUNTER — APPOINTMENT (OUTPATIENT)
Dept: OCCUPATIONAL THERAPY | Facility: CLINIC | Age: 63
End: 2018-10-31
Attending: ORTHOPAEDIC SURGERY
Payer: COMMERCIAL

## 2018-10-31 LAB
CREAT SERPL-MCNC: 1.78 MG/DL (ref 0.52–1.04)
GFR SERPL CREATININE-BSD FRML MDRD: 29 ML/MIN/1.7M2
GLUCOSE BLDC GLUCOMTR-MCNC: 90 MG/DL (ref 70–99)
GLUCOSE SERPL-MCNC: 105 MG/DL (ref 70–99)
HGB BLD-MCNC: 10.1 G/DL (ref 11.7–15.7)
PLATELET # BLD AUTO: 189 10E9/L (ref 150–450)

## 2018-10-31 PROCEDURE — 97110 THERAPEUTIC EXERCISES: CPT | Mod: GO | Performed by: REHABILITATION PRACTITIONER

## 2018-10-31 PROCEDURE — 25000132 ZZH RX MED GY IP 250 OP 250 PS 637: Performed by: ORTHOPAEDIC SURGERY

## 2018-10-31 PROCEDURE — 85018 HEMOGLOBIN: CPT | Performed by: ORTHOPAEDIC SURGERY

## 2018-10-31 PROCEDURE — 40000133 ZZH STATISTIC OT WARD VISIT: Performed by: REHABILITATION PRACTITIONER

## 2018-10-31 PROCEDURE — 82947 ASSAY GLUCOSE BLOOD QUANT: CPT | Performed by: ORTHOPAEDIC SURGERY

## 2018-10-31 PROCEDURE — 25000132 ZZH RX MED GY IP 250 OP 250 PS 637: Performed by: PHYSICIAN ASSISTANT

## 2018-10-31 PROCEDURE — 97530 THERAPEUTIC ACTIVITIES: CPT | Mod: GO | Performed by: REHABILITATION PRACTITIONER

## 2018-10-31 PROCEDURE — 96365 THER/PROPH/DIAG IV INF INIT: CPT

## 2018-10-31 PROCEDURE — 25000128 H RX IP 250 OP 636: Performed by: ORTHOPAEDIC SURGERY

## 2018-10-31 PROCEDURE — 12000011 ZZH R&B MS OVERFLOW

## 2018-10-31 PROCEDURE — 97535 SELF CARE MNGMENT TRAINING: CPT | Mod: GO | Performed by: REHABILITATION PRACTITIONER

## 2018-10-31 PROCEDURE — 97165 OT EVAL LOW COMPLEX 30 MIN: CPT | Mod: GO | Performed by: REHABILITATION PRACTITIONER

## 2018-10-31 PROCEDURE — 85049 AUTOMATED PLATELET COUNT: CPT | Performed by: ORTHOPAEDIC SURGERY

## 2018-10-31 PROCEDURE — 25000128 H RX IP 250 OP 636: Performed by: PHYSICIAN ASSISTANT

## 2018-10-31 PROCEDURE — 25000125 ZZHC RX 250: Performed by: ORTHOPAEDIC SURGERY

## 2018-10-31 PROCEDURE — 36415 COLL VENOUS BLD VENIPUNCTURE: CPT | Performed by: ORTHOPAEDIC SURGERY

## 2018-10-31 PROCEDURE — 82565 ASSAY OF CREATININE: CPT | Performed by: ORTHOPAEDIC SURGERY

## 2018-10-31 PROCEDURE — 00000146 ZZHCL STATISTIC GLUCOSE BY METER IP

## 2018-10-31 RX ORDER — HYDROMORPHONE HYDROCHLORIDE 2 MG/1
2 TABLET ORAL
Status: DISCONTINUED | OUTPATIENT
Start: 2018-10-31 | End: 2018-10-31

## 2018-10-31 RX ORDER — AMOXICILLIN 250 MG
2 CAPSULE ORAL 2 TIMES DAILY
Qty: 100 TABLET | Refills: 0 | Status: SHIPPED | OUTPATIENT
Start: 2018-10-31 | End: 2019-01-04

## 2018-10-31 RX ORDER — ACETAMINOPHEN 325 MG/1
650 TABLET ORAL EVERY 4 HOURS PRN
Qty: 100 TABLET | Refills: 0 | Status: SHIPPED | OUTPATIENT
Start: 2018-11-02 | End: 2018-11-01

## 2018-10-31 RX ORDER — OXYCODONE HYDROCHLORIDE 5 MG/1
5-10 TABLET ORAL EVERY 4 HOURS PRN
Qty: 30 TABLET | Refills: 0 | Status: SHIPPED | OUTPATIENT
Start: 2018-10-31 | End: 2018-11-01

## 2018-10-31 RX ORDER — HYDROMORPHONE HYDROCHLORIDE 2 MG/1
2-4 TABLET ORAL
Status: DISCONTINUED | OUTPATIENT
Start: 2018-10-31 | End: 2018-11-01 | Stop reason: HOSPADM

## 2018-10-31 RX ADMIN — Medication 0.5 MG: at 13:29

## 2018-10-31 RX ADMIN — HYDROMORPHONE HYDROCHLORIDE 2 MG: 2 TABLET ORAL at 11:13

## 2018-10-31 RX ADMIN — Medication 0.3 MG: at 07:26

## 2018-10-31 RX ADMIN — ACETAMINOPHEN 975 MG: 325 TABLET, FILM COATED ORAL at 13:29

## 2018-10-31 RX ADMIN — HYDROMORPHONE HYDROCHLORIDE 4 MG: 2 TABLET ORAL at 23:46

## 2018-10-31 RX ADMIN — FUROSEMIDE 20 MG: 20 TABLET ORAL at 09:24

## 2018-10-31 RX ADMIN — SODIUM CHLORIDE: 9 INJECTION, SOLUTION INTRAVENOUS at 20:40

## 2018-10-31 RX ADMIN — ENOXAPARIN SODIUM 40 MG: 40 INJECTION SUBCUTANEOUS at 10:48

## 2018-10-31 RX ADMIN — HYDROMORPHONE HYDROCHLORIDE 2 MG: 2 TABLET ORAL at 08:44

## 2018-10-31 RX ADMIN — ACETAMINOPHEN 975 MG: 325 TABLET, FILM COATED ORAL at 22:09

## 2018-10-31 RX ADMIN — SENNOSIDES AND DOCUSATE SODIUM 1 TABLET: 8.6; 5 TABLET ORAL at 09:24

## 2018-10-31 RX ADMIN — SODIUM CHLORIDE 500 ML: 9 INJECTION, SOLUTION INTRAVENOUS at 18:13

## 2018-10-31 RX ADMIN — HYDROMORPHONE HYDROCHLORIDE 4 MG: 2 TABLET ORAL at 20:39

## 2018-10-31 RX ADMIN — ACETAMINOPHEN 975 MG: 325 TABLET, FILM COATED ORAL at 05:44

## 2018-10-31 RX ADMIN — LISINOPRIL 10 MG: 10 TABLET ORAL at 09:25

## 2018-10-31 RX ADMIN — HYDROMORPHONE HYDROCHLORIDE 4 MG: 2 TABLET ORAL at 17:37

## 2018-10-31 RX ADMIN — HYDROMORPHONE HYDROCHLORIDE 4 MG: 2 TABLET ORAL at 14:37

## 2018-10-31 RX ADMIN — SENNOSIDES AND DOCUSATE SODIUM 2 TABLET: 8.6; 5 TABLET ORAL at 20:39

## 2018-10-31 RX ADMIN — OMEPRAZOLE 20 MG: 20 CAPSULE, DELAYED RELEASE ORAL at 09:25

## 2018-10-31 RX ADMIN — CLINDAMYCIN PHOSPHATE 900 MG: 900 INJECTION, SOLUTION INTRAVENOUS at 00:21

## 2018-10-31 RX ADMIN — ASPIRIN 81 MG: 81 TABLET, COATED ORAL at 09:25

## 2018-10-31 RX ADMIN — CYCLOBENZAPRINE HYDROCHLORIDE 5 MG: 5 TABLET, FILM COATED ORAL at 09:43

## 2018-10-31 ASSESSMENT — ACTIVITIES OF DAILY LIVING (ADL): IADL_COMMENTS: SPOUSE TO A AS NEEDED

## 2018-10-31 NOTE — PLAN OF CARE
Problem: Patient Care Overview  Goal: Plan of Care/Patient Progress Review  6535-2613: Pt resting comfortably, slept on and off during the night. VSS. A&O. on scheduled Tylenol. Pt reported minimal pain, pt offered oxycodone, and pt reports  that is the pill that made me pass out  pt felt very anxious and unsafe taking the oxycodone, and wanted to wait until the scheduled Tylenol was available- with IV dilaudid available if needed. Dressing CDI, DMS intact, states there is still mild tingling, but seems to be improving. Pt transfers with Ax1 to the BR, AUO.  On cleocin. Will continue to monitor.

## 2018-10-31 NOTE — PLAN OF CARE
Problem: Patient Care Overview  Goal: Plan of Care/Patient Progress Review  OT- eval completed and treatment initiated. Patient is POD #1 for L reverse TSA. Patient lives with spouse in split level home and reports her spouse works FT and does not plan to take time off of work, therefore patient will have limited support during the day.     Discharge Planner OT   Patient plan for discharge: home   Current status:  Educated in shoulder precautions, initiated some education of exercises following shoulder surgery and sling management, however patient familiar with it from previous RTC surgery, TSA handouts; advancement of activity following surgery, ADL management, driving readiness and pain management techniques, patient verbalized understanding and was engaged throughout instruction. SBA for bed mobility with HOB elevated to reclined position. After a period of sitting EOB for dizziness to resolve, patient was CGA with hand hold A for sit<>stand, ambulate to bathroom, complete toilet transfer and toileting skills on elevated toilet, complete pericares and hand hygiene sinkside. Patient agreed to sit in chair, order lunch and eat sitting up as able. BP was 135/72 after activity. Pain remained 10/10 at rest and with exercise. Pain meds provided during session.  Barriers to return to prior living situation: will be home alone during the day, pain, stairs  Recommendations for discharge: home with A with IADL's from spouse as needed  Rationale for recommendations: anticipate patient will meet needed goals for safe discharge home, will continue with OT for ADL's, functional mobility, exercises and AE needs.        Entered by: Concepcion Sung 10/31/2018 9:10 AM

## 2018-10-31 NOTE — PROGRESS NOTES
10/31/18 0826   Quick Adds   Type of Visit Initial Occupational Therapy Evaluation   Living Environment   Lives With spouse   Living Arrangements house   Home Accessibility stairs within home;tub/shower is not walk in   Number of Stairs Within Home (3 steps to kitchen, 12 steps to upper level)   Stair Railings at Home (railing on L going up stairs)   Transportation Available car;family or friend will provide   Living Environment Comment Patient is tub/shower combination, elevated toilet on main level and standard height toilet on upper level. Patient plans to stay on main level and sleep in recliner.   Self-Care   Dominant Hand right   Usual Activity Tolerance good   Current Activity Tolerance fair   Regular Exercise no   Activity/Exercise/Self-Care Comment Patient reports her spouse works and help once home is limited   Functional Level Prior   Ambulation 0-->independent   Transferring 0-->independent   Toileting 0-->independent   Bathing 0-->independent   Dressing 0-->independent   Eating 0-->independent   Communication 0-->understands/communicates without difficulty   Swallowing 0-->swallows foods/liquids without difficulty   Cognition 0 - no cognition issues reported   General Information   Onset of Illness/Injury or Date of Surgery - Date 10/30/18   Referring Physician Dr. Christianson   Patient/Family Goals Statement to return home   Additional Occupational Profile Info/Pertinent History of Current Problem Patient is POD #1 for L reverse TSA   Precautions/Limitations (sline donned, shoulder precautions)   Weight-Bearing Status - LUE nonweight-bearing   Cognitive Status Examination   Orientation orientation to person, place and time   Level of Consciousness alert   Able to Follow Commands WNL/WFL   Personal Safety (Cognitive) WNL/WFL   Visual Perception   Visual Perception Wears glasses   Sensory Examination   Sensory Quick Adds No deficits were identified   Pain Assessment   Patient Currently in Pain Yes, see Vital  Sign flowsheet  (rating pain 10/10, block as worn off)   Integumentary/Edema   Integumentary/Edema Comments mild edema in L hand   Posture   Posture not impaired   Range of Motion (ROM)   ROM Comment R WFL's   Strength   Strength Comments R WFL's   Hand Strength   Hand Strength Comments able to  with L, however weakneded grasp   Muscle Tone Assessment   Muscle Tone Quick Adds No deficits were identified   Coordination   Upper Extremity Coordination No deficits were identified   Mobility   Bed Mobility Comments SBA with sit to stand, HOB elevated in recliner position   Transfer Skill: Bed to Chair/Chair to Bed   Level of [place]: Bed to Chair (treatment initiated-defer to OT daily note for details)   Transfer Skill: Sit to Stand   Level of [place]: Sit/Stand contact guard   Physical Assist/Nonphysical Assist: Sit/Stand supervision;verbal cues;1 person assist   Assistive Device for Transfer: Sit/Stand other (see comments)  (hand hold A)   Toilet Transfer   Toilet Transfer Comments treatment initiated-defer to OT daily note for details   Balance   Balance Comments decreased balance noted, LOB not observed   Toileting   Level of [place]: Toilet (treatment initiated-defer to OT daily note for details)   Grooming   Level of [place]: Grooming (treatment initiated-defer to OT daily note for details)   Eating/Self Feeding   Level of [place]: Eating independent   Instrumental Activities of Daily Living (IADL)   IADL Comments spouse to A as needed   Activities of Daily Living Analysis   Impairments Contributing to Impaired Activities of Daily Living balance impaired;pain;post surgical precautions;ROM decreased;strength decreased   General Therapy Interventions   Planned Therapy Interventions ADL retraining;progressive activity/exercise;transfer training;home program guidelines;ROM   Clinical Impression   Criteria for Skilled Therapeutic Interventions Met yes, treatment indicated   OT Diagnosis  "decreased ADL's   Influenced by the following impairments balance impaired;pain;post surgical precautions;ROM decreased;strength decreased   Assessment of Occupational Performance 5 or more Performance Deficits   Identified Performance Deficits decreased ADL's and IADL's- dsg, toileting, bathing, functional and community mobility, household chores, driving, errands   Clinical Decision Making (Complexity) Low complexity   Therapy Frequency 2 times/day   Predicted Duration of Therapy Intervention (days/wks) 2 days   Anticipated Discharge Disposition Home   Risks and Benefits of Treatment have been explained. Yes   Patient, Family & other staff in agreement with plan of care Yes   [facility] TM \"6 Clicks\"   2016, Trustees of [facility], under license to [employer].  All rights reserved.   6 Clicks Short Forms Daily Activity Inpatient Short Form   [facility]  \"6 Clicks\" Daily Activity Inpatient Short Form   1. Putting on and taking off regular lower body clothing? 3 - A Little   2. Bathing (including washing, rinsing, drying)? 3 - A Little   3. Toileting, which includes using toilet, bedpan or urinal? 3 - A Little   4. Putting on and taking off regular upper body clothing? 3 - A Little   5. Taking care of personal grooming such as brushing teeth? 3 - A Little   6. Eating meals? 4 - None   Daily Activity Raw Score (Score out of 24.Lower scores equate to lower levels of function) 19   Total Evaluation Time   Total Evaluation Time (Minutes) 10     "

## 2018-10-31 NOTE — PLAN OF CARE
"Problem: Patient Care Overview  Goal: Plan of Care/Patient Progress Review  Outcome: No Change  Pt was assisted x2, the first time out of bed ambulating to bathroom.  Pt stated she felt \"wobbly.\"  Pt used bathroom, then upon getting back to bed she asked if there was a cafeteria over there (by the window), because she could see people eating.   Discussed with patient that it could be because of anesthesia.  RN will continue to monitor.  No further complaints were voiced.  Voided 175cc since 1600.  Ate 100% dinner.      "

## 2018-10-31 NOTE — PLAN OF CARE
Problem: Patient Care Overview  Goal: Plan of Care/Patient Progress Review  Outcome: Improving  Vital Signs: Afebrile. RA.   Pain/Comfort: Cold application. Short-term pain relief with oral Dilaudid; plan to increase dose per PA. IV Dilaudid times two for breakthrough pain.   Assessment: Trace edema in L hand. Sling intact.   Diet: Regular.   Output: Intact.   Activity/Ambulation: Up in chair for meals. Ambulated in hallway with SBA times one.   Social: Spouse present briefly.   Plan: Pain control. PT consulting. Will continue to monitor and provide for needs.

## 2018-10-31 NOTE — PROGRESS NOTES
Orthopedic Surgery  Krystyna Peña  10/31/2018  Admit Date:  10/30/2018  POD # 1  S/P Left reverse total shoulder arthroplasty     Patient uncomfortable in bed.   Pain is not controlled. Patient states the last time she took Oxycodone with her rotator cuff repair she had a respiratory arrest. Patient was hesitant due to this and refused Oxycodone throughout the night and subsequently complains of increasing left arm pain.   Tolerating oral intake.    Denies nausea or vomiting.  Denies chest pain or shortness of breath.  No events overnight.      Alert and orient to person, place, and time.  Vital Sign Ranges  Temperature Temp  Av.9  F (36.6  C)  Min: 97.3  F (36.3  C)  Max: 98.1  F (36.7  C)   Blood pressure Systolic (24hrs), Av , Min:112 , Max:135        Diastolic (24hrs), Av, Min:59, Max:72      Pulse Pulse  Av  Min: 63  Max: 63   Respirations Resp  Avg: 15  Min: 12  Max: 18   Pulse oximetry SpO2  Av %  Min: 96 %  Max: 98 %       Ultra sling in place  Sensation intact in upper arm over biceps as well as in hand r/m/u nerve distribution  Able to abduct and oppose left thumb  +Radial pulse  +cap refill      Labs:  Recent Labs   Lab Test  10/29/18   1430  18   1628  18   1427   POTASSIUM  5.0  4.5  5.4*     Recent Labs   Lab Test  10/31/18   0659  10/24/18   1106  18   0932   HGB  10.1*  13.6  12.5     Recent Labs   Lab Test  16   1510  11   1202   INR  0.96  0.90     Recent Labs   Lab Test  10/31/18   0659  10/24/18   1106  18   0932   PLT  189  223  218       A/P  1. Plan   Continue ASA & SCDs for DVT prophylaxis.     NWB to GENET.    Codman's and pendulum swing exercises   Discontinued Oxycodone. Changed to oral Dilaudid w/ IV Dilaudid as breakthrough.    No Toradol secondary to patient's creatinine elevated to 1.78; likely pre-renal d/t dehydration.    Will give fluids and repeat creatinine in the am.     2. Disposition   Anticipate d/c to home, likely  tomorrow.     Berkley Sarkar PA-C

## 2018-11-01 ENCOUNTER — APPOINTMENT (OUTPATIENT)
Dept: OCCUPATIONAL THERAPY | Facility: CLINIC | Age: 63
End: 2018-11-01
Attending: ORTHOPAEDIC SURGERY
Payer: COMMERCIAL

## 2018-11-01 VITALS
OXYGEN SATURATION: 99 % | DIASTOLIC BLOOD PRESSURE: 54 MMHG | HEIGHT: 63 IN | SYSTOLIC BLOOD PRESSURE: 97 MMHG | RESPIRATION RATE: 16 BRPM | WEIGHT: 168 LBS | BODY MASS INDEX: 29.77 KG/M2 | TEMPERATURE: 98.1 F | HEART RATE: 73 BPM

## 2018-11-01 LAB
CREAT SERPL-MCNC: 1.71 MG/DL (ref 0.52–1.04)
GFR SERPL CREATININE-BSD FRML MDRD: 30 ML/MIN/1.7M2
GLUCOSE SERPL-MCNC: 99 MG/DL (ref 70–99)
HGB BLD-MCNC: 10.7 G/DL (ref 11.7–15.7)

## 2018-11-01 PROCEDURE — 97530 THERAPEUTIC ACTIVITIES: CPT | Mod: GO

## 2018-11-01 PROCEDURE — 97535 SELF CARE MNGMENT TRAINING: CPT | Mod: GO

## 2018-11-01 PROCEDURE — 82565 ASSAY OF CREATININE: CPT | Performed by: ORTHOPAEDIC SURGERY

## 2018-11-01 PROCEDURE — 25000128 H RX IP 250 OP 636: Performed by: ORTHOPAEDIC SURGERY

## 2018-11-01 PROCEDURE — 97110 THERAPEUTIC EXERCISES: CPT | Mod: GO

## 2018-11-01 PROCEDURE — 40000133 ZZH STATISTIC OT WARD VISIT

## 2018-11-01 PROCEDURE — 82947 ASSAY GLUCOSE BLOOD QUANT: CPT | Performed by: ORTHOPAEDIC SURGERY

## 2018-11-01 PROCEDURE — 85018 HEMOGLOBIN: CPT | Performed by: ORTHOPAEDIC SURGERY

## 2018-11-01 PROCEDURE — 36415 COLL VENOUS BLD VENIPUNCTURE: CPT | Performed by: ORTHOPAEDIC SURGERY

## 2018-11-01 PROCEDURE — 25000132 ZZH RX MED GY IP 250 OP 250 PS 637: Performed by: ORTHOPAEDIC SURGERY

## 2018-11-01 PROCEDURE — 25000132 ZZH RX MED GY IP 250 OP 250 PS 637: Performed by: PHYSICIAN ASSISTANT

## 2018-11-01 RX ORDER — POLYETHYLENE GLYCOL 3350 17 G/17G
17 POWDER, FOR SOLUTION ORAL DAILY
Status: DISCONTINUED | OUTPATIENT
Start: 2018-11-01 | End: 2018-11-01 | Stop reason: HOSPADM

## 2018-11-01 RX ORDER — MAGNESIUM CARB/ALUMINUM HYDROX 105-160MG
30 TABLET,CHEWABLE ORAL DAILY PRN
Status: DISCONTINUED | OUTPATIENT
Start: 2018-11-01 | End: 2018-11-01 | Stop reason: HOSPADM

## 2018-11-01 RX ORDER — HYDROMORPHONE HYDROCHLORIDE 2 MG/1
2 TABLET ORAL EVERY 4 HOURS PRN
Qty: 30 TABLET | Refills: 0 | Status: SHIPPED | OUTPATIENT
Start: 2018-11-01 | End: 2019-01-02

## 2018-11-01 RX ORDER — ACETAMINOPHEN 325 MG/1
650 TABLET ORAL EVERY 4 HOURS PRN
Qty: 100 TABLET | Refills: 0 | Status: SHIPPED | OUTPATIENT
Start: 2018-11-02 | End: 2019-01-04

## 2018-11-01 RX ADMIN — ASPIRIN 81 MG: 81 TABLET, COATED ORAL at 07:27

## 2018-11-01 RX ADMIN — HYDROMORPHONE HYDROCHLORIDE 4 MG: 2 TABLET ORAL at 05:44

## 2018-11-01 RX ADMIN — POLYETHYLENE GLYCOL 3350 17 G: 17 POWDER, FOR SOLUTION ORAL at 09:15

## 2018-11-01 RX ADMIN — ACETAMINOPHEN 975 MG: 325 TABLET, FILM COATED ORAL at 14:55

## 2018-11-01 RX ADMIN — ENOXAPARIN SODIUM 40 MG: 40 INJECTION SUBCUTANEOUS at 09:15

## 2018-11-01 RX ADMIN — HYDROMORPHONE HYDROCHLORIDE 4 MG: 2 TABLET ORAL at 09:15

## 2018-11-01 RX ADMIN — HYDROMORPHONE HYDROCHLORIDE 4 MG: 2 TABLET ORAL at 02:53

## 2018-11-01 RX ADMIN — OMEPRAZOLE 20 MG: 20 CAPSULE, DELAYED RELEASE ORAL at 07:27

## 2018-11-01 RX ADMIN — HYDROMORPHONE HYDROCHLORIDE 4 MG: 2 TABLET ORAL at 13:17

## 2018-11-01 RX ADMIN — FUROSEMIDE 20 MG: 20 TABLET ORAL at 07:28

## 2018-11-01 RX ADMIN — SENNOSIDES AND DOCUSATE SODIUM 2 TABLET: 8.6; 5 TABLET ORAL at 07:28

## 2018-11-01 RX ADMIN — ACETAMINOPHEN 975 MG: 325 TABLET, FILM COATED ORAL at 05:44

## 2018-11-01 RX ADMIN — LISINOPRIL 10 MG: 10 TABLET ORAL at 07:27

## 2018-11-01 NOTE — PLAN OF CARE
Problem: Patient Care Overview  Goal: Plan of Care/Patient Progress Review  Outcome: Adequate for Discharge Date Met: 11/01/18  AVS printed and reviewed with patient and . Follow up appointment scheduled with surgeon. Patient will schedule follow up with primary care provider. Discharge medications given to patient and reviewed how and when to appropriately administer. No further questions or concerns.

## 2018-11-01 NOTE — PLAN OF CARE
Problem: Patient Care Overview  Goal: Plan of Care/Patient Progress Review  Discharge Planner OT   Patient plan for discharge: Home  Current status: Pt seated EOB upon OT arrival, agreeable to session. Reviewed and re-educated on LUE HEP, completed 10 reps x1 set of L elbow/forearm/wrist/digit, no pain reported. Codmans re-demonstrated for technique, pt completed sit > stand to trial attempt, however c/o dizziness. BP 96/60, HR 76, returned to supine, /58, HR 76. Unable to progress mobility this date due to dizziness - RN notified. Ongoing education provided to pt and spouse regarding home safety modifications/recommendations, safe activities post TSA, shoulder precautions, and sling wearing schedule, verbalized understanding.   Barriers to return to prior living situation: Stairs, mobility limited by dizziness  Recommendations for discharge: Home w/ assist for ADLs/IADL's from spouse as needed  Rationale for recommendations: Anticipate patient will meet needed goals for safe discharge home, recommend ongoing OT for ADL's, functional mobility, exercises and AE needs.        Entered by: Lety Lin 11/01/2018 8:53 AM

## 2018-11-01 NOTE — PLAN OF CARE
Problem: Patient Care Overview  Goal: Plan of Care/Patient Progress Review  Discharge Planner OT   Patient plan for discharge: Home  Current status: Pt educated on compensatory technique for UB dressing, min A for sling management, required min A for donning pullover shirt, set up for donning pants. Pt completed LUE HEP codmans in stance 10 reps x1 set, AROM of L elbow/forearm/wrist/digits 10 reps x1set. Pt ambulated in hallway with CGA, initial trial with no assistive device, heavy reliance on wall rail/hand hold support; 2nd trial utilized cane, improved balance noted - pt in agreement to utilize cane in home environment. Pt completed 8 stairs up/down with use of L handrail and CGA for safety, recommend spouse present for initial stair trial in home environment, verbalized understanding. Pt with no further questions/concerns, reports she will be discharging home later this evening.   Barriers to return to prior living situation: Home alone during the day  Recommendations for discharge: Home w/ assist for ADLs/IADL's from spouse as needed  Rationale for recommendations: Anticipate pt will continue to progress to return to indep PLOF. Pt reports she will have her neighbor check in during the day. Recommend use of cane for mobility and CGA on stairs initially, pt in agreement.        Entered by: Lety Lin 11/01/2018 2:19 PM       Occupational Therapy Discharge Summary    Reason for therapy discharge:    Discharged to home.    Progress towards therapy goal(s). See goals on Care Plan in [facility] electronic health record for goal details.  Goals partially met.  Barriers to achieving goals:   discharge from facility.    Therapy recommendation(s):    Continue home exercise program.  Defer further therapy recommendations to surgeon

## 2018-11-01 NOTE — PROGRESS NOTES
Orthopedic Surgery  Krystyna Peña  2018  Admit Date:  10/30/2018  POD # 2  S/P Left RTSA    Patient resting comfortably at edge of bed, working with PT    Pain much better controlled.  Tolerating oral intake.    Denies nausea or vomiting  Denies chest pain or shortness of breath  No events overnight.     Alert and orient to person, place, and time.  Vital Sign Ranges  Temperature Temp  Av.1  F (36.7  C)  Min: 98.1  F (36.7  C)  Max: 98.1  F (36.7  C)   Blood pressure Systolic (24hrs), Av , Min:86 , Max:135        Diastolic (24hrs), Av, Min:52, Max:72      Pulse Pulse  Av  Min: 73  Max: 73   Respirations Resp  Av.5  Min: 16  Max: 18   Pulse oximetry SpO2  Av.3 %  Min: 96 %  Max: 99 %       Dressing CDI  Sling off for codmans  Sensation intact in upper arm over biceps as well as in hand r/m/u nerve distribution  Able to abduct and oppose left thumb  +Radial pulse  +cap refill     Cr mildly improved       Labs:  Recent Labs   Lab Test  10/29/18   1430  18   1628  18   1427   POTASSIUM  5.0  4.5  5.4*     Recent Labs   Lab Test  18   0704  10/31/18   0659  10/24/18   1106   HGB  10.7*  10.1*  13.6     Recent Labs   Lab Test  16   1510  11   1202   INR  0.96  0.90     Recent Labs   Lab Test  10/31/18   0659  10/24/18   1106  18   0932   PLT  189  223  218       A/P  1. Plan   Continue ASA and SCDs for DVT prophylaxis.     Mobilize with PT/OT    Non-WB through left UE.     Continue current pain regiment.   Continue current pain regimen   Continue encouraging oral fluids    2. Disposition   Anticipate d/c to home based on pain control - ok to discharge today.    Maria Antonia Alberts PA-C  [phone]

## 2018-11-01 NOTE — PLAN OF CARE
Problem: Patient Care Overview  Goal: Plan of Care/Patient Progress Review  Outcome: Improving  Afebrile. VSS. BP 86/52, 123/61 on recheck. Pain well managed with PO dilaudid Q3 hours. Left shoulder in sling with pillow, CMS intact, dressing C/D/I. Ice applied. LS clear. Tolerating regular diet. Tolerating ambulation SBA. Continues to be slightly unsteady with ADL's specifically wiping after using the toilet. Appeared to sleep comfortably between cares.

## 2018-11-01 NOTE — PLAN OF CARE
Problem: Patient Care Overview  Goal: Plan of Care/Patient Progress Review  Outcome: Improving  VSS. Pain controled with scheduled tylenol and PRN PO dilaudid q3h. Ice for comfort. Dressing c/d/i. Tolerating regular diet. IVF bolus given, PIV infusing. Voiding adequately. No BM, senna given. Labs to be checked in the AM. Will continue to monitor and provide for needs.

## 2018-11-01 NOTE — PLAN OF CARE
Problem: Patient Care Overview  Goal: Plan of Care/Patient Progress Review  Outcome: Improving  VSS.  Denies pain with current regimen.  Up SBA to bathroom.  Tolerating diet.  CMS intact.  Complained of constipation; had BM following miralax and stool softener.  Working with therapy; states her legs are weak with ambulation.  Ambulated in halls several times.  Anticipate discharge to home tonight.

## 2018-11-02 ENCOUNTER — TELEPHONE (OUTPATIENT)
Dept: INTERNAL MEDICINE | Facility: CLINIC | Age: 63
End: 2018-11-02

## 2018-11-02 NOTE — TELEPHONE ENCOUNTER
IP F/U    Date: 11/01/18  Diagnosis: Left Shoulder Necrosis, Rotator Cuff Tear, S/P Reverse Total Shoulder Arthoplasty, Left  Is patient active in care coordination? No  Was patient in TCU? No

## 2018-11-08 ENCOUNTER — TRANSFERRED RECORDS (OUTPATIENT)
Dept: HEALTH INFORMATION MANAGEMENT | Facility: CLINIC | Age: 63
End: 2018-11-08

## 2018-11-08 NOTE — TELEPHONE ENCOUNTER
Patient's home number message to call back.  Please confirms patient's home/cell number as home number listed appears to be her husbands cell.

## 2018-11-08 NOTE — TELEPHONE ENCOUNTER
"ED/Discharge Protocol    \"Hi, my name is Loyda Hanley, a registered nurse, and I am calling on behalf of Dr. Savage's office at [facility].  I am calling to follow up and see how things are going for you after your recent visit.\"    \"I see that you were in the (ER/UC/IP) on 10-30-18.    How are you doing now that you are home?\" doing well    Is patient experiencing symptoms that may require a hospital visit?  Not at this time.    Discharge Instructions    \"Let's review your discharge instructions.  What is/are the follow-up recommendations?  Pt. Response: f/u with Dr. Savage    \"Were you instructed to make a follow-up appointment?\"  Pt. Response: Yes.  Has appointment been made?   No.  \"Can I help you schedule that appointment?\" Not at this time.      \"When you see the provider, I would recommend that you bring your discharge instructions with you.    Medications    \"How many new medications are you on since your hospitalization/ED visit?\"    0-1  \"How many of your current medicines changed (dose, timing, name, etc.) while you were in the hospital/ED visit?\"   0-1  \"Do you have questions about your medications?\"   No  \"Were you newly diagnosed with heart failure, COPD, diabetes or did you have a heart attack?\"   No  Medication reconciliation completed? Yes    Was MTM referral placed (*Make sure to put transitions as reason for referral)?   No    Call Summary    \"Do you have any questions or concerns about your condition or care plan at the moment?\"    No  Triage nurse advice given: follow discharge instructions and call back to schedule a f/u appointment.    Patient was in ER 2 in the past year (assess appropriateness of ER visits.)      \"If you have questions or things don't continue to improve, we encourage you contact us through the main clinic number,  [phone].  Even if the clinic is not open, triage nurses are available 24/7 to help you.     We would like you to know that our clinic has extended hours (provide " "information).  We also have urgent care (provide details on closest location and hours/contact info)\"      \"Thank you for your time and take care!\"        "

## 2018-11-09 NOTE — OP NOTE
Procedure Date: 10/30/2018      DATE OF PROCEDURE: 10/30/2018.      PREOPERATIVE DIAGNOSIS:  Left shoulder necrosis and rotator cuff tear.      POSTOPERATIVE DIAGNOSIS:  Left shoulder necrosis and rotator cuff tear.      PROCEDURE:  Left reverse total shoulder arthroplasty.      SURGEON:  Aaron Christianson MD.      FIRST ASSISTANT:  Berkley Sarkar PA-C.  A skilled first assistant was necessary for patient positioning, prepping and draping help with retraction and help with arm positioning, as well as closure and patient transfer.      ESTIMATED BLOOD LOSS:  100 mL.      ANESTHESIA:  General and interscalene block.      COMPLICATIONS:  None apparent.      IMPLANTS:   1.  Tornier glenoid baseplate with a full wedge size 25 mm.   2.  Tornier Eccentric glenosphere size 39 mm with a +3 offset.   3.  Tornier polyethylene, size 39 mm with a +6 mm thickness.   4.  Tornier humeral stem size 3B.   5.  Tornier reversed tray with a +0 thickness.      INDICATIONS:  The patient is a 63-year-old female who was referred to me by one of my colleagues, Dr. Meza.  She underwent a rotator cuff repair in April of this year; however, subsequently she redeveloped symptoms, had another MRI which demonstrated a retear of the rotator cuff as well as osteonecrosis of the humeral head.  We had a discussion with the patient about treatment options including continuing nonoperative measures with injections and physical therapy versus a reverse total shoulder arthroplasty.  We discussed the risks and benefits and she has agreed to proceed with a reverse shoulder arthroplasty.      DESCRIPTION OF PROCEDURE:  On the date of the procedure, the patient was met in the preoperative area by the surgeon and anesthesia team.  Her left shoulder was marked by the operative surgeon and informed consent was obtained.  Risks and benefits of the surgery were discussed with the patient including risk of bleeding, infection, damage to neurovascular structures,  risk of stiffness, risk of fracture, risk of need for future surgery for revision as well as risk of anesthesia.  She agreed to proceed.  Our anesthesia colleagues then performed an interscalene block on the left shoulder.        The patient was then brought to the operating room, placed on the operating room table in supine position and general anesthesia was administered.  She was then placed into a beachchair position with her left shoulder hanging off the bed to allow for mobilization.  The left shoulder was then prepped and draped in the usual sterile fashion.  Preoperative antibiotics were given and preoperative timeout was performed.  We began the procedure by making a standard incision in the deltopectoral interval from the coracoid distally.  Sharp dissection was carried out through the skin and subcutaneous tissue.  The cephalic vein was evaluated and mobilized and was protected throughout the case.  The deltopectoral interval was developed.  We then released the subacromial as well as the subdeltoid adhesions.  Appropriate retractors were placed.  The superior 1 cm of the pectoralis major tendon was released and the biceps was tenodesed to the superior aspect of the pectoralis major tendon.  We then performed a biceps tenotomy proximally and followed the biceps along the groove and into the glenohumeral space.  The biceps was then released.  The subscapularis was developed.  The superior edge as well as the inferior edge were noted.  Inferiorly we tied off the 3 sisters and coagulated them to allow for hemostasis.  Next, we tagged the subscapularis, removed the bursa off the subscapularis and performed a subscapularis tenotomy along the anatomic neck.  The humeral head was externally rotated and mobilized and inferior capsule was released all the way to the 7 o'clock position.  Next we developed the subscapularis, both along the anterior superior deep as well as the inferior aspect.  The inferior  glenohumeral ligament and the middle glenohumeral ligaments were released and the subscapularis was tucked away into the subscapularis fossa.  We then placed the appropriate retractors and the anterior labrum, as well as the capsule were removed.  After this, we visualized the humeral head which did have some evidence of osteonecrosis and did have an obvious retear of the supraspinatus that was retracted.  We then performed a freehand cut along the anatomic neck and excess bone was removed using a rongeur.  We then used a starter awl followed by sounders and the impactors and noted that a size 3B stem would be the most appropriate one as it had excellent rotational stability.  A cut protector was then placed and we turned our attention back to the glenoid.  The posterior labrum was removed.  We then used a Tornier patient specific blueprint guide to place our pin into the glenoid.  This was inserted and overdrilled.  We then reamed in the appropriate position to allow for the wedge to be superior and placed our true glenoid baseplate.  This was secured into place using screws as well.  Next, a size 39+3 glenosphere was then impacted into position and screwed into position and we turned our attention back to the proximal humerus.  A low eccentricity tray with a +0 was appropriately placed onto the proximal humeral stem.  The trial was then removed and we prepped the proximal humerus with bone tunnels for subscapularis repair.  We then irrigated the wound copiously, assembled our true implants on the back table and impacted them into position.  We then were able to reduce the shoulder without any issues and we turned our attention to the subscapularis repair.  The patient had excellent range of motion as well as stability of the reverse shoulder.  The subscapularis was repaired over bone tunnels.  The wound was again copiously irrigated.  The deltopectoral interval was loosely approximated using 0 Vicryl followed by  2-0 Vicryl for deep dermals and running 4-0 Monocryl for the skin.  Steri-Strips were applied followed by Aquacel.  The patient was placed into an UltraSling, awakened from anesthesia in stable condition and brought to the PACU for recovery.         ZO WISE MD             D: 2018   T: 2018   MT: FIDENCIO      Name:     JOSHUA REYNOLDS   MRN:      [id number]        Account:        [id number]   :      1955           Procedure Date: 10/30/2018      Document: [id number]

## 2018-11-09 NOTE — DISCHARGE SUMMARY
[facility] Discharge Summary    Krystyna Peña MRN# [id number]   Age: 63 year old YOB: 1955     Date of Admission:  10/30/2018  Date of Discharge::  11/1/2018  3:45 PM  Admitting Physician:  Aaron Christianson MD  Discharge Physician:  Aaron Christianson MD           Admission Diagnoses:   Arthoplasty of the shoulder          Discharge Diagnosis:   Arthoplasty of the shoulder          Procedures:   Procedure(s): REverse shoulder arthroplasty       No other procedures performed during this admission           Medications Prior to Admission:     No prescriptions prior to admission.             Discharge Medications:     Discharge Medication List as of 11/1/2018  3:13 PM      START taking these medications    Details   HYDROmorphone (DILAUDID) 2 MG tablet Take 1 tablet (2 mg) by mouth every 4 hours as needed for moderate to severe pain, Disp-30 tablet, R-0, Local Print      senna-docusate (SENOKOT-S;PERICOLACE) 8.6-50 MG per tablet Take 2 tablets by mouth 2 times daily, Disp-100 tablet, R-0, Local Print         CONTINUE these medications which have CHANGED    Details   acetaminophen (TYLENOL) 325 MG tablet Take 2 tablets (650 mg) by mouth every 4 hours as needed for pain, Disp-100 tablet, R-0, Local Print         CONTINUE these medications which have NOT CHANGED    Details   ASPIRIN PO Take 81 mg by mouth daily, Historical      cyclobenzaprine (FLEXERIL) 5 MG tablet Take 1 tablet (5 mg) by mouth 3 times daily as needed for muscle spasms, Disp-60 tablet, R-1, E-Prescribe      furosemide (LASIX) 20 MG tablet Take 1 tablet (20 mg) by mouth daily, Disp-30 tablet, R-1, E-Prescribe      lisinopril (PRINIVIL/ZESTRIL) 10 MG tablet TAKE 2 TABLETS(20 MG) BY MOUTH DAILY, Disp-180 tablet, R-1, E-Prescribe      omeprazole (PRILOSEC) 20 MG CR capsule Take 1 capsule (20 mg) by mouth daily, Historical         STOP taking these medications       celecoxib (CELEBREX) 200 MG capsule Comments:   Reason for Stopping:          HYDROcodone-acetaminophen (NORCO) 5-325 MG per tablet Comments:   Reason for Stopping:         OXYCODONE HCL PO Comments:   Reason for Stopping:         oxyCODONE IR (ROXICODONE) 5 MG tablet Comments:   Reason for Stopping:         traMADol (ULTRAM) 50 MG tablet Comments:   Reason for Stopping:                     Consultations:   No consultations were requested during this admission          Brief History of Illness:   This patient was a 63 year old female with a known history of rotator cuff tear and humeral head osteonecrosis.  She underwent a period of non-operative treatment which only provided mild and intermittent relief.  After a lengthy discussion and consultation with Dr. Christianson, the patient chose to undergo a left side shoulder arthroplasty.  She was explained the risks,complications, and benefits of the procedure and elected to have the surgical procedure.  Patient was cleared by her primary care physician for joint replacement.           Hospital Course:   The patient tolerated the procedure well and was taken to postop recovery in stable condition.  Please refer to the full operative note for complete details.   In recovery, shewas noted to be motor and neurovascular intact.  Post-operative films show components in excellent position.     On post-operative day 1, patient's wound was checked and noted to be healing well.  Patient had adequate pain control and was prescribed physical therapy.  She was given 24 hrs of perioperative antibiotics.  Patient had motor strength of 5/5 on the both sides.  Patient was neurovascularly intact in the both sides lower extremity. There were no complications throughout the hospital course as the patient passed physical therapy/occupational therapy on post-operative day #1. She was followed by the hospitalist during this hospital visit to manage her medical problems.     Upon discharge, the patient was seen by Dr. Christianson and all questions were answered.  She was  discharged on home medications as outlined in medication reconciliation list outlined below.  The patient has instructions that if she has increased pain, fever, erythema, swelling or drainage to immediately call.          Discharge Instructions and Follow-Up:   Discharge diet: Regular   Discharge activity: No lifting, driving, or strenuous exercise for 6 week(s)   Discharge follow-up: Follow up with Dr. Christianson in 2 weeks   Wound care: Apply bandage daily  Keep wound clean and dry  May get incision wet in shower but do not soak or scrub           Discharge Disposition:   Discharged to home      Attestation:  I have reviewed today's vital signs, notes, medications, labs and imaging.    Aaron Christianson MD

## 2018-11-13 NOTE — LETTER
July 11, 2018      To Whom It May Concern:      Krystyna Peña was seen in our Emergency Department today, 07/11/18.  I expect her condition to improve over the next 2 days.  She may return to work/school when improved.    Sincerely,        Gertrude Avila MD        
  July 12, 2018      To Whom It May Concern:      Krystyna Peña was seen in our Emergency Department today, 07/11/18.  I expect her condition to improve.  She may return to work 7/13/18 without restrictions.    Sincerely,        Cecilia Huang RN        
Yes

## 2018-12-07 ENCOUNTER — TRANSFERRED RECORDS (OUTPATIENT)
Dept: HEALTH INFORMATION MANAGEMENT | Facility: CLINIC | Age: 63
End: 2018-12-07

## 2018-12-10 ENCOUNTER — TELEPHONE (OUTPATIENT)
Dept: INTERNAL MEDICINE | Facility: CLINIC | Age: 63
End: 2018-12-10

## 2018-12-10 NOTE — TELEPHONE ENCOUNTER
Krystyna Peña is a 63 year old female  who calls with vomiting in the morning for the past 2 weeks and now having abdominal pain. Requesting an appointment for further evaluation     NURSING ASSESSMENT:  The pain began 3 days ago. Vomiting began 2 weeks ago.  Pain scale (0-10): 5/10 at baseline, but increases to 10/10 with pressure  The pain is described as stabbing and is located LLQ, which is without radiation.  Symptom associated with the abdominal pain: anorexia, nausea and vomiting in the mornings. Emesis is sometimes mucous-like, other times it is bile appearing.  Patient has not had previous abdominal surgery, including none.  Pain is aggravated by pressure, and relieved by nothing.  Allergies:   Allergies   Allergen Reactions     Morphine      respiratory distress     Cephalexin Rash       NURSING PLAN: Nursing advice to patient schedule an appt.     RECOMMENDED DISPOSITION:  See in 24 hours - Patient requesting to see Dr. Savage tomorrow late morning or early afternoon. Message sent to provider asking if patient can be worked in. Advised patient to go to the ER if pain worsens.  Will comply with recommendation: Yes  If further questions/concerns or if symptoms do not improve, worsen or new symptoms develop, call your PCP or [facility] as soon as possible.    Guideline used:  Telephone Triage Protocols for Nurses, Fifth Edition, Dorita Gusman RN

## 2018-12-11 ENCOUNTER — OFFICE VISIT (OUTPATIENT)
Dept: INTERNAL MEDICINE | Facility: CLINIC | Age: 63
End: 2018-12-11
Payer: COMMERCIAL

## 2018-12-11 VITALS
RESPIRATION RATE: 16 BRPM | OXYGEN SATURATION: 99 % | BODY MASS INDEX: 29.22 KG/M2 | HEART RATE: 112 BPM | TEMPERATURE: 98.1 F | DIASTOLIC BLOOD PRESSURE: 76 MMHG | SYSTOLIC BLOOD PRESSURE: 124 MMHG | HEIGHT: 63 IN | WEIGHT: 164.9 LBS

## 2018-12-11 DIAGNOSIS — R10.84 ABDOMINAL PAIN, GENERALIZED: Primary | ICD-10-CM

## 2018-12-11 LAB
BASOPHILS # BLD AUTO: 0 10E9/L (ref 0–0.2)
BASOPHILS NFR BLD AUTO: 0.2 %
DIFFERENTIAL METHOD BLD: ABNORMAL
EOSINOPHIL # BLD AUTO: 0.1 10E9/L (ref 0–0.7)
EOSINOPHIL NFR BLD AUTO: 1.5 %
ERYTHROCYTE [DISTWIDTH] IN BLOOD BY AUTOMATED COUNT: 12.8 % (ref 10–15)
HCT VFR BLD AUTO: 38.5 % (ref 35–47)
HGB BLD-MCNC: 12.9 G/DL (ref 11.7–15.7)
LIPASE SERPL-CCNC: 114 U/L (ref 73–393)
LYMPHOCYTES # BLD AUTO: 0.8 10E9/L (ref 0.8–5.3)
LYMPHOCYTES NFR BLD AUTO: 14.6 %
MCH RBC QN AUTO: 35.8 PG (ref 26.5–33)
MCHC RBC AUTO-ENTMCNC: 33.5 G/DL (ref 31.5–36.5)
MCV RBC AUTO: 107 FL (ref 78–100)
MONOCYTES # BLD AUTO: 0.9 10E9/L (ref 0–1.3)
MONOCYTES NFR BLD AUTO: 18.1 %
NEUTROPHILS # BLD AUTO: 3.4 10E9/L (ref 1.6–8.3)
NEUTROPHILS NFR BLD AUTO: 65.6 %
PLATELET # BLD AUTO: 306 10E9/L (ref 150–450)
RBC # BLD AUTO: 3.6 10E12/L (ref 3.8–5.2)
WBC # BLD AUTO: 5.2 10E9/L (ref 4–11)

## 2018-12-11 PROCEDURE — 99214 OFFICE O/P EST MOD 30 MIN: CPT | Performed by: NURSE PRACTITIONER

## 2018-12-11 PROCEDURE — 80053 COMPREHEN METABOLIC PANEL: CPT | Performed by: NURSE PRACTITIONER

## 2018-12-11 PROCEDURE — 83690 ASSAY OF LIPASE: CPT | Performed by: NURSE PRACTITIONER

## 2018-12-11 PROCEDURE — 36415 COLL VENOUS BLD VENIPUNCTURE: CPT | Performed by: NURSE PRACTITIONER

## 2018-12-11 PROCEDURE — 85025 COMPLETE CBC W/AUTO DIFF WBC: CPT | Performed by: NURSE PRACTITIONER

## 2018-12-11 ASSESSMENT — MIFFLIN-ST. JEOR: SCORE: 1267.11

## 2018-12-11 ASSESSMENT — PAIN SCALES - GENERAL: PAINLEVEL: MODERATE PAIN (4)

## 2018-12-11 NOTE — PROGRESS NOTES
SUBJECTIVE:   Krystyna Peña is a 63 year old female who presents to clinic today for the following health issues:      Abdominal Pain      Duration: 2 months, worse past 2 weeks    Description (location/character/radiation): L sided abd cramping and pain, AM nausea and vomiting, poor appetite       Associated flank pain: None    Intensity:  mild, moderate    Accompanying signs and symptoms:        Fever/Chills: no        Gas/Bloating: no        Nausea/vomitting: YES       Diarrhea: no        Dysuria or Hematuria: no     History (previous similar pain/trauma/previous testing): no    Precipitating or alleviating factors:       Pain worse with eating/BM/urination: no       Pain relieved by BM: YES    Therapies tried and outcome: None    LMP:  not applicable          Problem list and histories reviewed & adjusted, as indicated.  Additional history: as documented    Patient Active Problem List   Diagnosis     Asymptomatic varicose veins     CARDIOVASCULAR SCREENING; LDL GOAL LESS THAN 160     Back pain     Urinary frequency     Benign essential hypertension     Edema, unspecified type     S/P reverse total shoulder arthroplasty, left     Past Surgical History:   Procedure Laterality Date     AMPUTATE TOE(S) Left 4/12/2018    Procedure: AMPUTATE TOE(S);  Amputation left third digit;  Surgeon: Herb Michael DPM;  Location: [facility] OR     ARTHROPLASTY HIP Right 3/28/2016    Procedure: ARTHROPLASTY HIP;  Surgeon: Perez Meza MD;  Location: [facility] OR     C NONSPECIFIC PROCEDURE  1972    Right arm plastic surgery     C NONSPECIFIC PROCEDURE  1972    Right knee surgery     C NONSPECIFIC PROCEDURE  10/03    L popliteal AVM repair     C NONSPECIFIC PROCEDURE  11/04    Removal bone spur left foot     C NONSPECIFIC PROCEDURE  4/07    amputation of toes left & right toes     C REPAIR SPIEGELIAN HERNIA  2002     C TOTAL KNEE ARTHROPLASTY  10/03    LT     COLONOSCOPY       HC CORRECT BUNION,SIMPLE  1998    RT     HC CORRECT  TRIPP,BIJAL      LT     HC KNEE SCOPE, DIAGNOSTIC      LT     REVERSE ARTHROPLASTY SHOULDER Left 10/30/2018    Procedure: Left reverse total shoulder arthroplasty;  Surgeon: Aaron Christianson MD;  Location: [facility] OR     ROTATOR CUFF REPAIR RT/LT      right     VASCULAR SURGERY  left leg bypass        Social History     Tobacco Use     Smoking status: Former Smoker     Packs/day: 1.00     Types: Cigarettes, Cigars     Last attempt to quit: 2013     Years since quittin.6     Smokeless tobacco: Never Used   Substance Use Topics     Alcohol use: Yes     Comment: 1 per day     Family History   Problem Relation Age of Onset     Breast Cancer Mother      Cancer - colorectal Father         66     Diabetes Brother          Current Outpatient Medications   Medication Sig Dispense Refill     acetaminophen (TYLENOL) 325 MG tablet Take 2 tablets (650 mg) by mouth every 4 hours as needed for pain 100 tablet 0     ASPIRIN PO Take 81 mg by mouth daily       cyclobenzaprine (FLEXERIL) 5 MG tablet Take 1 tablet (5 mg) by mouth 3 times daily as needed for muscle spasms 60 tablet 1     furosemide (LASIX) 20 MG tablet Take 1 tablet (20 mg) by mouth daily 30 tablet 1     HYDROmorphone (DILAUDID) 2 MG tablet Take 1 tablet (2 mg) by mouth every 4 hours as needed for moderate to severe pain 30 tablet 0     lisinopril (PRINIVIL/ZESTRIL) 10 MG tablet TAKE 2 TABLETS(20 MG) BY MOUTH DAILY 180 tablet 1     omeprazole (PRILOSEC) 20 MG CR capsule Take 1 capsule (20 mg) by mouth daily       senna-docusate (SENOKOT-S;PERICOLACE) 8.6-50 MG per tablet Take 2 tablets by mouth 2 times daily 100 tablet 0     BP Readings from Last 3 Encounters:   18 124/76   18 97/54   10/15/18 136/84    Wt Readings from Last 3 Encounters:   18 74.8 kg (164 lb 14.4 oz)   10/30/18 76.2 kg (168 lb)   10/15/18 77.1 kg (169 lb 14.4 oz)                    Reviewed and updated as needed this visit by clinical staff  Tobacco  Allergies  Meds   "Med Hx  Surg Hx  Fam Hx  Soc Hx      Reviewed and updated as needed this visit by Provider         ROS:  CONSTITUTIONAL: NEGATIVE for fever, chills, change in weight  ENT/MOUTH: NEGATIVE for ear, mouth and throat problems  RESP: NEGATIVE for significant cough or SOB  CV: NEGATIVE for chest pain, palpitations or peripheral edema  GI: NEGATIVE for constipation, diarrhea, dyspepsia, jaundice and melena  : negative for and dysuria  NEURO: NEGATIVE for weakness, dizziness or paresthesias  ENDOCRINE: NEGATIVE for temperature intolerance, skin/hair changes  PSYCHIATRIC: NEGATIVE for changes in mood or affect  ROS otherwise negative    OBJECTIVE:     /76 (BP Location: Right arm, Patient Position: Sitting, Cuff Size: Adult Large)   Pulse 112   Temp 98.1  F (36.7  C) (Oral)   Resp 16   Ht 1.6 m (5' 3\")   Wt 74.8 kg (164 lb 14.4 oz)   LMP 10/01/2003   SpO2 99%   BMI 29.21 kg/m    Body mass index is 29.21 kg/m .  GENERAL: healthy, alert and no distress  NECK: no adenopathy, no asymmetry, masses, or scars and thyroid normal to palpation  RESP: lungs clear to auscultation - no rales, rhonchi or wheezes  CV: regular rate and rhythm, normal S1 S2, no S3 or S4, no murmur, click or rub, no peripheral edema and peripheral pulses strong  ABDOMEN: soft, nontender, no hepatosplenomegaly, no masses and bowel sounds normal  PSYCH: mentation appears normal and anxious        ASSESSMENT/PLAN:               ICD-10-CM    1. Abdominal pain, generalized R10.84 CBC with platelets differential     Comprehensive metabolic panel     Lipase     CT Abdomen Pelvis w Contrast       F/u pending results    Consider GI consult    Sangita Danielle NP  [facility]    "

## 2018-12-11 NOTE — LETTER
December 12, 2018      Krystyna Peña  [address] [place] [address]        Dear ,    We are writing to inform you of your test results.    Recent labs were normal.     Resulted Orders   CBC with platelets differential   Result Value Ref Range    WBC 5.2 4.0 - 11.0 10e9/L    RBC Count 3.60 (L) 3.8 - 5.2 10e12/L    Hemoglobin 12.9 11.7 - 15.7 g/dL    Hematocrit 38.5 35.0 - 47.0 %     (H) 78 - 100 fl    MCH 35.8 (H) 26.5 - 33.0 pg    MCHC 33.5 31.5 - 36.5 g/dL    RDW 12.8 10.0 - 15.0 %    Platelet Count 306 150 - 450 10e9/L    % Neutrophils 65.6 %    % Lymphocytes 14.6 %    % Monocytes 18.1 %    % Eosinophils 1.5 %    % Basophils 0.2 %    Absolute Neutrophil 3.4 1.6 - 8.3 10e9/L    Absolute Lymphocytes 0.8 0.8 - 5.3 10e9/L    Absolute Monocytes 0.9 0.0 - 1.3 10e9/L    Absolute Eosinophils 0.1 0.0 - 0.7 10e9/L    Absolute Basophils 0.0 0.0 - 0.2 10e9/L    Diff Method Automated Method    Lipase   Result Value Ref Range    Lipase 114 73 - 393 U/L       If you have any questions or concerns, please call the clinic at the number listed above.       Sincerely,        Sangita Danielle NP

## 2018-12-12 LAB
ALBUMIN SERPL-MCNC: 3.4 G/DL (ref 3.4–5)
ALP SERPL-CCNC: 92 U/L (ref 40–150)
ALT SERPL W P-5'-P-CCNC: 39 U/L (ref 0–50)
ANION GAP SERPL CALCULATED.3IONS-SCNC: 13 MMOL/L (ref 3–14)
AST SERPL W P-5'-P-CCNC: 71 U/L (ref 0–45)
BILIRUB SERPL-MCNC: 0.5 MG/DL (ref 0.2–1.3)
BUN SERPL-MCNC: 12 MG/DL (ref 7–30)
CALCIUM SERPL-MCNC: 9.5 MG/DL (ref 8.5–10.1)
CHLORIDE SERPL-SCNC: 97 MMOL/L (ref 94–109)
CO2 SERPL-SCNC: 23 MMOL/L (ref 20–32)
CREAT SERPL-MCNC: 1.14 MG/DL (ref 0.52–1.04)
GFR SERPL CREATININE-BSD FRML MDRD: 48 ML/MIN/1.7M2
GLUCOSE SERPL-MCNC: 101 MG/DL (ref 70–99)
POTASSIUM SERPL-SCNC: 4.9 MMOL/L (ref 3.4–5.3)
PROT SERPL-MCNC: 7 G/DL (ref 6.8–8.8)
SODIUM SERPL-SCNC: 133 MMOL/L (ref 133–144)

## 2018-12-20 ENCOUNTER — TELEPHONE (OUTPATIENT)
Dept: INTERNAL MEDICINE | Facility: CLINIC | Age: 63
End: 2018-12-20

## 2018-12-20 ENCOUNTER — HOSPITAL ENCOUNTER (OUTPATIENT)
Dept: CT IMAGING | Facility: CLINIC | Age: 63
Discharge: HOME | End: 2018-12-20
Attending: NURSE PRACTITIONER | Admitting: NURSE PRACTITIONER
Payer: COMMERCIAL

## 2018-12-20 DIAGNOSIS — K57.32 DIVERTICULITIS OF COLON: Primary | ICD-10-CM

## 2018-12-20 DIAGNOSIS — R10.84 ABDOMINAL PAIN, GENERALIZED: ICD-10-CM

## 2018-12-20 PROCEDURE — 25000128 H RX IP 250 OP 636: Performed by: NURSE PRACTITIONER

## 2018-12-20 PROCEDURE — 74177 CT ABD & PELVIS W/CONTRAST: CPT

## 2018-12-20 RX ORDER — METRONIDAZOLE 500 MG/1
500 TABLET ORAL 3 TIMES DAILY
Qty: 30 TABLET | Refills: 0 | Status: SHIPPED | OUTPATIENT
Start: 2018-12-20 | End: 2019-06-03

## 2018-12-20 RX ORDER — IOPAMIDOL 755 MG/ML
500 INJECTION, SOLUTION INTRAVASCULAR ONCE
Status: COMPLETED | OUTPATIENT
Start: 2018-12-20 | End: 2018-12-20

## 2018-12-20 RX ORDER — CIPROFLOXACIN 500 MG/1
500 TABLET, FILM COATED ORAL 2 TIMES DAILY
Qty: 20 TABLET | Refills: 0 | Status: SHIPPED | OUTPATIENT
Start: 2018-12-20 | End: 2019-06-03

## 2018-12-20 RX ADMIN — SODIUM CHLORIDE 51 ML: 9 INJECTION, SOLUTION INTRAVENOUS at 11:20

## 2018-12-20 RX ADMIN — IOPAMIDOL 82 ML: 755 INJECTION, SOLUTION INTRAVENOUS at 11:20

## 2018-12-21 ENCOUNTER — TELEPHONE (OUTPATIENT)
Dept: INTERNAL MEDICINE | Facility: CLINIC | Age: 63
End: 2018-12-21

## 2018-12-21 ENCOUNTER — NURSE TRIAGE (OUTPATIENT)
Dept: NURSING | Facility: CLINIC | Age: 63
End: 2018-12-21

## 2018-12-21 NOTE — TELEPHONE ENCOUNTER
Caller states that pharmacist told her to check with provider before beginning antibiotics of Flagyl and Cipro  because she had her shingles vaccine on 12/18/18 @ [facility]  Contacted on call Provider Dr. Cabrera and she advised  to take antibiotic; no problems as  vaccine is not live   Patient notified and understands   Ruby Musa RN  [facility]        Reason for Disposition    Caller has URGENT medication question about med that PCP prescribed and triager unable to answer question    Protocols used: MEDICATION QUESTION CALL-ADULT-

## 2018-12-21 NOTE — TELEPHONE ENCOUNTER
Reason for Call:  Other call back    Detailed comments: Pt just got her shingles vaccine recently and is wondering if she is still able to take her rx's Cipro and Flagyl after getting that. Please advise    Phone Number Patient can be reached at: Home number on file [phone] (home)    Best Time: any    Can we leave a detailed message on this number? YES    Call taken on 12/21/2018 at 10:52 AM by Nai Ram

## 2018-12-26 NOTE — TELEPHONE ENCOUNTER
Pt called FNA and FNA called Dr Cabrera, on-call provider.     She was able to take her antibiotics.

## 2018-12-28 ENCOUNTER — TELEPHONE (OUTPATIENT)
Dept: INTERNAL MEDICINE | Facility: CLINIC | Age: 63
End: 2018-12-28

## 2018-12-28 DIAGNOSIS — R11.0 NAUSEA: Primary | ICD-10-CM

## 2018-12-28 RX ORDER — ONDANSETRON 4 MG/1
TABLET, FILM COATED ORAL
Qty: 20 TABLET | Refills: 0 | Status: SHIPPED | OUTPATIENT
Start: 2018-12-28 | End: 2019-01-04

## 2018-12-28 NOTE — TELEPHONE ENCOUNTER
Dr Savage, pt is vomiting from Flagyl. Can she get some anti-nausea med?     Should only have a few days left.

## 2018-12-28 NOTE — TELEPHONE ENCOUNTER
Reason for Call:  Other prescription    Detailed comments: Pt states the abx-metronidazole that she has is making her vomit. Pt has stopped taking the med.  Pls call pt and advise.  Phone Number Patient can be reached at: Home number on file [phone] (home)    Best Time: any    Can we leave a detailed message on this number? YES    Call taken on 12/28/2018 at 9:21 AM by Ana Palumbo

## 2019-01-02 ENCOUNTER — TRANSFERRED RECORDS (OUTPATIENT)
Dept: HEALTH INFORMATION MANAGEMENT | Facility: CLINIC | Age: 64
End: 2019-01-02

## 2019-01-02 ENCOUNTER — OFFICE VISIT (OUTPATIENT)
Dept: INTERNAL MEDICINE | Facility: CLINIC | Age: 64
End: 2019-01-02
Payer: COMMERCIAL

## 2019-01-02 VITALS
WEIGHT: 161.7 LBS | DIASTOLIC BLOOD PRESSURE: 82 MMHG | HEART RATE: 95 BPM | SYSTOLIC BLOOD PRESSURE: 116 MMHG | TEMPERATURE: 98.2 F | BODY MASS INDEX: 28.64 KG/M2 | RESPIRATION RATE: 16 BRPM | OXYGEN SATURATION: 96 %

## 2019-01-02 DIAGNOSIS — Z00.00 HEALTH CARE MAINTENANCE: Primary | ICD-10-CM

## 2019-01-02 DIAGNOSIS — R60.9 EDEMA, UNSPECIFIED TYPE: ICD-10-CM

## 2019-01-02 LAB
ERYTHROCYTE [DISTWIDTH] IN BLOOD BY AUTOMATED COUNT: 13.5 % (ref 10–15)
HCT VFR BLD AUTO: 40.9 % (ref 35–47)
HGB BLD-MCNC: 13.2 G/DL (ref 11.7–15.7)
MCH RBC QN AUTO: 33.8 PG (ref 26.5–33)
MCHC RBC AUTO-ENTMCNC: 32.3 G/DL (ref 31.5–36.5)
MCV RBC AUTO: 105 FL (ref 78–100)
PLATELET # BLD AUTO: 392 10E9/L (ref 150–450)
RBC # BLD AUTO: 3.9 10E12/L (ref 3.8–5.2)
WBC # BLD AUTO: 6.2 10E9/L (ref 4–11)

## 2019-01-02 PROCEDURE — 84443 ASSAY THYROID STIM HORMONE: CPT | Performed by: NURSE PRACTITIONER

## 2019-01-02 PROCEDURE — 36415 COLL VENOUS BLD VENIPUNCTURE: CPT | Performed by: NURSE PRACTITIONER

## 2019-01-02 PROCEDURE — 84460 ALANINE AMINO (ALT) (SGPT): CPT | Performed by: NURSE PRACTITIONER

## 2019-01-02 PROCEDURE — 85027 COMPLETE CBC AUTOMATED: CPT | Performed by: NURSE PRACTITIONER

## 2019-01-02 PROCEDURE — 80048 BASIC METABOLIC PNL TOTAL CA: CPT | Performed by: NURSE PRACTITIONER

## 2019-01-02 PROCEDURE — 80061 LIPID PANEL: CPT | Performed by: NURSE PRACTITIONER

## 2019-01-02 PROCEDURE — 99396 PREV VISIT EST AGE 40-64: CPT | Performed by: NURSE PRACTITIONER

## 2019-01-02 NOTE — NURSING NOTE
/82   Pulse 95   Temp 98.2  F (36.8  C) (Oral)   Resp 16   Wt 73.3 kg (161 lb 11.2 oz)   LMP 10/01/2003   SpO2 96%   BMI 28.64 kg/m

## 2019-01-02 NOTE — PROGRESS NOTES
SUBJECTIVE:   Krystyna HUMPHREYS White is a 63 year old female who presents to clinic today for the following health issues:            Problem list and histories reviewed & adjusted, as indicated.  Additional history: as documented    Patient Active Problem List   Diagnosis     Asymptomatic varicose veins     CARDIOVASCULAR SCREENING; LDL GOAL LESS THAN 160     Back pain     Urinary frequency     Benign essential hypertension     Edema, unspecified type     S/P reverse total shoulder arthroplasty, left     Past Surgical History:   Procedure Laterality Date     AMPUTATE TOE(S) Left 2018    Procedure: AMPUTATE TOE(S);  Amputation left third digit;  Surgeon: Herb Michael DPM;  Location: [facility]     ARTHROPLASTY HIP Right 3/28/2016    Procedure: ARTHROPLASTY HIP;  Surgeon: Perez Meza MD;  Location: [facility] OR     C NONSPECIFIC PROCEDURE      Right arm plastic surgery     C NONSPECIFIC PROCEDURE      Right knee surgery     C NONSPECIFIC PROCEDURE  10/03    L popliteal AVM repair     C NONSPECIFIC PROCEDURE      Removal bone spur left foot     C NONSPECIFIC PROCEDURE      amputation of toes left & right toes     C REPAIR SPIEGELIAN HERNIA       C TOTAL KNEE ARTHROPLASTY  10/03    LT     COLONOSCOPY       HC CORRECT BUNION,SIMPLE      RT     HC CORRECT BUNION,SIMPLE      LT     HC KNEE SCOPE, DIAGNOSTIC      LT     REVERSE ARTHROPLASTY SHOULDER Left 10/30/2018    Procedure: Left reverse total shoulder arthroplasty;  Surgeon: Aaron Christianson MD;  Location: [facility] OR     ROTATOR CUFF REPAIR RT/LT      right     VASCULAR SURGERY  left leg bypass        Social History     Tobacco Use     Smoking status: Former Smoker     Packs/day: 1.00     Types: Cigarettes, Cigars     Last attempt to quit: 2013     Years since quittin.7     Smokeless tobacco: Never Used   Substance Use Topics     Alcohol use: Yes     Comment: 1 per day     Family History   Problem Relation Age of Onset     Breast  Cancer Mother      Cancer - colorectal Father         66     Diabetes Brother          Current Outpatient Medications   Medication Sig Dispense Refill     lisinopril (PRINIVIL/ZESTRIL) 10 MG tablet TAKE 2 TABLETS(20 MG) BY MOUTH DAILY 180 tablet 1     acetaminophen (TYLENOL) 325 MG tablet Take 2 tablets (650 mg) by mouth every 4 hours as needed for pain (Patient not taking: Reported on 1/2/2019) 100 tablet 0     ASPIRIN PO Take 81 mg by mouth daily       cyclobenzaprine (FLEXERIL) 5 MG tablet Take 1 tablet (5 mg) by mouth 3 times daily as needed for muscle spasms (Patient not taking: Reported on 1/2/2019) 60 tablet 1     furosemide (LASIX) 20 MG tablet Take 1 tablet (20 mg) by mouth daily (Patient not taking: Reported on 1/2/2019) 30 tablet 1     omeprazole (PRILOSEC) 20 MG CR capsule Take 1 capsule (20 mg) by mouth daily       ondansetron (ZOFRAN) 4 MG tablet 1-2 tabs po q8 hours prn (Patient not taking: Reported on 1/2/2019) 20 tablet 0     senna-docusate (SENOKOT-S;PERICOLACE) 8.6-50 MG per tablet Take 2 tablets by mouth 2 times daily (Patient not taking: Reported on 1/2/2019) 100 tablet 0     BP Readings from Last 3 Encounters:   01/02/19 116/82   12/11/18 124/76   11/01/18 97/54    Wt Readings from Last 3 Encounters:   01/02/19 73.3 kg (161 lb 11.2 oz)   12/11/18 74.8 kg (164 lb 14.4 oz)   10/30/18 76.2 kg (168 lb)                    Reviewed and updated as needed this visit by clinical staff  Tobacco  Allergies  Meds  Med Hx  Surg Hx  Fam Hx  Soc Hx      Reviewed and updated as needed this visit by Provider         ROS:  CONSTITUTIONAL: NEGATIVE for fever, chills, change in weight  ENT/MOUTH: NEGATIVE for ear, mouth and throat problems  RESP: NEGATIVE for significant cough or SOB  CV: NEGATIVE for chest pain, palpitations or peripheral edema  GI: NEGATIVE for nausea, abdominal pain, heartburn, or change in bowel habits  : NEGATIVE  ROS otherwise negative    OBJECTIVE:     /82   Pulse 95   Temp  98.2  F (36.8  C) (Oral)   Resp 16   Wt 73.3 kg (161 lb 11.2 oz)   LMP 10/01/2003   SpO2 96%   BMI 28.64 kg/m    Body mass index is 28.64 kg/m .  GENERAL: healthy, alert and no distress  NECK: no adenopathy, no asymmetry, masses, or scars and thyroid normal to palpation  RESP: lungs clear to auscultation - no rales, rhonchi or wheezes  CV: regular rate and rhythm, normal S1 S2, no S3 or S4, no murmur, click or rub, no peripheral edema and peripheral pulses strong  ABDOMEN: soft, nontender, no hepatosplenomegaly, no masses and bowel sounds normal  PSYCH: mentation appears normal, affect normal/bright        ASSESSMENT/PLAN:               ICD-10-CM    1. Health care maintenance Z00.00 CBC with platelets     Basic metabolic panel     Lipid Profile     ALT     TSH with free T4 reflex     *MA Screening Digital Bilateral     DX Hip/Pelvis/Spine   2. Edema, unspecified type R60.9        Labs pending    Sangita Danielle NP  [facility]

## 2019-01-03 ENCOUNTER — TELEPHONE (OUTPATIENT)
Dept: INTERNAL MEDICINE | Facility: CLINIC | Age: 64
End: 2019-01-03

## 2019-01-03 DIAGNOSIS — N18.5 CKD (CHRONIC KIDNEY DISEASE) STAGE 5, GFR LESS THAN 15 ML/MIN (H): Primary | ICD-10-CM

## 2019-01-03 DIAGNOSIS — I10 BENIGN ESSENTIAL HYPERTENSION: ICD-10-CM

## 2019-01-03 LAB
ALT SERPL W P-5'-P-CCNC: 12 U/L (ref 0–50)
ANION GAP SERPL CALCULATED.3IONS-SCNC: 7 MMOL/L (ref 3–14)
BUN SERPL-MCNC: 20 MG/DL (ref 7–30)
CALCIUM SERPL-MCNC: 9.1 MG/DL (ref 8.5–10.1)
CHLORIDE SERPL-SCNC: 105 MMOL/L (ref 94–109)
CHOLEST SERPL-MCNC: 211 MG/DL
CO2 SERPL-SCNC: 24 MMOL/L (ref 20–32)
CREAT SERPL-MCNC: 4.02 MG/DL (ref 0.52–1.04)
GFR SERPL CREATININE-BSD FRML MDRD: 11 ML/MIN/{1.73_M2}
GLUCOSE SERPL-MCNC: 105 MG/DL (ref 70–99)
HDLC SERPL-MCNC: 73 MG/DL
LDLC SERPL CALC-MCNC: 108 MG/DL
NONHDLC SERPL-MCNC: 138 MG/DL
POTASSIUM SERPL-SCNC: 3.9 MMOL/L (ref 3.4–5.3)
SODIUM SERPL-SCNC: 136 MMOL/L (ref 133–144)
TRIGL SERPL-MCNC: 152 MG/DL
TSH SERPL DL<=0.005 MIU/L-ACNC: 3.14 MU/L (ref 0.4–4)

## 2019-01-03 NOTE — TELEPHONE ENCOUNTER
Please advise pt kidney function test is significantly elevated.  Reviewed with Dr Savage.  Order sent to repeat CrCl and microalbumin.  Order for renal US, please assist her to schedule ASAP.   referral to nephrology done for further eval and tx.  Sangita Danielle CNP

## 2019-01-03 NOTE — TELEPHONE ENCOUNTER
"Requested Prescriptions   Pending Prescriptions Disp Refills     lisinopril (PRINIVIL/ZESTRIL) 10 MG tablet [Pharmacy Med Name: LISINOPRIL 10MG TABLETS] 180 tablet 0    Last Written Prescription Date:  06/22/2018  Last Fill Quantity: 180,  # refills: 1   Last office visit: 1/2/2019 with prescribing provider:     Future Office Visit:   Sig: TAKE 2 TABLETS(20 MG) BY MOUTH DAILY    ACE Inhibitors (Including Combos) Protocol Failed - 1/3/2019  1:06 PM       Failed - Normal serum creatinine on file in past 12 months    Recent Labs   Lab Test 01/02/19  1341  04/21/18  0904   CR 4.02*   < >  --    CREAT  --   --  1.4*    < > = values in this interval not displayed.            Passed - Blood pressure under 140/90 in past 12 months    BP Readings from Last 3 Encounters:   01/02/19 116/82   12/11/18 124/76   11/01/18 97/54                Passed - Recent (12 mo) or future (30 days) visit within the authorizing provider's specialty    Patient had office visit in the last 12 months or has a visit in the next 30 days with authorizing provider or within the authorizing provider's specialty.  See \"Patient Info\" tab in inbasket, or \"Choose Columns\" in Meds & Orders section of the refill encounter.             Passed - Patient is age 18 or older       Passed - No active pregnancy on record       Passed - Normal serum potassium on file in past 12 months    Recent Labs   Lab Test 01/02/19  1341   POTASSIUM 3.9            Passed - No positive pregnancy test in past 12 months        "

## 2019-01-04 ENCOUNTER — APPOINTMENT (OUTPATIENT)
Dept: CT IMAGING | Facility: CLINIC | Age: 64
End: 2019-01-04
Payer: COMMERCIAL

## 2019-01-04 ENCOUNTER — HOSPITAL ENCOUNTER (OUTPATIENT)
Facility: CLINIC | Age: 64
Setting detail: OBSERVATION
Discharge: HOME | End: 2019-01-06
Attending: EMERGENCY MEDICINE | Admitting: INTERNAL MEDICINE
Payer: COMMERCIAL

## 2019-01-04 ENCOUNTER — APPOINTMENT (OUTPATIENT)
Dept: ULTRASOUND IMAGING | Facility: CLINIC | Age: 64
End: 2019-01-04
Payer: COMMERCIAL

## 2019-01-04 DIAGNOSIS — N18.5 CKD (CHRONIC KIDNEY DISEASE) STAGE 5, GFR LESS THAN 15 ML/MIN (H): ICD-10-CM

## 2019-01-04 DIAGNOSIS — R55 SYNCOPE AND COLLAPSE: ICD-10-CM

## 2019-01-04 DIAGNOSIS — N17.9 AKI (ACUTE KIDNEY INJURY) (H): ICD-10-CM

## 2019-01-04 LAB
ALBUMIN UR-MCNC: NEGATIVE MG/DL
ANION GAP SERPL CALCULATED.3IONS-SCNC: 8 MMOL/L (ref 3–14)
APPEARANCE UR: CLEAR
BACTERIA #/AREA URNS HPF: ABNORMAL /HPF
BASOPHILS # BLD AUTO: 0.1 10E9/L (ref 0–0.2)
BASOPHILS NFR BLD AUTO: 0.8 %
BILIRUB UR QL STRIP: NEGATIVE
BUN SERPL-MCNC: 17 MG/DL (ref 7–30)
CALCIUM SERPL-MCNC: 8.7 MG/DL (ref 8.5–10.1)
CHLORIDE SERPL-SCNC: 104 MMOL/L (ref 94–109)
CO2 SERPL-SCNC: 23 MMOL/L (ref 20–32)
COLOR UR AUTO: YELLOW
CREAT SERPL-MCNC: 3.05 MG/DL (ref 0.52–1.04)
CREAT SERPL-MCNC: 3.46 MG/DL (ref 0.52–1.04)
CREAT UR-MCNC: 58 MG/DL
DIFFERENTIAL METHOD BLD: ABNORMAL
EOSINOPHIL # BLD AUTO: 0.2 10E9/L (ref 0–0.7)
EOSINOPHIL NFR BLD AUTO: 2.8 %
ERYTHROCYTE [DISTWIDTH] IN BLOOD BY AUTOMATED COUNT: 13.5 % (ref 10–15)
FRACT EXCRET NA UR+SERPL-RTO: 1.6 %
GFR SERPL CREATININE-BSD FRML MDRD: 13 ML/MIN/{1.73_M2}
GFR SERPL CREATININE-BSD FRML MDRD: 15 ML/MIN/{1.73_M2}
GLUCOSE BLDC GLUCOMTR-MCNC: 98 MG/DL (ref 70–99)
GLUCOSE SERPL-MCNC: 105 MG/DL (ref 70–99)
GLUCOSE UR STRIP-MCNC: NEGATIVE MG/DL
HCT VFR BLD AUTO: 40.4 % (ref 35–47)
HGB BLD-MCNC: 13.7 G/DL (ref 11.7–15.7)
HGB UR QL STRIP: NEGATIVE
HYALINE CASTS #/AREA URNS LPF: 4 /LPF (ref 0–2)
IMM GRANULOCYTES # BLD: 0 10E9/L (ref 0–0.4)
IMM GRANULOCYTES NFR BLD: 0.6 %
KETONES UR STRIP-MCNC: NEGATIVE MG/DL
LACTATE BLD-SCNC: 1.3 MMOL/L (ref 0.7–2)
LEUKOCYTE ESTERASE UR QL STRIP: ABNORMAL
LYMPHOCYTES # BLD AUTO: 1 10E9/L (ref 0.8–5.3)
LYMPHOCYTES NFR BLD AUTO: 16.4 %
MCH RBC QN AUTO: 34.9 PG (ref 26.5–33)
MCHC RBC AUTO-ENTMCNC: 33.9 G/DL (ref 31.5–36.5)
MCV RBC AUTO: 103 FL (ref 78–100)
MONOCYTES # BLD AUTO: 0.7 10E9/L (ref 0–1.3)
MONOCYTES NFR BLD AUTO: 11.6 %
MUCOUS THREADS #/AREA URNS LPF: PRESENT /LPF
NEUTROPHILS # BLD AUTO: 4.3 10E9/L (ref 1.6–8.3)
NEUTROPHILS NFR BLD AUTO: 67.8 %
NITRATE UR QL: NEGATIVE
NRBC # BLD AUTO: 0 10*3/UL
NRBC BLD AUTO-RTO: 0 /100
PH UR STRIP: 5 PH (ref 5–7)
PLATELET # BLD AUTO: 420 10E9/L (ref 150–450)
POTASSIUM SERPL-SCNC: 4.3 MMOL/L (ref 3.4–5.3)
RBC # BLD AUTO: 3.92 10E12/L (ref 3.8–5.2)
RBC #/AREA URNS AUTO: 2 /HPF (ref 0–2)
SODIUM SERPL-SCNC: 135 MMOL/L (ref 133–144)
SODIUM SERPL-SCNC: 139 MMOL/L (ref 133–144)
SODIUM UR-SCNC: 41 MMOL/L
SOURCE: ABNORMAL
SP GR UR STRIP: 1.01 (ref 1–1.03)
SQUAMOUS #/AREA URNS AUTO: 4 /HPF (ref 0–1)
TROPONIN I SERPL-MCNC: <0.015 UG/L (ref 0–0.04)
UROBILINOGEN UR STRIP-MCNC: 0 MG/DL (ref 0–2)
WBC # BLD AUTO: 6.4 10E9/L (ref 4–11)
WBC #/AREA URNS AUTO: 6 /HPF (ref 0–5)

## 2019-01-04 PROCEDURE — 36415 COLL VENOUS BLD VENIPUNCTURE: CPT | Performed by: INTERNAL MEDICINE

## 2019-01-04 PROCEDURE — 25000128 H RX IP 250 OP 636: Performed by: INTERNAL MEDICINE

## 2019-01-04 PROCEDURE — 85025 COMPLETE CBC W/AUTO DIFF WBC: CPT | Performed by: EMERGENCY MEDICINE

## 2019-01-04 PROCEDURE — 80048 BASIC METABOLIC PNL TOTAL CA: CPT | Performed by: NURSE PRACTITIONER

## 2019-01-04 PROCEDURE — 99285 EMERGENCY DEPT VISIT HI MDM: CPT | Mod: 25

## 2019-01-04 PROCEDURE — 84300 ASSAY OF URINE SODIUM: CPT | Performed by: INTERNAL MEDICINE

## 2019-01-04 PROCEDURE — 96361 HYDRATE IV INFUSION ADD-ON: CPT

## 2019-01-04 PROCEDURE — 82570 ASSAY OF URINE CREATININE: CPT | Performed by: INTERNAL MEDICINE

## 2019-01-04 PROCEDURE — 99207 ZZC CDG-MDM COMPONENT: MEETS LOW - DOWN CODED: CPT | Performed by: INTERNAL MEDICINE

## 2019-01-04 PROCEDURE — 84295 ASSAY OF SERUM SODIUM: CPT | Performed by: INTERNAL MEDICINE

## 2019-01-04 PROCEDURE — 93005 ELECTROCARDIOGRAM TRACING: CPT

## 2019-01-04 PROCEDURE — 82565 ASSAY OF CREATININE: CPT | Performed by: INTERNAL MEDICINE

## 2019-01-04 PROCEDURE — 83605 ASSAY OF LACTIC ACID: CPT | Performed by: EMERGENCY MEDICINE

## 2019-01-04 PROCEDURE — 84484 ASSAY OF TROPONIN QUANT: CPT | Performed by: EMERGENCY MEDICINE

## 2019-01-04 PROCEDURE — 70450 CT HEAD/BRAIN W/O DYE: CPT

## 2019-01-04 PROCEDURE — 81001 URINALYSIS AUTO W/SCOPE: CPT | Performed by: EMERGENCY MEDICINE

## 2019-01-04 PROCEDURE — 00000146 ZZHCL STATISTIC GLUCOSE BY METER IP

## 2019-01-04 PROCEDURE — 82043 UR ALBUMIN QUANTITATIVE: CPT | Performed by: NURSE PRACTITIONER

## 2019-01-04 PROCEDURE — 80048 BASIC METABOLIC PNL TOTAL CA: CPT | Performed by: EMERGENCY MEDICINE

## 2019-01-04 PROCEDURE — 36415 COLL VENOUS BLD VENIPUNCTURE: CPT | Performed by: EMERGENCY MEDICINE

## 2019-01-04 PROCEDURE — 99219 ZZC INITIAL OBSERVATION CARE,LEVL II: CPT | Performed by: INTERNAL MEDICINE

## 2019-01-04 PROCEDURE — 25000132 ZZH RX MED GY IP 250 OP 250 PS 637: Performed by: INTERNAL MEDICINE

## 2019-01-04 PROCEDURE — 25000128 H RX IP 250 OP 636: Performed by: EMERGENCY MEDICINE

## 2019-01-04 PROCEDURE — 76770 US EXAM ABDO BACK WALL COMP: CPT

## 2019-01-04 PROCEDURE — 96360 HYDRATION IV INFUSION INIT: CPT

## 2019-01-04 PROCEDURE — 70486 CT MAXILLOFACIAL W/O DYE: CPT

## 2019-01-04 PROCEDURE — G0378 HOSPITAL OBSERVATION PER HR: HCPCS

## 2019-01-04 RX ORDER — PROCHLORPERAZINE 25 MG
25 SUPPOSITORY, RECTAL RECTAL EVERY 12 HOURS PRN
Status: DISCONTINUED | OUTPATIENT
Start: 2019-01-04 | End: 2019-01-06 | Stop reason: HOSPADM

## 2019-01-04 RX ORDER — LIDOCAINE 40 MG/G
CREAM TOPICAL
Status: DISCONTINUED | OUTPATIENT
Start: 2019-01-04 | End: 2019-01-05

## 2019-01-04 RX ORDER — AMOXICILLIN 250 MG
2 CAPSULE ORAL 2 TIMES DAILY PRN
Status: DISCONTINUED | OUTPATIENT
Start: 2019-01-04 | End: 2019-01-06 | Stop reason: HOSPADM

## 2019-01-04 RX ORDER — ONDANSETRON 2 MG/ML
4 INJECTION INTRAMUSCULAR; INTRAVENOUS EVERY 6 HOURS PRN
Status: DISCONTINUED | OUTPATIENT
Start: 2019-01-04 | End: 2019-01-06 | Stop reason: HOSPADM

## 2019-01-04 RX ORDER — POLYETHYLENE GLYCOL 3350 17 G/17G
17 POWDER, FOR SOLUTION ORAL DAILY PRN
Status: DISCONTINUED | OUTPATIENT
Start: 2019-01-04 | End: 2019-01-06 | Stop reason: HOSPADM

## 2019-01-04 RX ORDER — ASPIRIN 81 MG/1
81 TABLET ORAL DAILY
Status: DISCONTINUED | OUTPATIENT
Start: 2019-01-05 | End: 2019-01-06 | Stop reason: HOSPADM

## 2019-01-04 RX ORDER — ACETAMINOPHEN 325 MG/1
650 TABLET ORAL EVERY 4 HOURS PRN
Status: DISCONTINUED | OUTPATIENT
Start: 2019-01-04 | End: 2019-01-06 | Stop reason: HOSPADM

## 2019-01-04 RX ORDER — SODIUM CHLORIDE 9 MG/ML
INJECTION, SOLUTION INTRAVENOUS CONTINUOUS
Status: DISCONTINUED | OUTPATIENT
Start: 2019-01-04 | End: 2019-01-06

## 2019-01-04 RX ORDER — NITROGLYCERIN 0.4 MG/1
0.4 TABLET SUBLINGUAL EVERY 5 MIN PRN
Status: DISCONTINUED | OUTPATIENT
Start: 2019-01-04 | End: 2019-01-06 | Stop reason: HOSPADM

## 2019-01-04 RX ORDER — ONDANSETRON 4 MG/1
4 TABLET, ORALLY DISINTEGRATING ORAL EVERY 6 HOURS PRN
Status: DISCONTINUED | OUTPATIENT
Start: 2019-01-04 | End: 2019-01-06 | Stop reason: HOSPADM

## 2019-01-04 RX ORDER — PROCHLORPERAZINE MALEATE 5 MG
10 TABLET ORAL EVERY 6 HOURS PRN
Status: DISCONTINUED | OUTPATIENT
Start: 2019-01-04 | End: 2019-01-06 | Stop reason: HOSPADM

## 2019-01-04 RX ORDER — ACETAMINOPHEN 650 MG/1
650 SUPPOSITORY RECTAL EVERY 4 HOURS PRN
Status: DISCONTINUED | OUTPATIENT
Start: 2019-01-04 | End: 2019-01-06 | Stop reason: HOSPADM

## 2019-01-04 RX ORDER — BISACODYL 10 MG
10 SUPPOSITORY, RECTAL RECTAL DAILY PRN
Status: DISCONTINUED | OUTPATIENT
Start: 2019-01-04 | End: 2019-01-06 | Stop reason: HOSPADM

## 2019-01-04 RX ORDER — AMOXICILLIN 250 MG
1 CAPSULE ORAL 2 TIMES DAILY PRN
Status: DISCONTINUED | OUTPATIENT
Start: 2019-01-04 | End: 2019-01-06 | Stop reason: HOSPADM

## 2019-01-04 RX ORDER — NALOXONE HYDROCHLORIDE 0.4 MG/ML
.1-.4 INJECTION, SOLUTION INTRAMUSCULAR; INTRAVENOUS; SUBCUTANEOUS
Status: DISCONTINUED | OUTPATIENT
Start: 2019-01-04 | End: 2019-01-06 | Stop reason: HOSPADM

## 2019-01-04 RX ADMIN — SODIUM CHLORIDE 1000 ML: 9 INJECTION, SOLUTION INTRAVENOUS at 15:16

## 2019-01-04 RX ADMIN — TRAZODONE HYDROCHLORIDE 25 MG: 50 TABLET ORAL at 21:26

## 2019-01-04 RX ADMIN — SODIUM CHLORIDE, PRESERVATIVE FREE: 5 INJECTION INTRAVENOUS at 19:18

## 2019-01-04 ASSESSMENT — ENCOUNTER SYMPTOMS
SHORTNESS OF BREATH: 0
VOMITING: 1
FREQUENCY: 1
DYSURIA: 0
FEVER: 0
APPETITE CHANGE: 1
DIARRHEA: 0
LIGHT-HEADEDNESS: 1

## 2019-01-04 NOTE — TELEPHONE ENCOUNTER
Patient called back and advised of message below. Scheduled lab appointment for today. Assisted in scheduling patient for renal US for 1/7/18. Gave patient information on nephrology referral- attempted to call and help patient schedule but they will be booked out until march, stated any sooner would require provider call. Patient stated that [place] is too far for her to travel and asked if any she could be referred to urology in [place]. Please advise,     Thank you

## 2019-01-04 NOTE — ED NOTES
Patient presents via EMS with loss of consciousness. Patient was shopping at [employer] when she started feeling lightheaded, and had to sit down. Patient was then in the checkout brett when she had a syncopal episode and fell forward onto her cart, striking her nose. Patient states a bystander caught her. Patient states she has had abnormal labs in the past couple days with little appetite as well. ABCDS intact, alert and oriented x 4.

## 2019-01-04 NOTE — ED PROVIDER NOTES
"  History     Chief Complaint:  Loss of Consciousness     HPI   Krystyna Peña is a 63 year old female with a history of hypertension and previous cardiac arrest who presents to the emergency department today via EMS for evaluation after loss of consciousness. Patient reports that while walking in the store today, she began to feel lightheaded to the point that she needed to sit down and subsequently lost consciousness while in the checkout line, hitting her nose on a handlebar. She did have a nosebleed, but is currently hemostatic although her right nare feels \"stuffy.\" She denies chest pain or shortness of breath prior to this episode, had no urinary incontinence, did not bite her tongue, says she has had 2 similar incidents in the past, and is currently only lightheaded. Patient also endorses that she has had decreased appetite recently, had vomiting this morning, urinary frequency during the night, increased bilateral leg swelling, and notes giving blood and urine this morning around 1200 for recent issues with her kidneys. She denies dysuria, shortness of breath, diarrhea, fever, and is not on blood thinners besides ASA.     Allergies:  Morphine   Cephalexin      Medications:    Aspirin  Lasix  Lisinopril     Past Medical History:    Arthritis    Cardiac arrest    Gastroesophageal reflux disease   Hypertension   Major depression     Past Surgical History:    Amputate toes, left and right  Arthroplasty hip, right   Right arm plastic surgery  Right knee surgery   Left popliteal AVM repair   Removal bone spur left foot  Repair spigelian hernia   Correct bunion, simple x2  Right rotator cuff repair   Vascular surgery     Family History:    Mother: Breast cancer  Father: Colorectal cancer  Brother: Diabetes    Social History:  Smoking Status: Former Smoker   Packs/day: 1.00   Types: Cigarettes, cigars    Quit roughly 6 years ago.   Smokeless Tobacco: Never Used  Alcohol Use: Positive   Daily  Drug Use: Negative  "   Marital Status:       Review of Systems   Constitutional: Positive for appetite change (decreased). Negative for fever.   HENT: Positive for nosebleeds.    Respiratory: Negative for shortness of breath.    Cardiovascular: Positive for leg swelling. Negative for chest pain.   Gastrointestinal: Positive for vomiting. Negative for diarrhea.   Genitourinary: Positive for frequency. Negative for dysuria.        No urinary incontinence   Neurological: Positive for syncope and light-headedness.   All other systems reviewed and are negative.    Physical Exam     Patient Vitals for the past 24 hrs:   BP Temp Temp src Pulse Heart Rate Resp SpO2   01/04/19 1545 149/83 -- -- 75 -- -- --   01/04/19 1500 145/82 -- -- 92 -- -- --   01/04/19 1445 136/69 -- -- 75 -- -- --   01/04/19 1430 139/82 -- -- 79 -- -- --   01/04/19 1419 144/79 98.1  F (36.7  C) Oral -- 78 20 98 %     Physical Exam  Constitutional: vitals reviewed  HENT: Dry oral mucosa.   Eyes: Grossly normal vision. Pupils are equal and round. Extraocular movements intact.  Sclera clear and without icterus. Conjunctiva without pallor.  Cardiovascular: Normal rate. Regular rhythm. S1 and S2 without audible murmurs. 2+ radial pulses. Normal capillary refill.  Respiratory: Effort normal. Clear breath sounds bilaterally.  Gastrointestinal: Soft. No distension. No tenderness to palpation. No rebound or guarding.  Neurologic: Alert and awake. Coordinated movement of extremities. Speech normal.  Skin: No diaphoresis, rashes, ecchymoses, or lesions.  Musculoskeletal: Bilateral 2+ pitting edema  Psychiatric: Appropriate affect. Behavior is normal. Intact recent and remote memory. Linear thought process.    Emergency Department Course     ECG:  ECG taken at 1425, ECG read at 1507  Normal sinus rhythm  Possible left atrial enlargement   Borderline ECG  No significant change compared to EKG dated 10/15/18.  Rate 75 bpm. ND interval 146 ms. QRS duration 68 ms. QT/QTc 390/435  ms. P-R-T [facility] 21 -14 8.    Imaging:  Radiology findings were communicated with the patient who voiced understanding of the findings.    US Renal Complete  Normal bilateral renal ultrasound. No hydronephrosis or obvious renal calculi.  JHONY DYE MD  Reading per radiology     Laboratory:  Laboratory findings were communicated with the patient who voiced understanding of the findings.    UA with Microscopic: Leukocyte Esterase Small(A), WBC 6(H), Bacteria Few(A), Squamous Epithelial 4(H), Mucous Present(A), Hyaline Casts 4(H) o/w WNL   CBC: WBC 6.4, HGB 13.7,   BMP: Glucose 105(H), Creatinine 3.46(H), GFR Estimate 13(L) o/w WNL   Troponin (Collected 1425): <0.015   Lactic Acid (Resulted 1521): 1.3  Glucose by meter: 98     Interventions:  1439 NS 1,000 mL IV    Emergency Department Course:    1422 Nursing notes and vitals reviewed.    1425 EKG taken as noted above. IV was inserted and blood was drawn for laboratory testing, results above.    1429 Glucose by meter collected as noted above.     1430 I performed an exam of the patient as documented above.     1512 The patient provided a urine sample here in the emergency department. This was sent for laboratory testing, findings above.    1531 The patient was sent for a US while in the emergency department, results above.     1545 Rechecked and updated.      1552 I spoke with Dr. Field of the hospitalist service from [facility] regarding patient's presentation, findings, and plan of care.    1650 I personally reviewed the lab and imaging results with the patient and answered all related questions prior to admit.    Impression & Plan      Medical Decision Making:  Krystyna Peña is a 63 year old female who presents to the emergency department today for evaluation of syncope and lightheadedness.  The description of her episode is concerning for vasovagal versus orthostatic syncope.  She had some prodrome which makes cardiogenic slightly less likely.  She  was recently discovered to have acute renal injury, but is having normal urine output.  Her recent illness of diverticulitis which required antibiotics and her ongoing decreased appetite are potential triggers for dehydration, prerenal kidney injury, ATN related to the infection or antibiotics.  EKG and troponin are undetectable.  Her creatinine today is improved from yesterday, but still 3 times her baseline.  Her orthostatic vital signs were not numerically positive, but she did have symptoms when going to an upright position.  She was given IV fluids and underwent renal ultrasonography to evaluate for etiologies of her kidney injury.  She will be admitted to the hospital for ongoing fluid hydration, cardiac monitoring.    Diagnosis:    ICD-10-CM    1. Syncope and collapse R55    2. JORJE (acute kidney injury) (H) N17.9      Disposition:   The patient is admitted into the care of Dr. Field.    Scribe Disclosure:  I, Fay Jones, am serving as a scribe at 2:30 PM on 1/4/2019 to document services personally performed by Javi Cesar MD based on my observations and the provider's statements to me.      [facility] EMERGENCY DEPARTMENT       Javi Cesar MD  01/04/19 2613

## 2019-01-04 NOTE — PHARMACY-ADMISSION MEDICATION HISTORY
Admission medication history interview status for this patient is complete. See [facility] admission navigator for allergy information, prior to admission medications and immunization status.     Medication history interview source(s):Patient  Medication history resources (including written lists, pill bottles, clinic record):None  Primary [facility]: [facility]    Changes made to PTA medication list:  Added: none  Deleted: APAP, cyclobenzaprine, furosemide, omeprazole, ondansetron, senna-docusate  Changed: none    Actions taken by pharmacist (provider contacted, etc):None     Additional medication history information:None    Medication reconciliation/reorder completed by provider prior to medication history? No    Do you take OTC medications (eg tylenol, ibuprofen, fish oil, eye/ear drops, etc)? N(Y/N)    For patients on insulin therapy: N (Y/N)    Prior to Admission medications    Medication Sig Last Dose Taking? Auth Provider   ASPIRIN PO Take 81 mg by mouth daily 1/4/2019 at Unknown time Yes Reported, Patient   lisinopril (PRINIVIL/ZESTRIL) 10 MG tablet TAKE 2 TABLETS(20 MG) BY MOUTH DAILY 1/4/2019 at Unknown time Yes Viri Savage MD

## 2019-01-04 NOTE — ED NOTES
"[facility]  ED Nurse Handoff Report    Chief Complaint:  Loss of Consciousness      HPI   Krystyna Peña is a 63 year old female with a history of hypertension and previous cardiac arrest who presents to the emergency department today via EMS for evaluation after loss of consciousness. Patient reports that while walking in the store today, she began to feel lightheaded to the point that she needed to sit down and subsequently lost consciousness while in the checkout line, hitting her nose on a handlebar. She did have a nosebleed, but is currently hemostatic although her right nare feels \"stuffy.\" She denies chest pain or shortness of breath prior to this episode, had no urinary incontinence, did not bite her tongue, says she has had 2 similar incidents in the past, and is currently only lightheaded. Patient also endorses that she has had decreased appetite recently, had vomiting this morning, urinary frequency during the night, increased bilateral leg swelling, and notes giving blood and urine this morning around 1200 for recent issues with her kidneys. She denies dysuria, shortness of breath, diarrhea, fever, and is not on blood thinners besides ASA.        ED Chief complaint: Loss of Consciousness  ED Diagnosis:   Final diagnoses:   Syncope and collapse   JORJE (acute kidney injury) (H)     Allergies:   Allergies   Allergen Reactions     Morphine      respiratory distress     Cephalexin Rash       Code Status: Full Code  Activity level - Baseline/Home:  Independent. Activity Level - Current:   Stand with Assist. Lift room needed: No. Bariatric: No   Needed: No   Isolation: No. Infection: Not Applicable.     Vital Signs:   Vitals:    01/04/19 1430 01/04/19 1445 01/04/19 1500 01/04/19 1545   BP: 139/82 136/69 145/82 149/83   Pulse: 79 75 92 75   Resp:       Temp:       TempSrc:       SpO2:           Cardiac Rhythm:  NSR      Pain level: 0-10 Pain Scale: 0  Patient confused: No. Patient Falls " Risk: Yes.   Elimination Status: Has voided   Patient Report - Initial Complaint: Loss of Consciousness              Focused Assessment:   15:59 Neurological Cognitive - Cognitive/Neuro/Behavioral WDL: -WDL except; level of consciousness (Patient presents with syncopal episode at [facility] today. Patient fell onto the front of her cart, hitting her nose. Patient did not hit her head. Patient states she was feeling lightheaded prior to LOC, still feeling lightheaded.) Level Of Consciousness: alert Follows Commands: yes Mood/Behavior: calm; cooperative  Veronique Coma Scale - Best Eye Response: 4-->(E4) spontaneous Best Motor Response: 6-->(M6) obeys commands Best Verbal Response: 5-->(V5) oriented [place] Coma Scale Score: 15       Tests Performed: Orthostatics, Blood Work, UA, US Renal  Abnormal Results:   Labs Ordered and Resulted from Time of ED Arrival Up to the Time of Departure from the ED   CBC WITH PLATELETS DIFFERENTIAL - Abnormal; Notable for the following components:       Result Value     (*)     MCH 34.9 (*)     All other components within normal limits   BASIC METABOLIC PANEL - Abnormal; Notable for the following components:    Glucose 105 (*)     Creatinine 3.46 (*)     GFR Estimate 13 (*)     GFR Estimate If Black 15 (*)     All other components within normal limits   ROUTINE UA WITH MICROSCOPIC - Abnormal; Notable for the following components:    Leukocyte Esterase Urine Small (*)     WBC Urine 6 (*)     Bacteria Urine Few (*)     Squamous Epithelial /HPF Urine 4 (*)     Mucous Urine Present (*)     Hyaline Casts 4 (*)     All other components within normal limits   GLUCOSE MONITOR NURSING POCT   LACTIC ACID WHOLE BLOOD   GLUCOSE BY METER   TROPONIN I   CARDIAC CONTINUOUS MONITORING   ORTHOSTATIC BLOOD PRESSURE AND PULSE     US Renal Complete   Final Result   IMPRESSION: Normal bilateral renal ultrasound. No hydronephrosis or   obvious renal calculi.      JHONY DYE MD          Treatments  provided: NS Bolus  Family Comments: No family at bedside.   OBS brochure/video discussed/provided to patient:  Yes  ED Medications:   Medications   0.9% sodium chloride BOLUS (1,000 mLs Intravenous New Bag 1/4/19 8444)     Drips infusing:  No  For the majority of the shift, the patient's behavior Green. Interventions performed were N/A.     Severe Sepsis OR Septic Shock Diagnosis Present: No      ED Nurse Name/Phone Number: Zaira Fisher,   4:18 PM    RECEIVING UNIT ED HANDOFF REVIEW    Above ED Nurse Handoff Report was reviewed: yes  Reviewed by: Swathi Cheatham on January 4, 2019 at 4:48 PM

## 2019-01-05 ENCOUNTER — APPOINTMENT (OUTPATIENT)
Dept: CARDIOLOGY | Facility: CLINIC | Age: 64
End: 2019-01-05
Payer: COMMERCIAL

## 2019-01-05 LAB
ANION GAP SERPL CALCULATED.3IONS-SCNC: 8 MMOL/L (ref 3–14)
ANION GAP SERPL CALCULATED.3IONS-SCNC: 9 MMOL/L (ref 3–14)
BUN SERPL-MCNC: 17 MG/DL (ref 7–30)
BUN SERPL-MCNC: 18 MG/DL (ref 7–30)
CALCIUM SERPL-MCNC: 8.1 MG/DL (ref 8.5–10.1)
CALCIUM SERPL-MCNC: 9.2 MG/DL (ref 8.5–10.1)
CHLORIDE SERPL-SCNC: 104 MMOL/L (ref 94–109)
CHLORIDE SERPL-SCNC: 111 MMOL/L (ref 94–109)
CO2 SERPL-SCNC: 22 MMOL/L (ref 20–32)
CO2 SERPL-SCNC: 23 MMOL/L (ref 20–32)
CREAT SERPL-MCNC: 2.86 MG/DL (ref 0.52–1.04)
CREAT SERPL-MCNC: 3.33 MG/DL (ref 0.52–1.04)
CREAT UR-MCNC: 187 MG/DL
ERYTHROCYTE [DISTWIDTH] IN BLOOD BY AUTOMATED COUNT: 13.4 % (ref 10–15)
GFR SERPL CREATININE-BSD FRML MDRD: 14 ML/MIN/{1.73_M2}
GFR SERPL CREATININE-BSD FRML MDRD: 17 ML/MIN/{1.73_M2}
GLUCOSE SERPL-MCNC: 102 MG/DL (ref 70–99)
GLUCOSE SERPL-MCNC: 80 MG/DL (ref 70–99)
HCT VFR BLD AUTO: 34.4 % (ref 35–47)
HGB BLD-MCNC: 11.2 G/DL (ref 11.7–15.7)
MCH RBC QN AUTO: 34 PG (ref 26.5–33)
MCHC RBC AUTO-ENTMCNC: 32.6 G/DL (ref 31.5–36.5)
MCV RBC AUTO: 105 FL (ref 78–100)
MICROALBUMIN UR-MCNC: 20 MG/L
MICROALBUMIN/CREAT UR: 10.75 MG/G CR (ref 0–25)
PLATELET # BLD AUTO: 376 10E9/L (ref 150–450)
POTASSIUM SERPL-SCNC: 3.9 MMOL/L (ref 3.4–5.3)
POTASSIUM SERPL-SCNC: 4 MMOL/L (ref 3.4–5.3)
RBC # BLD AUTO: 3.29 10E12/L (ref 3.8–5.2)
SODIUM SERPL-SCNC: 136 MMOL/L (ref 133–144)
SODIUM SERPL-SCNC: 141 MMOL/L (ref 133–144)
WBC # BLD AUTO: 4.5 10E9/L (ref 4–11)

## 2019-01-05 PROCEDURE — 93306 TTE W/DOPPLER COMPLETE: CPT | Mod: 26 | Performed by: INTERNAL MEDICINE

## 2019-01-05 PROCEDURE — 96361 HYDRATE IV INFUSION ADD-ON: CPT

## 2019-01-05 PROCEDURE — 93306 TTE W/DOPPLER COMPLETE: CPT

## 2019-01-05 PROCEDURE — 36415 COLL VENOUS BLD VENIPUNCTURE: CPT | Performed by: INTERNAL MEDICINE

## 2019-01-05 PROCEDURE — 25000128 H RX IP 250 OP 636: Performed by: PHYSICIAN ASSISTANT

## 2019-01-05 PROCEDURE — 96376 TX/PRO/DX INJ SAME DRUG ADON: CPT

## 2019-01-05 PROCEDURE — 96374 THER/PROPH/DIAG INJ IV PUSH: CPT

## 2019-01-05 PROCEDURE — 85027 COMPLETE CBC AUTOMATED: CPT | Performed by: INTERNAL MEDICINE

## 2019-01-05 PROCEDURE — 99225 ZZC SUBSEQUENT OBSERVATION CARE,LEVEL II: CPT | Performed by: INTERNAL MEDICINE

## 2019-01-05 PROCEDURE — G0378 HOSPITAL OBSERVATION PER HR: HCPCS

## 2019-01-05 PROCEDURE — 96375 TX/PRO/DX INJ NEW DRUG ADDON: CPT

## 2019-01-05 PROCEDURE — 99207 ZZC CDG-CODE CATEGORY CHANGED: CPT | Performed by: INTERNAL MEDICINE

## 2019-01-05 PROCEDURE — 25000128 H RX IP 250 OP 636: Performed by: INTERNAL MEDICINE

## 2019-01-05 PROCEDURE — 25000132 ZZH RX MED GY IP 250 OP 250 PS 637: Performed by: INTERNAL MEDICINE

## 2019-01-05 PROCEDURE — 80048 BASIC METABOLIC PNL TOTAL CA: CPT | Performed by: INTERNAL MEDICINE

## 2019-01-05 RX ORDER — LABETALOL HYDROCHLORIDE 5 MG/ML
10 INJECTION, SOLUTION INTRAVENOUS
Status: DISCONTINUED | OUTPATIENT
Start: 2019-01-05 | End: 2019-01-06 | Stop reason: HOSPADM

## 2019-01-05 RX ADMIN — LABETALOL HYDROCHLORIDE 10 MG: 5 INJECTION INTRAVENOUS at 19:07

## 2019-01-05 RX ADMIN — ASPIRIN 81 MG: 81 TABLET, COATED ORAL at 08:17

## 2019-01-05 RX ADMIN — LABETALOL HYDROCHLORIDE 10 MG: 5 INJECTION INTRAVENOUS at 21:09

## 2019-01-05 RX ADMIN — SODIUM CHLORIDE, PRESERVATIVE FREE: 5 INJECTION INTRAVENOUS at 23:46

## 2019-01-05 RX ADMIN — SODIUM CHLORIDE, PRESERVATIVE FREE: 5 INJECTION INTRAVENOUS at 04:32

## 2019-01-05 NOTE — PLAN OF CARE
PRIMARY DIAGNOSIS: SYNCOPE/TIA  OUTPATIENT/OBSERVATION GOALS TO BE MET BEFORE DISCHARGE:  1. Orthostatic performed: Yes:  done on admission        Lying Orthostatic BP: 137/77         Sitting Orthostatic BP: 137/77         Standing Orthostatic BP: 145/82   /77   Pulse 82   Temp 98.2  F (36.8  C) (Oral)   Resp 16   LMP 10/01/2003   SpO2 96%        2. Diagnostic testing complete & at baseline neurologic testing: No     3. Cleared by consultants (if involved): No     4. Interpretation of cardiac rhythm per telemetry tech: SR, HR 80's     5. Tolerating adequate PO diet and medications: Yes     6. Return to near baseline physical activity or neurologic status: Yes     Discharge Planner Nurse   Safe discharge environment identified: Yes  Barriers to discharge: No, not once medically cleared       Entered by: Krista Tay 01/04/2019       VSS. SBA for transfers d/t recent syncopal episode.  CT head and facial bones-neg.  Cr- 3.46, GFR-13. Edema in BLE. Urine collected for FENa upon admission.  Has  tele and fluids, echo and nephrology consult this am.  Please review provider order for any additional goals.   Nurse to notify provider when observation goals have been met and patient is ready for discharge.

## 2019-01-05 NOTE — PLAN OF CARE
PRIMARY DIAGNOSIS: SYNCOPE/TIA  OUTPATIENT/OBSERVATION GOALS TO BE MET BEFORE DISCHARGE:  1. Orthostatic performed: Yes:          Lying Orthostatic BP: 137/77         Sitting Orthostatic BP: 137/77         Standing Orthostatic BP: 145/82     2. Diagnostic testing complete & at baseline neurologic testing: No    3. Cleared by consultants (if involved): No    4. Interpretation of cardiac rhythm per telemetry tech: SR, HR 80's    5. Tolerating adequate PO diet and medications: Yes    6. Return to near baseline physical activity or neurologic status: Yes    Discharge Planner Nurse   Safe discharge environment identified: Yes  Barriers to discharge: No, not once medically cleared       Entered by: Krista Tay 01/04/2019      VSS. SBA for transfers d/t recent syncopal episode. Pt. Hit nose during episode, ice applied. CT head and facial bones-neg. Cr- 3.46, GFR-13. Edema in BLE. Urine collected for FENa. Plan for tele tonight, fluids, echo in am and nephrology consult in am.   Please review provider order for any additional goals.   Nurse to notify provider when observation goals have been met and patient is ready for discharge.

## 2019-01-05 NOTE — PLAN OF CARE
ROOM # 212-2    Living Situation (if not independent, order SW consult): Home w/   Facility name: N/A  : -[facility]    Activity level at baseline: Ind  Activity level on admit: SBA      Patient registered to observation; given Patient Bill of Rights; given the opportunity to ask questions about observation status and their plan of care.  Patient has been oriented to the observation room, bathroom and call light is in place.    Discussed discharge goals and expectations with patient/family.

## 2019-01-05 NOTE — PLAN OF CARE
PRIMARY DIAGNOSIS: SYNCOPE/TIA  OUTPATIENT/OBSERVATION GOALS TO BE MET BEFORE DISCHARGE:  1. Orthostatic performed: Yes:          Lying Orthostatic BP: 137/77         Sitting Orthostatic BP: 137/77         Standing Orthostatic BP: 145/82      2. Diagnostic testing complete & at baseline neurologic testing: Yes     3. Cleared by consultants (if involved): No     4. Interpretation of cardiac rhythm per telemetry tech: SR, HR 70-80s     5. Tolerating adequate PO diet and medications: Yes     6. Return to near baseline physical activity or neurologic status: Yes     Discharge Planner Nurse   Safe discharge environment identified: Yes  Barriers to discharge: No       Entered by: Adriane Farias 01/05/2019    /85 (BP Location: Right arm)   Pulse 78   Temp 98.1  F (36.7  C) (Oral)   Resp 16   LMP 10/01/2003   SpO2 99%      A&Ox4. VSS. Remote tele (SR w/ HR in 70-80s). LS clear/equal bilaterally. Mild edema to BLE, mottled in appearance. Up w/ SBA. Denies dizziness/lightheadedness while standing and ambulating in room. ECHO complete this AM - results pending. Denies pain. BS active x4, passing flatus, toleraing low fat, 2g Na diet (poor to fair appetite - baseline per pt.). Pt. states she has been having daily emesis in the AM since shoulder surgery in October. Last emesis was Friday 1/4 per pt. Voiding in adequate amts. IVF infusing. Creatinine 2.86 this AM, Nephrology consult. Will continue to monitor.   .   Please review provider order for any additional goals.   Nurse to notify provider when observation goals have been met and patient is ready for discharge.

## 2019-01-05 NOTE — H&P
[facility]    History and Physical - Hospitalist Service       Date of Admission:  1/4/2019    Assessment & Plan   Krystyna Peña is a 63 year old female admitted on 1/4/2019. She has a past medical history significant for hypertension.  She presented to the emergency room after a syncopal event.    1.  Syncope.  Suspect vasovagal event.  Monitor on telemetry.  Check echocardiogram.  Check CT scan of the head.  Check CT scan of the maxillofacial structures.    2.  Acute kidney failure.  Unclear how long this has been going on.  Stop lisinopril.  Start continuous IV fluids.  Avoid nephrotoxins.  Check FENa.  Nephrology consult.    3.  Hypertension.  Hold lisinopril due to kidney failure.  Monitor.     Diet: Low Saturated Fat Na <2400 mg    DVT Prophylaxis: Ambulate  To Catheter: not present  Code Status: Full Code      Disposition Plan   Expected discharge: Tomorrow, recommended to prior living arrangement   Entered: Ray Field DO 01/04/2019, 7:11 PM      Ray Field, DO  [facility]    ______________________________________________________________________    Chief Complaint   Syncope.    History is obtained from the patient    History of Present Illness   Krystyna Peña is a 63 year old female who has a past medical history significant for hypertension and recent left shoulder surgery.  She had recently been told by her primary care physician that she was having kidney problems.  She had been to the lab earlier today for repeat blood work and a urine sample.  After having blood drawn and giving a urine sample, she went to the store.  Just after checking out at the store, she passed out.  Fell forward and was told that she hit her face on a shopping cart.  Has not passed out like this before.  Is having a little pain in her nose currently.  Does note that she was feeling lightheaded before she passed out.  Does note that she has been having leg swelling for the past  several weeks.  Has been urinating frequently.  Does not feel short of breath.  No other complaints.    Review of Systems    The 10 point Review of Systems is negative other than noted in the HPI     Past Medical History    I have reviewed this patient's medical history and updated it with pertinent information if needed.   Past Medical History:   Diagnosis Date     Arthritis      Asymptomatic varicose veins      Benign neoplasm of colon 11/06, 7/13    Adenoma     Cardiac arrest (H) 2003    after knee replacement     Gastroesophageal reflux disease      Hypertension      Major depression        Past Surgical History   I have reviewed this patient's surgical history and updated it with pertinent information if needed.  Past Surgical History:   Procedure Laterality Date     AMPUTATE TOE(S) Left 4/12/2018    Procedure: AMPUTATE TOE(S);  Amputation left third digit;  Surgeon: Herb Michael DPM;  Location: [facility] OR     ARTHROPLASTY HIP Right 3/28/2016    Procedure: ARTHROPLASTY HIP;  Surgeon: Perez Meza MD;  Location: [facility] OR     C NONSPECIFIC PROCEDURE  1972    Right arm plastic surgery     C NONSPECIFIC PROCEDURE  1972    Right knee surgery     C NONSPECIFIC PROCEDURE  10/03    L popliteal AVM repair     C NONSPECIFIC PROCEDURE  11/04    Removal bone spur left foot     C NONSPECIFIC PROCEDURE  4/07    amputation of toes left & right toes     C REPAIR SPIEGELIAN HERNIA  2002     C TOTAL KNEE ARTHROPLASTY  10/03    LT     COLONOSCOPY       HC CORRECT BUNION,SIMPLE  1998    RT     HC CORRECT BUNION,SIMPLE  2001    LT     HC KNEE SCOPE, DIAGNOSTIC      LT     REVERSE ARTHROPLASTY SHOULDER Left 10/30/2018    Procedure: Left reverse total shoulder arthroplasty;  Surgeon: Aaron Christianson MD;  Location: [facility] OR     ROTATOR CUFF REPAIR RT/LT  7/07    right     VASCULAR SURGERY  left leg bypass 2004       Social History   I have reviewed this patient's social history and updated it with pertinent information if needed.  Social  History     Tobacco Use     Smoking status: Former Smoker     Packs/day: 1.00     Types: Cigarettes, Cigars     Last attempt to quit: 2013     Years since quittin.7     Smokeless tobacco: Never Used   Substance Use Topics     Alcohol use: Yes     Comment: 1 per day     Drug use: No       Family History   I have reviewed this patient's family history and updated it with pertinent information if needed.   Family History   Problem Relation Age of Onset     Breast Cancer Mother      Cancer - colorectal Father         66     Diabetes Brother        Prior to Admission Medications   Prior to Admission Medications   Prescriptions Last Dose Informant Patient Reported? Taking?   ASPIRIN PO 2019 at Unknown time  Yes Yes   Sig: Take 81 mg by mouth daily   lisinopril (PRINIVIL/ZESTRIL) 10 MG tablet 2019 at Unknown time  No Yes   Sig: TAKE 2 TABLETS(20 MG) BY MOUTH DAILY      Facility-Administered Medications: None     Allergies   Allergies   Allergen Reactions     Morphine      respiratory distress     Cephalexin Rash       Physical Exam   Vital Signs: Temp: 98.1  F (36.7  C) Temp src: Oral BP: 159/84 Pulse: 77 Heart Rate: 84 Resp: 22 SpO2: 99 % O2 Device: None (Room air)    Weight: 0 lbs 0 oz    Gen:  NAD, A&Ox3.  Eyes:  PERRL, sclera anicteric.  OP:  MMM, no lesions.  Neck:  Supple.  CV:  Regular, no murmurs.  Lung:  CTA b/l, normal effort.  Ab:  +BS, soft.  Skin:  Warm, dry to touch.  No rash.  Ext:  1+ pitting edema LE b/l.      Data   Data reviewed today: I reviewed all medications, new labs and imaging results over the last 24 hours.   Recent Labs   Lab 19  1425 19  1341   WBC 6.4 6.2   HGB 13.7 13.2   * 105*    392    136   POTASSIUM 4.3 3.9   CHLORIDE 104 105   CO2 23 24   BUN 17 20   CR 3.46* 4.02*   ANIONGAP 8 7   YANA 8.7 9.1   * 105*   ALT  --  12   TROPI <0.015  --

## 2019-01-05 NOTE — PROGRESS NOTES
[facility]  Hospitalist Progress Note  Gricelda Valdez MD 01/05/19  Text Page  Pager: [phone] (7am-6pm)    Reason for Stay (Diagnosis): Syncope, JORJE         Assessment and Plan:      Summary of Stay: Krystyna Peña is a 63 year old female with past medical history of HTN, recent treatment for diverticulitis (therapy completed, started on 12/20) and recently found JORJE who was admitted on 1/4/2019 with syncopal episode as well as JORJE.    Problem List/Assessment and Plan:     1. Syncope: Symptoms consistent with vasovagal syncope (had lightheadedness and weakness prior to her event, felt that she was going to pass out, no CP or SOB).  CT head without acute pathology.  She did hit her face when she passed out, CT facial bones without fracture.  TTE shows normal EF without significant valvular abnormalities.  Has not had arrhythmias on telemetry.  Orthostatics negative.    - Continue to monitor on telemetry    2. HTN: History of HTN and had been on Lisinopril.  This has been discontinued given her JORJE.  Will monitor blood pressure, likely will need a new agent, potentially beta blocker.  - Monitor for now  - Lisinopril discontinued    3. JORJE on likely CKD: Looking back at prior creatinine her baseline appears to be ~1.2-1.6.  Creatinine had been 1.14 on 12/11/18 but on 1/2 had increased to 4.02.  She had been on Lisinopril which has been discontinued.  During that time she was also treated with Ciprofloxacin and Flagyl.  FENA elevated to 1.6.  Creatinine has improed from 3.45 on admit to 2.86 today.  Nephrology has been consulted as well.  - Lisinopril stopped  - Nephrology consulted  - Continue IVF  - Avoid nephrotoxic agents  - BMP in AM    Diet: Low Saturated Fat Na <2400 mg    DVT Prophylaxis: Low Risk/Ambulatory with no VTE prophylaxis indicated  To Catheter: not present  Code Status: Full Code      Disposition Plan   Expected discharge: 1-2 days, recommended to prior living arrangement once  no further syncope, improving renal function.  Entered: Gricelda Valdez MD 01/05/2019, 11:01 AM       The patient's care was discussed with the Bedside Nurse, Care Coordinator/ and Patient.  I also updated the patient's  at the bedside later in the day.    Gricelda Valdez MD  Hospitalist Service  [facility]          Interval History (Subjective):      Patient states she had a syncopal episode at [facility] yesterday.  She had started to feel bad while shopping, she sat down and felt a little better.  She felt generally weak and lightheaded but did not have CP or SOB.  She went to the self checkout and her symptoms worsened.  She sat down and drank some water.  She tried to get up and grabbed the grocery cart but then had a syncopal episode.      She states she had been having daily emesis but then was on two antibiotics (Cipro/Flagyl) for 10 days.  Since that time the emesis has resolved but she still has a very poor appetite.  Will try to eat and nothing sounds good and she can only eat a few bites.  She also notes that her legs have been more swollen over the past month but worse over the past week. Her primary had referred her to nephrology but she states she cannot get in until March.                  Physical Exam:      Last Vital Signs:  /59 (BP Location: Right arm)   Pulse 78   Temp 98.5  F (36.9  C) (Oral)   Resp 16   LMP 10/01/2003   SpO2 97%     General: Alert, awake, no acute distress.  HEENT: Normocephalic and atraumatic, eyes anicteric and without scleral injection, EOMI, face symmetric, MMM.  Cardiac: RRR, normal S1, S2. No m/g/r, 1+ pitting LE edema.  Pulmonary: Normal chest rise, normal work of breathing.  Lungs CTAB without crackles or wheezing.  Abdomen: soft, non-tender, non-distended.  Normoactive bowel sounds, no guarding or rebound tenderness.  Extremities: no deformities.  Warm, well perfused.  Skin: no rashes or lesions.  Warm and Dry.  Neuro: No  focal deficits.  Speech clear.  Spontaneously moving all extremities in bed  Psych: Alert and oriented x3. Appropriate affect.         Medications:      All current medications were reviewed with changes reflected in problem list.         Data:      All new lab and imaging data was reviewed.   Labs:  Recent Labs   Lab 01/05/19  0629 01/04/19  2151 01/04/19  1425 01/04/19  1144    139 135 136   POTASSIUM 4.0  --  4.3 3.9   CHLORIDE 111*  --  104 104   CO2 22  --  23 23   ANIONGAP 8  --  8 9   GLC 80  --  105* 102*   BUN 18  --  17 17   CR 2.86* 3.05* 3.46* 3.33*   GFRESTIMATED 17* 15* 13* 14*   GFRESTBLACK 19* 18* 15* 16*   YANA 8.1*  --  8.7 9.2     Recent Labs   Lab 01/05/19  0629 01/04/19  1425 01/02/19  1341   WBC 4.5 6.4 6.2   HGB 11.2* 13.7 13.2   HCT 34.4* 40.4 40.9   * 103* 105*    420 392      Imaging:   Recent Results (from the past 24 hour(s))   US Renal Complete    Narrative    US RENAL COMPLETE 1/4/2019 3:51 PM     HISTORY: JORJE.     COMPARISON: CT abdomen and pelvis 12/20/2018.    FINDINGS: The kidneys are normal in echogenicity without  hydronephrosis bilaterally. Right kidney measures 10.2 x 4.7 x 4.5 cm  and the left measures 10.0 x 5.1 x 5.0 cm. No renal cortical thinning  is appreciated. No renal cyst or mass is evident. No definite renal  calculi are appreciated. Bladder is partly decompressed, but otherwise  unremarkable.      Impression    IMPRESSION: Normal bilateral renal ultrasound. No hydronephrosis or  obvious renal calculi.    JHONY DYE MD   CT Head w/o Contrast    Narrative    CT OF THE HEAD WITHOUT CONTRAST 1/4/2019 7:48 PM     COMPARISON: None.    HISTORY: Altered level of consciousness (LOC), unexplained.    TECHNIQUE: 5 mm thick axial CT images of the head were acquired  without IV contrast material.    FINDINGS: There is mild diffuse cerebral volume loss. There are subtle  patchy areas of decreased density in the cerebral white matter  bilaterally that are  consistent with sequela of chronic small vessel  ischemic disease.    The ventricles and basal cisterns are within normal limits in  configuration given the degree of cerebral volume loss.  There is no  midline shift. There are no extra-axial fluid collections.    No intracranial hemorrhage, mass or recent infarct.    The visualized paranasal sinuses are well-aerated. There is no  mastoiditis. There are no fractures of the visualized bones.      Impression    IMPRESSION: Diffuse cerebral volume loss and cerebral white matter  changes consistent with chronic small vessel ischemic disease. No  evidence for acute intracranial pathology.        Radiation dose for this scan was reduced using automated exposure  control, adjustment of the mA and/or kV according to patient size, or  iterative reconstruction technique    ALIVIA IRENE MD   CT Facial Bones without Contrast    Narrative    CT OF THE FACE WITHOUT CONTRAST 1/4/2019 7:48 PM     COMPARISON: None.    HISTORY: Facial trauma, fracture suspected.    TECHNIQUE:  Helical thin-section axial CT images of the face were  acquired without contrast. Coronal reconstructions were created from  the axial source data.      Impression    IMPRESSION: There are no facial bone fractures. The orbits and globes  bilaterally are unremarkable. The paranasal sinuses are well aerated.  Temporomandibular joint alignment bilaterally is normal.      Radiation dose for this scan was reduced using automated exposure  control, adjustment of the mA and/or kV according to patient size, or  iterative reconstruction technique    MD Gricelda VALDOVINOS MD.

## 2019-01-05 NOTE — PLAN OF CARE
PRIMARY DIAGNOSIS: SYNCOPE/TIA  OUTPATIENT/OBSERVATION GOALS TO BE MET BEFORE DISCHARGE:  1. Orthostatic performed: Yes:          Lying Orthostatic BP: 137/77         Sitting Orthostatic BP: 137/77         Standing Orthostatic BP: 145/82     2. Diagnostic testing complete & at baseline neurologic testing: Yes    3. Cleared by consultants (if involved): No    4. Interpretation of cardiac rhythm per telemetry tech: SR, HR 70-80s    5. Tolerating adequate PO diet and medications: Yes    6. Return to near baseline physical activity or neurologic status: Yes    Discharge Planner Nurse   Safe discharge environment identified: Yes  Barriers to discharge: No       Entered by: Adriane Farias 01/05/2019     /59 (BP Location: Right arm)   Pulse 78   Temp 98.5  F (36.9  C) (Oral)   Resp 16   LMP 10/01/2003   SpO2 97%     A&Ox4. VSS. Remote tele (SR w/ HR in 70-80s). LS clear/equal bilaterally. Mild edema to BLE, mottled in appearance. Up w/ SBA. Denies dizziness/lightheadedness while standing and ambulating in room. ECHO complete this AM - results pending. Denies pain. BS active x4, passing flatus. Pt. states she has been having daily emesis in the AM since shoulder surgery in October. Last emesis was Friday 1/4 per pt. Voiding in adequate amts. IVF infusing. Creatinine 2.86 this AM. Nephrology consult. Will continue to monitor.   .   Please review provider order for any additional goals.   Nurse to notify provider when observation goals have been met and patient is ready for discharge.

## 2019-01-06 VITALS
RESPIRATION RATE: 18 BRPM | DIASTOLIC BLOOD PRESSURE: 88 MMHG | OXYGEN SATURATION: 96 % | SYSTOLIC BLOOD PRESSURE: 144 MMHG | TEMPERATURE: 98.2 F | HEART RATE: 78 BPM

## 2019-01-06 LAB
ANION GAP SERPL CALCULATED.3IONS-SCNC: 8 MMOL/L (ref 3–14)
BUN SERPL-MCNC: 15 MG/DL (ref 7–30)
CALCIUM SERPL-MCNC: 7.9 MG/DL (ref 8.5–10.1)
CHLORIDE SERPL-SCNC: 115 MMOL/L (ref 94–109)
CO2 SERPL-SCNC: 22 MMOL/L (ref 20–32)
CREAT SERPL-MCNC: 2.26 MG/DL (ref 0.52–1.04)
GFR SERPL CREATININE-BSD FRML MDRD: 22 ML/MIN/{1.73_M2}
GLUCOSE SERPL-MCNC: 97 MG/DL (ref 70–99)
INTERPRETATION ECG - MUSE: NORMAL
MAGNESIUM SERPL-MCNC: 1.3 MG/DL (ref 1.6–2.3)
PHOSPHATE SERPL-MCNC: 4.3 MG/DL (ref 2.5–4.5)
POTASSIUM SERPL-SCNC: 3.8 MMOL/L (ref 3.4–5.3)
SODIUM SERPL-SCNC: 145 MMOL/L (ref 133–144)
URATE SERPL-MCNC: 7.2 MG/DL (ref 2.6–6)

## 2019-01-06 PROCEDURE — 25000128 H RX IP 250 OP 636: Performed by: HOSPITALIST

## 2019-01-06 PROCEDURE — 84550 ASSAY OF BLOOD/URIC ACID: CPT | Performed by: INTERNAL MEDICINE

## 2019-01-06 PROCEDURE — 36415 COLL VENOUS BLD VENIPUNCTURE: CPT | Performed by: INTERNAL MEDICINE

## 2019-01-06 PROCEDURE — 25000132 ZZH RX MED GY IP 250 OP 250 PS 637: Performed by: INTERNAL MEDICINE

## 2019-01-06 PROCEDURE — 96361 HYDRATE IV INFUSION ADD-ON: CPT

## 2019-01-06 PROCEDURE — 99217 ZZC OBSERVATION CARE DISCHARGE: CPT | Performed by: HOSPITALIST

## 2019-01-06 PROCEDURE — G0378 HOSPITAL OBSERVATION PER HR: HCPCS

## 2019-01-06 PROCEDURE — 83735 ASSAY OF MAGNESIUM: CPT | Performed by: INTERNAL MEDICINE

## 2019-01-06 PROCEDURE — 96375 TX/PRO/DX INJ NEW DRUG ADDON: CPT

## 2019-01-06 PROCEDURE — 80048 BASIC METABOLIC PNL TOTAL CA: CPT | Performed by: INTERNAL MEDICINE

## 2019-01-06 PROCEDURE — 84100 ASSAY OF PHOSPHORUS: CPT | Performed by: INTERNAL MEDICINE

## 2019-01-06 RX ORDER — SODIUM CHLORIDE, SODIUM LACTATE, POTASSIUM CHLORIDE, CALCIUM CHLORIDE 600; 310; 30; 20 MG/100ML; MG/100ML; MG/100ML; MG/100ML
INJECTION, SOLUTION INTRAVENOUS CONTINUOUS
Status: DISCONTINUED | OUTPATIENT
Start: 2019-01-06 | End: 2019-01-06 | Stop reason: HOSPADM

## 2019-01-06 RX ORDER — MAGNESIUM SULFATE 1 G/100ML
1 INJECTION INTRAVENOUS ONCE
Status: COMPLETED | OUTPATIENT
Start: 2019-01-06 | End: 2019-01-06

## 2019-01-06 RX ADMIN — MAGNESIUM SULFATE IN DEXTROSE 1 G: 10 INJECTION, SOLUTION INTRAVENOUS at 08:35

## 2019-01-06 RX ADMIN — ASPIRIN 81 MG: 81 TABLET, COATED ORAL at 08:22

## 2019-01-06 RX ADMIN — SODIUM CHLORIDE, POTASSIUM CHLORIDE, SODIUM LACTATE AND CALCIUM CHLORIDE: 600; 310; 30; 20 INJECTION, SOLUTION INTRAVENOUS at 08:21

## 2019-01-06 NOTE — CONSULTS
See dictation. Confirmation # [id number].  JORJE which is resolving. Recent episode of diverticulitis. Hx of long-term NSAID use for DJD. JORJE due to NSAID vs pre-renal in setting of ACEI. US nl.    Ok for discharge.  Hold Lisinopril until clinic F/U.  No NSAID.  F/U in our clinic in one week: [facility], Dr. Morin or NP, [phone].   Repeat labs with primary MD clinic in 3 d.

## 2019-01-06 NOTE — PLAN OF CARE
PRIMARY DIAGNOSIS: SYNCOPE/TIA  OUTPATIENT/OBSERVATION GOALS TO BE MET BEFORE DISCHARGE:  1. Orthostatic performed: Yes:          Lying Orthostatic BP: 137/77         Sitting Orthostatic BP: 137/77         Standing Orthostatic BP: 145/82   Done prior     2. Diagnostic testing complete & at baseline neurologic testing: Yes     3. Cleared by consultants (if involved): Yes     4. Interpretation of cardiac rhythm per telemetry tech: SR 80s      5. Tolerating adequate PO diet and medications: Yes     6. Return to near baseline physical activity or neurologic status: Yes     A & Ox4, electronic  SBP, manual reading 156/98.  Instructed to self monitor BP at home and record results for follow up appointment and when to notify health care provider.  PIV removed.  Nephrology consult completed.  Up independently, ambulated in halls and room.  Tolerated diet.  Plan for discharge.  Discharge Planner Nurse   Safe discharge environment identified: Yes  Barriers to discharge: No        Please review provider order for any additional goals.   Nurse to notify provider when observation goals have been met and patient is ready for discharge.

## 2019-01-06 NOTE — TELEPHONE ENCOUNTER
Routing refill request to provider for review/approval because:  Labs out of range:  creatinine    Dr. Savage-Please sign if agree.    Thank you!  MIGUEL AndersonN, RN

## 2019-01-06 NOTE — PLAN OF CARE
PRIMARY DIAGNOSIS: SYNCOPE/TIA  OUTPATIENT/OBSERVATION GOALS TO BE MET BEFORE DISCHARGE:  1. Orthostatic performed: N/A    2. Diagnostic testing complete & at baseline neurologic testing: Yes    3. Cleared by consultants (if involved): No, Nephrology to consult w/family & patient.    4. Interpretation of cardiac rhythm per telemetry tech: SR    5. Tolerating adequate PO diet and medications: Yes    6. Return to near baseline physical activity or neurologic status: No. Intermittent pain/discomfort.    Discharge Planner Nurse   Safe discharge environment identified: No  Barriers to discharge: Yes  Patient alert & orient x3-4. (+) CSM, BUE/BLE. No respiratory or cardiac distress voiced or noted. Telemetry shows NSR.  Please review provider order for any additional goals.   Nurse to notify provider when observation goals have been met and patient is ready for discharge.

## 2019-01-06 NOTE — PLAN OF CARE
PRIMARY DIAGNOSIS: SYNCOPE/TIA  OUTPATIENT/OBSERVATION GOALS TO BE MET BEFORE DISCHARGE:  1. Orthostatic performed: Yes:          Lying Orthostatic BP: 137/77         Sitting Orthostatic BP: 137/77         Standing Orthostatic BP: 145/82      2. Diagnostic testing complete & at baseline neurologic testing: Yes     3. Cleared by consultants (if involved): No     4. Interpretation of cardiac rhythm per telemetry tech: SR 80s      5. Tolerating adequate PO diet and medications: Yes     6. Return to near baseline physical activity or neurologic status: Yes     Patient is alert and oriented x4. VSS besides BP has been increased 187/79 and 173/82. PRN labetalol given x2. Patient has been tolerating regular diet. Up SBA. Patient denies dizziness, lightheadedness or any episodes of confusion. Patient is on tele, SR 80. NS running at 100 mL/hr. Nephro consulted for tomorrow. Will continue to monitor patient.       Discharge Planner Nurse   Safe discharge environment identified: Yes  Barriers to discharge: No       Entered by: Jeannette Barillas 01/05/2019     Please review provider order for any additional goals.   Nurse to notify provider when observation goals have been met and patient is ready for discharge.

## 2019-01-06 NOTE — PROGRESS NOTES
Patient's After Visit Summary was reviewed with patient and her spouse.  Patient verbalized understanding of After Visit Summary, recommended follow up and was given an opportunity to ask questions.   Discharge medications sent home with patient/family: No new meds.   Discharged to home in c/o .  Patient instructed on discontinuing Lisinopril and of follow up MD appointments.  stated good understanding of all discharge instructions.

## 2019-01-06 NOTE — PLAN OF CARE
PRIMARY DIAGNOSIS: SYNCOPE/TIA  OUTPATIENT/OBSERVATION GOALS TO BE MET BEFORE DISCHARGE:  1. Orthostatic performed: Yes:          Lying Orthostatic BP: 137/77         Sitting Orthostatic BP: 137/77         Standing Orthostatic BP: 145/82   Done prior     2. Diagnostic testing complete & at baseline neurologic testing: Yes     3. Cleared by consultants (if involved): No nephrology consult pending     4. Interpretation of cardiac rhythm per telemetry tech: SR 80s      5. Tolerating adequate PO diet and medications: Yes     6. Return to near baseline physical activity or neurologic status: Yes     A&Ox4, VSS, 97% RA, denies pain, denies dizziness.  Tenderness on right side of nose from fall prior to admission.  Na 145, IV fluids changed to LR per order.  Magnesium 1.3, replacement infusing as ordered.   Cr. 2.26, down from 2.86. Tolerating diet, good appetite.  Up with SBA . Will continue to monitor, nephrology consult pending.  Discharge Planner Nurse   Safe discharge environment identified: Yes  Barriers to discharge: No            Please review provider order for any additional goals.   Nurse to notify provider when observation goals have been met and patient is ready for discharge.

## 2019-01-06 NOTE — DISCHARGE SUMMARY
[facility]  Hospitalist Discharge Summary       Date of Admission:  1/4/2019  Date of Discharge:  1/6/2019  Discharging Provider: Raheel Hare MD      Discharge Diagnoses   Syncope, acute renal failure    Follow-ups Needed After Discharge   Follow-up Appointments     Follow-up and recommended labs and tests       Follow up with primary care provider, Viri Savage, within 2-3 days for hospital follow- up.  The following labs/tests are recommended: chem 7 (to check kidney function). Follow-up with Dr. Carbajal or one of his colleagues (his office will call, [phone]) within a week.               Unresulted Labs Ordered in the Past 30 Days of this Admission     No orders found from 11/5/2018 to 1/5/2019.      These results will be followed up by [facility]    Admitting H&P:  63 year old female who has a past medical history significant for hypertension and recent left shoulder surgery.  She had recently been told by her primary care physician that she was having kidney problems.  She had been to the lab earlier today for repeat blood work and a urine sample.  After having blood drawn and giving a urine sample, she went to the store.  Just after checking out at the store, she passed out.  Fell forward and was told that she hit her face on a shopping cart.  Has not passed out like this before.  Is having a little pain in her nose currently.  Does note that she was feeling lightheaded before she passed out.  Does note that she has been having leg swelling for the past several weeks.  Has been urinating frequently.  Does not feel short of breath.  No other complaints.    Patient was admitted to the Obs Unit. She was hydrated. Her Cr improved. Likely her acute on chronic renal failure was due to nephrotoxic meds and her recent diverticulitis. She was seen by Dr. Morin (Nephrology). She will follow-up with him. She will discharge home.  is in room. Questions  answered.      Consultations This Hospital Stay   NEPHROLOGY IP CONSULT    Code Status   Full Code    Time Spent on this Encounter   I, Raheel Hare, personally saw the patient today and spent greater than 30 minutes discharging this patient.       Raheel Hare MD  [facility]  ______________________________________________________________________    Physical Exam   Vital Signs: Temp: 98.2  F (36.8  C) Temp src: Oral BP: 144/88   Heart Rate: 82 Resp: 18 SpO2: 96 % O2 Device: None (Room air)    Weight: 0 lbs 0 oz  Constitutional: awake, alert, cooperative, no apparent distress, and appears stated age  Respiratory: No increased work of breathing, good air exchange, clear to auscultation bilaterally, no crackles or wheezing  Cardiovascular: Normal apical impulse, regular rate and rhythm, normal S1 and S2, no S3 or S4, and no murmur noted  GI: No scars, normal bowel sounds, soft, non-distended, non-tender, no masses palpated, no hepatosplenomegally       Primary Care Physician   Viri Savage    Discharge Disposition   Discharged to home  Condition at discharge: Stable    Significant Results and Procedures   Most Recent 3 CBC's:  Recent Labs   Lab Test 01/05/19  0629 01/04/19  1425 01/02/19  1341   WBC 4.5 6.4 6.2   HGB 11.2* 13.7 13.2   * 103* 105*    420 392     Most Recent 3 BMP's:  Recent Labs   Lab Test 01/06/19  0617 01/05/19  0629 01/04/19  2151 01/04/19  1425   * 141 139 135   POTASSIUM 3.8 4.0  --  4.3   CHLORIDE 115* 111*  --  104   CO2 22 22  --  23   BUN 15 18  --  17   CR 2.26* 2.86* 3.05* 3.46*   ANIONGAP 8 8  --  8   YANA 7.9* 8.1*  --  8.7   GLC 97 80  --  105*   ,   Results for orders placed or performed during the hospital encounter of 01/04/19   US Renal Complete    Narrative    US RENAL COMPLETE 1/4/2019 3:51 PM     HISTORY: JORJE.     COMPARISON: CT abdomen and pelvis 12/20/2018.    FINDINGS: The kidneys are normal in echogenicity without  hydronephrosis  bilaterally. Right kidney measures 10.2 x 4.7 x 4.5 cm  and the left measures 10.0 x 5.1 x 5.0 cm. No renal cortical thinning  is appreciated. No renal cyst or mass is evident. No definite renal  calculi are appreciated. Bladder is partly decompressed, but otherwise  unremarkable.      Impression    IMPRESSION: Normal bilateral renal ultrasound. No hydronephrosis or  obvious renal calculi.    JHONY DYE MD   CT Head w/o Contrast    Narrative    CT OF THE HEAD WITHOUT CONTRAST 1/4/2019 7:48 PM     COMPARISON: None.    HISTORY: Altered level of consciousness (LOC), unexplained.    TECHNIQUE: 5 mm thick axial CT images of the head were acquired  without IV contrast material.    FINDINGS: There is mild diffuse cerebral volume loss. There are subtle  patchy areas of decreased density in the cerebral white matter  bilaterally that are consistent with sequela of chronic small vessel  ischemic disease.    The ventricles and basal cisterns are within normal limits in  configuration given the degree of cerebral volume loss.  There is no  midline shift. There are no extra-axial fluid collections.    No intracranial hemorrhage, mass or recent infarct.    The visualized paranasal sinuses are well-aerated. There is no  mastoiditis. There are no fractures of the visualized bones.      Impression    IMPRESSION: Diffuse cerebral volume loss and cerebral white matter  changes consistent with chronic small vessel ischemic disease. No  evidence for acute intracranial pathology.        Radiation dose for this scan was reduced using automated exposure  control, adjustment of the mA and/or kV according to patient size, or  iterative reconstruction technique    ALIVIA IRENE MD   CT Facial Bones without Contrast    Narrative    CT OF THE FACE WITHOUT CONTRAST 1/4/2019 7:48 PM     COMPARISON: None.    HISTORY: Facial trauma, fracture suspected.    TECHNIQUE:  Helical thin-section axial CT images of the face were  acquired without  contrast. Coronal reconstructions were created from  the axial source data.      Impression    IMPRESSION: There are no facial bone fractures. The orbits and globes  bilaterally are unremarkable. The paranasal sinuses are well aerated.  Temporomandibular joint alignment bilaterally is normal.      Radiation dose for this scan was reduced using automated exposure  control, adjustment of the mA and/or kV according to patient size, or  iterative reconstruction technique    ALIVIA IRENE MD       Discharge Orders      Basic metabolic panel     Reason for your hospital stay    Syncope (passing out) likely due to acute renal failure     Follow-up and recommended labs and tests     Follow up with primary care provider, Viri Savage, within 2-3 days for hospital follow- up.  The following labs/tests are recommended: chem 7 (to check kidney function). Follow-up with Dr. Carbajal or one of his colleagues (his office will call, [phone]) within a week.     Activity    Your activity upon discharge: activity as tolerated     Discharge Instructions    Avoid all NSAIDs (non-steroidal anti-inflammatory medications, including Aleve, Advil, Motrin, ibuprofen)     Diet    Follow this diet upon discharge: Regular     Discharge Medications   Current Discharge Medication List      CONTINUE these medications which have NOT CHANGED    Details   ASPIRIN PO Take 81 mg by mouth daily         STOP taking these medications       lisinopril (PRINIVIL/ZESTRIL) 10 MG tablet Comments:   Reason for Stopping:             Allergies   Allergies   Allergen Reactions     Morphine      respiratory distress     Cephalexin Rash

## 2019-01-07 ENCOUNTER — TELEPHONE (OUTPATIENT)
Dept: INTERNAL MEDICINE | Facility: CLINIC | Age: 64
End: 2019-01-07

## 2019-01-07 NOTE — CONSULTS
Consult Date:  01/06/2019      RENAL CONSULTATION      REQUESTING PHYSICIAN:  Payam.      REASON FOR CONSULTATION:  Acute kidney injury.      HISTORY OF PRESENT ILLNESS:  This is a 63-year-old admitted on 01/04/2019 with syncope.  She was noted to have acute kidney injury.  She had shoulder surgery done in October 2018 and prior to that had been on nonsteroidal anti-inflammatory drugs.  Reviewing her laboratory, she has had several episodes of acute kidney injury.  She had mild kidney injury in March 2016 associated with a total hip arthroplasty.  She had mild acute kidney injury in July 2018, and now has more severe acute kidney injury.  She had recently presented with abdominal pain and was seen in clinic for this on 12/11/2018, and she was treated with a prolonged course of Cipro and Flagyl.  These antibiotics ended last week.  She notes weight loss, anorexia, nausea, and vomiting.  She has not been taking any nonsteroidal anti-inflammatory drugs in the last weeks.  She has no prior history of chronic kidney disease.  She has had no urologic surgery.  She denies kidney stones or urinary tract infection.      PAST MEDICAL HISTORY:  Includes hypertension and degenerative joint disease.  She has had multiple orthopedic procedures including bilateral total knee arthroplasty, bilateral total hip arthroplasty, and left shoulder surgery.  She has also had toe amputations, bunion surgeries, and left lower extremity bypass.      SOCIAL HISTORY:  She is a former smoker.  She has about 1 drink a day.  She works for the [employer], although she is on medical leave now.      FAMILY HISTORY:  Mother with breast cancer, father with colon cancer, brother with diabetes.      REVIEW OF SYSTEMS:  Negative except as noted above.      PHYSICAL EXAMINATION:   GENERAL:  She is alert and lying comfortably in bed.   VITAL SIGNS:  Her blood pressure is 144/88, although it has been higher earlier in the day, respiratory rate is 18, heart rate is  82.  She is afebrile.   HEENT:  Head shows no trauma.  The eyes show pupils round and react to light.  Mouth shows good dentition.  Posterior pharynx clear.   NECK:  Supple.   LUNGS:  Show clear breath sounds.  There are no wheezes or rales.   CARDIAC:  Regular rhythm, normal S1, S2, no murmur, rub, or gallop.   ABDOMEN:  Normal bowel sounds.  It is soft and nontender.  No hepatosplenomegaly.  I do not hear any bruits.   EXTREMITIES:  Upper extremities show normal nails, joints, and pulses.  Lower extremities show 1+ edema and diminished pulses in the feet.      LABORATORY DATA:  In July 2018, the creatinine was 2.09.  In September 2018, it was 1.23.  In October 2018, it was 1.78.  In December 2018, it had come down to 1.14.  Therefore, there is marked variability in her creatinine.  On admission, her creatinine was 4.02 and today is 2.26.  Today, her electrolytes are unremarkable.  Her magnesium is somewhat low at 1.3.  Potassium is mildly low at 3.8.  Phosphorus and calcium are okay.  Hemoglobin 11.2.  Urinalysis on 01/04/2019 was unremarkable.  A renal ultrasound was unremarkable with no hydronephrosis or stone.  On December 2018, she did have a CT scan with contrast that showed a fatty liver, no hydronephrosis in the kidneys, but questionable diverticulitis.      IMPRESSION:  Acute kidney injury.  This may be due to dehydration after her episode of diverticulitis, anorexia, nausea, and vomiting due to antibiotics.  It is improving.  Her creatinine has dropped from high of 4.0 down now to 2.26.  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  Her kidneys are structurally normal.  She is okay for discharge.  We should leave her off the lisinopril until she is seen back in clinic and laboratories are checked again.  I advised her not to take any nonsteroidal anti-inflammatory drugs ever again and use just Tylenol.  We will see her back  in our clinic and follow her for recovery of renal function and perhaps management of her chronic kidney disease.         ALMA BARR MD             D: 2019   T: 2019   MT: MS      Name:     JOSHUA REYNOLDS   MRN:      [id number]        Account:       [id number]   :      1955           Consult Date:  2019      Document: [id number]

## 2019-01-07 NOTE — TELEPHONE ENCOUNTER
IP F/U    Date: 01/06/19  Diagnosis: Syncope and Collapse  Is patient active in care coordination? No  Was patient in TCU? No

## 2019-01-07 NOTE — TELEPHONE ENCOUNTER
Nancy would like a call back from a nurse about her recent hospital this weekend.  She had her ultrasound done at that time also.  Please call her back at [phone] with all results.  Should she come in for lab work tomorrow.

## 2019-01-08 RX ORDER — LISINOPRIL 10 MG/1
TABLET ORAL
Qty: 180 TABLET | Refills: 3 | OUTPATIENT
Start: 2019-01-08

## 2019-01-08 NOTE — TELEPHONE ENCOUNTER
Pt calls back. She would just like to schedule lab appt. Dr Hare ordered BMP.     Scheduled for tomorrow.

## 2019-01-09 DIAGNOSIS — N17.9 AKI (ACUTE KIDNEY INJURY) (H): ICD-10-CM

## 2019-01-09 PROCEDURE — 36415 COLL VENOUS BLD VENIPUNCTURE: CPT | Performed by: HOSPITALIST

## 2019-01-09 PROCEDURE — 80048 BASIC METABOLIC PNL TOTAL CA: CPT | Performed by: HOSPITALIST

## 2019-01-10 LAB
ANION GAP SERPL CALCULATED.3IONS-SCNC: 9 MMOL/L (ref 3–14)
BUN SERPL-MCNC: 12 MG/DL (ref 7–30)
CALCIUM SERPL-MCNC: 8.8 MG/DL (ref 8.5–10.1)
CHLORIDE SERPL-SCNC: 105 MMOL/L (ref 94–109)
CO2 SERPL-SCNC: 24 MMOL/L (ref 20–32)
CREAT SERPL-MCNC: 1.74 MG/DL (ref 0.52–1.04)
GFR SERPL CREATININE-BSD FRML MDRD: 30 ML/MIN/{1.73_M2}
GLUCOSE SERPL-MCNC: 100 MG/DL (ref 70–99)
POTASSIUM SERPL-SCNC: 4 MMOL/L (ref 3.4–5.3)
SODIUM SERPL-SCNC: 138 MMOL/L (ref 133–144)

## 2019-01-10 NOTE — TELEPHONE ENCOUNTER
Patient was hospitalized for this issue on 1/4. Call to patient. States she is seeing Neurology in [place].

## 2019-01-10 NOTE — TELEPHONE ENCOUNTER
Pt doesn't want to travel to [place] to [facility]. March is soonest appt.   Pt has [employer], maybe could check with Referrals Dept to see if could go somewhere in [facility]?  If so, route to [facility] referral

## 2019-01-10 NOTE — TELEPHONE ENCOUNTER
She needs to see a nephrologist for kidneys, not sure why she is referred to neurologist. If she means nephrologist, when is her appointment? If not scheduled with nephrology yet, I will call.

## 2019-01-10 NOTE — TELEPHONE ENCOUNTER
They are the only kidney specialists around here that I know of, she needs to see them there. [facility] may have one but we don't use them as they are out of network for most of our patients but they probably don't have anyone out here.

## 2019-01-11 NOTE — TELEPHONE ENCOUNTER
[facility] typed in error in my previous note. Patient is seeing Nephrology in [place] as referred as a result of recent hospitalization. No further action needed.

## 2019-01-25 ENCOUNTER — TRANSFERRED RECORDS (OUTPATIENT)
Dept: HEALTH INFORMATION MANAGEMENT | Facility: CLINIC | Age: 64
End: 2019-01-25

## 2019-01-31 DIAGNOSIS — I10 HYPERTENSION: Primary | ICD-10-CM

## 2019-01-31 DIAGNOSIS — N17.9 ACUTE KIDNEY FAILURE, UNSPECIFIED (H): ICD-10-CM

## 2019-02-06 ENCOUNTER — OFFICE VISIT (OUTPATIENT)
Dept: RHEUMATOLOGY | Facility: CLINIC | Age: 64
End: 2019-02-06
Payer: COMMERCIAL

## 2019-02-06 VITALS
BODY MASS INDEX: 27.11 KG/M2 | HEIGHT: 63 IN | WEIGHT: 153 LBS | DIASTOLIC BLOOD PRESSURE: 86 MMHG | HEART RATE: 85 BPM | OXYGEN SATURATION: 98 % | SYSTOLIC BLOOD PRESSURE: 170 MMHG | TEMPERATURE: 98.5 F

## 2019-02-06 DIAGNOSIS — M19.90 INFLAMMATORY ARTHRITIS: Primary | ICD-10-CM

## 2019-02-06 LAB
CRP SERPL-MCNC: <2.9 MG/L (ref 0–8)
ERYTHROCYTE [SEDIMENTATION RATE] IN BLOOD BY WESTERGREN METHOD: 13 MM/H (ref 0–30)

## 2019-02-06 PROCEDURE — 86431 RHEUMATOID FACTOR QUANT: CPT | Performed by: INTERNAL MEDICINE

## 2019-02-06 PROCEDURE — 84550 ASSAY OF BLOOD/URIC ACID: CPT | Performed by: INTERNAL MEDICINE

## 2019-02-06 PROCEDURE — 86038 ANTINUCLEAR ANTIBODIES: CPT | Performed by: INTERNAL MEDICINE

## 2019-02-06 PROCEDURE — 85652 RBC SED RATE AUTOMATED: CPT | Performed by: INTERNAL MEDICINE

## 2019-02-06 PROCEDURE — 86140 C-REACTIVE PROTEIN: CPT | Performed by: INTERNAL MEDICINE

## 2019-02-06 PROCEDURE — 86200 CCP ANTIBODY: CPT | Performed by: INTERNAL MEDICINE

## 2019-02-06 PROCEDURE — 84460 ALANINE AMINO (ALT) (SGPT): CPT | Performed by: INTERNAL MEDICINE

## 2019-02-06 PROCEDURE — 36415 COLL VENOUS BLD VENIPUNCTURE: CPT | Performed by: INTERNAL MEDICINE

## 2019-02-06 PROCEDURE — 84450 TRANSFERASE (AST) (SGOT): CPT | Performed by: INTERNAL MEDICINE

## 2019-02-06 PROCEDURE — 99204 OFFICE O/P NEW MOD 45 MIN: CPT | Performed by: INTERNAL MEDICINE

## 2019-02-06 RX ORDER — PREDNISONE 5 MG/1
TABLET ORAL
Qty: 14 TABLET | Refills: 0 | Status: SHIPPED | OUTPATIENT
Start: 2019-02-06 | End: 2019-02-15

## 2019-02-06 ASSESSMENT — ROUTINE ASSESSMENT OF PATIENT INDEX DATA (RAPID3)
TOTAL RAPID3 SCORE: 8
RAPID3 INTERPRETATION: MODERATE 6.1-12.0

## 2019-02-06 ASSESSMENT — MIFFLIN-ST. JEOR: SCORE: 1213.13

## 2019-02-06 NOTE — NURSING NOTE
"Chief Complaint   Patient presents with     Establish Care       Initial /86   Pulse 85   Temp 98.5  F (36.9  C) (Oral)   Ht 1.6 m (5' 3\")   Wt 69.4 kg (153 lb)   LMP 10/01/2003   SpO2 98%   BMI 27.10 kg/m   Estimated body mass index is 27.1 kg/m  as calculated from the following:    Height as of this encounter: 1.6 m (5' 3\").    Weight as of this encounter: 69.4 kg (153 lb).  Medication Reconciliation: complete    Have you ever seen a rheumatologist thinks so  Who unsure When many years ago  Joint pain history  Onset: Patient is here to establish care. States both hands has a growth that continues to get bigger, has been there for years and becoming more painful. Also states that \"is covered with arthritis\"  Involved joints: hands, hips, knees, back  Pain scale:  8/10     Wakes the patient from sleep : No  Morning stiffness:No  Meds used:asa    Interim history  Since last visit:  1. Infections - No  2. New symptoms/medical problem - Yes, watching kidneys, was taken off BP Rx, due to causing kidney damage  3. Any side effects from Rheum medications -none  3. ER visits/Hospitalizations/surgeries - Yes 10/30/18 partial left shoulder replacement. 1/4/19 passed out at [facility], believed to be from medications  4. Last PCP visit: 1/2/19  Wt Readings from Last 4 Encounters:   02/06/19 69.4 kg (153 lb)   01/02/19 73.3 kg (161 lb 11.2 oz)   12/11/18 74.8 kg (164 lb 14.4 oz)   10/30/18 76.2 kg (168 lb)     BP Readings from Last 3 Encounters:   02/06/19 170/86   01/06/19 144/88   01/02/19 116/82       "

## 2019-02-06 NOTE — LETTER
[facility]  [address] [place]  [address]  [phone]      Krystyna Peña  [address] [address] [place] [address]      2/8/2019        Dear MsThaddeus VelazquezKrystyna PETR Peña:    I am writing to inform you of the results of the laboratory tests you had done recently.    Antibodies for rheumatoid arthritis, lupus and some related conditions are negative. This does not rule out rheumatoid arthritis by itself.   Inflammatory markers are normal.   Liver enzymes are normal. AST normalized.   Uric acid gout test is normal.    Your results are as follows.  Component      Latest Ref Rng & Units 2/6/2019   AST      0 - 45 U/L 16   ALT      0 - 50 U/L 19   Sed Rate      0 - 30 mm/h 13   CRP Inflammation      0.0 - 8.0 mg/L <2.9   Rheumatoid Factor      <20 IU/mL <20   Cyclic Citrullinated Peptide Antibody, IgG      <7 U/mL 1   MELINDA interpretation      NEG:Negative Negative   Uric Acid      2.6 - 6.0 mg/dL 5.7        Thank you for allowing me to participate in your care.    Sincerely,    Adithya Guerrero MD  [facility]

## 2019-02-06 NOTE — PROGRESS NOTES
[place] - Rheumatology Clinic Visit     Krystyna Peña MRN# [id number]   YOB: 1955    Primary care provider: Viri Savage  Feb 6, 2019          Assessment and Plan:   #  Inflammatory arthritis - chronic  # H/o psoriasis dx about many years ago - Resolved  # Chronic kidney disease  # Anemia    There is suspicion for rheumatoid arthritis. We will do the following work up. We will meet again in few weeks to review the lab results and discuss management. In the meantime, we will try a prednisone course to see the response. Side effects discussed.     Avoid NSAIDs    Xray hands done at [facility]. Patient will try to get the report from [facility].    The labs from patient records are reviewed.     I will be back in touch with the patient through mychart/letter when results are available.     Patient agrees with the above mentioned treatment plan.     Most Recent Immunizations   Administered Date(s) Administered     Influenza (IIV3) PF 08/20/2016     Influenza Intranasal Vaccine 4 valent 09/30/2017     Pneumococcal 23 valent 12/05/2008     TD (ADULT, 7+) 10/02/2006     TDAP Vaccine (Adacel) 11/19/2013     Zoster vaccine, live 10/01/2014       Orders Placed This Encounter   Procedures     AST     ALT     Erythrocyte sedimentation rate auto     CRP inflammation     Rheumatoid factor     Cyclic Citrullinated Peptide Antibody IgG     Anti Nuclear Ruby IgG by IFA with Reflex     Uric acid       Return in about 4 weeks (around 3/6/2019).    There are no discontinued medications.  Current Outpatient Medications   Medication Sig Dispense Refill     ASPIRIN PO Take 81 mg by mouth daily       predniSONE (DELTASONE) 5 MG tablet Take 10 mg PO daily X 2 weeks and then stop. 14 tablet 0       Adithya Guerrero MD  [facility]          Active Problem List:     Patient Active Problem List    Diagnosis Date Noted     Syncope 01/04/2019     Priority: Medium     S/P reverse total shoulder arthroplasty, left 10/30/2018  "    Priority: Medium     Edema, unspecified type 07/15/2017     Priority: Medium     Benign essential hypertension 11/27/2015     Priority: Medium     Urinary frequency 09/21/2014     Priority: Medium     Back pain 03/24/2014     Priority: Medium     CARDIOVASCULAR SCREENING; LDL GOAL LESS THAN 160 10/31/2010     Priority: Medium     Asymptomatic varicose veins 06/24/2003     Priority: Medium            History of Present Illness:     Chief Complaint   Patient presents with     Establish Care       February 6, 2019  Have you ever seen a rheumatologist thinks so  Who unsure When many years ago  Joint pain history  Onset: Patient is here to establish care. States both hands has a growth that continues to get bigger, has been there for years and becoming more painful. Also states that \"is covered with arthritis\"  Involved joints: hands, hips, knees, back  Pain scale:  8/10     Wakes the patient from sleep : No  Morning stiffness:No  Meds used:asa; tramadol PRN; celebrex     Interim history  Since last visit:  1. Infections - No  2. New symptoms/medical problem - Yes, watching kidneys, was taken off BP Rx, due to causing kidney damage  3. Any side effects from Rheum medications -none  3. ER visits/Hospitalizations/surgeries - Yes 10/30/18 partial left shoulder replacement. 1/4/19 passed out at [facility], believed to be from medications  4. Last PCP visit: 1/2/19    Wt Readings from Last 4 Encounters:   02/06/19 69.4 kg (153 lb)   01/02/19 73.3 kg (161 lb 11.2 oz)   12/11/18 74.8 kg (164 lb 14.4 oz)   10/30/18 76.2 kg (168 lb)   loss of appetite with new meds recently.     No h/o myalgia, unintentional weight loss, fevers, rash  No h/o gout  No family or personal history of ulcerative colitis or chron's disease. No h/o iritis .  Patient denies any raynauds  No h/o persistent shortness of breath, cough, chest pain  No h/o persistent vomiting, diarrhea, abdominal pain  No h/o hematochezia, hematuria, hemoptysis  No h/o " seizures or strokes  No h/o cancer    BP Readings from Last 3 Encounters:   19 170/86   19 144/88   19 116/82              Review of Systems:   Complete ROS negative except for symptoms mentioned in the HPI          Past Medical History:     Past Medical History:   Diagnosis Date     Arthritis      Asymptomatic varicose veins      Benign neoplasm of colon ,     Adenoma     Cardiac arrest (H)     after knee replacement     Gastroesophageal reflux disease      Hypertension      Major depression      Past Surgical History:   Procedure Laterality Date     AMPUTATE TOE(S) Left 2018    Procedure: AMPUTATE TOE(S);  Amputation left third digit;  Surgeon: Herb Michael DPM;  Location: [facility] OR     ARTHROPLASTY HIP Right 3/28/2016    Procedure: ARTHROPLASTY HIP;  Surgeon: Perez Meza MD;  Location: [facility] OR     C NONSPECIFIC PROCEDURE      Right arm plastic surgery     C NONSPECIFIC PROCEDURE      Right knee surgery     C NONSPECIFIC PROCEDURE  10/03    L popliteal AVM repair     C NONSPECIFIC PROCEDURE      Removal bone spur left foot     C NONSPECIFIC PROCEDURE      amputation of toes left & right toes     C REPAIR SPIEGELIAN HERNIA       C TOTAL KNEE ARTHROPLASTY  10/03    LT     COLONOSCOPY       HC CORRECT BUNION,SIMPLE      RT     HC CORRECT BUNION,SIMPLE      LT     HC KNEE SCOPE, DIAGNOSTIC      LT     REVERSE ARTHROPLASTY SHOULDER Left 10/30/2018    Procedure: Left reverse total shoulder arthroplasty;  Surgeon: Aaron Christianson MD;  Location: [facility] OR     ROTATOR CUFF REPAIR RT/LT      right     VASCULAR SURGERY  left leg bypass             Social History:     Social History     Occupational History     Employer: [employer]   Tobacco Use     Smoking status: Former Smoker     Packs/day: 1.00     Types: Cigarettes, Cigars     Last attempt to quit: 2013     Years since quittin.8     Smokeless tobacco: Never Used  "  Substance and Sexual Activity     Alcohol use: Yes     Comment: 1 per day     Drug use: No     Sexual activity: Yes     Partners: Male            Family History:     Family History   Problem Relation Age of Onset     Breast Cancer Mother      Cancer - colorectal Father         66     Diabetes Brother             Allergies:     Allergies   Allergen Reactions     Morphine      respiratory distress     Cephalexin Rash            Medications:     Current Outpatient Medications   Medication Sig Dispense Refill     ASPIRIN PO Take 81 mg by mouth daily       predniSONE (DELTASONE) 5 MG tablet Take 10 mg PO daily X 2 weeks and then stop. 14 tablet 0            Physical Exam:   Blood pressure 170/86, pulse 85, temperature 98.5  F (36.9  C), temperature source Oral, height 1.6 m (5' 3\"), weight 69.4 kg (153 lb), last menstrual period 10/01/2003, SpO2 98 %, not currently breastfeeding.  Wt Readings from Last 4 Encounters:   02/06/19 69.4 kg (153 lb)   01/02/19 73.3 kg (161 lb 11.2 oz)   12/11/18 74.8 kg (164 lb 14.4 oz)   10/30/18 76.2 kg (168 lb)     Constitutional: well-developed, appearing stated age; cooperative  Eyes: normal conjunctiva, sclera  ENT: nl external ears, nose, lips.No mucous membrane lesions, normal saliva pool  Neck: no cervical lymphadenopathy  Resp: lungs clear to auscultation in the bases,   CV: RRR, no added sounds  GI: Abdomen soft and no tenderness  : not tested  Lymph: no cervical, supraclavicular or epitrochlear nodes  MS: Ulnar deviation noted in fingers bilaterally. Synovial thickening noted in bilateral MCPs. Multiple toes missing bilaterally (s/p surgery around 2008) Fist 100% bilateral. All shoulder, elbow, wrist, MCP/PIP/DIP, hip, knee, ankle were examined and  found without active synovitis or major deformity. Full ROM.  No dactylitis,  tenosynovitis, enthesopathy.  Skin: no rash in exposed areas  Psych: nl judgement, orientation, memory, affect.         Data:         Adithya Qiu " MD Cesar    [facility]

## 2019-02-07 LAB
ALT SERPL W P-5'-P-CCNC: 19 U/L (ref 0–50)
ANA SER QL IF: NEGATIVE
AST SERPL W P-5'-P-CCNC: 16 U/L (ref 0–45)
CCP AB SER IA-ACNC: 1 U/ML
RHEUMATOID FACT SER NEPH-ACNC: <20 IU/ML (ref 0–20)
URATE SERPL-MCNC: 5.7 MG/DL (ref 2.6–6)

## 2019-02-08 DIAGNOSIS — N18.30 CKD (CHRONIC KIDNEY DISEASE) STAGE 3, GFR 30-59 ML/MIN (H): Primary | ICD-10-CM

## 2019-02-08 NOTE — RESULT ENCOUNTER NOTE
Please send a letter to the patient with a copy of the lab results and also the following note:  Antibodies for rheumatoid arthritis, lupus and some related conditions are negative. This does not rule out rheumatoid arthritis by itself.   Inflammatory markers are normal.   Liver enzymes are normal. AST normalized.   Uric acid gout test is normal.

## 2019-02-11 ENCOUNTER — HOSPITAL ENCOUNTER (OUTPATIENT)
Dept: MAMMOGRAPHY | Facility: CLINIC | Age: 64
Discharge: HOME | End: 2019-02-11
Attending: NURSE PRACTITIONER | Admitting: NURSE PRACTITIONER
Payer: COMMERCIAL

## 2019-02-11 DIAGNOSIS — Z00.00 HEALTH CARE MAINTENANCE: ICD-10-CM

## 2019-02-11 PROCEDURE — 77063 BREAST TOMOSYNTHESIS BI: CPT

## 2019-02-12 ENCOUNTER — TRANSFERRED RECORDS (OUTPATIENT)
Dept: HEALTH INFORMATION MANAGEMENT | Facility: CLINIC | Age: 64
End: 2019-02-12

## 2019-02-14 ENCOUNTER — TELEPHONE (OUTPATIENT)
Dept: RHEUMATOLOGY | Facility: CLINIC | Age: 64
End: 2019-02-14

## 2019-02-14 DIAGNOSIS — M19.90 INFLAMMATORY ARTHRITIS: ICD-10-CM

## 2019-02-14 NOTE — TELEPHONE ENCOUNTER
Pt calling to report how she is doing after being on prednisone for a week.  She didn't feel any pain in hands while taking.  So it definitely worked.  Should she take any longer?  Would need another rx if so.

## 2019-02-15 RX ORDER — PREDNISONE 5 MG/1
TABLET ORAL
Qty: 40 TABLET | Refills: 0 | Status: SHIPPED | OUTPATIENT
Start: 2019-02-15 | End: 2019-06-03

## 2019-02-15 NOTE — TELEPHONE ENCOUNTER
I called and left patient voicemail to call back to give her provider's message below.    Stephanie Peck, CMA

## 2019-02-18 NOTE — TELEPHONE ENCOUNTER
I called and left patient voicemail to call back. Awaiting patient call back to update with provider's message below.    Stephanie Peck, CMA

## 2019-02-22 NOTE — TELEPHONE ENCOUNTER
Pt called back and read message to her. She understood the message. Pharmacy has not called about the script yet. She will call them. If they do not have it she will call back to be sent again.

## 2019-02-25 ENCOUNTER — TRANSFERRED RECORDS (OUTPATIENT)
Dept: HEALTH INFORMATION MANAGEMENT | Facility: CLINIC | Age: 64
End: 2019-02-25

## 2019-02-27 DIAGNOSIS — N17.9 ACUTE KIDNEY FAILURE, UNSPECIFIED (H): ICD-10-CM

## 2019-02-27 DIAGNOSIS — I10 HYPERTENSION: ICD-10-CM

## 2019-02-27 DIAGNOSIS — N18.30 CKD (CHRONIC KIDNEY DISEASE) STAGE 3, GFR 30-59 ML/MIN (H): ICD-10-CM

## 2019-02-27 PROCEDURE — 36415 COLL VENOUS BLD VENIPUNCTURE: CPT | Performed by: PHYSICIAN ASSISTANT

## 2019-02-27 PROCEDURE — 80048 BASIC METABOLIC PNL TOTAL CA: CPT | Performed by: PHYSICIAN ASSISTANT

## 2019-02-28 LAB
ANION GAP SERPL CALCULATED.3IONS-SCNC: 9 MMOL/L (ref 3–14)
BUN SERPL-MCNC: 38 MG/DL (ref 7–30)
CALCIUM SERPL-MCNC: 9.3 MG/DL (ref 8.5–10.1)
CHLORIDE SERPL-SCNC: 104 MMOL/L (ref 94–109)
CO2 SERPL-SCNC: 22 MMOL/L (ref 20–32)
CREAT SERPL-MCNC: 1.34 MG/DL (ref 0.52–1.04)
GFR SERPL CREATININE-BSD FRML MDRD: 42 ML/MIN/{1.73_M2}
GLUCOSE SERPL-MCNC: 106 MG/DL (ref 70–99)
POTASSIUM SERPL-SCNC: 4.8 MMOL/L (ref 3.4–5.3)
SODIUM SERPL-SCNC: 135 MMOL/L (ref 133–144)

## 2019-03-01 DIAGNOSIS — N18.30 CHRONIC RENAL DISEASE, STAGE III (H): Primary | ICD-10-CM

## 2019-03-12 ENCOUNTER — TRANSFERRED RECORDS (OUTPATIENT)
Dept: HEALTH INFORMATION MANAGEMENT | Facility: CLINIC | Age: 64
End: 2019-03-12

## 2019-03-23 DIAGNOSIS — B00.9 HERPES SIMPLEX VIRUS INFECTION: ICD-10-CM

## 2019-03-24 ENCOUNTER — OFFICE VISIT (OUTPATIENT)
Dept: URGENT CARE | Facility: URGENT CARE | Age: 64
End: 2019-03-24
Payer: COMMERCIAL

## 2019-03-24 VITALS
SYSTOLIC BLOOD PRESSURE: 134 MMHG | BODY MASS INDEX: 27.28 KG/M2 | OXYGEN SATURATION: 100 % | RESPIRATION RATE: 20 BRPM | WEIGHT: 154 LBS | TEMPERATURE: 98.6 F | DIASTOLIC BLOOD PRESSURE: 76 MMHG | HEART RATE: 86 BPM

## 2019-03-24 DIAGNOSIS — R05.9 COUGH: ICD-10-CM

## 2019-03-24 DIAGNOSIS — J01.90 ACUTE SINUSITIS WITH SYMPTOMS > 10 DAYS: Primary | ICD-10-CM

## 2019-03-24 PROCEDURE — 99213 OFFICE O/P EST LOW 20 MIN: CPT | Performed by: FAMILY MEDICINE

## 2019-03-24 RX ORDER — LISINOPRIL 10 MG/1
TABLET ORAL
Refills: 3 | COMMUNITY
Start: 2019-02-09 | End: 2019-07-15

## 2019-03-24 RX ORDER — AZITHROMYCIN 250 MG/1
TABLET, FILM COATED ORAL
Qty: 6 TABLET | Refills: 0 | Status: SHIPPED | OUTPATIENT
Start: 2019-03-24 | End: 2019-06-03

## 2019-03-24 RX ORDER — CODEINE PHOSPHATE AND GUAIFENESIN 10; 100 MG/5ML; MG/5ML
SOLUTION ORAL
Qty: 120 ML | Refills: 0 | Status: SHIPPED | OUTPATIENT
Start: 2019-03-24 | End: 2019-06-03

## 2019-03-24 NOTE — PROGRESS NOTES
SUBJECTIVE: Krystyna Peña is a 63 year old female here with concerns about sinus infection.  She states onset  of symptoms were greater than 10 days with sinus pressure and drainage.  Course of illness is worsening.    Severity: moderate  Current and Associated symptoms: cold symptoms  Predisposing factors include none.   Recent treatment has included: OTC's  Allergic symptoms include: none    Past Medical History:   Diagnosis Date     Arthritis      Asymptomatic varicose veins      Benign neoplasm of colon ,     Adenoma     Cardiac arrest (H)     after knee replacement     Gastroesophageal reflux disease      Hypertension      Major depression      Allergies   Allergen Reactions     Morphine      respiratory distress     Cephalexin Rash     Social History     Tobacco Use     Smoking status: Former Smoker     Packs/day: 1.00     Types: Cigarettes, Cigars     Last attempt to quit: 2013     Years since quittin.9     Smokeless tobacco: Never Used   Substance Use Topics     Alcohol use: Yes     Comment: 1 per day       ROS:   no rash  no vomiting    OBJECTIVE:  /76 (Cuff Size: Adult Regular)   Pulse 86   Temp 98.6  F (37  C) (Oral)   Resp 20   Wt 69.9 kg (154 lb)   LMP 10/01/2003   SpO2 100%   BMI 27.28 kg/m    NAD  EYES: clear, no mattering  EARS: TM's clear, no redness/buldging, canals clear, no swelling/redness  NOSE: discolored discharge marky. Nares  THROAT: clear, no erythema, no exudate  SINUS: maxillary tenderness with palpation  LUNGS: CTAB, no rhonchi/wheeze/rales      ICD-10-CM    1. Acute sinusitis with symptoms > 10 days J01.90 azithromycin (ZITHROMAX) 250 MG tablet   2. Cough R05 guaiFENesin-codeine (ROBITUSSIN AC) 100-10 MG/5ML solution       Follow up with primary clinic if not improving

## 2019-03-25 RX ORDER — VALACYCLOVIR HYDROCHLORIDE 500 MG/1
TABLET, FILM COATED ORAL
Qty: 6 TABLET | Refills: 0 | Status: SHIPPED | OUTPATIENT
Start: 2019-03-25 | End: 2019-07-15

## 2019-03-25 NOTE — TELEPHONE ENCOUNTER
Routing refill request to provider for review/approval because:  Drug not active on patient's medication list  Labs out of range:  Cr

## 2019-03-25 NOTE — TELEPHONE ENCOUNTER
"Requested Prescriptions   Pending Prescriptions Disp Refills     valACYclovir (VALTREX) 500 MG tablet [Pharmacy Med Name: VALACYCLOVIR 500MG TABLETS] 6 tablet 0    Last Written Prescription Date:  Not on meds list  Last Fill Quantity: n/a,  # refills: n/a   Last office visit: 1/2/2019 with prescribing provider:     Future Office Visit:   Next 5 appointments (look out 90 days)    Apr 24, 2019  3:00 PM CDT  Return Visit with Adithya Guerrero MD  [facility] [address] [address] [phone]        Sig: TAKE 1 TABLET(500 MG) BY MOUTH TWICE DAILY    Antivirals for Herpes Protocol Failed - 3/23/2019  6:03 PM       Failed - Medication is active on med list       Failed - Normal serum creatinine on file in past 12 months    Recent Labs   Lab Test 02/27/19  1412  04/21/18  0904   CR 1.34*   < >  --    CREAT  --   --  1.4*    < > = values in this interval not displayed.            Passed - Patient is age 12 or older       Passed - Recent (12 mo) or future (30 days) visit within the authorizing provider's specialty    Patient had office visit in the last 12 months or has a visit in the next 30 days with authorizing provider or within the authorizing provider's specialty.  See \"Patient Info\" tab in inbasket, or \"Choose Columns\" in Meds & Orders section of the refill encounter.              "

## 2019-03-26 ENCOUNTER — TRANSFERRED RECORDS (OUTPATIENT)
Dept: HEALTH INFORMATION MANAGEMENT | Facility: CLINIC | Age: 64
End: 2019-03-26

## 2019-03-28 ENCOUNTER — TRANSFERRED RECORDS (OUTPATIENT)
Dept: HEALTH INFORMATION MANAGEMENT | Facility: CLINIC | Age: 64
End: 2019-03-28

## 2019-06-03 ENCOUNTER — OFFICE VISIT (OUTPATIENT)
Dept: INTERNAL MEDICINE | Facility: CLINIC | Age: 64
End: 2019-06-03
Payer: COMMERCIAL

## 2019-06-03 VITALS
WEIGHT: 155.3 LBS | SYSTOLIC BLOOD PRESSURE: 148 MMHG | BODY MASS INDEX: 27.52 KG/M2 | HEART RATE: 81 BPM | RESPIRATION RATE: 16 BRPM | DIASTOLIC BLOOD PRESSURE: 86 MMHG | TEMPERATURE: 99.2 F | HEIGHT: 63 IN | OXYGEN SATURATION: 97 %

## 2019-06-03 DIAGNOSIS — N18.30 CKD (CHRONIC KIDNEY DISEASE) STAGE 3, GFR 30-59 ML/MIN (H): ICD-10-CM

## 2019-06-03 DIAGNOSIS — M19.011 PRIMARY OSTEOARTHRITIS OF RIGHT SHOULDER: ICD-10-CM

## 2019-06-03 DIAGNOSIS — Z01.818 PREOP GENERAL PHYSICAL EXAM: Primary | ICD-10-CM

## 2019-06-03 PROBLEM — R55 SYNCOPE: Status: RESOLVED | Noted: 2019-01-04 | Resolved: 2019-06-03

## 2019-06-03 PROBLEM — Z96.612 S/P REVERSE TOTAL SHOULDER ARTHROPLASTY, LEFT: Status: RESOLVED | Noted: 2018-10-30 | Resolved: 2019-06-03

## 2019-06-03 LAB
ERYTHROCYTE [DISTWIDTH] IN BLOOD BY AUTOMATED COUNT: 13 % (ref 10–15)
HCT VFR BLD AUTO: 37.5 % (ref 35–47)
HGB BLD-MCNC: 13.4 G/DL (ref 11.7–15.7)
MCH RBC QN AUTO: 37.1 PG (ref 26.5–33)
MCHC RBC AUTO-ENTMCNC: 35.7 G/DL (ref 31.5–36.5)
MCV RBC AUTO: 104 FL (ref 78–100)
PLATELET # BLD AUTO: 277 10E9/L (ref 150–450)
RBC # BLD AUTO: 3.61 10E12/L (ref 3.8–5.2)
WBC # BLD AUTO: 5.2 10E9/L (ref 4–11)

## 2019-06-03 PROCEDURE — 80048 BASIC METABOLIC PNL TOTAL CA: CPT | Performed by: INTERNAL MEDICINE

## 2019-06-03 PROCEDURE — 87640 STAPH A DNA AMP PROBE: CPT | Mod: 59 | Performed by: INTERNAL MEDICINE

## 2019-06-03 PROCEDURE — 99215 OFFICE O/P EST HI 40 MIN: CPT | Performed by: INTERNAL MEDICINE

## 2019-06-03 PROCEDURE — 85027 COMPLETE CBC AUTOMATED: CPT | Performed by: INTERNAL MEDICINE

## 2019-06-03 PROCEDURE — 87641 MR-STAPH DNA AMP PROBE: CPT | Performed by: INTERNAL MEDICINE

## 2019-06-03 PROCEDURE — 93000 ELECTROCARDIOGRAM COMPLETE: CPT | Performed by: INTERNAL MEDICINE

## 2019-06-03 PROCEDURE — 36415 COLL VENOUS BLD VENIPUNCTURE: CPT | Performed by: INTERNAL MEDICINE

## 2019-06-03 ASSESSMENT — MIFFLIN-ST. JEOR: SCORE: 1223.57

## 2019-06-03 NOTE — PROGRESS NOTES
[place] [address]  [facility] [address]  [phone]  Dept: [phone]    PRE-OP EVALUATION:  Today's date: 6/3/2019    Krystyna Peña (: 1955) presents for pre-operative evaluation assessment as requested by Dr. Padilla.  She requires evaluation and anesthesia risk assessment prior to undergoing surgery/procedure for treatment of osteoarthritis of right shoulder .      Primary Physician: Viri Savage  Type of Anesthesia Anticipated: to be determined    Patient has a Health Care Directive or Living Will:  YES     Preop Questions 6/3/2019   Who is doing your surgery? heriberto padilla   What are you having done? r shoulder replacement   Date of Surgery/Procedure: 2019 @ 7 am   Facility or Hospital where procedure/surgery will be performed: [facility]   1.  Do you have a history of Heart attack, stroke, stent, coronary bypass surgery, or other heart surgery? No   2.  Do you ever have any pain or discomfort in your chest? No   3.  Do you have a history of  Heart Failure? No   4.   Are you troubled by shortness of breath when:  walking on a level surface, or up a slight hill, or at night? No   5.  Do you currently have a cold, bronchitis or other respiratory infection? No   6.  Do you have a cough, shortness of breath, or wheezing? No   7.  Do you sometimes get pains in the calves of your legs when you walk? No   8. Do you or anyone in your family have previous history of blood clots? No   9.  Do you or does anyone in your family have a serious bleeding problem such as prolonged bleeding following surgeries or cuts? No   10. Have you ever had problems with anemia or been told to take iron pills? No   11. Have you had any abnormal blood loss such as black, tarry or bloody stools, or abnormal vaginal bleeding? No   12. Have you ever had a blood transfusion? No   13. Have you or any of your relatives ever had problems with anesthesia? No   14. Do you have sleep  apnea, excessive snoring or daytime drowsiness? YES - snores    15. Do you have any prosthetic heart valves? No   16. Do you have prosthetic joints? Yes: total hip arthroplasty, total knee arthroplasty, left shoulder arthroplasty   17. Is there any chance that you may be pregnant? No         HPI:     HPI related to upcoming procedure: she has osteoarthritis of right shoulder, not controlled by conservative treatment so will undergo reverse arthroplasty.      She has hypertension which had been well controlled.  Her reading today is slightly elevated.  She has had some acute kidney injury in January so her lisinopril dose was decreased.  She has been following with renal with improvement in her renal function.    MEDICAL HISTORY:     Patient Active Problem List    Diagnosis Date Noted     CKD (chronic kidney disease) stage 3, GFR 30-59 ml/min (H) 06/03/2019     Priority: Medium     Edema, unspecified type 07/15/2017     Priority: Medium     Benign essential hypertension 11/27/2015     Priority: Medium     Urinary frequency 09/21/2014     Priority: Medium     Back pain 03/24/2014     Priority: Medium     CARDIOVASCULAR SCREENING; LDL GOAL LESS THAN 160 10/31/2010     Priority: Medium     Asymptomatic varicose veins 06/24/2003     Priority: Medium      Past Medical History:   Diagnosis Date     Arthritis      Asymptomatic varicose veins      Benign neoplasm of colon 11/06, 7/13    Adenoma     Cardiac arrest (H) 2003    after knee replacement     Gastroesophageal reflux disease      Hypertension      Major depression      Past Surgical History:   Procedure Laterality Date     AMPUTATE TOE(S) Left 4/12/2018    Procedure: AMPUTATE TOE(S);  Amputation left third digit;  Surgeon: Herb Michael DPM;  Location: [facility] OR     ARTHROPLASTY HIP Right 3/28/2016    Procedure: ARTHROPLASTY HIP;  Surgeon: Perez Meza MD;  Location: [facility] OR     C NONSPECIFIC PROCEDURE  1972    Right arm plastic surgery     C NONSPECIFIC PROCEDURE   1972    Right knee surgery     C NONSPECIFIC PROCEDURE  10/03    L popliteal AVM repair     C NONSPECIFIC PROCEDURE      Removal bone spur left foot     C NONSPECIFIC PROCEDURE      amputation of toes left & right toes     C REPAIR SPIEGELIAN HERNIA       C TOTAL KNEE ARTHROPLASTY  10/03    LT     COLONOSCOPY       HC CORRECT BUNION,SIMPLE      RT     HC CORRECT BUNION,SIMPLE      LT     HC KNEE SCOPE, DIAGNOSTIC      LT     REVERSE ARTHROPLASTY SHOULDER Left 10/30/2018    Procedure: Left reverse total shoulder arthroplasty;  Surgeon: Aaron Christianson MD;  Location: [facility] OR     ROTATOR CUFF REPAIR RT/LT      right     VASCULAR SURGERY  left leg bypass      Current Outpatient Medications   Medication Sig Dispense Refill     ASPIRIN PO Take 81 mg by mouth daily       lisinopril (PRINIVIL/ZESTRIL) 10 MG tablet TK 1 T PO D  3     valACYclovir (VALTREX) 500 MG tablet TAKE 1 TABLET(500 MG) BY MOUTH TWICE DAILY 6 tablet 0       ASA to be held a week prior to surgery  OTC products: None, except as noted above    Allergies   Allergen Reactions     Morphine      respiratory distress     Cephalexin Rash      Latex Allergy: NO    Social History     Tobacco Use     Smoking status: Former Smoker     Packs/day: 1.00     Types: Cigarettes, Cigars     Last attempt to quit: 2013     Years since quittin.1     Smokeless tobacco: Never Used   Substance Use Topics     Alcohol use: Yes     Comment: 1 per day     History   Drug Use No       REVIEW OF SYSTEMS:   GENERAL: negative for, fever, chills, weight loss, weight gain  EYES: negative  ENT: negative  RESPIRATORY: No dyspnea on exertion and No cough  CARDIOVASCULAR: negative for, palpitations, tachycardia, irregular heart beat and chest pain  GI: negative for, nausea, vomiting, abdominal pain, melena and hematochezia  : negative for, dysuria and hematuria  MUSCULOSKELETAL: shoulder pain, arthritis stable  NEUROLOGIC: negative for, headaches, seizures,  "local weakness, numbness or tingling of hands and numbness or tingling of feet  SKIN: negative  ENDOCRINE: negative         EXAM:   LMP 10/01/2003     Patient is alert, oriented, cooperative in no acute distress.  /86   Pulse 81   Temp 99.2  F (37.3  C) (Oral)   Resp 16   Ht 1.6 m (5' 3\")   Wt 70.4 kg (155 lb 4.8 oz)   LMP 10/01/2003   SpO2 97%   BMI 27.51 kg/m      HEENT: PERRL, EOMI, TM's are normal. Oropharynx is clear.  NECK: No lymphadenopathy or thyromegaly. Carotid pulses full without bruits.  LUNGS: clear  CV: normal S1, S2 without murmur, S3 or S4 present. Pulses are 2/2 throughout. No JVD.  ABDOMEN: Bowel sounds present, nontender without hepatosplenomegaly. Liver is normal size to percussion.  EXTREMITIES: no edema present, unremarkable joints  NEUROLOGIC: Cranial nerves II-XII intact, reflexes 2/4 throughout, strength 5/5, sensation grossly intact, gait normal.  SKIN: without rashes or significant lesions     DIAGNOSTICS:   EKG: Normal Sinus Rhythm, normal axis, normal intervals, no acute ST/T changes c/w ischemia, no LVH by voltage criteria, unchanged from previous tracings  Lab: see [facility]    Recent Labs   Lab Test 02/27/19  1412 01/09/19  1059  01/05/19  0629  01/04/19  1425  03/22/16  1510  09/17/14  1021  09/12/11  1202   HGB  --   --   --  11.2*  --  13.7   < > 12.8   < >  --    < > 15.9*   PLT  --   --   --  376  --  420   < > 291   < >  --    < > 265   INR  --   --   --   --   --   --   --  0.96  --   --   --  0.90    138   < > 141   < > 135   < >  --    < > 140   < >  --    POTASSIUM 4.8 4.0   < > 4.0  --  4.3   < > 3.7   < > 4.6   < >  --    CR 1.34* 1.74*   < > 2.86*   < > 3.46*   < > 1.38*   < > 0.92   < >  --    A1C  --   --   --   --   --   --   --   --   --  5.4  --   --     < > = values in this interval not displayed.        IMPRESSION:   Reason for surgery/procedure: Osteoarthritis right shoulder  Diagnosis/reason for consult: preop    The proposed surgical procedure is " considered INTERMEDIATE risk.    REVISED CARDIAC RISK INDEX  The patient has the following serious cardiovascular risks for perioperative complications such as (MI, PE, VFib and 3  AV Block):  No serious cardiac risks  INTERPRETATION: 0 risks: Class I (very low risk - 0.4% complication rate)    The patient has the following additional risks for perioperative complications:  No identified additional risks      ICD-10-CM    1. Preop general physical exam Z01.818 EKG 12-lead complete w/read - Clinics     Methicillin Resist/Sens S. aureus PCR     CBC with platelets     Basic metabolic panel  (Ca, Cl, CO2, Creat, Gluc, K, Na, BUN)   2. Primary osteoarthritis of right shoulder M19.011    3. CKD (chronic kidney disease) stage 3, GFR 30-59 ml/min (H) N18.3        RECOMMENDATIONS:     --Consult hospital rounder / IM to assist post-op medical management    Obstructive Sleep Apnea (or suspected sleep apnea)  Hospital staff are advised to monitor for sleep related oxygen desaturations due to suspicion of DORIAN      --Patient is to take all scheduled medications on the day of surgery EXCEPT for modifications listed below.    ACE Inhibitor or Angiotensin Receptor Blocker (ARB) Use  Will not take her lisinopril the morning of surgery, since her blood pressure is a little bit elevated today, she will take it the day before.       APPROVAL GIVEN to proceed with proposed procedure, without further diagnostic evaluation       Signed Electronically by: Viri Savage MD    Copy of this evaluation report is provided to requesting physician.    [facility] Preop Guidelines    Revised Cardiac Risk Index

## 2019-06-03 NOTE — PATIENT INSTRUCTIONS
Do not take the lisinopril the day of surgery.   You should stop aspirin on June 5th.       Before Your Surgery      Call your surgeon if there is any change in your health. This includes signs of a cold or flu (such as a sore throat, runny nose, cough, rash or fever).    Do not smoke, drink alcohol or take over the counter medicine (unless your surgeon or primary care doctor tells you to) for the 24 hours before and after surgery.    If you take prescribed drugs: Follow your doctor s orders about which medicines to take and which to stop until after surgery.    Eating and drinking prior to surgery: follow the instructions from your surgeon    Take a shower or bath the night before surgery. Use the soap your surgeon gave you to gently clean your skin. If you do not have soap from your surgeon, use your regular soap. Do not shave or scrub the surgery site.  Wear clean pajamas and have clean sheets on your bed.

## 2019-06-03 NOTE — NURSING NOTE
"Vital signs:  Temp: 99.2  F (37.3  C) Temp src: Oral BP: 148/86 Pulse: 81   Resp: 16 SpO2: 97 %     Height: 160 cm (5' 3\") Weight: 70.4 kg (155 lb 4.8 oz)  Estimated body mass index is 27.51 kg/m  as calculated from the following:    Height as of this encounter: 1.6 m (5' 3\").    Weight as of this encounter: 70.4 kg (155 lb 4.8 oz).          "

## 2019-06-04 LAB
ANION GAP SERPL CALCULATED.3IONS-SCNC: 15 MMOL/L (ref 3–14)
BUN SERPL-MCNC: 21 MG/DL (ref 7–30)
CALCIUM SERPL-MCNC: 9.4 MG/DL (ref 8.5–10.1)
CHLORIDE SERPL-SCNC: 99 MMOL/L (ref 94–109)
CO2 SERPL-SCNC: 17 MMOL/L (ref 20–32)
CREAT SERPL-MCNC: 1.07 MG/DL (ref 0.52–1.04)
GFR SERPL CREATININE-BSD FRML MDRD: 54 ML/MIN/{1.73_M2}
GLUCOSE SERPL-MCNC: 84 MG/DL (ref 70–99)
MRSA DNA SPEC QL NAA+PROBE: NEGATIVE
POTASSIUM SERPL-SCNC: 4.7 MMOL/L (ref 3.4–5.3)
SODIUM SERPL-SCNC: 131 MMOL/L (ref 133–144)
SPECIMEN SOURCE: NORMAL

## 2019-06-08 NOTE — PHARMACY-ADMISSION MEDICATION HISTORY
Admission medication history interview status for this patient is complete. See [facility] admission navigator for allergy information, prior to admission medications and immunization status.     PTA med list completed by pre-admitting nurse,   Nurse Raisa Abraham RN Wed Jun 5, 2019  3:31 PM       Prior to Admission medications    Medication Sig Last Dose Taking? Auth Provider   ASPIRIN PO Take 81 mg by mouth daily  Yes Reported, Patient   lisinopril (PRINIVIL/ZESTRIL) 10 MG tablet TK 1 T PO D  Yes Reported, Patient   valACYclovir (VALTREX) 500 MG tablet TAKE 1 TABLET(500 MG) BY MOUTH TWICE DAILY   Viri Savage MD

## 2019-06-10 ENCOUNTER — HOSPITAL ENCOUNTER (OUTPATIENT)
Dept: LAB | Facility: CLINIC | Age: 64
Discharge: HOME | End: 2019-06-10
Attending: ORTHOPAEDIC SURGERY | Admitting: ORTHOPAEDIC SURGERY
Payer: COMMERCIAL

## 2019-06-10 DIAGNOSIS — Z01.812 PRE-OPERATIVE LABORATORY EXAMINATION: ICD-10-CM

## 2019-06-10 LAB
ABO + RH BLD: NORMAL
ABO + RH BLD: NORMAL
BLD GP AB SCN SERPL QL: NORMAL
BLOOD BANK CMNT PATIENT-IMP: NORMAL
SPECIMEN EXP DATE BLD: NORMAL

## 2019-06-10 PROCEDURE — 86850 RBC ANTIBODY SCREEN: CPT | Performed by: ORTHOPAEDIC SURGERY

## 2019-06-10 PROCEDURE — 36415 COLL VENOUS BLD VENIPUNCTURE: CPT | Performed by: ORTHOPAEDIC SURGERY

## 2019-06-10 PROCEDURE — 86900 BLOOD TYPING SEROLOGIC ABO: CPT | Performed by: ORTHOPAEDIC SURGERY

## 2019-06-10 PROCEDURE — 86901 BLOOD TYPING SEROLOGIC RH(D): CPT | Performed by: ORTHOPAEDIC SURGERY

## 2019-06-12 ENCOUNTER — APPOINTMENT (OUTPATIENT)
Dept: GENERAL RADIOLOGY | Facility: CLINIC | Age: 64
End: 2019-06-12
Attending: PHYSICIAN ASSISTANT
Payer: COMMERCIAL

## 2019-06-12 ENCOUNTER — ANESTHESIA (OUTPATIENT)
Dept: SURGERY | Facility: CLINIC | Age: 64
End: 2019-06-12
Payer: COMMERCIAL

## 2019-06-12 ENCOUNTER — HOSPITAL ENCOUNTER (INPATIENT)
Facility: CLINIC | Age: 64
LOS: 2 days | Discharge: HOME | End: 2019-06-14
Attending: ORTHOPAEDIC SURGERY | Admitting: ORTHOPAEDIC SURGERY
Payer: COMMERCIAL

## 2019-06-12 ENCOUNTER — ANESTHESIA EVENT (OUTPATIENT)
Dept: SURGERY | Facility: CLINIC | Age: 64
End: 2019-06-12
Payer: COMMERCIAL

## 2019-06-12 DIAGNOSIS — Z96.611 STATUS POST REVERSE TOTAL REPLACEMENT OF RIGHT SHOULDER: Primary | ICD-10-CM

## 2019-06-12 PROBLEM — Z96.619 S/P REVERSE TOTAL SHOULDER ARTHROPLASTY: Status: ACTIVE | Noted: 2019-06-12

## 2019-06-12 LAB
CREAT SERPL-MCNC: 0.89 MG/DL (ref 0.52–1.04)
GFR SERPL CREATININE-BSD FRML MDRD: 68 ML/MIN/{1.73_M2}
PLATELET # BLD AUTO: 187 10E9/L (ref 150–450)

## 2019-06-12 PROCEDURE — 36000065 ZZH SURGERY LEVEL 4 W FLUORO 1ST 30 MIN: Performed by: ORTHOPAEDIC SURGERY

## 2019-06-12 PROCEDURE — 25000125 ZZHC RX 250: Performed by: NURSE ANESTHETIST, CERTIFIED REGISTERED

## 2019-06-12 PROCEDURE — 25000128 H RX IP 250 OP 636: Performed by: ORTHOPAEDIC SURGERY

## 2019-06-12 PROCEDURE — 0RRJ00Z REPLACEMENT OF RIGHT SHOULDER JOINT WITH REVERSE BALL AND SOCKET SYNTHETIC SUBSTITUTE, OPEN APPROACH: ICD-10-PCS | Performed by: ORTHOPAEDIC SURGERY

## 2019-06-12 PROCEDURE — 36000063 ZZH SURGERY LEVEL 4 EA 15 ADDTL MIN: Performed by: ORTHOPAEDIC SURGERY

## 2019-06-12 PROCEDURE — 25000132 ZZH RX MED GY IP 250 OP 250 PS 637: Performed by: PHYSICIAN ASSISTANT

## 2019-06-12 PROCEDURE — 25800030 ZZH RX IP 258 OP 636: Performed by: PHYSICIAN ASSISTANT

## 2019-06-12 PROCEDURE — C1713 ANCHOR/SCREW BN/BN,TIS/BN: HCPCS | Performed by: ORTHOPAEDIC SURGERY

## 2019-06-12 PROCEDURE — 25800025 ZZH RX 258: Performed by: ORTHOPAEDIC SURGERY

## 2019-06-12 PROCEDURE — 40000171 ZZH STATISTIC PRE-PROCEDURE ASSESSMENT III: Performed by: ORTHOPAEDIC SURGERY

## 2019-06-12 PROCEDURE — 12000000 ZZH R&B MED SURG/OB

## 2019-06-12 PROCEDURE — 25800030 ZZH RX IP 258 OP 636: Performed by: NURSE ANESTHETIST, CERTIFIED REGISTERED

## 2019-06-12 PROCEDURE — 82565 ASSAY OF CREATININE: CPT | Performed by: PHYSICIAN ASSISTANT

## 2019-06-12 PROCEDURE — 37000008 ZZH ANESTHESIA TECHNICAL FEE, 1ST 30 MIN: Performed by: ORTHOPAEDIC SURGERY

## 2019-06-12 PROCEDURE — 36415 COLL VENOUS BLD VENIPUNCTURE: CPT | Performed by: PHYSICIAN ASSISTANT

## 2019-06-12 PROCEDURE — 71000012 ZZH RECOVERY PHASE 1 LEVEL 1 FIRST HR: Performed by: ORTHOPAEDIC SURGERY

## 2019-06-12 PROCEDURE — 85049 AUTOMATED PLATELET COUNT: CPT | Performed by: PHYSICIAN ASSISTANT

## 2019-06-12 PROCEDURE — 27210794 ZZH OR GENERAL SUPPLY STERILE: Performed by: ORTHOPAEDIC SURGERY

## 2019-06-12 PROCEDURE — C1776 JOINT DEVICE (IMPLANTABLE): HCPCS | Performed by: ORTHOPAEDIC SURGERY

## 2019-06-12 PROCEDURE — 37000009 ZZH ANESTHESIA TECHNICAL FEE, EACH ADDTL 15 MIN: Performed by: ORTHOPAEDIC SURGERY

## 2019-06-12 PROCEDURE — 27110028 ZZH OR GENERAL SUPPLY NON-STERILE: Performed by: ORTHOPAEDIC SURGERY

## 2019-06-12 PROCEDURE — 25000128 H RX IP 250 OP 636: Performed by: NURSE ANESTHETIST, CERTIFIED REGISTERED

## 2019-06-12 PROCEDURE — 27211024 ZZHC OR SUPPLY OTHER OPNP: Performed by: ORTHOPAEDIC SURGERY

## 2019-06-12 PROCEDURE — 25000125 ZZHC RX 250: Performed by: PHYSICIAN ASSISTANT

## 2019-06-12 PROCEDURE — 40000986 XR SHOULDER RT PORT G/E 2 VW: Mod: RT

## 2019-06-12 PROCEDURE — 25000125 ZZHC RX 250: Performed by: ORTHOPAEDIC SURGERY

## 2019-06-12 DEVICE — IMPLANTABLE DEVICE: Type: IMPLANTABLE DEVICE | Site: SHOULDER | Status: FUNCTIONAL

## 2019-06-12 RX ORDER — LIDOCAINE HYDROCHLORIDE 10 MG/ML
INJECTION, SOLUTION INFILTRATION; PERINEURAL PRN
Status: DISCONTINUED | OUTPATIENT
Start: 2019-06-12 | End: 2019-06-12

## 2019-06-12 RX ORDER — HYDRALAZINE HYDROCHLORIDE 20 MG/ML
2.5-5 INJECTION INTRAMUSCULAR; INTRAVENOUS EVERY 10 MIN PRN
Status: DISCONTINUED | OUTPATIENT
Start: 2019-06-12 | End: 2019-06-12 | Stop reason: HOSPADM

## 2019-06-12 RX ORDER — LABETALOL 20 MG/4 ML (5 MG/ML) INTRAVENOUS SYRINGE
10
Status: DISCONTINUED | OUTPATIENT
Start: 2019-06-12 | End: 2019-06-12 | Stop reason: HOSPADM

## 2019-06-12 RX ORDER — DIMENHYDRINATE 50 MG/ML
25 INJECTION, SOLUTION INTRAMUSCULAR; INTRAVENOUS
Status: DISCONTINUED | OUTPATIENT
Start: 2019-06-12 | End: 2019-06-12 | Stop reason: HOSPADM

## 2019-06-12 RX ORDER — NALOXONE HYDROCHLORIDE 0.4 MG/ML
.1-.4 INJECTION, SOLUTION INTRAMUSCULAR; INTRAVENOUS; SUBCUTANEOUS
Status: DISCONTINUED | OUTPATIENT
Start: 2019-06-12 | End: 2019-06-12 | Stop reason: HOSPADM

## 2019-06-12 RX ORDER — MEPERIDINE HYDROCHLORIDE 50 MG/ML
12.5 INJECTION INTRAMUSCULAR; INTRAVENOUS; SUBCUTANEOUS
Status: DISCONTINUED | OUTPATIENT
Start: 2019-06-12 | End: 2019-06-12 | Stop reason: HOSPADM

## 2019-06-12 RX ORDER — ONDANSETRON 2 MG/ML
INJECTION INTRAMUSCULAR; INTRAVENOUS PRN
Status: DISCONTINUED | OUTPATIENT
Start: 2019-06-12 | End: 2019-06-12

## 2019-06-12 RX ORDER — BUPIVACAINE HYDROCHLORIDE AND EPINEPHRINE 5; 5 MG/ML; UG/ML
INJECTION, SOLUTION PERINEURAL PRN
Status: DISCONTINUED | OUTPATIENT
Start: 2019-06-12 | End: 2019-06-12 | Stop reason: HOSPADM

## 2019-06-12 RX ORDER — LIDOCAINE 40 MG/G
CREAM TOPICAL
Status: DISCONTINUED | OUTPATIENT
Start: 2019-06-12 | End: 2019-06-12 | Stop reason: HOSPADM

## 2019-06-12 RX ORDER — NEOSTIGMINE METHYLSULFATE 1 MG/ML
VIAL (ML) INJECTION PRN
Status: DISCONTINUED | OUTPATIENT
Start: 2019-06-12 | End: 2019-06-12

## 2019-06-12 RX ORDER — SODIUM CHLORIDE, SODIUM LACTATE, POTASSIUM CHLORIDE, CALCIUM CHLORIDE 600; 310; 30; 20 MG/100ML; MG/100ML; MG/100ML; MG/100ML
INJECTION, SOLUTION INTRAVENOUS CONTINUOUS
Status: DISCONTINUED | OUTPATIENT
Start: 2019-06-12 | End: 2019-06-12 | Stop reason: HOSPADM

## 2019-06-12 RX ORDER — ONDANSETRON 2 MG/ML
4 INJECTION INTRAMUSCULAR; INTRAVENOUS EVERY 6 HOURS PRN
Status: DISCONTINUED | OUTPATIENT
Start: 2019-06-12 | End: 2019-06-14 | Stop reason: HOSPADM

## 2019-06-12 RX ORDER — NALOXONE HYDROCHLORIDE 0.4 MG/ML
.1-.4 INJECTION, SOLUTION INTRAMUSCULAR; INTRAVENOUS; SUBCUTANEOUS
Status: DISCONTINUED | OUTPATIENT
Start: 2019-06-12 | End: 2019-06-14 | Stop reason: HOSPADM

## 2019-06-12 RX ORDER — PROPOFOL 10 MG/ML
INJECTION, EMULSION INTRAVENOUS CONTINUOUS PRN
Status: DISCONTINUED | OUTPATIENT
Start: 2019-06-12 | End: 2019-06-12

## 2019-06-12 RX ORDER — FENTANYL CITRATE 50 UG/ML
25-50 INJECTION, SOLUTION INTRAMUSCULAR; INTRAVENOUS
Status: CANCELLED | OUTPATIENT
Start: 2019-06-12

## 2019-06-12 RX ORDER — PROPOFOL 10 MG/ML
INJECTION, EMULSION INTRAVENOUS PRN
Status: DISCONTINUED | OUTPATIENT
Start: 2019-06-12 | End: 2019-06-12

## 2019-06-12 RX ORDER — SODIUM CHLORIDE, SODIUM LACTATE, POTASSIUM CHLORIDE, CALCIUM CHLORIDE 600; 310; 30; 20 MG/100ML; MG/100ML; MG/100ML; MG/100ML
INJECTION, SOLUTION INTRAVENOUS CONTINUOUS
Status: DISCONTINUED | OUTPATIENT
Start: 2019-06-12 | End: 2019-06-14 | Stop reason: HOSPADM

## 2019-06-12 RX ORDER — ONDANSETRON 4 MG/1
4 TABLET, ORALLY DISINTEGRATING ORAL EVERY 6 HOURS PRN
Status: DISCONTINUED | OUTPATIENT
Start: 2019-06-12 | End: 2019-06-14 | Stop reason: HOSPADM

## 2019-06-12 RX ORDER — LISINOPRIL 10 MG/1
10 TABLET ORAL DAILY
Status: DISCONTINUED | OUTPATIENT
Start: 2019-06-12 | End: 2019-06-14 | Stop reason: HOSPADM

## 2019-06-12 RX ORDER — CLINDAMYCIN PHOSPHATE 900 MG/50ML
900 INJECTION, SOLUTION INTRAVENOUS EVERY 8 HOURS
Status: COMPLETED | OUTPATIENT
Start: 2019-06-12 | End: 2019-06-13

## 2019-06-12 RX ORDER — DEXAMETHASONE SODIUM PHOSPHATE 4 MG/ML
4 INJECTION, SOLUTION INTRA-ARTICULAR; INTRALESIONAL; INTRAMUSCULAR; INTRAVENOUS; SOFT TISSUE EVERY 10 MIN PRN
Status: DISCONTINUED | OUTPATIENT
Start: 2019-06-12 | End: 2019-06-12 | Stop reason: HOSPADM

## 2019-06-12 RX ORDER — DEXAMETHASONE SODIUM PHOSPHATE 4 MG/ML
INJECTION, SOLUTION INTRA-ARTICULAR; INTRALESIONAL; INTRAMUSCULAR; INTRAVENOUS; SOFT TISSUE PRN
Status: DISCONTINUED | OUTPATIENT
Start: 2019-06-12 | End: 2019-06-12

## 2019-06-12 RX ORDER — AMOXICILLIN 250 MG
1 CAPSULE ORAL 2 TIMES DAILY
Status: DISCONTINUED | OUTPATIENT
Start: 2019-06-12 | End: 2019-06-14 | Stop reason: HOSPADM

## 2019-06-12 RX ORDER — CLINDAMYCIN PHOSPHATE 900 MG/50ML
900 INJECTION, SOLUTION INTRAVENOUS SEE ADMIN INSTRUCTIONS
Status: DISCONTINUED | OUTPATIENT
Start: 2019-06-12 | End: 2019-06-12 | Stop reason: HOSPADM

## 2019-06-12 RX ORDER — ACETAMINOPHEN 325 MG/1
975 TABLET ORAL EVERY 8 HOURS
Status: DISCONTINUED | OUTPATIENT
Start: 2019-06-12 | End: 2019-06-14 | Stop reason: HOSPADM

## 2019-06-12 RX ORDER — ONDANSETRON 2 MG/ML
4 INJECTION INTRAMUSCULAR; INTRAVENOUS EVERY 30 MIN PRN
Status: DISCONTINUED | OUTPATIENT
Start: 2019-06-12 | End: 2019-06-12 | Stop reason: HOSPADM

## 2019-06-12 RX ORDER — FENTANYL CITRATE 50 UG/ML
INJECTION, SOLUTION INTRAMUSCULAR; INTRAVENOUS PRN
Status: DISCONTINUED | OUTPATIENT
Start: 2019-06-12 | End: 2019-06-12

## 2019-06-12 RX ORDER — PHENYLEPHRINE HYDROCHLORIDE 10 MG/ML
INJECTION INTRAVENOUS PRN
Status: DISCONTINUED | OUTPATIENT
Start: 2019-06-12 | End: 2019-06-12

## 2019-06-12 RX ORDER — METOCLOPRAMIDE HYDROCHLORIDE 5 MG/ML
10 INJECTION INTRAMUSCULAR; INTRAVENOUS EVERY 6 HOURS PRN
Status: DISCONTINUED | OUTPATIENT
Start: 2019-06-12 | End: 2019-06-12 | Stop reason: HOSPADM

## 2019-06-12 RX ORDER — TRAMADOL HYDROCHLORIDE 50 MG/1
50 TABLET ORAL EVERY 6 HOURS PRN
Status: DISCONTINUED | OUTPATIENT
Start: 2019-06-12 | End: 2019-06-14 | Stop reason: HOSPADM

## 2019-06-12 RX ORDER — GLYCOPYRROLATE 0.2 MG/ML
INJECTION, SOLUTION INTRAMUSCULAR; INTRAVENOUS PRN
Status: DISCONTINUED | OUTPATIENT
Start: 2019-06-12 | End: 2019-06-12

## 2019-06-12 RX ORDER — HYDROMORPHONE HYDROCHLORIDE 1 MG/ML
.3-.5 INJECTION, SOLUTION INTRAMUSCULAR; INTRAVENOUS; SUBCUTANEOUS
Status: DISCONTINUED | OUTPATIENT
Start: 2019-06-12 | End: 2019-06-14 | Stop reason: HOSPADM

## 2019-06-12 RX ORDER — ACETAMINOPHEN 325 MG/1
650 TABLET ORAL EVERY 4 HOURS PRN
Status: DISCONTINUED | OUTPATIENT
Start: 2019-06-15 | End: 2019-06-14 | Stop reason: HOSPADM

## 2019-06-12 RX ORDER — AMOXICILLIN 250 MG
2 CAPSULE ORAL 2 TIMES DAILY
Status: DISCONTINUED | OUTPATIENT
Start: 2019-06-12 | End: 2019-06-14 | Stop reason: HOSPADM

## 2019-06-12 RX ORDER — CLINDAMYCIN PHOSPHATE 900 MG/50ML
900 INJECTION, SOLUTION INTRAVENOUS
Status: COMPLETED | OUTPATIENT
Start: 2019-06-12 | End: 2019-06-12

## 2019-06-12 RX ORDER — LIDOCAINE 40 MG/G
CREAM TOPICAL
Status: DISCONTINUED | OUTPATIENT
Start: 2019-06-12 | End: 2019-06-14 | Stop reason: HOSPADM

## 2019-06-12 RX ORDER — SODIUM CHLORIDE, SODIUM LACTATE, POTASSIUM CHLORIDE, CALCIUM CHLORIDE 600; 310; 30; 20 MG/100ML; MG/100ML; MG/100ML; MG/100ML
INJECTION, SOLUTION INTRAVENOUS CONTINUOUS PRN
Status: DISCONTINUED | OUTPATIENT
Start: 2019-06-12 | End: 2019-06-12

## 2019-06-12 RX ORDER — ONDANSETRON 4 MG/1
4 TABLET, ORALLY DISINTEGRATING ORAL EVERY 30 MIN PRN
Status: DISCONTINUED | OUTPATIENT
Start: 2019-06-12 | End: 2019-06-12 | Stop reason: HOSPADM

## 2019-06-12 RX ORDER — FENTANYL CITRATE 50 UG/ML
25-50 INJECTION, SOLUTION INTRAMUSCULAR; INTRAVENOUS
Status: DISCONTINUED | OUTPATIENT
Start: 2019-06-12 | End: 2019-06-12 | Stop reason: HOSPADM

## 2019-06-12 RX ORDER — METOCLOPRAMIDE 10 MG/1
10 TABLET ORAL EVERY 6 HOURS PRN
Status: DISCONTINUED | OUTPATIENT
Start: 2019-06-12 | End: 2019-06-12 | Stop reason: HOSPADM

## 2019-06-12 RX ADMIN — PHENYLEPHRINE HYDROCHLORIDE 50 MCG: 10 INJECTION INTRAVENOUS at 08:43

## 2019-06-12 RX ADMIN — LIDOCAINE HYDROCHLORIDE 30 MG: 10 INJECTION, SOLUTION INFILTRATION; PERINEURAL at 09:46

## 2019-06-12 RX ADMIN — TRAMADOL HYDROCHLORIDE 50 MG: 50 TABLET, FILM COATED ORAL at 20:11

## 2019-06-12 RX ADMIN — PHENYLEPHRINE HYDROCHLORIDE 50 MCG: 10 INJECTION INTRAVENOUS at 08:19

## 2019-06-12 RX ADMIN — Medication 2 MG: at 09:35

## 2019-06-12 RX ADMIN — LIDOCAINE HYDROCHLORIDE 50 MG: 10 INJECTION, SOLUTION INFILTRATION; PERINEURAL at 07:44

## 2019-06-12 RX ADMIN — SENNOSIDES AND DOCUSATE SODIUM 1 TABLET: 8.6; 5 TABLET ORAL at 20:11

## 2019-06-12 RX ADMIN — Medication 4 MCG: at 07:07

## 2019-06-12 RX ADMIN — PROPOFOL 50 MCG/KG/MIN: 10 INJECTION, EMULSION INTRAVENOUS at 07:07

## 2019-06-12 RX ADMIN — PHENYLEPHRINE HYDROCHLORIDE 100 MCG: 10 INJECTION INTRAVENOUS at 09:00

## 2019-06-12 RX ADMIN — FENTANYL CITRATE 100 MCG: 50 INJECTION, SOLUTION INTRAMUSCULAR; INTRAVENOUS at 07:44

## 2019-06-12 RX ADMIN — ONDANSETRON 4 MG: 2 INJECTION INTRAMUSCULAR; INTRAVENOUS at 09:17

## 2019-06-12 RX ADMIN — PHENYLEPHRINE HYDROCHLORIDE 50 MCG: 10 INJECTION INTRAVENOUS at 08:37

## 2019-06-12 RX ADMIN — Medication 4 MCG: at 08:33

## 2019-06-12 RX ADMIN — PROPOFOL 160 MG: 10 INJECTION, EMULSION INTRAVENOUS at 07:44

## 2019-06-12 RX ADMIN — PROPOFOL 40 MG: 10 INJECTION, EMULSION INTRAVENOUS at 08:27

## 2019-06-12 RX ADMIN — ROCURONIUM BROMIDE 10 MG: 10 INJECTION INTRAVENOUS at 09:07

## 2019-06-12 RX ADMIN — SODIUM CHLORIDE, POTASSIUM CHLORIDE, SODIUM LACTATE AND CALCIUM CHLORIDE: 600; 310; 30; 20 INJECTION, SOLUTION INTRAVENOUS at 09:28

## 2019-06-12 RX ADMIN — LISINOPRIL 10 MG: 10 TABLET ORAL at 15:15

## 2019-06-12 RX ADMIN — Medication 1 G: at 09:17

## 2019-06-12 RX ADMIN — CLINDAMYCIN PHOSPHATE 900 MG: 900 INJECTION, SOLUTION INTRAVENOUS at 15:25

## 2019-06-12 RX ADMIN — FENTANYL CITRATE 25 MCG: 50 INJECTION, SOLUTION INTRAMUSCULAR; INTRAVENOUS at 09:42

## 2019-06-12 RX ADMIN — Medication 1 G: at 08:02

## 2019-06-12 RX ADMIN — PHENYLEPHRINE HYDROCHLORIDE 50 MCG: 10 INJECTION INTRAVENOUS at 08:49

## 2019-06-12 RX ADMIN — SODIUM CHLORIDE, POTASSIUM CHLORIDE, SODIUM LACTATE AND CALCIUM CHLORIDE: 600; 310; 30; 20 INJECTION, SOLUTION INTRAVENOUS at 20:11

## 2019-06-12 RX ADMIN — PROPOFOL 40 MG: 10 INJECTION, EMULSION INTRAVENOUS at 09:42

## 2019-06-12 RX ADMIN — CLINDAMYCIN PHOSPHATE 900 MG: 900 INJECTION, SOLUTION INTRAVENOUS at 23:36

## 2019-06-12 RX ADMIN — FENTANYL CITRATE 25 MCG: 50 INJECTION, SOLUTION INTRAMUSCULAR; INTRAVENOUS at 09:46

## 2019-06-12 RX ADMIN — PHENYLEPHRINE HYDROCHLORIDE 50 MCG: 10 INJECTION INTRAVENOUS at 08:47

## 2019-06-12 RX ADMIN — CLINDAMYCIN PHOSPHATE 900 MG: 18 INJECTION, SOLUTION INTRAVENOUS at 07:44

## 2019-06-12 RX ADMIN — Medication 8 MCG: at 06:55

## 2019-06-12 RX ADMIN — GLYCOPYRROLATE 0.2 MG: 0.2 INJECTION, SOLUTION INTRAMUSCULAR; INTRAVENOUS at 07:44

## 2019-06-12 RX ADMIN — DEXAMETHASONE SODIUM PHOSPHATE 4 MG: 4 INJECTION, SOLUTION INTRA-ARTICULAR; INTRALESIONAL; INTRAMUSCULAR; INTRAVENOUS; SOFT TISSUE at 07:44

## 2019-06-12 RX ADMIN — GLYCOPYRROLATE 0.2 MG: 0.2 INJECTION, SOLUTION INTRAMUSCULAR; INTRAVENOUS at 09:35

## 2019-06-12 RX ADMIN — PHENYLEPHRINE HYDROCHLORIDE 50 MCG: 10 INJECTION INTRAVENOUS at 08:55

## 2019-06-12 RX ADMIN — PHENYLEPHRINE HYDROCHLORIDE 100 MCG: 10 INJECTION INTRAVENOUS at 09:05

## 2019-06-12 RX ADMIN — SODIUM CHLORIDE, POTASSIUM CHLORIDE, SODIUM LACTATE AND CALCIUM CHLORIDE: 600; 310; 30; 20 INJECTION, SOLUTION INTRAVENOUS at 06:56

## 2019-06-12 RX ADMIN — MIDAZOLAM 2 MG: 1 INJECTION INTRAMUSCULAR; INTRAVENOUS at 07:34

## 2019-06-12 ASSESSMENT — ACTIVITIES OF DAILY LIVING (ADL)
ADLS_ACUITY_SCORE: 12

## 2019-06-12 ASSESSMENT — MIFFLIN-ST. JEOR: SCORE: 1190.45

## 2019-06-12 NOTE — PROGRESS NOTES
Patient alert and oriented.  Lung sounds clear. Bowel sounds active.  Regular diet.  Capnography in place, VSS.  Denies pain.  Sling on right arm, shoulder dressing CDI, patient reports numbness to RUE otherwise CMS intact.  Bruising noted to bilateral upper extremities, tegaderm to right arm below shoulder dressing cdi, tegaderm also to left forearm covering skin tear cdi.  Dressing to left foot, blister under from patient report of over icing at home.  Will continue to monitor.

## 2019-06-12 NOTE — ANESTHESIA PROCEDURE NOTES
Peripheral nerve/Neuraxial procedure note : Interscalene  Pre-Procedure    Location: pre-op    Procedure Times:6/12/2019 6:58 AM and 6/12/2019 7:14 AM  Pre-Anesthestic Checklist: patient identified, IV checked, site marked, risks and benefits discussed, informed consent, monitors and equipment checked, pre-op evaluation, at physician/surgeon's request and post-op pain management    Timeout  Correct Patient: Yes   Correct Procedure: Yes   Correct Site: Yes   Correct Laterality: Yes   Correct Position: Yes   Site Marked: Yes   .   Procedure Documentation    Diagnosis:DJD R SHOULDER.    Procedure:  right  Interscalene.     Ultrasound used to identify targeted nerve, plexus, or vascular marker and placed a needle adjacent to it., Ultrasound was used to visualize the spread of the anesthetic in close proximity to the above stated nerve.   Patient Prep;sterile gloves, povidone-iodine 7.5% surgical scrub.  .  Needle: insulated, short bevel  Needle Length (Inches) 2  Insertion Method: Single Shot.       Assessment/Narrative  Paresthesias: No.  .  The placement was negative for: blood aspirated, painful injection and site bleeding.  Bolus given via needle. Blood aspirated via catheter.   Secured via.   Complications: none. Comments:  30ml of 0.5% Bupivicaine w/ 1:200,000 epi + 10ml of 2% Lidocaine injected    The surgeon has given a verbal order transferring care of this patient to me for the performance of a regional analgesia block for post-op pain control. It is requested of me because I am uniquely trained and qualified to perform this block and the surgeon is neither trained nor qualified to perform this procedure.

## 2019-06-12 NOTE — BRIEF OP NOTE
[facility]    Brief Operative Note    Pre-operative diagnosis: right shoulder osteoarthritis, right rotator cuff tear  Post-operative diagnosis same  Procedure: Procedure(s):  Right reverse total shoulder arthroplasty (general with interscalene block)  Surgeon: Surgeon(s) and Role:     * Aaron Christianson MD - Primary     * Berkley Sarkar PA-C - Assisting  Anesthesia: Other   Estimated blood loss: 150 mL  Drains: None  Specimens: * No specimens in log *  Findings:   None.  Complications: None.  Implants:    Implant Name Type Inv. Item Serial No.  Lot No. LRB No. Used   AEQUALIS  PerFORM+ Reversed 0.25mm, 15  Angle Full Wedge Augment Baseplate   [id number] [employer]  Right 1   6.5 x 40mm Central Screw    [employer] [id number] Right 1   5.0 x 34mm Peripheral Screw    [employer] [id number] Right 1   5.0 x 26mm Peripheral Screw    [employer] [id number] Right 1   5.0 x 42mm Peripheral Screw    [employer] [id number] Right 1   39mm Standard Glenosphere   [id number] [employer]  Right 1   Flex Shoulder System, Reverse Insert, 39 x 6mm, 12.5    [id number] [employer]  Right 1   Flex Shoulder System, Reverse Tray   [id number] [employer]  Right 1   Aequalis  Ascend  Flex Standard PTC Humeral Stem, 48 x 78mm, 132.5    [id number] [employer]  Right 1      Plan:  NWB to RUE  Ultrasling x 4 weeks  DVT prophylaxis - SCDs & Lovenox qday in hospital, then Aspirin 325 mg PO BID x 6 weeks  Clindamycin x 24 hours  PACU XRs  PT/OT - Codman's and pendulum swing exercises. Okay to work on PROM; FF 0 - 90, ER 0 - 30.   Keep dressing C/D/I for 2 weeks; okay to shower    F/U at clinic in 2 weeks

## 2019-06-12 NOTE — OP NOTE
Procedure Date: 06/12/2019      PREOPERATIVE DIAGNOSIS:  Right shoulder rotator cuff tear arthropathy.      POSTOPERATIVE DIAGNOSIS:  Right shoulder rotator cuff tear arthropathy.      PROCEDURE:  Right reverse total shoulder arthroplasty.      SURGEON:  Aaron Christianson MD.      FIRST ASSISTANT:  JEN Coburn.      A skilled first assistant was necessary for patient positioning, prepping and draping, help with soft tissue retraction, help with arm positioning, closing and patient transfer.      ESTIMATED BLOOD LOSS:  150 mL.      ANESTHESIA:  General, interscalene block.      COMPLICATIONS:  None apparent.      IMPLANTS:   1.  Tornier Aequalis Perform Plus reverse full wedge augmented baseplate, size 25.   2.  [employer] Aequalis Perform reverse standard glenosphere, size 39.   3.  Tornier Flex shoulder system reversed insert poly, size 39 with a +6 thickness.   4.  [employer]ier Flex shoulder system reversed tray, low eccentricity, thickness +0.   5.  [employer] Aequalis Ascend standard PTC humeral stem, size 4B.      INDICATIONS:  The patient is a 63-year-old female who has been complaining of right shoulder pain for several months and has gotten worse over the last few weeks.  She has a history of a mini open rotator cuff repair back in 2007, and she did undergo a reverse shoulder arthroplasty on the contralateral side by me approximately a year ago with excellent results.  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  She underwent a cortisone injection and that gave her some relief but an MRI did demonstrate significant rotator cuff tear arthropathy and she was interested in pursuing a reverse shoulder arthroplasty.      DESCRIPTION OF PROCEDURE:  On the date of the procedure, the patient was met in the preoperative area by the surgery and anesthesia team.  Her right  shoulder was marked by the operative surgeon.  Informed consent was obtained.  Risks and benefits of the surgery were discussed with the patient including risk of bleeding, infection, damage to neurovascular structures, risk of stiffness, pain, need for future revision surgery, dislocation, as well as fracture.  She understood and agreed to proceed.  Our anesthesia colleagues then performed an interscalene block.  She was then brought to the operating room, placed on the operating table in supine position and general anesthesia was administered.  She was then placed in the beach chair position.  The right shoulder was then prepped and draped in the usual sterile fashion.  Preoperative antibiotics were given.  Preoperative timeout was performed.  We began the procedure by making a standard incision over the deltopectoral interval.  Sharp dissection was carried down through the skin and subcutaneous tissue.  Hemostasis was obtained.  Cephalic vein was visualized and protected laterally.  Unfortunately, it did tear through the case, so it had to be coagulated.  The subdeltoid and subacromial adhesions were mobilized using a Aggarwal elevator and the lateral border of the conjoined tendon was mobilized as well.  Appropriate retractors were placed.  There was a significant amount of bursa and inflammatory tissue over the subscapularis which was removed using electrocautery.  The biceps was then tenodesed to the superior aspect of the pec major and we then tagged the subscapularis, tied off the 3 sisters and then performed a subscapularis peel off the lesser tuberosity.  The subscapularis was significantly torn off.  We then placed a Fukuda retractor and performed a 4-sided subscapularis release without any issues and we were able to get good mobilization of the subscapularis.  The subscapularis was then tucked into the subscapularis fossa and protected.  We then turned our attention to the proximal humerus.  The proximal  humerus was well visualized.  We had a full exposure of the proximal humerus and we used the rongeur in order to remove osteophytes.  We then made an anatomic neck cut using a free hand technique and placed our sounders as well as broaches up to a size 4 in the patient's native version.  The supraspinatus was torn off, but the infraspinatus was intact and we did visualize an old metal anchor that was removed from the humeral head.  When we were happy with the stability of a size 4, we then put our cut protector in place and turned our attention back to the glenoid.  The labrum was circumferentially removed.  We had excellent exposure of the glenoid.  The cartilage was removed and then we used the blueprint created a guide that patient specific for placement of our pin.  The pin was placed in the middle of the inferior half of the glenoid, overreamed appropriately, and a size 25 full wedge augmented baseplate was placed into position.  Three screws were utilized for secondary stability and was rotational stability and a standard 39 glenosphere was then impacted into position.  We then turned our attention back to the proximal humerus and trialed a low eccentricity tray, which had excellent fit, so the trials were removed.  The true implants were opened.  Three bone tunnels were placed about the lesser trochanter.  We placed #5 FiberWires in a Niceloop fashion through those bone tunnels for subscapularis repair and we then placed our true implants along with a +6 poly into position and reduced the shoulder without much difficulty.  It had excellent range of motion.  It was slightly tight, but not overtly tight, and had excellent stability.  We then copiously irrigated out the wound, repaired the subscapularis with #5 FiberWires in a Niceloop fashion and turned our attention to closure.  The skin was closed with 2-0 Vicryl, followed by running 4-0 Monocryl.  Sterile dressing was applied.  The patient was then placed  into an UltraSling.  She was awakened from anesthesia in stable condition and brought to the PACU for recovery.         ZO WISE MD             D: 2019   T: 2019   MT: FIDENCIO      Name:     JOSHUA REYNOLDS   MRN:      [id number]        Account:        [id number]   :      1955           Procedure Date: 2019      Document: [id number]

## 2019-06-12 NOTE — OR NURSING
0610 pt has an aprox. Silver dollar un opened blister on left heal due to icing it for pain. Dr. Christianson aware pk to proceed.

## 2019-06-12 NOTE — ANESTHESIA POSTPROCEDURE EVALUATION
Patient: Krystyna Peña    Procedure(s):  Right reverse total shoulder arthroplasty (general with interscalene block)    Diagnosis:right shoulder osteoarthritis, right rotator cuff tear  Diagnosis Additional Information: No value filed.    Anesthesia Type:  No value filed.    Note:  Anesthesia Post Evaluation    Patient location during evaluation: PACU  Patient participation: Able to fully participate in evaluation  Level of consciousness: awake and alert  Pain management: adequate  Airway patency: patent  Cardiovascular status: acceptable  Respiratory status: acceptable  Hydration status: acceptable  PONV: controlled     Anesthetic complications: None          Last vitals:  Vitals:    06/12/19 1128 06/12/19 1200 06/12/19 1230   BP: 149/70 127/72 136/69   Pulse:      Resp: 16 16 16   Temp: 95.1  F (35.1  C)     SpO2: 97% 98% 98%         Electronically Signed By: Bautista Montenegro MD  June 12, 2019  4:15 PM

## 2019-06-12 NOTE — ANESTHESIA PREPROCEDURE EVALUATION
Anesthesia Pre-Procedure Evaluation    Patient: Krystyna Peña   MRN: [id number] : 1955          Preoperative Diagnosis: right shoulder osteoarthritis, right rotator cuff tear    Procedure(s):  Right reverse total shoulder arthroplasty (general with interscalene block)    Past Medical History:   Diagnosis Date     Arthritis      Asymptomatic varicose veins      Benign neoplasm of colon ,     Adenoma     Gastroesophageal reflux disease      Hypertension      Major depression      Past Surgical History:   Procedure Laterality Date     AMPUTATE TOE(S) Left 2018    Procedure: AMPUTATE TOE(S);  Amputation left third digit;  Surgeon: Herb Michael DPM;  Location: [facility] OR     ARTHROPLASTY HIP Right 3/28/2016    Procedure: ARTHROPLASTY HIP;  Surgeon: Perez Meza MD;  Location: [facility] OR     C NONSPECIFIC PROCEDURE      Right arm plastic surgery     C NONSPECIFIC PROCEDURE      Right knee surgery     C NONSPECIFIC PROCEDURE  10/03    L popliteal AVM repair     C NONSPECIFIC PROCEDURE      Removal bone spur left foot     C NONSPECIFIC PROCEDURE      amputation of toes left & right toes     C REPAIR SPIEGELIAN HERNIA       C TOTAL KNEE ARTHROPLASTY  10/03    LT     COLONOSCOPY       HC CORRECT BUNION,SIMPLE      RT     HC CORRECT BUNION,SIMPLE      LT     HC KNEE SCOPE, DIAGNOSTIC      LT     REVERSE ARTHROPLASTY SHOULDER Left 10/30/2018    Procedure: Left reverse total shoulder arthroplasty;  Surgeon: Aaron Christianson MD;  Location: [facility] OR     ROTATOR CUFF REPAIR RT/LT      right     VASCULAR SURGERY  left leg bypass      Anesthesia Evaluation     . Pt has had prior anesthetic. Type: General           ROS/MED HX    ENT/Pulmonary:  - neg pulmonary ROS     Neurologic:  - neg neurologic ROS     Cardiovascular:     (+) hypertension----. : . . . :. .       METS/Exercise Tolerance:     Hematologic:  - neg hematologic  ROS       Musculoskeletal:  - neg musculoskeletal ROS   "     GI/Hepatic:     (+) GERD Asymptomatic on medication,       Renal/Genitourinary:     (+) chronic renal disease,       Endo:  - neg endo ROS       Psychiatric:  - neg psychiatric ROS       Infectious Disease:  - neg infectious disease ROS       Malignancy:      - no malignancy   Other:    (+) No chance of pregnancy C-spine cleared: N/A, no H/O Chronic Pain,no other significant disability   - neg other ROS                      Physical Exam  Normal systems: cardiovascular, pulmonary and dental    Airway   Mallampati: II  TM distance: >3 FB  Neck ROM: full    Dental     Cardiovascular       Pulmonary             Lab Results   Component Value Date    WBC 5.2 06/03/2019    HGB 13.4 06/03/2019    HCT 37.5 06/03/2019     06/12/2019    CRP <2.9 02/06/2019    SED 13 02/06/2019     (L) 06/03/2019    POTASSIUM 4.7 06/03/2019    CHLORIDE 99 06/03/2019    CO2 17 (L) 06/03/2019    BUN 21 06/03/2019    CR 0.89 06/12/2019    GLC 84 06/03/2019    YANA 9.4 06/03/2019    PHOS 4.3 01/06/2019    MAG 1.3 (L) 01/06/2019    ALBUMIN 3.4 12/11/2018    PROTTOTAL 7.0 12/11/2018    ALT 19 02/06/2019    AST 16 02/06/2019    ALKPHOS 92 12/11/2018    BILITOTAL 0.5 12/11/2018    LIPASE 114 12/11/2018    PTT 28 09/12/2011    INR 0.96 03/22/2016    TSH 3.14 01/02/2019       Preop Vitals  BP Readings from Last 3 Encounters:   06/12/19 136/69   06/03/19 148/86   03/24/19 134/76    Pulse Readings from Last 3 Encounters:   06/12/19 60   06/03/19 81   03/24/19 86      Resp Readings from Last 3 Encounters:   06/12/19 16   06/03/19 16   03/24/19 20    SpO2 Readings from Last 3 Encounters:   06/12/19 98%   06/03/19 97%   03/24/19 100%      Temp Readings from Last 1 Encounters:   06/12/19 95.1  F (35.1  C)    Ht Readings from Last 1 Encounters:   06/12/19 1.6 m (5' 3\")      Wt Readings from Last 1 Encounters:   06/12/19 67.1 kg (148 lb)    Estimated body mass index is 26.22 kg/m  as calculated from the following:    Height as of this " "encounter: 1.6 m (5' 3\").    Weight as of this encounter: 67.1 kg (148 lb).       Anesthesia Plan      History & Physical Review  History and physical reviewed and following examination; no interval change.    ASA Status:  3 .    NPO Status:  > 8 hours    Plan for General, ETT and Periph. Nerve Block for postop pain with Intravenous induction. Maintenance will be Balanced.    PONV prophylaxis:  Ondansetron (or other 5HT-3) and Dexamethasone or Solumedrol       Postoperative Care  Postoperative pain management:  IV analgesics.      Consents  Anesthetic plan, risks, benefits and alternatives discussed with:  Patient.  Use of blood products discussed: Yes.   Use of blood products discussed with Patient.  Consented to blood products.  .                 Bautista Montenegro MD                    .  "

## 2019-06-12 NOTE — ANESTHESIA CARE TRANSFER NOTE
Patient: Krystyna Peña    Procedure(s):  Right reverse total shoulder arthroplasty (general with interscalene block)    Diagnosis: right shoulder osteoarthritis, right rotator cuff tear  Diagnosis Additional Information: No value filed.    Anesthesia Type:   No value filed.     Note:  Airway :Face Mask and Blow-by  Patient transferred to:PACU  Comments: To PAR, awake and alert, no c/o surgical pain,airway patent, report to RN. Handoff Report: Identifed the Patient, Identified the Reponsible Provider, Reviewed the pertinent medical history, Discussed the surgical course, Reviewed Intra-OP anesthesia mangement and issues during anesthesia and Allowed opportunity for questions and acknowledgement of understanding      Vitals: (Last set prior to Anesthesia Care Transfer)    CRNA VITALS  6/12/2019 0925 - 6/12/2019 1006      6/12/2019             Pulse:  98    SpO2:  100 %    Resp Rate (observed):  9                Electronically Signed By: JOCELYN Crook CRNA  June 12, 2019  10:06 AM

## 2019-06-13 ENCOUNTER — APPOINTMENT (OUTPATIENT)
Dept: OCCUPATIONAL THERAPY | Facility: CLINIC | Age: 64
End: 2019-06-13
Attending: ORTHOPAEDIC SURGERY
Payer: COMMERCIAL

## 2019-06-13 LAB — HGB BLD-MCNC: 11.8 G/DL (ref 11.7–15.7)

## 2019-06-13 PROCEDURE — 97535 SELF CARE MNGMENT TRAINING: CPT | Mod: GO | Performed by: STUDENT IN AN ORGANIZED HEALTH CARE EDUCATION/TRAINING PROGRAM

## 2019-06-13 PROCEDURE — 36415 COLL VENOUS BLD VENIPUNCTURE: CPT | Performed by: PHYSICIAN ASSISTANT

## 2019-06-13 PROCEDURE — 85018 HEMOGLOBIN: CPT | Performed by: PHYSICIAN ASSISTANT

## 2019-06-13 PROCEDURE — 97110 THERAPEUTIC EXERCISES: CPT | Mod: GO | Performed by: STUDENT IN AN ORGANIZED HEALTH CARE EDUCATION/TRAINING PROGRAM

## 2019-06-13 PROCEDURE — 25000132 ZZH RX MED GY IP 250 OP 250 PS 637: Performed by: PHYSICIAN ASSISTANT

## 2019-06-13 PROCEDURE — 25000128 H RX IP 250 OP 636: Performed by: PHYSICIAN ASSISTANT

## 2019-06-13 PROCEDURE — 97530 THERAPEUTIC ACTIVITIES: CPT | Mod: GO | Performed by: STUDENT IN AN ORGANIZED HEALTH CARE EDUCATION/TRAINING PROGRAM

## 2019-06-13 PROCEDURE — 97165 OT EVAL LOW COMPLEX 30 MIN: CPT | Mod: GO | Performed by: STUDENT IN AN ORGANIZED HEALTH CARE EDUCATION/TRAINING PROGRAM

## 2019-06-13 PROCEDURE — 12000000 ZZH R&B MED SURG/OB

## 2019-06-13 RX ORDER — OXYCODONE HYDROCHLORIDE 5 MG/1
5-10 TABLET ORAL EVERY 4 HOURS PRN
Status: DISCONTINUED | OUTPATIENT
Start: 2019-06-13 | End: 2019-06-13

## 2019-06-13 RX ORDER — ACETAMINOPHEN 325 MG/1
650 TABLET ORAL EVERY 4 HOURS PRN
Qty: 1 BOTTLE | Refills: 0 | Status: SHIPPED | OUTPATIENT
Start: 2019-06-15 | End: 2019-06-14

## 2019-06-13 RX ORDER — ASPIRIN 325 MG
325 TABLET ORAL
Qty: 60 TABLET | Refills: 0 | Status: ON HOLD | OUTPATIENT
Start: 2019-06-13 | End: 2019-07-16

## 2019-06-13 RX ORDER — HYDROMORPHONE HYDROCHLORIDE 2 MG/1
2-4 TABLET ORAL EVERY 4 HOURS PRN
Status: DISCONTINUED | OUTPATIENT
Start: 2019-06-13 | End: 2019-06-14

## 2019-06-13 RX ORDER — AMOXICILLIN 250 MG
2 CAPSULE ORAL 2 TIMES DAILY PRN
Qty: 40 TABLET | Refills: 0 | Status: SHIPPED | OUTPATIENT
Start: 2019-06-13 | End: 2019-07-15

## 2019-06-13 RX ORDER — OXYCODONE HYDROCHLORIDE 5 MG/1
5 TABLET ORAL EVERY 4 HOURS PRN
Qty: 20 TABLET | Refills: 0 | Status: SHIPPED | OUTPATIENT
Start: 2019-06-13 | End: 2019-06-14

## 2019-06-13 RX ORDER — TRAMADOL HYDROCHLORIDE 50 MG/1
50 TABLET ORAL EVERY 6 HOURS PRN
Qty: 20 TABLET | Refills: 0 | Status: SHIPPED | OUTPATIENT
Start: 2019-06-13 | End: 2019-07-15

## 2019-06-13 RX ORDER — OXYCODONE HYDROCHLORIDE 5 MG/1
5 TABLET ORAL EVERY 4 HOURS PRN
Status: DISCONTINUED | OUTPATIENT
Start: 2019-06-13 | End: 2019-06-13

## 2019-06-13 RX ORDER — DIPHENHYDRAMINE HCL 25 MG
25-50 CAPSULE ORAL EVERY 6 HOURS PRN
Status: DISCONTINUED | OUTPATIENT
Start: 2019-06-13 | End: 2019-06-14 | Stop reason: HOSPADM

## 2019-06-13 RX ORDER — HYDROXYZINE HYDROCHLORIDE 25 MG/1
25 TABLET, FILM COATED ORAL EVERY 6 HOURS PRN
Status: DISCONTINUED | OUTPATIENT
Start: 2019-06-13 | End: 2019-06-14 | Stop reason: HOSPADM

## 2019-06-13 RX ADMIN — TRAMADOL HYDROCHLORIDE 50 MG: 50 TABLET, FILM COATED ORAL at 05:18

## 2019-06-13 RX ADMIN — OXYCODONE HYDROCHLORIDE 5 MG: 5 TABLET ORAL at 19:21

## 2019-06-13 RX ADMIN — HYDROMORPHONE HYDROCHLORIDE 0.3 MG: 1 INJECTION, SOLUTION INTRAMUSCULAR; INTRAVENOUS; SUBCUTANEOUS at 04:28

## 2019-06-13 RX ADMIN — HYDROMORPHONE HYDROCHLORIDE 0.5 MG: 1 INJECTION, SOLUTION INTRAMUSCULAR; INTRAVENOUS; SUBCUTANEOUS at 08:34

## 2019-06-13 RX ADMIN — HYDROMORPHONE HYDROCHLORIDE 0.5 MG: 1 INJECTION, SOLUTION INTRAMUSCULAR; INTRAVENOUS; SUBCUTANEOUS at 06:28

## 2019-06-13 RX ADMIN — LISINOPRIL 10 MG: 10 TABLET ORAL at 11:35

## 2019-06-13 RX ADMIN — SENNOSIDES AND DOCUSATE SODIUM 2 TABLET: 8.6; 5 TABLET ORAL at 11:35

## 2019-06-13 RX ADMIN — OXYCODONE HYDROCHLORIDE 5 MG: 5 TABLET ORAL at 11:31

## 2019-06-13 RX ADMIN — HYDROXYZINE HYDROCHLORIDE 25 MG: 25 TABLET ORAL at 18:00

## 2019-06-13 RX ADMIN — OXYCODONE HYDROCHLORIDE 5 MG: 5 TABLET ORAL at 15:44

## 2019-06-13 RX ADMIN — SENNOSIDES AND DOCUSATE SODIUM 2 TABLET: 8.6; 5 TABLET ORAL at 19:21

## 2019-06-13 RX ADMIN — ENOXAPARIN SODIUM 40 MG: 40 INJECTION SUBCUTANEOUS at 11:37

## 2019-06-13 ASSESSMENT — ACTIVITIES OF DAILY LIVING (ADL)
ADLS_ACUITY_SCORE: 13
PREVIOUS_RESPONSIBILITIES: MEAL PREP;HOUSEKEEPING;LAUNDRY;SHOPPING;MEDICATION MANAGEMENT;FINANCES;DRIVING;WORK
ADLS_ACUITY_SCORE: 13

## 2019-06-13 NOTE — PROGRESS NOTES
06/13/19 0845   Quick Adds   Type of Visit Initial Occupational Therapy Evaluation   Living Environment   Lives With spouse   Living Arrangements house   Home Accessibility stairs to enter home;stairs within home   Number of Stairs, Main Entrance 4   Number of Stairs, Within Home, Primary 10   Transportation Anticipated car, drives self;family or friend will provide   Living Environment Comment Pt reports living with  in multi story home   Self-Care   Usual Activity Tolerance good   Current Activity Tolerance fair   Equipment Currently Used at Home none   Functional Level   Ambulation 0-->independent   Transferring 0-->independent   Toileting 0-->independent  (counter on L side, varying heights)   Bathing 0-->independent  (tub shower)   Dressing 0-->independent   Fall history within last six months no   Prior Functional Level Comment baseline pt is independent in ADL/IADLs and mobility without AD including working for [employer]   General Information   Onset of Illness/Injury or Date of Surgery - Date 06/12/19   Referring Physician Mello RODRIGUEZ   Patient/Family Goals Statement return home POD#2   Additional Occupational Profile Info/Pertinent History of Current Problem POD#1 R reverse TSA. Pt has history of L reverse shoulder 10/2018 and previous other ortho related surgeries. Pt reports pain this morning related to R shoulder and L heel/foot blister   Weight-Bearing Status - RUE nonweight-bearing   General Observations pt is R hand dominant   Cognitive Status Examination   Orientation orientation to person, place and time   Level of Consciousness alert   Follows Commands (Cognition) WNL   Cognitive Comment will continue to monitor, at times pt seemed confused and forgetful   Visual Perception   Visual Perception Wears glasses   Sensory Examination   Sensory Comments some tingly and numbness in R hand   Pain Assessment   Patient Currently in Pain Yes, see Vital Sign flowsheet  (7)   Range of Motion (ROM)   ROM  Comment L UE AROM intact   Strength   Strength Comments L UE grossly 5/5   Coordination   Upper Extremity Coordination Right UE impaired   Transfer Skill: Sit to Stand   Level of [place]: Sit/Stand contact guard   Physical Assist/Nonphysical Assist: Sit/Stand verbal cues   Transfer Skill: Toilet Transfer   Level of [place]: Toilet contact guard  (bedside commode)   Physical Assist/Nonphysical Assist: Toilet set-up required;verbal cues   Balance   Balance Comments impaired related to L foot/heel blister   Upper Body Dressing   Level of [place]: Dress Upper Body maximum assist (25% patients effort)   Toileting   Level of [place]: Toilet contact guard   Instrumental Activities of Daily Living (IADL)   Previous Responsibilities meal prep;housekeeping;laundry;shopping;medication management;finances;driving;work   Activities of Daily Living Analysis   Impairments Contributing to Impaired Activities of Daily Living balance impaired;coordination impaired;pain;post surgical precautions;strength decreased   General Therapy Interventions   Planned Therapy Interventions ADL retraining;IADL retraining;transfer training;home program guidelines   Clinical Impression   Criteria for Skilled Therapeutic Interventions Met yes, treatment indicated   OT Diagnosis impaired ability to manage ADL/IADLs   Influenced by the following impairments post-op precautions, pain, fatigue, activity tolerance   Assessment of Occupational Performance 3-5 Performance Deficits   Identified Performance Deficits dressing, bathing, toileting, meals, working   Clinical Decision Making (Complexity) Low complexity   Therapy Frequency 2 times/day   Predicted Duration of Therapy Intervention (days/wks) 2 days   Anticipated Discharge Disposition Home with Assist   Risks and Benefits of Treatment have been explained. Yes   Patient, Family & other staff in agreement with plan of care Yes   Clinical Impression Comments demonstrates ability to  "benefit from skilled OT services    [facility] TM \"6 Clicks\"   2016, Trustees of [facility], under license to [employer].  All rights reserved.   6 Clicks Short Forms Daily Activity Inpatient Short Form   [facility]  \"6 Clicks\" Daily Activity Inpatient Short Form   1. Putting on and taking off regular lower body clothing? 2 - A Lot   2. Bathing (including washing, rinsing, drying)? 2 - A Lot   3. Toileting, which includes using toilet, bedpan or urinal? 3 - A Little   4. Putting on and taking off regular upper body clothing? 2 - A Lot   5. Taking care of personal grooming such as brushing teeth? 3 - A Little   6. Eating meals? 3 - A Little   Daily Activity Raw Score (Score out of 24.Lower scores equate to lower levels of function) 15   Total Evaluation Time   Total Evaluation Time (Minutes) 8     "

## 2019-06-13 NOTE — PLAN OF CARE
PT: Per communication with OT, pt has been transferring CGA-SBA, ambulating short distances within room with CGA, has not performed stairs yet. OT to continue to assess for PT needs. Will continue to follow.

## 2019-06-13 NOTE — PLAN OF CARE
A&O. VSS. Pain managed with IV dilaudid and Tramadol. Assist of 2 to bedside commode. New Tegaderm dressing to R inner thigh from commode pinch, commode replaced. Voiding adequately.

## 2019-06-13 NOTE — PLAN OF CARE
OT: Evaluation completed, treatment initiated POD#1 R reverse TSA. Pt has history of L reverse shoulder 10/2018 and previous other ortho related surgeries. Pt reports pain this morning related to R shoulder and L heel/foot blister    Pt reports living with  in multi story home, baseline pt is independent in ADL/IADLs and mobility without AD including working for [employer]    Discharge Planner OT   Patient plan for discharge: home POD#2  Current status: Pt perseverating on post-op exercises at beginning of session, provided answers to questions and educated on not using weights or pulleys until instructed by OP therapy; Pt reported pain prior to activity, following 5 minute seated rest and education on post-op precautions, activity guidelines and sling management, pt in agreement for further activity; provided recommendations for bathroom use however pt declined mobility due to L foot/heel blister and pain; pt agreeable to set up of commode 3' away from bed, pt able to complete sit<>stand from bed and commode, CGA and completed short distance mobility with CGA while holding onto objects; following encouragement pt able to complete pericares, SBA; pt's O2 monitored and following activity pt on roomair 98%  Barriers to return to prior living situation: activity tolerance, pain  Recommendations for discharge: anticipate return home with assist for IADLs such as driving, managing medications and meals  Rationale for recommendations: will continue to follow for IP OT services to maximize pt's level of independence and safety with ADL/IADLs and mobility       Entered by: Anjelica Lam 06/13/2019 9:47 AM

## 2019-06-13 NOTE — PROGRESS NOTES
Orthopedic Surgery  Krystyna Peña  2019  Admit Date:  2019  POD: 1 Day Post-Op  Procedure(s):  Right reverse total shoulder arthroplasty (general with interscalene block)    Patient resting comfortably in bed.    Patient reports pain is uncontrolled now that the block wore off this morning.   She states she has increased pain with walking secondary to the blister on her left heel.   Tolerating oral intake.    Denies nausea or vomiting.  Denies chest pain or shortness of breath.  No events overnight.      Alert and orient to person, place, and time.  Vital Sign Ranges  Temperature Temp  Av.2  F (36.2  C)  Min: 95.1  F (35.1  C)  Max: 98.8  F (37.1  C)   Blood pressure Systolic (24hrs), Av , Min:118 , Max:149        Diastolic (24hrs), Av, Min:56, Max:78      Pulse Pulse  Av.9  Min: 60  Max: 83   Respirations Resp  Avg: 15.7  Min: 11  Max: 21   Pulse oximetry SpO2  Av.1 %  Min: 87 %  Max: 100 %       Sling in place  Aquacel C/D/I  Sensation intact in upper arm over biceps as well as in hand r/m/u nerve distribution  Able to abduct and oppose left thumb  +Radial pulse  +cap refill      Labs:  Recent Labs   Lab Test 19  1603 19  1412 19  1059   POTASSIUM 4.7 4.8 4.0     Recent Labs   Lab Test 19  0805 19  1603 19  0629   HGB 11.8 13.4 11.2*     Recent Labs   Lab Test 16  1510 11  1202   INR 0.96 0.90     Recent Labs   Lab Test 19  1129 19  1603 19  0629    277 376       A/P  1. Plan   Continue Lovenox & SCDs for DVT prophylaxis.     Mobilize with PT/OT - okay for FF 0 - 90, ER 0 - 30, Codman's/pendulum swing exercises, gentle ROM at the elbow and wrist, and fist pumps.     NWB to RUE    Change current pain regiment - add in oxycodone   Leave dressing intact   Follow up with Dr. Christianson in clinic in 2 weeks    2. Disposition   Anticipate d/c to home pending pain control and PT progress, likely tomorrow afternoon.      Berkley Sarkar PA-C

## 2019-06-13 NOTE — PROGRESS NOTES
I dropped off an Ultra Sling to the OR control desk this morning.  I went back this afternoon and checked the fit of the orthosis.  I went over donning of orthosis. The waist belt was undone, I fastened it and brought it to her attention.  [facility] if any questions or issues with the orthosis.  Ronny ELAINE.

## 2019-06-13 NOTE — PLAN OF CARE
Pts cms intact. Pts arm in sling and ice applied intermittent . Pt is tolerating diet and voiding without difficulty.  Pt complained of pain control  and pt was changed from ultram to oxycodone .  Pt is taking 5mg oxycodone every 4 hours.  Pt is planning to discharge to home tomorrow.

## 2019-06-13 NOTE — PROVIDER NOTIFICATION
Page to [facility]    Pt is c/o pain being a 10/10 after taking 5 mg Oxycodone. She would like to know if she can increase her order to 5-10 mg

## 2019-06-14 ENCOUNTER — APPOINTMENT (OUTPATIENT)
Dept: OCCUPATIONAL THERAPY | Facility: CLINIC | Age: 64
End: 2019-06-14
Attending: ORTHOPAEDIC SURGERY
Payer: COMMERCIAL

## 2019-06-14 VITALS
WEIGHT: 148 LBS | HEIGHT: 63 IN | TEMPERATURE: 97.5 F | SYSTOLIC BLOOD PRESSURE: 127 MMHG | HEART RATE: 83 BPM | DIASTOLIC BLOOD PRESSURE: 70 MMHG | BODY MASS INDEX: 26.22 KG/M2 | OXYGEN SATURATION: 93 % | RESPIRATION RATE: 16 BRPM

## 2019-06-14 LAB
GLUCOSE SERPL-MCNC: 98 MG/DL (ref 70–99)
HGB BLD-MCNC: 11.8 G/DL (ref 11.7–15.7)

## 2019-06-14 PROCEDURE — 25000132 ZZH RX MED GY IP 250 OP 250 PS 637: Performed by: PHYSICIAN ASSISTANT

## 2019-06-14 PROCEDURE — 82947 ASSAY GLUCOSE BLOOD QUANT: CPT | Performed by: PHYSICIAN ASSISTANT

## 2019-06-14 PROCEDURE — 97535 SELF CARE MNGMENT TRAINING: CPT | Mod: GO | Performed by: REHABILITATION PRACTITIONER

## 2019-06-14 PROCEDURE — 36415 COLL VENOUS BLD VENIPUNCTURE: CPT | Performed by: PHYSICIAN ASSISTANT

## 2019-06-14 PROCEDURE — 25000128 H RX IP 250 OP 636: Performed by: PHYSICIAN ASSISTANT

## 2019-06-14 PROCEDURE — 97530 THERAPEUTIC ACTIVITIES: CPT | Mod: GO | Performed by: REHABILITATION PRACTITIONER

## 2019-06-14 PROCEDURE — 97110 THERAPEUTIC EXERCISES: CPT | Mod: GO | Performed by: REHABILITATION PRACTITIONER

## 2019-06-14 PROCEDURE — 85018 HEMOGLOBIN: CPT | Performed by: PHYSICIAN ASSISTANT

## 2019-06-14 RX ORDER — HYDROCODONE BITARTRATE AND ACETAMINOPHEN 5; 325 MG/1; MG/1
1 TABLET ORAL EVERY 6 HOURS PRN
Qty: 20 TABLET | Refills: 0 | Status: ON HOLD | OUTPATIENT
Start: 2019-06-14 | End: 2019-07-15

## 2019-06-14 RX ORDER — HYDROCODONE BITARTRATE AND ACETAMINOPHEN 5; 325 MG/1; MG/1
1 TABLET ORAL EVERY 6 HOURS PRN
Status: DISCONTINUED | OUTPATIENT
Start: 2019-06-14 | End: 2019-06-14 | Stop reason: HOSPADM

## 2019-06-14 RX ADMIN — SENNOSIDES AND DOCUSATE SODIUM 2 TABLET: 8.6; 5 TABLET ORAL at 08:20

## 2019-06-14 RX ADMIN — HYDROXYZINE HYDROCHLORIDE 25 MG: 25 TABLET ORAL at 00:16

## 2019-06-14 RX ADMIN — HYDROCODONE BITARTRATE AND ACETAMINOPHEN 1 TABLET: 5; 325 TABLET ORAL at 14:40

## 2019-06-14 RX ADMIN — HYDROCODONE BITARTRATE AND ACETAMINOPHEN 1 TABLET: 5; 325 TABLET ORAL at 08:20

## 2019-06-14 RX ADMIN — ENOXAPARIN SODIUM 40 MG: 40 INJECTION SUBCUTANEOUS at 08:20

## 2019-06-14 RX ADMIN — LISINOPRIL 10 MG: 10 TABLET ORAL at 08:20

## 2019-06-14 RX ADMIN — HYDROMORPHONE HYDROCHLORIDE 2 MG: 2 TABLET ORAL at 00:16

## 2019-06-14 RX ADMIN — HYDROMORPHONE HYDROCHLORIDE 2 MG: 2 TABLET ORAL at 04:02

## 2019-06-14 ASSESSMENT — ACTIVITIES OF DAILY LIVING (ADL)
ADLS_ACUITY_SCORE: 12
ADLS_ACUITY_SCORE: 12
ADLS_ACUITY_SCORE: 13
ADLS_ACUITY_SCORE: 13
ADLS_ACUITY_SCORE: 12

## 2019-06-14 NOTE — PROGRESS NOTES
Reviewed discharge instructions and medications with patient. Questions answered. Patient discharged to home with discharge instructions, medications (aspirin, NORCO, Senna, and Ultram ), and belongings at this time.

## 2019-06-14 NOTE — DISCHARGE INSTRUCTIONS
Aquacel dressing:  DO NOT CHANGE DRESSING DAILY.  Leave this dressing on until follow-up appointment with Orthopedic Surgeon.  Dressing is waterproof, can shower with it on, pat dry when done.  No soaking such as in tub baths, pools or hot tubs

## 2019-06-14 NOTE — PROVIDER NOTIFICATION
Page to [facility]  Pt is c/o itching and is now flushed on her face and upper torso and BLE. Would like to know if we can get an order for Benadryl. I am having an IV restarted due to it being knocked out earlier.    [id number] [facility] called in. Stop Oxycodone. Ordered Benadryl for the flushed itchy reaction pt is having. Start PO Dilaudid

## 2019-06-14 NOTE — PLAN OF CARE
Occupational Therapy Discharge Summary    Reason for therapy discharge:    All goals and outcomes met, no further needs identified.    Progress towards therapy goal(s). See goals on Care Plan in [facility] electronic health record for goal details.  Goals met    Therapy recommendation(s):    No further therapy is recommended.  Continue home exercise program.

## 2019-06-14 NOTE — PLAN OF CARE
Pt A&Ox4. VSS. High temp 99.8. Encouraged IS and fluids. Pt refuses to take scheduled Tylenol stating it upsets her stomach.  Saline locked. Pain was an issued this evening. We got an order to increase her Oxycodone but them she seems to have had a reaction with her upper torso, BLE arms and face became flushed and itchy. Pt declined the benadryl for the itching. Oxycodone discontinued and PO Dilaudid ordered pt also took one dose of Atarax. RUE cms intact but pt c/o red, tight swelling inner arm area. Ice applied. Will continue to monitor. Up with assist of 1 and gait belt to the BR. Voiding adequate amounts. Tolerated regular diet. Possible discharge tomorrow if pain is managed. Will continue to monitor status.

## 2019-06-14 NOTE — PLAN OF CARE
Discharge Planner PT   Patient plan for discharge: home  Current status: Pt has DC'd to home, OT saw pt for mobility during stay, no PT needs as pt ambulating with SEC. No skilled PT needs.   Barriers to return to prior living situation: defer to OT  Recommendations for discharge: Defer to OT  Rationale for recommendations: Defer to OT       Entered by: Cate Whitt 06/14/2019 3:27 PM

## 2019-06-14 NOTE — PLAN OF CARE
Vss. Low grade temp 99.4. Lungs clr-room air high 90s. +bs/+gas, no nausea. Tolerating diet. Last bm 06/12/19. Voiding. Saline locked. Drsg-cdi. Cms-2+ edema. Pain managed with Oral Dilaudid/Atarax/Tylenol. Sling/Swathe in place. Skin has some bruising, blister, & skin tear. Tolerating ice. Repositioning self. Possible discharge to home in afternoon today. No other significant issues noted overnight.

## 2019-06-14 NOTE — PROGRESS NOTES
Orthopedic Surgery  Krystyna Peña  2019  Admit Date:  2019  POD: 2 Days Post-Op  Procedure(s):  Right reverse total shoulder arthroplasty    Patient resting comfortably in bed.    Pain controlled this am. Patient received oxycodone yesterday and experienced itching and flushing shortly thereafter. The on call PA was paged and patient switched to oral Dilaudid for pain.   Tolerating oral intake.    Denies nausea or vomiting.  Denies chest pain or shortness of breath.  No events overnight.      Alert and orient to person, place, and time.  Vital Sign Ranges  Temperature Temp  Av.8  F (37.1  C)  Min: 97.2  F (36.2  C)  Max: 99.8  F (37.7  C)   Blood pressure Systolic (24hrs), Av , Min:125 , Max:163        Diastolic (24hrs), Av, Min:64, Max:75      Pulse No data recorded   Respirations Resp  Av  Min: 16  Max: 16   Pulse oximetry SpO2  Av.3 %  Min: 95 %  Max: 98 %       Sling in place  Aquacel C/D/I  Sensation intact in upper arm over biceps as well as in hand r/m/u nerve distribution  Able to abduct and oppose left thumb  +Radial pulse  +cap refill      Labs:  Recent Labs   Lab Test 19  1603 19  1412 19  1059   POTASSIUM 4.7 4.8 4.0     Recent Labs   Lab Test 19  0654 19  0805 19  1603   HGB 11.8 11.8 13.4     Recent Labs   Lab Test 16  1510 11  1202   INR 0.96 0.90     Recent Labs   Lab Test 19  1129 19  1603 19  0629    277 376       A/P  1. Plan              Continue Lovenox & SCDs for DVT prophylaxis, then ASA on discharge.                Mobilize with PT/OT - okay for FF 0 - 90, ER 0 - 30, Codman's/pendulum swing exercises, gentle ROM at the elbow and wrist, and fist pumps.                NWB to RUE               Change current pain regiment - trial Norco prior to discharge home. Discontinue oral Dilaudid.              Leave dressing intact              Follow up with Dr. Christianson in clinic in 2 weeks    2.  Disposition   Anticipate d/c to home today after therapy.     Berkley Sarkar PA-C

## 2019-06-14 NOTE — PLAN OF CARE
Discharge Planner OT   Patient plan for discharge: home, hoping today  Current status: Min A with supine to sit. Min A to don/doff sling, patient reports spouse will not be home consistently to A as he still works. CGA to stand, ambulate to/from satellite. General unsteadiness observed, either using honeycutt rail or OT hand hold A for support and balance for extended mobility. Upon arrival to satellite, patient required several seated rest breaks due to fatigue. BP was 137/77, . Educated in tub/shower transfer, afterwards patient declined to practice or trial as her tub is much higher then a typical tub and plans to sponge bathe until she can manage. Educated in exercises following shoulder surgery, afterwards, patient was able to complete hand to elbow exercises.   Barriers to return to prior living situation: will be alone throughout the day, general weakness and pain  Recommendations for discharge: home with spouse support for UE dressing, bathing, household chores, errands, driving.  Rationale for recommendations: anticipate patient will meet needed goals for safe return home with spouse to A as needed. Will continue with OT.       Entered by: Concepcion Sung 06/14/2019 12:34 PM

## 2019-06-17 ENCOUNTER — TELEPHONE (OUTPATIENT)
Dept: INTERNAL MEDICINE | Facility: CLINIC | Age: 64
End: 2019-06-17

## 2019-06-17 NOTE — TELEPHONE ENCOUNTER
IP F/U    Date: 06/14/19  Diagnosis: Right Shoulder Osteoarthritis, Right Shoulder Cuff Tear, S/P Reverse Total Shoulder Arthroplasty  Is patient active in care coordination? No  Was patient in TCU? No

## 2019-06-18 NOTE — TELEPHONE ENCOUNTER
"ED/Discharge Protocol    \"Hi, my name is Loydarehana Ham, a registered nurse, and I am calling on behalf of Dr. Savage's office at [facility].  I am calling to follow up and see how things are going for you after your recent visit.\"    \"I see that you were in the (ER/UC/IP) on 6/14/19.    How are you doing now that you are home?\"  Much better, not taking anything for pain, pain has resolved    Is patient experiencing symptoms that may require a hospital visit?  no    Discharge Instructions    \"Let's review your discharge instructions.  What is/are the follow-up recommendations?  Pt. Response: Follow-up and recommended labs and tests: Please follow up with Dr. Christianson with [facility] in 2 weeks.   Patient has appointment scheduled for 6/25/19  \"Were you instructed to make a follow-up appointment?\"  Pt. Response: No.       \"When you see the provider, I would recommend that you bring your discharge instructions with you.    Medications    \"How many new medications are you on since your hospitalization/ED visit?\"    0-1  \"How many of your current medicines changed (dose, timing, name, etc.) while you were in the hospital/ED visit?\"   0-1  \"Do you have questions about your medications?\"   No  Medication reconciliation completed? Yes  Call Summary    \"Do you have any questions or concerns about your condition or care plan at the moment?\"    No  Triage nurse advice given: continue to follow-up as directed by your provider. Call clinic with any concerns.     \"If you have questions or things don't continue to improve, we encourage you contact us through the main clinic number,  [phone].  Even if the clinic is not open, triage nurses are available 24/7 to help you.     We would like you to know that our clinic has extended hours (provide information).  We also have urgent care (provide details on closest location and hours/contact info)\"      \"Thank you for your time and take care!\"        "

## 2019-06-18 NOTE — TELEPHONE ENCOUNTER
Patient requesting lab results of labs done 6/3/19, patient would like to know how her kidney function is. Please advise,     Thank you

## 2019-06-18 NOTE — TELEPHONE ENCOUNTER
You can give her results of her labs.  Her kidney tests were stable.  For preop so we generally do not send a notice, we only call if there is something worrisome.

## 2019-06-25 ENCOUNTER — TRANSFERRED RECORDS (OUTPATIENT)
Dept: HEALTH INFORMATION MANAGEMENT | Facility: CLINIC | Age: 64
End: 2019-06-25

## 2019-06-26 NOTE — DISCHARGE SUMMARY
Orthopedic Discharge Summary   Patient: Krystyna Peña  Admission Date: 6/12/2019  Discharge Date: 6/14/2019  Date of Service: 6/26/2019  Attending Provider: No att. providers found  Admission Diagnosis: right shoulder osteoarthritis, right rotator cuff tear  S/p reverse total shoulder arthroplasty  Discharge Diagnosis: right shoulder osteoarthritis & right rotator cuff tear  Procedures Performed: right reverse total shoulder arthroplasty   Complications: None apparent     Past Medical History:   Past Medical History:   Diagnosis Date     Arthritis      Asymptomatic varicose veins      Benign neoplasm of colon 11/06, 7/13    Adenoma     Gastroesophageal reflux disease      Hypertension      Major depression      Patient Active Problem List   Diagnosis     Asymptomatic varicose veins     CARDIOVASCULAR SCREENING; LDL GOAL LESS THAN 160     Back pain     Urinary frequency     Benign essential hypertension     Edema, unspecified type     CKD (chronic kidney disease) stage 3, GFR 30-59 ml/min (H)     S/p reverse total shoulder arthroplasty     Past Surgical History:   Procedure Laterality Date     AMPUTATE TOE(S) Left 4/12/2018    Procedure: AMPUTATE TOE(S);  Amputation left third digit;  Surgeon: Herb Michael DPM;  Location: [facility] OR     ARTHROPLASTY HIP Right 3/28/2016    Procedure: ARTHROPLASTY HIP;  Surgeon: Perez Meza MD;  Location: [facility] OR     C NONSPECIFIC PROCEDURE  1972    Right arm plastic surgery     C NONSPECIFIC PROCEDURE  1972    Right knee surgery     C NONSPECIFIC PROCEDURE  10/03    L popliteal AVM repair     C NONSPECIFIC PROCEDURE  11/04    Removal bone spur left foot     C NONSPECIFIC PROCEDURE  4/07    amputation of toes left & right toes     C REPAIR SPIEGELIAN HERNIA  2002     C TOTAL KNEE ARTHROPLASTY  10/03    LT     COLONOSCOPY       HC CORRECT BUNION,SIMPLE  1998    RT     HC CORRECT BUNION,SIMPLE  2001    LT     HC KNEE SCOPE, DIAGNOSTIC      LT     REVERSE ARTHROPLASTY SHOULDER Left  10/30/2018    Procedure: Left reverse total shoulder arthroplasty;  Surgeon: Aaron Christianson MD;  Location: [facility] OR     REVERSE ARTHROPLASTY SHOULDER Right 2019    Procedure: Right reverse total shoulder arthroplasty;  Surgeon: Aaron Christianson MD;  Location: [facility] OR     ROTATOR CUFF REPAIR RT/LT      right     VASCULAR SURGERY  left leg bypass      Social History     Socioeconomic History     Marital status:      Spouse name: Not on file     Number of children: 2     Years of education: Not on file     Highest education level: Not on file   Occupational History     Employer: [employer]   Social Needs     Financial resource strain: Not on file     Food insecurity:     Worry: Not on file     Inability: Not on file     Transportation needs:     Medical: Not on file     Non-medical: Not on file   Tobacco Use     Smoking status: Former Smoker     Packs/day: 1.00     Types: Cigarettes, Cigars     Last attempt to quit: 2013     Years since quittin.2     Smokeless tobacco: Never Used   Substance and Sexual Activity     Alcohol use: Yes     Comment: 1 per day     Drug use: No     Sexual activity: Yes     Partners: Male   Lifestyle     Physical activity:     Days per week: Not on file     Minutes per session: Not on file     Stress: Not on file   Relationships     Social connections:     Talks on phone: Not on file     Gets together: Not on file     Attends Holiness service: Not on file     Active member of club or organization: Not on file     Attends meetings of clubs or organizations: Not on file     Relationship status: Not on file     Intimate partner violence:     Fear of current or ex partner: Not on file     Emotionally abused: Not on file     Physically abused: Not on file     Forced sexual activity: Not on file   Other Topics Concern      Service Not Asked     Blood Transfusions Not Asked     Caffeine Concern Not Asked     Occupational Exposure Not Asked      Hobby Hazards Not Asked     Sleep Concern Not Asked     Stress Concern Not Asked     Weight Concern Not Asked     Special Diet Not Asked     Back Care Not Asked     Exercise Yes     Bike Helmet Not Asked     Seat Belt Yes     Self-Exams Yes     Parent/sibling w/ CABG, MI or angioplasty before 65F 55M? Not Asked   Social History Narrative     Not on file     Medications on admission:   No medications prior to admission.     Allergies:    Allergies   Allergen Reactions     Morphine      respiratory distress     Cephalexin Rash       Hospital Course: Patient was admitted to Orthopedics and brought to the OR on 6/12/2019 where she underwent the above named procedure. Postoperatively she did very well with no apparent complications, and she had an uneventful hospital stay. Clindamycin was given for 24 hours after surgery. She was given Lovenox for DVT prophylaxis and her pain was well controlled with IV Dilaudid, then transitioned to oral pain medications during her hospital stay. The patient did have rash and flushing with Oxycodone so she was transitioned to oral Dilaudid. The patient tolerated this well in the hospital and then was given a dose of Norco and sent home with a short supply of Norco for pain control. She progressed well with physical therapy and discharge to home was recommended. Her chronic medical problems were followed by the medicine team during her hospital stay and there were no significant changes to conditions or treatment plans. No new medical problems presented during her hospital stay.   Consultations Obtained During Hospitalization:  1. Internal medicine for management of comorbid conditions and home medication management.  2. Physical Therapy for gait training, mobilization, range of motion and strengthening exercises  3. Occupational Therapy for activities of daily living.  4. Social Work for disposition planning.  Active Problems:    S/p reverse total shoulder arthroplasty    Discharge  Disposition: home  Discharge Diet: regular  Discharge Medications:   Discharge Medication List as of 6/14/2019  2:18 PM      START taking these medications    Details   HYDROcodone-acetaminophen (NORCO) 5-325 MG tablet Take 1 tablet by mouth every 6 hours as needed for pain, Disp-20 tablet, R-0, Local Print      senna-docusate (SENOKOT-S/PERICOLACE) 8.6-50 MG tablet Take 2 tablets by mouth 2 times daily as needed for constipation, Disp-40 tablet, R-0, Local Print      traMADol (ULTRAM) 50 MG tablet Take 1 tablet (50 mg) by mouth every 6 hours as needed for moderate to severe pain, Disp-20 tablet, R-0, Local Print         CONTINUE these medications which have CHANGED    Details   aspirin (ASA) 325 MG tablet Take 1 tablet (325 mg) by mouth 2 times daily, Disp-60 tablet, R-0, Local Print         CONTINUE these medications which have NOT CHANGED    Details   lisinopril (PRINIVIL/ZESTRIL) 10 MG tablet TK 1 T PO D, R-3, Historical      valACYclovir (VALTREX) 500 MG tablet TAKE 1 TABLET(500 MG) BY MOUTH TWICE DAILY, Disp-6 tablet, R-0, E-Prescribe         STOP taking these medications       acetaminophen (TYLENOL) 325 MG tablet Comments:   Reason for Stopping:         oxyCODONE (ROXICODONE) 5 MG tablet Comments:   Reason for Stopping:             Code Status: full code  X-rays needed at the follow up visit: right shoulder, 4 views  Berkley Sarkar PA-C  6/26/2019 11:12 AM   [facility]   [phone]

## 2019-07-15 ENCOUNTER — ANESTHESIA (OUTPATIENT)
Dept: SURGERY | Facility: CLINIC | Age: 64
End: 2019-07-15
Payer: COMMERCIAL

## 2019-07-15 ENCOUNTER — ANESTHESIA EVENT (OUTPATIENT)
Dept: SURGERY | Facility: CLINIC | Age: 64
End: 2019-07-15
Payer: COMMERCIAL

## 2019-07-15 ENCOUNTER — APPOINTMENT (OUTPATIENT)
Dept: GENERAL RADIOLOGY | Facility: CLINIC | Age: 64
End: 2019-07-15
Attending: EMERGENCY MEDICINE
Payer: COMMERCIAL

## 2019-07-15 ENCOUNTER — HOSPITAL ENCOUNTER (INPATIENT)
Facility: CLINIC | Age: 64
LOS: 1 days | Discharge: HOME | End: 2019-07-16
Attending: EMERGENCY MEDICINE | Admitting: ORTHOPAEDIC SURGERY
Payer: COMMERCIAL

## 2019-07-15 ENCOUNTER — APPOINTMENT (OUTPATIENT)
Dept: GENERAL RADIOLOGY | Facility: CLINIC | Age: 64
End: 2019-07-15
Attending: ORTHOPAEDIC SURGERY
Payer: COMMERCIAL

## 2019-07-15 DIAGNOSIS — Z98.890 S/P CLOSED REDUCTION OF DISLOCATED TOTAL HIP PROSTHESIS: Primary | ICD-10-CM

## 2019-07-15 DIAGNOSIS — Z92.83: ICD-10-CM

## 2019-07-15 DIAGNOSIS — Z96.611 STATUS POST REVERSE TOTAL REPLACEMENT OF RIGHT SHOULDER: ICD-10-CM

## 2019-07-15 DIAGNOSIS — S73.004A DISLOCATION OF RIGHT HIP, INITIAL ENCOUNTER (H): ICD-10-CM

## 2019-07-15 PROBLEM — Z87.828 HISTORY OF CLOSED HIP DISLOCATION: Status: ACTIVE | Noted: 2019-07-15

## 2019-07-15 LAB
ANION GAP SERPL CALCULATED.3IONS-SCNC: 10 MMOL/L (ref 3–14)
BASOPHILS # BLD AUTO: 0 10E9/L (ref 0–0.2)
BASOPHILS NFR BLD AUTO: 0.7 %
BUN SERPL-MCNC: 20 MG/DL (ref 7–30)
CALCIUM SERPL-MCNC: 9.5 MG/DL (ref 8.5–10.1)
CHLORIDE SERPL-SCNC: 105 MMOL/L (ref 94–109)
CO2 SERPL-SCNC: 21 MMOL/L (ref 20–32)
CREAT SERPL-MCNC: 0.98 MG/DL (ref 0.52–1.04)
DIFFERENTIAL METHOD BLD: ABNORMAL
EOSINOPHIL # BLD AUTO: 0.1 10E9/L (ref 0–0.7)
EOSINOPHIL NFR BLD AUTO: 1.8 %
ERYTHROCYTE [DISTWIDTH] IN BLOOD BY AUTOMATED COUNT: 13.3 % (ref 10–15)
GFR SERPL CREATININE-BSD FRML MDRD: 60 ML/MIN/{1.73_M2}
GLUCOSE SERPL-MCNC: 113 MG/DL (ref 70–99)
HCT VFR BLD AUTO: 40 % (ref 35–47)
HGB BLD-MCNC: 13.6 G/DL (ref 11.7–15.7)
IMM GRANULOCYTES # BLD: 0.1 10E9/L (ref 0–0.4)
IMM GRANULOCYTES NFR BLD: 1.2 %
LYMPHOCYTES # BLD AUTO: 1.6 10E9/L (ref 0.8–5.3)
LYMPHOCYTES NFR BLD AUTO: 28.9 %
MCH RBC QN AUTO: 35.3 PG (ref 26.5–33)
MCHC RBC AUTO-ENTMCNC: 34 G/DL (ref 31.5–36.5)
MCV RBC AUTO: 104 FL (ref 78–100)
MONOCYTES # BLD AUTO: 0.7 10E9/L (ref 0–1.3)
MONOCYTES NFR BLD AUTO: 11.5 %
NEUTROPHILS # BLD AUTO: 3.2 10E9/L (ref 1.6–8.3)
NEUTROPHILS NFR BLD AUTO: 55.9 %
NRBC # BLD AUTO: 0 10*3/UL
NRBC BLD AUTO-RTO: 0 /100
PLATELET # BLD AUTO: 304 10E9/L (ref 150–450)
POTASSIUM SERPL-SCNC: 4.8 MMOL/L (ref 3.4–5.3)
RBC # BLD AUTO: 3.85 10E12/L (ref 3.8–5.2)
SODIUM SERPL-SCNC: 135 MMOL/L (ref 133–144)
WBC # BLD AUTO: 5.7 10E9/L (ref 4–11)

## 2019-07-15 PROCEDURE — 96376 TX/PRO/DX INJ SAME DRUG ADON: CPT | Mod: 59

## 2019-07-15 PROCEDURE — G0378 HOSPITAL OBSERVATION PER HR: HCPCS

## 2019-07-15 PROCEDURE — 0SW9XJZ REVISION OF SYNTHETIC SUBSTITUTE IN RIGHT HIP JOINT, EXTERNAL APPROACH: ICD-10-PCS | Performed by: EMERGENCY MEDICINE

## 2019-07-15 PROCEDURE — 27210794 ZZH OR GENERAL SUPPLY STERILE: Performed by: ORTHOPAEDIC SURGERY

## 2019-07-15 PROCEDURE — 25800030 ZZH RX IP 258 OP 636: Performed by: ANESTHESIOLOGY

## 2019-07-15 PROCEDURE — 12000000 ZZH R&B MED SURG/OB

## 2019-07-15 PROCEDURE — 40000965 ZZH STATISTIC END TITIAL CO2 MONITORING

## 2019-07-15 PROCEDURE — 25000125 ZZHC RX 250: Performed by: NURSE ANESTHETIST, CERTIFIED REGISTERED

## 2019-07-15 PROCEDURE — 36000052 ZZH SURGERY LEVEL 2 EA 15 ADDTL MIN: Performed by: ORTHOPAEDIC SURGERY

## 2019-07-15 PROCEDURE — 96361 HYDRATE IV INFUSION ADD-ON: CPT | Mod: 59

## 2019-07-15 PROCEDURE — 37000009 ZZH ANESTHESIA TECHNICAL FEE, EACH ADDTL 15 MIN: Performed by: ORTHOPAEDIC SURGERY

## 2019-07-15 PROCEDURE — 25000128 H RX IP 250 OP 636

## 2019-07-15 PROCEDURE — 99213 OFFICE O/P EST LOW 20 MIN: CPT | Performed by: PHYSICIAN ASSISTANT

## 2019-07-15 PROCEDURE — 37000008 ZZH ANESTHESIA TECHNICAL FEE, 1ST 30 MIN: Performed by: ORTHOPAEDIC SURGERY

## 2019-07-15 PROCEDURE — 27265 TREAT HIP DISLOCATION: CPT | Mod: RT

## 2019-07-15 PROCEDURE — 25000128 H RX IP 250 OP 636: Performed by: PHYSICIAN ASSISTANT

## 2019-07-15 PROCEDURE — 99207 ZZC CDG-CODE CATEGORY CHANGED: CPT | Performed by: PHYSICIAN ASSISTANT

## 2019-07-15 PROCEDURE — 99285 EMERGENCY DEPT VISIT HI MDM: CPT | Mod: 25

## 2019-07-15 PROCEDURE — 25000125 ZZHC RX 250: Performed by: EMERGENCY MEDICINE

## 2019-07-15 PROCEDURE — 40000275 ZZH STATISTIC RCP TIME EA 10 MIN

## 2019-07-15 PROCEDURE — 73502 X-RAY EXAM HIP UNI 2-3 VIEWS: CPT

## 2019-07-15 PROCEDURE — 25800030 ZZH RX IP 258 OP 636: Performed by: EMERGENCY MEDICINE

## 2019-07-15 PROCEDURE — 36000050 ZZH SURGERY LEVEL 2 1ST 30 MIN: Performed by: ORTHOPAEDIC SURGERY

## 2019-07-15 PROCEDURE — 71000012 ZZH RECOVERY PHASE 1 LEVEL 1 FIRST HR: Performed by: ORTHOPAEDIC SURGERY

## 2019-07-15 PROCEDURE — 85025 COMPLETE CBC W/AUTO DIFF WBC: CPT | Performed by: EMERGENCY MEDICINE

## 2019-07-15 PROCEDURE — 0SW9XJZ REVISION OF SYNTHETIC SUBSTITUTE IN RIGHT HIP JOINT, EXTERNAL APPROACH: ICD-10-PCS | Performed by: ORTHOPAEDIC SURGERY

## 2019-07-15 PROCEDURE — 25000128 H RX IP 250 OP 636: Performed by: NURSE ANESTHETIST, CERTIFIED REGISTERED

## 2019-07-15 PROCEDURE — 40000306 ZZH STATISTIC PRE PROC ASSESS II: Performed by: ORTHOPAEDIC SURGERY

## 2019-07-15 PROCEDURE — 40000986 XR PELVIS AND HIP RIGHT 1 VIEW

## 2019-07-15 PROCEDURE — 25000128 H RX IP 250 OP 636: Performed by: ANESTHESIOLOGY

## 2019-07-15 PROCEDURE — 96374 THER/PROPH/DIAG INJ IV PUSH: CPT | Mod: 59

## 2019-07-15 PROCEDURE — 25800030 ZZH RX IP 258 OP 636: Performed by: ORTHOPAEDIC SURGERY

## 2019-07-15 PROCEDURE — 25000128 H RX IP 250 OP 636: Performed by: EMERGENCY MEDICINE

## 2019-07-15 PROCEDURE — 80048 BASIC METABOLIC PNL TOTAL CA: CPT | Performed by: EMERGENCY MEDICINE

## 2019-07-15 PROCEDURE — 40000277 XR SURGERY CARM FLUORO LESS THAN 5 MIN W STILLS: Mod: TC

## 2019-07-15 PROCEDURE — 25000132 ZZH RX MED GY IP 250 OP 250 PS 637: Performed by: ORTHOPAEDIC SURGERY

## 2019-07-15 RX ORDER — DIPHENHYDRAMINE HCL 25 MG
25 CAPSULE ORAL EVERY 6 HOURS PRN
Status: DISCONTINUED | OUTPATIENT
Start: 2019-07-15 | End: 2019-07-16 | Stop reason: HOSPADM

## 2019-07-15 RX ORDER — ONDANSETRON 2 MG/ML
4 INJECTION INTRAMUSCULAR; INTRAVENOUS EVERY 6 HOURS PRN
Status: DISCONTINUED | OUTPATIENT
Start: 2019-07-15 | End: 2019-07-15

## 2019-07-15 RX ORDER — FENTANYL CITRATE 50 UG/ML
25-50 INJECTION, SOLUTION INTRAMUSCULAR; INTRAVENOUS
Status: DISCONTINUED | OUTPATIENT
Start: 2019-07-15 | End: 2019-07-15 | Stop reason: HOSPADM

## 2019-07-15 RX ORDER — CELECOXIB 200 MG/1
200 CAPSULE ORAL 2 TIMES DAILY
Status: DISCONTINUED | OUTPATIENT
Start: 2019-07-15 | End: 2019-07-16 | Stop reason: HOSPADM

## 2019-07-15 RX ORDER — LISINOPRIL 10 MG/1
10 TABLET ORAL DAILY
Status: ON HOLD | COMMUNITY
End: 2019-10-30

## 2019-07-15 RX ORDER — IBUPROFEN 600 MG/1
600 TABLET, FILM COATED ORAL EVERY 6 HOURS PRN
Status: DISCONTINUED | OUTPATIENT
Start: 2019-07-15 | End: 2019-07-16 | Stop reason: HOSPADM

## 2019-07-15 RX ORDER — ONDANSETRON 2 MG/ML
4 INJECTION INTRAMUSCULAR; INTRAVENOUS EVERY 30 MIN PRN
Status: DISCONTINUED | OUTPATIENT
Start: 2019-07-15 | End: 2019-07-15 | Stop reason: HOSPADM

## 2019-07-15 RX ORDER — HYDROXYZINE HYDROCHLORIDE 50 MG/1
50 TABLET, FILM COATED ORAL EVERY 6 HOURS PRN
Status: DISCONTINUED | OUTPATIENT
Start: 2019-07-15 | End: 2019-07-16 | Stop reason: HOSPADM

## 2019-07-15 RX ORDER — FENTANYL CITRATE 50 UG/ML
100 INJECTION, SOLUTION INTRAMUSCULAR; INTRAVENOUS ONCE
Status: COMPLETED | OUTPATIENT
Start: 2019-07-15 | End: 2019-07-15

## 2019-07-15 RX ORDER — ONDANSETRON 4 MG/1
4 TABLET, ORALLY DISINTEGRATING ORAL EVERY 30 MIN PRN
Status: DISCONTINUED | OUTPATIENT
Start: 2019-07-15 | End: 2019-07-15 | Stop reason: HOSPADM

## 2019-07-15 RX ORDER — PROCHLORPERAZINE MALEATE 10 MG
10 TABLET ORAL EVERY 6 HOURS PRN
Status: DISCONTINUED | OUTPATIENT
Start: 2019-07-15 | End: 2019-07-16 | Stop reason: HOSPADM

## 2019-07-15 RX ORDER — SODIUM CHLORIDE, SODIUM LACTATE, POTASSIUM CHLORIDE, CALCIUM CHLORIDE 600; 310; 30; 20 MG/100ML; MG/100ML; MG/100ML; MG/100ML
INJECTION, SOLUTION INTRAVENOUS CONTINUOUS
Status: DISCONTINUED | OUTPATIENT
Start: 2019-07-15 | End: 2019-07-15 | Stop reason: HOSPADM

## 2019-07-15 RX ORDER — FENTANYL CITRATE 50 UG/ML
25-50 INJECTION, SOLUTION INTRAMUSCULAR; INTRAVENOUS
Status: CANCELLED | OUTPATIENT
Start: 2019-07-15

## 2019-07-15 RX ORDER — POLYETHYLENE GLYCOL 3350 17 G/17G
17 POWDER, FOR SOLUTION ORAL DAILY PRN
Status: DISCONTINUED | OUTPATIENT
Start: 2019-07-15 | End: 2019-07-16 | Stop reason: HOSPADM

## 2019-07-15 RX ORDER — CLINDAMYCIN PHOSPHATE 900 MG/50ML
900 INJECTION, SOLUTION INTRAVENOUS
Status: DISCONTINUED | OUTPATIENT
Start: 2019-07-15 | End: 2019-07-15 | Stop reason: HOSPADM

## 2019-07-15 RX ORDER — HYDRALAZINE HYDROCHLORIDE 20 MG/ML
2.5-5 INJECTION INTRAMUSCULAR; INTRAVENOUS EVERY 10 MIN PRN
Status: DISCONTINUED | OUTPATIENT
Start: 2019-07-15 | End: 2019-07-15 | Stop reason: HOSPADM

## 2019-07-15 RX ORDER — DIPHENHYDRAMINE HYDROCHLORIDE 50 MG/ML
25 INJECTION INTRAMUSCULAR; INTRAVENOUS EVERY 6 HOURS PRN
Status: DISCONTINUED | OUTPATIENT
Start: 2019-07-15 | End: 2019-07-16 | Stop reason: HOSPADM

## 2019-07-15 RX ORDER — HYDROMORPHONE HYDROCHLORIDE 1 MG/ML
0.5 INJECTION, SOLUTION INTRAMUSCULAR; INTRAVENOUS; SUBCUTANEOUS
Status: DISCONTINUED | OUTPATIENT
Start: 2019-07-15 | End: 2019-07-15 | Stop reason: DRUGHIGH

## 2019-07-15 RX ORDER — ONDANSETRON 2 MG/ML
INJECTION INTRAMUSCULAR; INTRAVENOUS PRN
Status: DISCONTINUED | OUTPATIENT
Start: 2019-07-15 | End: 2019-07-15

## 2019-07-15 RX ORDER — HYDROMORPHONE HYDROCHLORIDE 1 MG/ML
.3-.5 INJECTION, SOLUTION INTRAMUSCULAR; INTRAVENOUS; SUBCUTANEOUS
Status: DISCONTINUED | OUTPATIENT
Start: 2019-07-15 | End: 2019-07-16 | Stop reason: HOSPADM

## 2019-07-15 RX ORDER — NALOXONE HYDROCHLORIDE 0.4 MG/ML
.1-.4 INJECTION, SOLUTION INTRAMUSCULAR; INTRAVENOUS; SUBCUTANEOUS
Status: DISCONTINUED | OUTPATIENT
Start: 2019-07-15 | End: 2019-07-15

## 2019-07-15 RX ORDER — HYDROXYZINE HYDROCHLORIDE 25 MG/1
25 TABLET, FILM COATED ORAL EVERY 6 HOURS PRN
Status: DISCONTINUED | OUTPATIENT
Start: 2019-07-15 | End: 2019-07-16 | Stop reason: HOSPADM

## 2019-07-15 RX ORDER — OXYCODONE HYDROCHLORIDE 5 MG/1
5-10 TABLET ORAL
Status: DISCONTINUED | OUTPATIENT
Start: 2019-07-15 | End: 2019-07-15

## 2019-07-15 RX ORDER — FENTANYL CITRATE 50 UG/ML
INJECTION, SOLUTION INTRAMUSCULAR; INTRAVENOUS PRN
Status: DISCONTINUED | OUTPATIENT
Start: 2019-07-15 | End: 2019-07-15

## 2019-07-15 RX ORDER — KETAMINE HYDROCHLORIDE 10 MG/ML
1 INJECTION, SOLUTION INTRAMUSCULAR; INTRAVENOUS ONCE
Status: COMPLETED | OUTPATIENT
Start: 2019-07-15 | End: 2019-07-15

## 2019-07-15 RX ORDER — SODIUM CHLORIDE 9 MG/ML
1000 INJECTION, SOLUTION INTRAVENOUS CONTINUOUS
Status: DISCONTINUED | OUTPATIENT
Start: 2019-07-15 | End: 2019-07-15

## 2019-07-15 RX ORDER — AMOXICILLIN 250 MG
2 CAPSULE ORAL 2 TIMES DAILY
Status: DISCONTINUED | OUTPATIENT
Start: 2019-07-15 | End: 2019-07-16 | Stop reason: HOSPADM

## 2019-07-15 RX ORDER — HYDROMORPHONE HYDROCHLORIDE 1 MG/ML
.3-.5 INJECTION, SOLUTION INTRAMUSCULAR; INTRAVENOUS; SUBCUTANEOUS EVERY 10 MIN PRN
Status: DISCONTINUED | OUTPATIENT
Start: 2019-07-15 | End: 2019-07-15 | Stop reason: HOSPADM

## 2019-07-15 RX ORDER — HYDROMORPHONE HYDROCHLORIDE 1 MG/ML
INJECTION, SOLUTION INTRAMUSCULAR; INTRAVENOUS; SUBCUTANEOUS
Status: COMPLETED
Start: 2019-07-15 | End: 2019-07-15

## 2019-07-15 RX ORDER — OXYCODONE HYDROCHLORIDE 5 MG/1
5-10 TABLET ORAL
Status: DISCONTINUED | OUTPATIENT
Start: 2019-07-15 | End: 2019-07-16

## 2019-07-15 RX ORDER — PROPOFOL 10 MG/ML
200 INJECTION, EMULSION INTRAVENOUS ONCE
Status: DISCONTINUED | OUTPATIENT
Start: 2019-07-15 | End: 2019-07-15

## 2019-07-15 RX ORDER — ETOMIDATE 2 MG/ML
10 INJECTION INTRAVENOUS ONCE
Status: COMPLETED | OUTPATIENT
Start: 2019-07-15 | End: 2019-07-15

## 2019-07-15 RX ORDER — ACETAMINOPHEN 325 MG/1
975 TABLET ORAL EVERY 8 HOURS
Status: DISCONTINUED | OUTPATIENT
Start: 2019-07-15 | End: 2019-07-16 | Stop reason: HOSPADM

## 2019-07-15 RX ORDER — LABETALOL HYDROCHLORIDE 5 MG/ML
10 INJECTION, SOLUTION INTRAVENOUS EVERY 5 MIN PRN
Status: DISCONTINUED | OUTPATIENT
Start: 2019-07-15 | End: 2019-07-15 | Stop reason: HOSPADM

## 2019-07-15 RX ORDER — AMOXICILLIN 250 MG
1 CAPSULE ORAL 2 TIMES DAILY
Status: DISCONTINUED | OUTPATIENT
Start: 2019-07-15 | End: 2019-07-16 | Stop reason: HOSPADM

## 2019-07-15 RX ORDER — HYDROCODONE BITARTRATE AND ACETAMINOPHEN 5; 325 MG/1; MG/1
1 TABLET ORAL EVERY 6 HOURS PRN
Qty: 18 TABLET | Refills: 0 | Status: SHIPPED | OUTPATIENT
Start: 2019-07-15 | End: 2019-10-28

## 2019-07-15 RX ORDER — NALOXONE HYDROCHLORIDE 0.4 MG/ML
.1-.4 INJECTION, SOLUTION INTRAMUSCULAR; INTRAVENOUS; SUBCUTANEOUS
Status: DISCONTINUED | OUTPATIENT
Start: 2019-07-15 | End: 2019-07-16 | Stop reason: HOSPADM

## 2019-07-15 RX ORDER — LANOLIN ALCOHOL/MO/W.PET/CERES
3-6 CREAM (GRAM) TOPICAL
Status: DISCONTINUED | OUTPATIENT
Start: 2019-07-16 | End: 2019-07-16 | Stop reason: HOSPADM

## 2019-07-15 RX ORDER — PROPOFOL 10 MG/ML
INJECTION, EMULSION INTRAVENOUS PRN
Status: DISCONTINUED | OUTPATIENT
Start: 2019-07-15 | End: 2019-07-15

## 2019-07-15 RX ORDER — AMOXICILLIN 250 MG
1 CAPSULE ORAL 2 TIMES DAILY PRN
Status: DISCONTINUED | OUTPATIENT
Start: 2019-07-15 | End: 2019-07-16 | Stop reason: HOSPADM

## 2019-07-15 RX ORDER — ALBUTEROL SULFATE 0.83 MG/ML
2.5 SOLUTION RESPIRATORY (INHALATION) EVERY 4 HOURS PRN
Status: DISCONTINUED | OUTPATIENT
Start: 2019-07-15 | End: 2019-07-15 | Stop reason: HOSPADM

## 2019-07-15 RX ORDER — SODIUM CHLORIDE, SODIUM LACTATE, POTASSIUM CHLORIDE, CALCIUM CHLORIDE 600; 310; 30; 20 MG/100ML; MG/100ML; MG/100ML; MG/100ML
INJECTION, SOLUTION INTRAVENOUS CONTINUOUS
Status: DISCONTINUED | OUTPATIENT
Start: 2019-07-15 | End: 2019-07-16 | Stop reason: HOSPADM

## 2019-07-15 RX ORDER — HYDROMORPHONE HYDROCHLORIDE 1 MG/ML
0.5 INJECTION, SOLUTION INTRAMUSCULAR; INTRAVENOUS; SUBCUTANEOUS ONCE
Status: COMPLETED | OUTPATIENT
Start: 2019-07-15 | End: 2019-07-15

## 2019-07-15 RX ORDER — CLINDAMYCIN PHOSPHATE 900 MG/50ML
900 INJECTION, SOLUTION INTRAVENOUS SEE ADMIN INSTRUCTIONS
Status: DISCONTINUED | OUTPATIENT
Start: 2019-07-15 | End: 2019-07-15 | Stop reason: HOSPADM

## 2019-07-15 RX ORDER — ASPIRIN 81 MG/1
81 TABLET ORAL DAILY
Status: ON HOLD | COMMUNITY
End: 2019-10-29

## 2019-07-15 RX ORDER — LIDOCAINE 40 MG/G
CREAM TOPICAL
Status: DISCONTINUED | OUTPATIENT
Start: 2019-07-15 | End: 2019-07-16 | Stop reason: HOSPADM

## 2019-07-15 RX ORDER — AMOXICILLIN 250 MG
2 CAPSULE ORAL 2 TIMES DAILY PRN
Status: DISCONTINUED | OUTPATIENT
Start: 2019-07-15 | End: 2019-07-16 | Stop reason: HOSPADM

## 2019-07-15 RX ORDER — MEPERIDINE HYDROCHLORIDE 25 MG/ML
12.5 INJECTION INTRAMUSCULAR; INTRAVENOUS; SUBCUTANEOUS
Status: DISCONTINUED | OUTPATIENT
Start: 2019-07-15 | End: 2019-07-15 | Stop reason: HOSPADM

## 2019-07-15 RX ORDER — PROPOFOL 10 MG/ML
150 INJECTION, EMULSION INTRAVENOUS ONCE
Status: COMPLETED | OUTPATIENT
Start: 2019-07-15 | End: 2019-07-15

## 2019-07-15 RX ORDER — ACETAMINOPHEN 325 MG/1
650 TABLET ORAL EVERY 4 HOURS PRN
Status: DISCONTINUED | OUTPATIENT
Start: 2019-07-18 | End: 2019-07-16 | Stop reason: HOSPADM

## 2019-07-15 RX ORDER — ACETAMINOPHEN 325 MG/1
650 TABLET ORAL EVERY 4 HOURS PRN
Status: DISCONTINUED | OUTPATIENT
Start: 2019-07-15 | End: 2019-07-16 | Stop reason: HOSPADM

## 2019-07-15 RX ORDER — ONDANSETRON 4 MG/1
4 TABLET, ORALLY DISINTEGRATING ORAL EVERY 6 HOURS PRN
Status: DISCONTINUED | OUTPATIENT
Start: 2019-07-15 | End: 2019-07-16 | Stop reason: HOSPADM

## 2019-07-15 RX ORDER — HYDROMORPHONE HYDROCHLORIDE 1 MG/ML
.3-.5 INJECTION, SOLUTION INTRAMUSCULAR; INTRAVENOUS; SUBCUTANEOUS EVERY 5 MIN PRN
Status: DISCONTINUED | OUTPATIENT
Start: 2019-07-15 | End: 2019-07-15 | Stop reason: HOSPADM

## 2019-07-15 RX ORDER — ONDANSETRON 2 MG/ML
4 INJECTION INTRAMUSCULAR; INTRAVENOUS EVERY 6 HOURS PRN
Status: DISCONTINUED | OUTPATIENT
Start: 2019-07-15 | End: 2019-07-16 | Stop reason: HOSPADM

## 2019-07-15 RX ORDER — LIDOCAINE HYDROCHLORIDE 10 MG/ML
INJECTION, SOLUTION INFILTRATION; PERINEURAL PRN
Status: DISCONTINUED | OUTPATIENT
Start: 2019-07-15 | End: 2019-07-15

## 2019-07-15 RX ORDER — LIDOCAINE 40 MG/G
CREAM TOPICAL
Status: DISCONTINUED | OUTPATIENT
Start: 2019-07-15 | End: 2019-07-15

## 2019-07-15 RX ORDER — ONDANSETRON 4 MG/1
4 TABLET, ORALLY DISINTEGRATING ORAL EVERY 6 HOURS PRN
Status: DISCONTINUED | OUTPATIENT
Start: 2019-07-15 | End: 2019-07-15

## 2019-07-15 RX ORDER — NALOXONE HYDROCHLORIDE 0.4 MG/ML
.1-.4 INJECTION, SOLUTION INTRAMUSCULAR; INTRAVENOUS; SUBCUTANEOUS
Status: DISCONTINUED | OUTPATIENT
Start: 2019-07-15 | End: 2019-07-15 | Stop reason: HOSPADM

## 2019-07-15 RX ADMIN — SODIUM CHLORIDE, POTASSIUM CHLORIDE, SODIUM LACTATE AND CALCIUM CHLORIDE: 600; 310; 30; 20 INJECTION, SOLUTION INTRAVENOUS at 17:33

## 2019-07-15 RX ADMIN — SODIUM CHLORIDE 1000 ML: 0.9 INJECTION, SOLUTION INTRAVENOUS at 11:31

## 2019-07-15 RX ADMIN — CELECOXIB 200 MG: 200 CAPSULE ORAL at 20:26

## 2019-07-15 RX ADMIN — FENTANYL CITRATE 100 MCG: 50 INJECTION, SOLUTION INTRAMUSCULAR; INTRAVENOUS at 16:52

## 2019-07-15 RX ADMIN — SENNOSIDES AND DOCUSATE SODIUM 1 TABLET: 8.6; 5 TABLET ORAL at 20:26

## 2019-07-15 RX ADMIN — KETAMINE HYDROCHLORIDE 60 MG: 10 INJECTION, SOLUTION INTRAMUSCULAR; INTRAVENOUS at 11:40

## 2019-07-15 RX ADMIN — PROPOFOL 150 MG: 10 INJECTION, EMULSION INTRAVENOUS at 11:15

## 2019-07-15 RX ADMIN — ETOMIDATE 10 MG: 2 INJECTION INTRAVENOUS at 11:12

## 2019-07-15 RX ADMIN — MIDAZOLAM 2 MG: 1 INJECTION INTRAMUSCULAR; INTRAVENOUS at 17:33

## 2019-07-15 RX ADMIN — HYDROMORPHONE HYDROCHLORIDE 1 MG: 1 INJECTION, SOLUTION INTRAMUSCULAR; INTRAVENOUS; SUBCUTANEOUS at 12:56

## 2019-07-15 RX ADMIN — ASPIRIN 325 MG: 325 TABLET, DELAYED RELEASE ORAL at 20:26

## 2019-07-15 RX ADMIN — MIDAZOLAM 2 MG: 1 INJECTION INTRAMUSCULAR; INTRAVENOUS at 11:15

## 2019-07-15 RX ADMIN — ONDANSETRON 4 MG: 2 INJECTION INTRAMUSCULAR; INTRAVENOUS at 17:58

## 2019-07-15 RX ADMIN — HYDROMORPHONE HYDROCHLORIDE 0.5 MG: 1 INJECTION, SOLUTION INTRAMUSCULAR; INTRAVENOUS; SUBCUTANEOUS at 15:47

## 2019-07-15 RX ADMIN — Medication 1 MG: at 12:56

## 2019-07-15 RX ADMIN — PROPOFOL 160 MG: 10 INJECTION, EMULSION INTRAVENOUS at 17:40

## 2019-07-15 RX ADMIN — HYDROMORPHONE HYDROCHLORIDE 0.5 MG: 1 INJECTION, SOLUTION INTRAMUSCULAR; INTRAVENOUS; SUBCUTANEOUS at 13:38

## 2019-07-15 RX ADMIN — HYDROMORPHONE HYDROCHLORIDE 0.5 MG: 10 INJECTION, SOLUTION INTRAMUSCULAR; INTRAVENOUS; SUBCUTANEOUS at 12:12

## 2019-07-15 RX ADMIN — SODIUM CHLORIDE, POTASSIUM CHLORIDE, SODIUM LACTATE AND CALCIUM CHLORIDE: 600; 310; 30; 20 INJECTION, SOLUTION INTRAVENOUS at 20:26

## 2019-07-15 RX ADMIN — HYDROMORPHONE HYDROCHLORIDE 1 MG: 1 INJECTION, SOLUTION INTRAMUSCULAR; INTRAVENOUS; SUBCUTANEOUS at 10:01

## 2019-07-15 RX ADMIN — SODIUM CHLORIDE 1000 ML: 9 INJECTION, SOLUTION INTRAVENOUS at 10:03

## 2019-07-15 RX ADMIN — LIDOCAINE HYDROCHLORIDE 50 MG: 10 INJECTION, SOLUTION INFILTRATION; PERINEURAL at 17:40

## 2019-07-15 RX ADMIN — FENTANYL CITRATE 50 MCG: 50 INJECTION, SOLUTION INTRAMUSCULAR; INTRAVENOUS at 17:40

## 2019-07-15 RX ADMIN — FENTANYL CITRATE 100 MCG: 50 INJECTION, SOLUTION INTRAMUSCULAR; INTRAVENOUS at 11:07

## 2019-07-15 ASSESSMENT — MIFFLIN-ST. JEOR: SCORE: 1238.53

## 2019-07-15 ASSESSMENT — ACTIVITIES OF DAILY LIVING (ADL): ADLS_ACUITY_SCORE: 13

## 2019-07-15 ASSESSMENT — ENCOUNTER SYMPTOMS: ARTHRALGIAS: 1

## 2019-07-15 NOTE — PLAN OF CARE
PRIMARY DIAGNOSIS: ACUTE PAIN  OUTPATIENT/OBSERVATION GOALS TO BE MET BEFORE DISCHARGE:  1. Pain Status: Improved but still requiring IV narcotics.    2. Return to near baseline physical activity: No    3. Cleared for discharge by consultants (if involved): No    Discharge Planner Nurse   Safe discharge environment identified: Yes  Barriers to discharge: Yes       Entered by: Doris Kraft 07/15/2019 1:15PM     Please review provider order for any additional goals.   Nurse to notify provider when observation goals have been met and patient is ready for discharge.    IV dilaudid given x1, A&Ox4, CMS intact, NPO, ortho consult, surgery at 1700, PT to see tomorrow, will continue to monitor and provide supportive cares.

## 2019-07-15 NOTE — ED NOTES
Patient to have reduction of right hip dislocation with sedation. Consent signed, pause for caused completed. IV patent. MD and RCP at bedside along with 2 ERT's and myself.

## 2019-07-15 NOTE — ED NOTES
[facility]  ED Nurse Handoff Report    Krystyna Peña is a 63 year old female   ED Chief complaint: Hip Pain  . ED Diagnosis:   Final diagnoses:   Dislocation of right hip, initial encounter (H)   H/O failed moderate sedation     Allergies:   Allergies   Allergen Reactions     Morphine      respiratory distress     Cephalexin Rash       Code Status: Full Code  Activity level - Baseline/Home:  Assist X 1. Activity Level - Current:   Total Care. Lift room needed: Yes. Bariatric: No   Needed: No   Isolation: No. Infection: Not Applicable.     Vital Signs:   Vital Signs - BP: 149/71 (Device Time: 12:25:17) BP - Mean: 104 (Device Time: 12:25:17) Oximeter Heart Rate: 87 bpm (Device Time: 12:30:23) SpO2: 96 % (Device Time: 12:30:25)    Cardiac Rhythm:  ,      Pain level: 0-10 Pain Scale: 7  Patient confused: No. Patient Falls Risk: Yes.   Elimination Status: Has voided   Patient Report - Initial Complaint: Right hip shortened and internally rotated patient crossed legs and dislocated hip. . Focused dislocated hip after crossing legs- patient had hip replacement 1 year ago Assessment: + CMS right foot after attempt hip reduction.Tests Performed: No images are attached to the encounter.. Abnormal Results:   Labs Ordered and Resulted from Time of ED Arrival Up to the Time of Departure from the ED   CBC WITH PLATELETS DIFFERENTIAL - Abnormal; Notable for the following components:       Result Value     (*)     MCH 35.3 (*)     All other components within normal limits   BASIC METABOLIC PANEL - Abnormal; Notable for the following components:    Glucose 113 (*)     GFR Estimate 60 (*)     All other components within normal limits   PERIPHERAL IV CATHETER   VITAL SIGNS     Treatments provided: Conscious sedation to attempt reduction of right hip- patient to have surgery of hip tonight.  Family Comments: not at bedside  OBS brochure/video discussed/provided to patient:  Yes  ED Medications:    Medications   0.9% sodium chloride BOLUS (0 mLs Intravenous Stopped 7/15/19 1130)     Followed by   sodium chloride 0.9% infusion (1,000 mLs Intravenous New Bag 7/15/19 1131)   propofol (DIPRIVAN) injection 10 mg/mL vial (200 mg Intravenous Not Given 7/15/19 1206)   HYDROmorphone (DILAUDID) injection 1 mg (has no administration in time range)   ketamine (KETALAR) injection 67.5 mg (has no administration in time range)   HYDROmorphone (PF) (DILAUDID) injection 0.5 mg (0.5 mg Intravenous Given 7/15/19 1212)   HYDROmorphone (DILAUDID) injection 1 mg (1 mg Intravenous Given 7/15/19 1001)   propofol (DIPRIVAN) injection 10 mg/mL vial (150 mg Intravenous Given 7/15/19 1115)   fentaNYL (PF) (SUBLIMAZE) injection 100 mcg (100 mcg Intravenous Given 7/15/19 1107)   etomidate (AMIDATE) injection 10 mg (10 mg Intravenous Given 7/15/19 1112)   midazolam (VERSED) injection 2 mg (2 mg Intravenous Given 7/15/19 1115)     Drips infusing:  No  For the majority of the shift, the patient's behavior Green. Interventions performed were none.     Severe Sepsis OR Septic Shock Diagnosis Present: No      ED Nurse Name/Phone Number: Olamide Ledezma,   12:34 PM    RECEIVING UNIT ED HANDOFF REVIEW    Above ED Nurse Handoff Report was reviewed: Yes  Reviewed by: Doris Kraft on July 15, 2019 at 1:02 PM

## 2019-07-15 NOTE — H&P
[facility] Observation [facility]  History and Physical Exam     Krystyna Peña MRN# [id number]   YOB: 1955 Age: 63 year old      Date of Admission:  7/15/2019    Primary care provider: Viri Savage          Assessment:   Krystyna Peña is a 63 year old female with a PMH significant for HTN, GERD and depression, who presents with R hip dislocation.   Work up in ED reveals: VSS, BPs moderately elevated, likely due to pain. Labs are unremarkable, BMP and CBC. XR of the pelvis shows a right hip dislocation, no fx. Reduction of the dislocation was attempted in the ED but unsuccessful. Repeat XRs did show any change or acute fx. Orthopedics was contacted and plan to reduce hip in OR later today.   Patient is being registered to observation for further evaluation and continue supportive therapy.    1. R hip dislocation - occurred while crossing her legs to put her socks on. Unable to reduce in ED with sedation. Ortho consulted, plan for reduction in OR later today. CMS intact. PT consult after hip is reduced for mobility assessment. Pain control.     2. HTN - hold lisinopril due to planned anesthesia. Resume tomorrow           Plan:     1. [place] to Observation  2. CMS checks Q4 and serial pain assessments  3. Initiate ice and bedrest until hip is reduced  4. Start scheduled tylenol if not contraindicated. Add PRN oxycodone, vistaril  5. Ortho consult  6. PT consult   7. DVT prophylaxis: pt at low risk, encourage ambulation  8. Social work consult to ensure safe discharge plan                Chief Complaint:   R hip dislocation         History of Present Illness:   Krystyna Peña is a 63 year old female with a PMH significant for HTN, GERD and depression, who presents with R hip dislocation. Pt states she was sitting at the edge of her bed and crossed her legs to put her socks on when she felt a pop and immediate pain. She called EMS. She had her right hip replaced 1-2 yrs ago by Dr. Meza of [facility],  no issues with previous dislocation or complication. She denies olga numbness in her right foot but does not some mild tingling. She recently had her right shoulder replaced as well. She states her mobility was good prior to this.              Past Medical History:     Past Medical History:   Diagnosis Date     Arthritis      Asymptomatic varicose veins      Benign neoplasm of colon 11/06, 7/13    Adenoma     Gastroesophageal reflux disease      Hypertension      Major depression                Past Surgical History:     Past Surgical History:   Procedure Laterality Date     AMPUTATE TOE(S) Left 4/12/2018    Procedure: AMPUTATE TOE(S);  Amputation left third digit;  Surgeon: Herb Michael DPM;  Location: [facility] OR     ARTHROPLASTY HIP Right 3/28/2016    Procedure: ARTHROPLASTY HIP;  Surgeon: Perez Meza MD;  Location: [facility] OR     C NONSPECIFIC PROCEDURE  1972    Right arm plastic surgery     C NONSPECIFIC PROCEDURE  1972    Right knee surgery     C NONSPECIFIC PROCEDURE  10/03    L popliteal AVM repair     C NONSPECIFIC PROCEDURE  11/04    Removal bone spur left foot     C NONSPECIFIC PROCEDURE  4/07    amputation of toes left & right toes     C REPAIR SPIEGELIAN HERNIA  2002     C TOTAL KNEE ARTHROPLASTY  10/03    LT     COLONOSCOPY       HC CORRECT BUNION,SIMPLE  1998    RT     HC CORRECT BUNION,SIMPLE  2001    LT     HC KNEE SCOPE, DIAGNOSTIC      LT     REVERSE ARTHROPLASTY SHOULDER Left 10/30/2018    Procedure: Left reverse total shoulder arthroplasty;  Surgeon: Aaron Christianson MD;  Location: [facility] OR     REVERSE ARTHROPLASTY SHOULDER Right 6/12/2019    Procedure: Right reverse total shoulder arthroplasty;  Surgeon: Aaron Christianson MD;  Location: [facility] OR     ROTATOR CUFF REPAIR RT/LT  7/07    right     VASCULAR SURGERY  left leg bypass 2004               Social History:     Social History     Socioeconomic History     Marital status:      Spouse name: Not on file     Number of children: 2     Years of  education: Not on file     Highest education level: Not on file   Occupational History     Employer: [employer]   Social Needs     Financial resource strain: Not on file     Food insecurity:     Worry: Not on file     Inability: Not on file     Transportation needs:     Medical: Not on file     Non-medical: Not on file   Tobacco Use     Smoking status: Former Smoker     Packs/day: 1.00     Types: Cigarettes, Cigars     Last attempt to quit: 2013     Years since quittin.2     Smokeless tobacco: Never Used   Substance and Sexual Activity     Alcohol use: Yes     Comment: 1 per day     Drug use: No     Sexual activity: Yes     Partners: Male   Lifestyle     Physical activity:     Days per week: Not on file     Minutes per session: Not on file     Stress: Not on file   Relationships     Social connections:     Talks on phone: Not on file     Gets together: Not on file     Attends Yazidi service: Not on file     Active member of club or organization: Not on file     Attends meetings of clubs or organizations: Not on file     Relationship status: Not on file     Intimate partner violence:     Fear of current or ex partner: Not on file     Emotionally abused: Not on file     Physically abused: Not on file     Forced sexual activity: Not on file   Other Topics Concern      Service Not Asked     Blood Transfusions Not Asked     Caffeine Concern Not Asked     Occupational Exposure Not Asked     Hobby Hazards Not Asked     Sleep Concern Not Asked     Stress Concern Not Asked     Weight Concern Not Asked     Special Diet Not Asked     Back Care Not Asked     Exercise Yes     Bike Helmet Not Asked     Seat Belt Yes     Self-Exams Yes     Parent/sibling w/ CABG, MI or angioplasty before 65F 55M? Not Asked   Social History Narrative     Not on file               Family History:     Family History   Problem Relation Age of Onset     Breast Cancer Mother      Cancer - colorectal Father          66     Diabetes Brother               Allergies:      Allergies   Allergen Reactions     Morphine      respiratory distress     Cephalexin Rash               Medications:     Prior to Admission medications    Medication Sig Last Dose Taking? Auth Provider   aspirin 81 MG EC tablet Take 81 mg by mouth daily 7/14/2019 at am Yes Unknown, Entered By History   lisinopril (PRINIVIL/ZESTRIL) 10 MG tablet Take 10 mg by mouth daily 7/14/2019 at am Yes Unknown, Entered By History              Review of Systems:   A Comprehensive greater than 10 system review of systems was carried out.  Pertinent positives and negatives are noted above.  Otherwise negative for contributory information.            Physical Exam:   Blood pressure 149/71, pulse 86, temperature 98.1  F (36.7  C), resp. rate 10, weight 68 kg (150 lb), last menstrual period 10/01/2003, SpO2 (!) 87 %, not currently breastfeeding.    GENERAL:  Comfortable.  PSYCH: pleasant, oriented, No acute distress.  HEENT:  Atraumatic, normocephalic. Normal conjunctiva, normal hearing, and oropharynx is normal.  NECK:  Supple, no neck vein distention  HEART:  Normal S1, S2 with no murmur, no pericardial rub, gallops or S3 or S4.  LUNGS:  Clear to auscultation, normal Respiratory effort. No wheezing, rales or ronchi.  GI:  Soft, normal bowel sounds. Non-tender, non distended.   EXTREMITIES:  No pedal edema, +2 pulses bilateral and equal. CMS intact. Right LE is shortened compared to the left.   SKIN:  Dry to touch, No rash, wound or ulcerations.  NEUROLOGIC:  CN 2-12 intact, BL 5/5 symmetric upper and lower extremity strength, sensation is intact with no focal deficits.            Data:     Recent Labs   Lab 07/15/19  1005   WBC 5.7   HGB 13.6   HCT 40.0   *        Recent Labs   Lab 07/15/19  1005      POTASSIUM 4.8   CHLORIDE 105   CO2 21   ANIONGAP 10   *   BUN 20   CR 0.98   GFRESTIMATED 60*   GFRESTBLACK 70   YANA 9.5       Recent Results (from  the past 48 hour(s))   XR Pelvis w Hip Port Right 1 View    Narrative    XR PELVIS AD HIP PORTABLE RIGHT 1 VIEW 7/15/2019 10:15 AM    HISTORY: right hip pain, dislocation      Impression    IMPRESSION: Lateral dislocation of the right DAE.    JONATHAN LANCE MD   XR Pelvis w Hip Right 1 View    Narrative    PELVIS AND RIGHT HIP ONE VIEW 7/15/2019 12:23 PM     HISTORY:  Post manipulation.      Impression    IMPRESSION: Right total hip arthroplasty with continued posterior  superior dislocation. The alignment is similar if not unchanged  compared to earlier today.    MD Roxie THOMPSON PA-C

## 2019-07-15 NOTE — PROGRESS NOTES
RT note ; pt monitored for conscious sedation, R hip dislocation, SPO2 maintained 100% with 4 lpm nc, ETCO2 22-38, HR , RR 8-20, ambu bag, suction, oral airway at bedside, pt awake and conversing appropriately after procedure attempted, Physician unable to remedy dislocation of R hip so surgery probable.

## 2019-07-15 NOTE — ED NOTES
Bed: ED32  Expected date: 7/15/19  Expected time: 9:36 AM  Means of arrival: Ambulance  Comments:  bv2- hip

## 2019-07-15 NOTE — ANESTHESIA PREPROCEDURE EVALUATION
Anesthesia Pre-Procedure Evaluation    Patient: Krystyna Peña   MRN: [id number] : 1955          Preoperative Diagnosis: right hip dislocated    Procedure(s):  right hip closed reduction vs open reduction with cup exchange    Past Medical History:   Diagnosis Date     Arthritis      Asymptomatic varicose veins      Benign neoplasm of colon ,     Adenoma     Gastroesophageal reflux disease      Hypertension      Major depression      Past Surgical History:   Procedure Laterality Date     AMPUTATE TOE(S) Left 2018    Procedure: AMPUTATE TOE(S);  Amputation left third digit;  Surgeon: Herb Michael DPM;  Location: [facility] OR     ARTHROPLASTY HIP Right 3/28/2016    Procedure: ARTHROPLASTY HIP;  Surgeon: Perez Meza MD;  Location: [facility] OR     C NONSPECIFIC PROCEDURE      Right arm plastic surgery     C NONSPECIFIC PROCEDURE      Right knee surgery     C NONSPECIFIC PROCEDURE  10/03    L popliteal AVM repair     C NONSPECIFIC PROCEDURE      Removal bone spur left foot     C NONSPECIFIC PROCEDURE      amputation of toes left & right toes     C REPAIR SPIEGELIAN HERNIA       C TOTAL KNEE ARTHROPLASTY  10/03    LT     COLONOSCOPY       HC CORRECT BUNION,SIMPLE      RT     HC CORRECT BUNION,SIMPLE      LT     HC KNEE SCOPE, DIAGNOSTIC      LT     REVERSE ARTHROPLASTY SHOULDER Left 10/30/2018    Procedure: Left reverse total shoulder arthroplasty;  Surgeon: Aaron Christianson MD;  Location: [facility] OR     REVERSE ARTHROPLASTY SHOULDER Right 2019    Procedure: Right reverse total shoulder arthroplasty;  Surgeon: Aaron Christianson MD;  Location: [facility] OR     ROTATOR CUFF REPAIR RT/LT      right     VASCULAR SURGERY  left leg bypass      Anesthesia Evaluation     . Pt has had prior anesthetic. Type: General    No history of anesthetic complications          ROS/MED HX    ENT/Pulmonary:  - neg pulmonary ROS     Neurologic:  - neg neurologic ROS     Cardiovascular:     (+)  "hypertension----. : . . . :. .       METS/Exercise Tolerance:     Hematologic:  - neg hematologic  ROS       Musculoskeletal:   (+)  other musculoskeletal- dislocated hip prosthesis      GI/Hepatic:     (+) GERD Asymptomatic on medication,       Renal/Genitourinary:     (+) chronic renal disease, type: CRI,       Endo:  - neg endo ROS       Psychiatric:  - neg psychiatric ROS       Infectious Disease:  - neg infectious disease ROS       Malignancy:      - no malignancy   Other:    - neg other ROS                      Physical Exam  Normal systems: cardiovascular and pulmonary    Airway   Mallampati: III  TM distance: >3 FB  Neck ROM: full    Dental   (+) chipped and other  Comment: Cracked incisor    Cardiovascular       Pulmonary             Lab Results   Component Value Date    WBC 5.7 07/15/2019    HGB 13.6 07/15/2019    HCT 40.0 07/15/2019     07/15/2019    CRP <2.9 02/06/2019    SED 13 02/06/2019     07/15/2019    POTASSIUM 4.8 07/15/2019    CHLORIDE 105 07/15/2019    CO2 21 07/15/2019    BUN 20 07/15/2019    CR 0.98 07/15/2019     (H) 07/15/2019    YANA 9.5 07/15/2019    PHOS 4.3 01/06/2019    MAG 1.3 (L) 01/06/2019    ALBUMIN 3.4 12/11/2018    PROTTOTAL 7.0 12/11/2018    ALT 19 02/06/2019    AST 16 02/06/2019    ALKPHOS 92 12/11/2018    BILITOTAL 0.5 12/11/2018    LIPASE 114 12/11/2018    PTT 28 09/12/2011    INR 0.96 03/22/2016    TSH 3.14 01/02/2019       Preop Vitals  BP Readings from Last 3 Encounters:   07/15/19 160/87   06/14/19 127/70   06/03/19 148/86    Pulse Readings from Last 3 Encounters:   07/15/19 74   06/12/19 83   06/03/19 81      Resp Readings from Last 3 Encounters:   07/15/19 16   06/14/19 16   06/03/19 16    SpO2 Readings from Last 3 Encounters:   07/15/19 100%   06/14/19 93%   06/03/19 97%      Temp Readings from Last 1 Encounters:   07/15/19 99.3  F (37.4  C) (Temporal)    Ht Readings from Last 1 Encounters:   07/15/19 1.575 m (5' 2\")      Wt Readings from Last 1 " "Encounters:   07/15/19 73.5 kg (162 lb 1.6 oz)    Estimated body mass index is 29.65 kg/m  as calculated from the following:    Height as of this encounter: 1.575 m (5' 2\").    Weight as of this encounter: 73.5 kg (162 lb 1.6 oz).       Anesthesia Plan      History & Physical Review  History and physical reviewed and following examination; no interval change.    ASA Status:  2 .        Plan for General and LMA with Intravenous and Propofol induction. Maintenance will be Balanced.    PONV prophylaxis:  Ondansetron (or other 5HT-3) and Dexamethasone or Solumedrol       Postoperative Care  Postoperative pain management:  IV analgesics and Oral pain medications.      Consents  Anesthetic plan, risks, benefits and alternatives discussed with:  Patient or representative and Patient..                 Rell Del Real MD                    .  "

## 2019-07-15 NOTE — PHARMACY-ADMISSION MEDICATION HISTORY
Admission medication history interview status for this patient is complete. See [facility] admission navigator for allergy information, prior to admission medications and immunization status.     Medication history interview source(s):Patient  Medication history resources (including written lists, pill bottles, clinic record):None      Changes made to PTA medication list:  Added: aspirin  Deleted: senna/docusate, tramadol, valtrex  Changed: none    Actions taken by pharmacist (provider contacted, etc):None     Additional medication history information:None    Medication reconciliation/reorder completed by provider prior to medication history? No    Do you take OTC medications (eg tylenol, ibuprofen, fish oil, eye/ear drops, etc)? N(Y/N)    For patients on insulin therapy: N (Y/N)      Prior to Admission medications    Medication Sig Last Dose Taking? Auth Provider   aspirin 81 MG EC tablet Take 81 mg by mouth daily 7/14/2019 at am Yes Unknown, Entered By History   lisinopril (PRINIVIL/ZESTRIL) 10 MG tablet Take 10 mg by mouth daily 7/14/2019 at am Yes Unknown, Entered By History

## 2019-07-15 NOTE — CONSULTS
Patient seen and examined by Dr Meza in ER.   Xrays reviewed showing dislocated right total hip arthroplasty  Will need closed vs open reduction in the OR.   Orders placed will need to be cleared.   Need to delay until OR available later this afternoon.  Keep NPO.    Full consult to follow  Alem Cloud PA-C  [phone]

## 2019-07-15 NOTE — PLAN OF CARE
PRIMARY DIAGNOSIS: ACUTE PAIN/ hip pain   OUTPATIENT/OBSERVATION GOALS TO BE MET BEFORE DISCHARGE:  1. Pain Status: Improved but still requiring IV narcotics. Patient in pain when IV dilaudid starts to wear off. IV dilaudid just given for pain.     2. Return to near baseline physical activity: No, Patient bedrest until after surgery. Surgery planned for 5pm.      3. Cleared for discharge by consultants (if involved): No    Discharge Planner Nurse   Safe discharge environment identified: No  Barriers to discharge: PT and SW to follow patient after surgery.         Entered by: Lori Gilmore 07/15/2019 3:51 PM     Please review provider order for any additional goals.   Nurse to notify provider when observation goals have been met and patient is ready for discharge.

## 2019-07-15 NOTE — ANESTHESIA CARE TRANSFER NOTE
Patient: Krystyna Peña    Procedure(s):  right hip closed reduction    Diagnosis: right hip dislocated  Diagnosis Additional Information: No value filed.    Anesthesia Type:   General, LMA     Note:  Airway :Face Mask  Patient transferred to:PACU  Handoff Report: Identifed the Patient, Identified the Reponsible Provider, Reviewed the pertinent medical history, Discussed the surgical course, Reviewed Intra-OP anesthesia mangement and issues during anesthesia, Set expectations for post-procedure period and Allowed opportunity for questions and acknowledgement of understanding      Vitals: (Last set prior to Anesthesia Care Transfer)    CRNA VITALS  7/15/2019 1740 - 7/15/2019 1822      7/15/2019             EKG:  Sinus rhythm                Electronically Signed By: Dean Dennis Severson, APRN CRNA  July 15, 2019  6:22 PM

## 2019-07-15 NOTE — PLAN OF CARE
ROOM # 221    Living Situation (if not independent, order SW consult): lives in a split level with   Facility name: N/A  : Buzz (spouse)    Activity level at baseline: independent  Activity level on admit: A-2      Patient registered to observation; given Patient Bill of Rights; given the opportunity to ask questions about observation status and their plan of care.  Patient has been oriented to the observation room, bathroom and call light is in place.    Discussed discharge goals and expectations with patient/family.

## 2019-07-15 NOTE — ED NOTES
Patient awake and vital signs stable. Dr Garrison and Dr Rodríguez in room for procedure, reduction unsuccessful.

## 2019-07-15 NOTE — PLAN OF CARE
PRIMARY DIAGNOSIS: ACUTE PAIN  OUTPATIENT/OBSERVATION GOALS TO BE MET BEFORE DISCHARGE:  1. Pain Status: Improved but still requiring IV narcotics. Patient in pain shortly before q2h annie hits for IV dilaudid      2. Return to near baseline physical activity: No. Patient bedrest until after surgery. Right leg internally rotated.      3. Cleared for discharge by consultants (if involved): No     Discharge Planner Nurse   Safe discharge environment identified: Yes  Barriers to discharge: Yes        Please review provider order for any additional goals.   Nurse to notify provider when observation goals have been met and patient is ready for discharge.     IV dilaudid given x1, A&Ox4, CMS intact, NPO, ortho consult, surgery at 1700, PT to see tomorrow, will continue to monitor and provide supportive cares.

## 2019-07-15 NOTE — ED TRIAGE NOTES
"Patient reports right hip replacement a year ago, she crossed her legs and felt sudden pain pain right hip. EMS gave 200 mcg intranasal fentanyl without relief. Patient arrives in severe pain cheo hyperventilating, states the pain medication \"didn't help at all\". Right leg shortened and internall rotated. CMS intact but foot feels \"tingly\"  "

## 2019-07-15 NOTE — PROGRESS NOTES
Patient my discharge to home when able to walk safely in knee immobilizer.  Alem Cloud PA-C  [phone]

## 2019-07-16 ENCOUNTER — APPOINTMENT (OUTPATIENT)
Dept: PHYSICAL THERAPY | Facility: CLINIC | Age: 64
End: 2019-07-16
Attending: PHYSICIAN ASSISTANT
Payer: COMMERCIAL

## 2019-07-16 VITALS
RESPIRATION RATE: 14 BRPM | HEIGHT: 62 IN | HEART RATE: 73 BPM | DIASTOLIC BLOOD PRESSURE: 81 MMHG | BODY MASS INDEX: 29.83 KG/M2 | WEIGHT: 162.1 LBS | TEMPERATURE: 98 F | OXYGEN SATURATION: 95 % | SYSTOLIC BLOOD PRESSURE: 145 MMHG

## 2019-07-16 PROCEDURE — 25800030 ZZH RX IP 258 OP 636: Performed by: ORTHOPAEDIC SURGERY

## 2019-07-16 PROCEDURE — 25000132 ZZH RX MED GY IP 250 OP 250 PS 637: Performed by: PHYSICIAN ASSISTANT

## 2019-07-16 PROCEDURE — 99207 ZZC CDG-CODE CATEGORY CHANGED: CPT | Performed by: INTERNAL MEDICINE

## 2019-07-16 PROCEDURE — 99217 ZZC OBSERVATION CARE DISCHARGE: CPT | Performed by: INTERNAL MEDICINE

## 2019-07-16 PROCEDURE — 97116 GAIT TRAINING THERAPY: CPT | Mod: GP | Performed by: PHYSICAL THERAPIST

## 2019-07-16 PROCEDURE — 97530 THERAPEUTIC ACTIVITIES: CPT | Mod: GP | Performed by: PHYSICAL THERAPIST

## 2019-07-16 PROCEDURE — G0378 HOSPITAL OBSERVATION PER HR: HCPCS

## 2019-07-16 PROCEDURE — 25000128 H RX IP 250 OP 636: Performed by: ORTHOPAEDIC SURGERY

## 2019-07-16 PROCEDURE — 97161 PT EVAL LOW COMPLEX 20 MIN: CPT | Mod: GP | Performed by: PHYSICAL THERAPIST

## 2019-07-16 PROCEDURE — 25000132 ZZH RX MED GY IP 250 OP 250 PS 637: Performed by: ORTHOPAEDIC SURGERY

## 2019-07-16 RX ORDER — AMOXICILLIN 250 MG
2 CAPSULE ORAL 2 TIMES DAILY PRN
Qty: 20 TABLET | Refills: 0 | Status: ON HOLD | OUTPATIENT
Start: 2019-07-16 | End: 2019-10-29

## 2019-07-16 RX ORDER — HYDROCODONE BITARTRATE AND ACETAMINOPHEN 5; 325 MG/1; MG/1
1 TABLET ORAL EVERY 6 HOURS PRN
Status: DISCONTINUED | OUTPATIENT
Start: 2019-07-16 | End: 2019-07-16 | Stop reason: HOSPADM

## 2019-07-16 RX ADMIN — HYDROCODONE BITARTRATE AND ACETAMINOPHEN 1 TABLET: 5; 325 TABLET ORAL at 12:13

## 2019-07-16 RX ADMIN — HYDROMORPHONE HYDROCHLORIDE 0.5 MG: 1 INJECTION, SOLUTION INTRAMUSCULAR; INTRAVENOUS; SUBCUTANEOUS at 00:22

## 2019-07-16 RX ADMIN — SENNOSIDES AND DOCUSATE SODIUM 1 TABLET: 8.6; 5 TABLET ORAL at 08:26

## 2019-07-16 RX ADMIN — ASPIRIN 325 MG: 325 TABLET, DELAYED RELEASE ORAL at 08:26

## 2019-07-16 RX ADMIN — SODIUM CHLORIDE, POTASSIUM CHLORIDE, SODIUM LACTATE AND CALCIUM CHLORIDE: 600; 310; 30; 20 INJECTION, SOLUTION INTRAVENOUS at 06:10

## 2019-07-16 RX ADMIN — CELECOXIB 200 MG: 200 CAPSULE ORAL at 08:26

## 2019-07-16 ASSESSMENT — ACTIVITIES OF DAILY LIVING (ADL)
ADLS_ACUITY_SCORE: 13

## 2019-07-16 NOTE — PLAN OF CARE
Vital sign stable, on capnography, O2 at 2 lpm nasal cannula .  Lungs clear.  Bowel sounds hypoactive.  CMS intact, pcd;s on.Knee immobilizer on right knee.NWB RLE.  Pt  requested to use purewick overnight.  Denies pain, refuses tylenol.  Care plan reviewed with pt and spouse.

## 2019-07-16 NOTE — PLAN OF CARE
Discharge Planner PT   Patient plan for discharge: home, maybe with home therapies  Current status: PT eval completed and treatment initiated.  Pt s/p R hip dislocation s/p reduction, WBAT, KI on at all times except for hygiene, no flexion past 90 degrees.  Pt previously I at home with spouse with multiple stairs.  Pt currently amb with SBA with ', some difficulty maintaining precautions during bed mob, min A to stand from lower toilet.  Able to complete steps with 1 rail and B hand hold and SBA and cues for sequencing  Barriers to return to prior living situation: pt will be alone, fall risk, needs A for transfers and ADL's  Recommendations for discharge: home with home PT and home safety eval  Rationale for recommendations: Pt will benefit cont therapies to improve strength and act vickie to progress functional mob I and safety.       Entered by: DAKOTA ALBRECHT 07/16/2019 2:13 PM     Physical Therapy Discharge Summary    Reason for therapy discharge:    Discharged to home with home therapy.    Progress towards therapy goal(s). See goals on Care Plan in [facility] electronic health record for goal details.  Goals partially met.  Barriers to achieving goals:   discharge on same date as initial evaluation.    Therapy recommendation(s):    Continued therapy is recommended.  Rationale/Recommendations:  see above.

## 2019-07-16 NOTE — PLAN OF CARE
Pt. Is A/O, VSS, afebrile. LS clear, voiding, +bowel sounds. Pain managed with PO [place]. Tolerated regular diet. CMS intact. R leg in knee immobilizer at all times. Up with Ax1 walker and gait belt. Plan is to discharge home, SW involved. Will continue to monitor.

## 2019-07-16 NOTE — OP NOTE
Procedure Date: 07/15/2019      PREOPERATIVE DIAGNOSIS:  Right total hip arthroplasty dislocation.      POSTOPERATIVE DIAGNOSIS:  Right total hip arthroplasty dislocation.      PROCEDURE:  Closed reduction, right total hip arthroplasty dislocation.      SURGEON:  Alka Meza MD      ASSISTANT:  Alem Cloud PA-C      ANESTHESIA:  General.      COMPLICATIONS:  None.      ESTIMATED BLOOD LOSS:  None.       DESCRIPTION OF PROCEDURE:  The patient is 1-1/2 years status post right total hip arthroplasty.  She was putting her socks on and had her hip in an unusual position when she felt a pop and was unable to walk.  She was seen in the emergency room where sedation was ineffective and a closed reduction was not feasible.  She subsequently was taken to the operating room where, after administration of general anesthetic and antibiotic prophylaxis, a closed reduction was initially attempted in a supine position, but was ineffective in a 90/90 position.      The patient was placed in a hip hamlin, and extreme flexion, adduction, and internal rotation was done, which did reduce the hip.  The hip appeared unstable at 90 degrees of flexion, 30 degrees of adduction and approximately 60 to 70 degrees of internal rotation.  It should be noted, lengths appeared essentially equal.  The patient was then placed in a knee immobilizer.  There were no complications.         ALKA MEZA MD             D: 07/15/2019   T: 07/15/2019   MT: BOBBY      Name:     JOSHUA REYNOLDS   MRN:      [id number]        Account:        [id number]   :      1955           Procedure Date: 07/15/2019      Document: [id number]

## 2019-07-16 NOTE — OR NURSING
Pt sent to the floor with signed prescription for Norco on the front of her chart.  Floor RN aware of script and pt's desire to have filled here prior to discharge tomorrow.

## 2019-07-16 NOTE — DISCHARGE SUMMARY
[facility]  Discharge Summary  Name: Krystyna Peña    MRN: [id number]  YOB: 1955    Age: 63 year old  Date of Discharge:  7/16/2019  Date of Admission: 7/15/2019  Primary Care Provider: Viri Savage  Discharge Physician:  Hussain Kolb MD  Discharging Service:  Hospitalist      Hospital Course/Discharge Diagnoses:  Ms. Nancy Peña is a very pleasant 63-year-old female with history of right total hip arthroplasty about 1-1/2 years ago who presented on 7/15/2019 with abrupt dislocation of her right hip which occurred while her right leg was in a figure 4 type position that she was putting on a sock.  She denies any previous issues with that hip including dislocations.  She presented here to the ER and attempts at reduction in the ER were unsuccessful.  Orthopedic surgery was contacted and she was taken to the operating room for closed reduction under anesthesia.  This was apparently successful and she was admitted to the hospital overnight.  She was seen by orthopedic surgery today who recommended that she weight-bear as tolerated in her knee immobilizer and once she passed physical therapy/was mobile with her the immobilizer she may discharge home.     1.  Right hip dislocation, first episode.  In the setting of remote right hip total arthroplasty with Dr. Meza approximately 1-1/2 years prior to admission.  Status post closed reduction in the operating room on 7/15.  She will follow-up with orthopedics in clinic.    2.  History of hypertension on lisinopril      Discharge Disposition:  Discharged to home     Allergies:  Allergies   Allergen Reactions     Morphine      respiratory distress     Cephalexin Rash        Discharge Medications:        Review of your medicines      START taking      Dose / Directions   senna-docusate 8.6-50 MG tablet  Commonly known as:  SENOKOT-S/PERICOLACE      Dose:  2 tablet  Take 2 tablets by mouth 2 times daily as needed for  "constipation  Quantity:  20 tablet  Refills:  0        CONTINUE these medicines which may have CHANGED, or have new prescriptions. If we are uncertain of the size of tablets/capsules you have at home, strength may be listed as something that might have changed.      Dose / Directions   aspirin 81 MG EC tablet  This may have changed:  Another medication with the same name was removed. Continue taking this medication, and follow the directions you see here.      Dose:  81 mg  Take 81 mg by mouth daily  Refills:  0        CONTINUE these medicines which have NOT CHANGED      Dose / Directions   HYDROcodone-acetaminophen 5-325 MG tablet  Commonly known as:  NORCO  Used for:  S/P closed reduction of dislocated total hip prosthesis      Dose:  1 tablet  Take 1 tablet by mouth every 6 hours as needed for pain  Quantity:  18 tablet  Refills:  0     lisinopril 10 MG tablet  Commonly known as:  PRINIVIL/ZESTRIL      Dose:  10 mg  Take 10 mg by mouth daily  Refills:  0           Where to get your medicines      These medications were sent to [facility] [id number] [place] [id number]    Phone:  [phone]     senna-docusate 8.6-50 MG tablet     Some of these will need a paper prescription and others can be bought over the counter. Ask your nurse if you have questions.    Bring a paper prescription for each of these medications    HYDROcodone-acetaminophen 5-325 MG tablet         Condition on Discharge:  Discharge condition: Stable       Code status on discharge: Full Code     History of Illness:  See detailed admission note for full details.    Physical Exam:  Vital signs:  Temp: 98  F (36.7  C) Temp src: Oral BP: 145/81 Pulse: 73 Heart Rate: 71 Resp: 14 SpO2: 95 % O2 Device: None (Room air) Oxygen Delivery: 2 LPM Height: 157.5 cm (5' 2\") Weight: 73.5 kg (162 lb 1.6 oz)  Estimated body mass index is 29.65 kg/m  as calculated from the following:    Height " "as of this encounter: 1.575 m (5' 2\").    Weight as of this encounter: 73.5 kg (162 lb 1.6 oz).    Wt Readings from Last 1 Encounters:   07/15/19 73.5 kg (162 lb 1.6 oz)     General: Alert, awake, no acute distress.  HEENT: NC/AT, eyes anicteric, external occular movements intact, face symmetric.  Dentition WNL, MM moist.  Cardiac: RRR, S1, S2.  No murmurs appreciated.  Pulmonary: Normal chest rise, normal work of breathing.  Lungs CTA BL  Abdomen: soft, non-tender, non-distended.  Bowel Sounds Present.  No guarding.  Extremities: Right leg in brace.  No deformities.  Warm, well perfused.  Skin: no rashes or lesions noted.  Warm and Dry.  Neuro: No focal deficits noted.  Speech clear.  Coordination and strength grossly normal.  Psych: Appropriate affect.    Procedures other than Imaging:  Right hip closed reduction under anesthesia by orthopedic surgery     Imaging:  Recent Results (from the past 48 hour(s))   XR Pelvis w Hip Port Right 1 View    Narrative    XR PELVIS AD HIP PORTABLE RIGHT 1 VIEW 7/15/2019 10:15 AM    HISTORY: right hip pain, dislocation      Impression    IMPRESSION: Lateral dislocation of the right DAE.    JONATHAN LANCE MD   XR Pelvis w Hip Right 1 View    Narrative    PELVIS AND RIGHT HIP ONE VIEW 7/15/2019 12:23 PM     HISTORY:  Post manipulation.      Impression    IMPRESSION: Right total hip arthroplasty with continued posterior  superior dislocation. The alignment is similar if not unchanged  compared to earlier today.    DIOMEDES BYRD MD   XR Surgery EILEEN L/T 5 Min Fluoro w Stills    Narrative    This exam was marked as non-reportable because it will not be read by a   radiologist or a [place] non-radiologist provider.                    Consultations:  Consultation during this admission received from orthopedics.       Recent Lab Results:  Recent Labs   Lab 07/15/19  1005   WBC 5.7   HGB 13.6   HCT 40.0   *             Lab Results   Component Value Date     " 07/15/2019     06/03/2019     02/27/2019    Lab Results   Component Value Date    CHLORIDE 105 07/15/2019    CHLORIDE 99 06/03/2019    CHLORIDE 104 02/27/2019    Lab Results   Component Value Date    BUN 20 07/15/2019    BUN 21 06/03/2019    BUN 38 02/27/2019      Lab Results   Component Value Date    POTASSIUM 4.8 07/15/2019    POTASSIUM 4.7 06/03/2019    POTASSIUM 4.8 02/27/2019    Lab Results   Component Value Date    CO2 21 07/15/2019    CO2 17 06/03/2019    CO2 22 02/27/2019    Lab Results   Component Value Date    CR 0.98 07/15/2019    CR 0.89 06/12/2019    CR 1.07 06/03/2019             Pending Results:    Unresulted Labs Ordered in the Past 30 Days of this Admission     No orders found for last 61 day(s).           Discharge Instructions and Follow-Up:   Discharge Procedure Orders   Home care nursing referral   Referral Priority: Routine Referral Type: Home Health Therapies & Aides   Number of Visits Requested: 1     Home Care PT Referral for Hospital Discharge   Referral Priority: Routine Referral Type: Home Health Therapies & Aides   Number of Visits Requested: 1     Home Care OT Referral for Hospital Discharge   Referral Priority: Routine   Number of Visits Requested: 1     Reason for your hospital stay   Order Comments: Diagnosis: right DAE posterior dislocation   Procedure: Closed reduction of right total hip arthroplastySurgeon:  Perez Meza MD  Patient underwent Closed reduction of right total hip arthroplastySurgeon without complication. Patient was placed in a knee immobilizer to be worn at all times except for hygiene. Strict posterior hip precautions were placed.  She may WBAT in knee immobilizer. Patient will follow -up in the office 10-14days post-op for wound check. Please refer to chart for any other specifics of this hospital stay.  Alem Cloud PA-C     Follow-up and recommended labs and tests    Order Comments: Follow-up with JENNIFER Lee with Dr Meza,  In 14 days. Call  for appointment [phone].     Activity   Order Comments: Your activity upon discharge: strict posterior hip precautions. May WBAT in knee immobilizer. Wear knee immobilizer at all times except for hygiene.     Order Specific Question Answer Comments   Is discharge order? Yes      MD face to face encounter   Order Comments: Documentation of Face to Face and Certification for Home Health Services    I certify that patient: Krystyna Peña is under my care and that I, or a nurse practitioner or physician's assistant working with me, had a face-to-face encounter that meets the physician face-to-face encounter requirements with this patient on: 7/16/2019.    This encounter with the patient was in whole, or in part, for the following medical condition, which is the primary reason for home health care: Right hip dislocation.    I certify that, based on my findings, the following services are medically necessary home health services: Nursing, Occupational Therapy and Physical Therapy.    My clinical findings support the need for the above services because: Nurse is needed: For complex aftercare of surgical procedures because the patient needs instruction and cannot perform care on their own due to: Significantly impaired mobility and reduced ability to accomplish ADLs.., Occupational Therapy Services are needed to assess and treat cognitive ability and address ADL safety due to impairment in mobility, flexibility. and Physical Therapy Services are needed to assess and treat the following functional impairments: Mobility.    Further, I certify that my clinical findings support that this patient is homebound (i.e. absences from home require considerable and taxing effort and are for medical reasons or [employer] services or infrequently or of short duration when for other reasons) because: Leaving home is medically contraindicated for the following reason(s): Other physician ordered restriction: Limited mobility with  increased fall risk due to recent right hip dislocation and also status 1 month post right shoulder arthroplasty...    Based on the above findings. I certify that this patient is confined to the home and needs intermittent skilled nursing care, physical therapy and/or speech therapy.  The patient is under my care, and I have initiated the establishment of the plan of care.  This patient will be followed by a physician who will periodically review the plan of care.  Physician/Provider to provide follow up care: Viri Savage    Attending hospital physician (the Medicare certified [facility] provider): Osvaldo Kolb  Physician Signature: See electronic signature associated with these discharge orders.  Date: 7/16/2019     Diet   Order Comments: Follow this diet upon discharge: adult regular diet     Order Specific Question Answer Comments   Is discharge order? Yes        Total time spent in face to face contact with the patient and coordinating discharge was:  45 Minutes.

## 2019-07-16 NOTE — PROGRESS NOTES
07/16/19 1200   Quick Adds   Type of Visit Initial PT Evaluation   Living Environment   Lives With spouse   Living Arrangements house   Home Accessibility stairs to enter home;stairs within home   Number of Stairs, Main Entrance 5   Stair Railings, Main Entrance railing on left side (ascending)   Number of Stairs, Within Home, Primary 10   Stair Railings, Within Home, Primary railing on left side (ascending)   Transportation Anticipated family or friend will provide   Living Environment Comment Also stairs to laundry in basement. sponge bathes due to tub edge to high to get into   Self-Care   Usual Activity Tolerance good   Current Activity Tolerance moderate   Regular Exercise No   Equipment Currently Used at Home cane, straight   Functional Level Prior   Ambulation 0-->independent   Transferring 0-->independent   Toileting 0-->independent   Bathing 0-->independent   Communication 0-->understands/communicates without difficulty   Swallowing 0-->swallows foods/liquids without difficulty   Cognition 0 - no cognition issues reported   Fall history within last six months no   Which of the above functional risks had a recent onset or change? none   General Information   Onset of Illness/Injury or Date of Surgery - Date 07/15/19   Referring Physician Armida Faustin   Patient/Family Goals Statement Pt plans for dc to home   Pertinent History of Current Problem (include personal factors and/or comorbidities that impact the POC) Pt admitted for R hip dislocation, s/p closed reduction   Precautions/Limitations fall precautions;other (see comments)  (KI on L LE at all times, except for hygiene)   Weight-Bearing Status - LUE full weight-bearing   Weight-Bearing Status - RUE full weight-bearing   Weight-Bearing Status - LLE weight-bearing as tolerated   Weight-Bearing Status - RLE full weight-bearing   General Info Comments recent reverse TSA   Cognitive Status Examination   Orientation orientation to person, place and time  "  Level of Consciousness alert   Follows Commands and Answers Questions 100% of the time   Personal Safety and Judgment intact   Memory intact   Pain Assessment   Patient Currently in Pain No   Range of Motion (ROM)   ROM Comment R LE limited with brace and restrictions   Strength   Strength Comments L LE decreased requires A for sit to stand without use of R LEdue to brace   Bed Mobility   Bed Mobility Comments bed mob with cues for precautions, SBA   Transfer Skills   Transfer Comments sit to stand with CGA/min A    Gait   Gait Comments amb with FWW with cues and KI brace on, 20'   Balance   Balance Comments mild impairments with brace on R LE, B UE support to maintain   General Therapy Interventions   Planned Therapy Interventions bed mobility training;gait training;transfer training   Clinical Impression   Criteria for Skilled Therapeutic Intervention yes, treatment indicated   PT Diagnosis difficulty walking   Influenced by the following impairments post op restrictions, weakness, decreased balance   Functional limitations due to impairments impaired functional mob I and safety   Clinical Presentation Stable/Uncomplicated   Clinical Presentation Rationale clinical judgement   Clinical Decision Making (Complexity) Low complexity   Therapy Frequency Daily   Predicted Duration of Therapy Intervention (days/wks) 2 days   Anticipated Equipment Needs at Discharge front wheeled walker   Anticipated Discharge Disposition Home with Home Therapy;Home with Assist   Risk & Benefits of therapy have been explained Yes   Patient, Family & other staff in agreement with plan of care Yes   [facility] [employer]-PAC TM \"6 Clicks\"   2016, Trustees of [facility], under license to [employer].  All rights reserved.   6 Clicks Short Forms Basic Mobility Inpatient Short Form   [facility] AM-PAC  \"6 Clicks\" V.2 Basic Mobility Inpatient Short Form   1. Turning from your back to your side while in a flat bed without using " bedrails? 4 - None   2. Moving from lying on your back to sitting on the side of a flat bed without using bedrails? 4 - None   3. Moving to and from a bed to a chair (including a wheelchair)? 3 - A Little   4. Standing up from a chair using your arms (e.g., wheelchair, or bedside chair)? 3 - A Little   5. To walk in hospital room? 4 - None   6. Climbing 3-5 steps with a railing? 4 - None   Basic Mobility Raw Score (Score out of 24.Lower scores equate to lower levels of function) 22   Total Evaluation Time   Total Evaluation Time (Minutes) 10

## 2019-07-16 NOTE — CONSULTS
Consult Date:  07/15/2019      CHIEF COMPLAINT:  Right hip dislocation.      HISTORY OF PRESENT ILLNESS:  The patient is a 63-year-old female who is 1-1/2 years status post right total hip arthroplasty.  She was tying her shoes and felt a pop about the posterior hip with difficulties walking.  Dr. Garrison has requested orthopedic evaluation in the emergency room for this patient.      PHYSICAL EXAMINATION:  Her leg is shortened and internally rotated.  She is able to weakly flex and extend her ankle.  Skin is intact.  Her general appearance and affect are unremarkable.      X-rays demonstrate a posterior hip dislocation.      IMPRESSION:  Dislocated right total hip arthroplasty.      RECOMMENDATIONS:  She had attempted reduction in the emergency room and she is unable to receive additional sedation at this point.  Apparently the sedation was ineffective at typical doses.  Given this, she will be brought to the operating room for closed versus possible  open reduction.  Risks and benefits of this were reviewed with the patient and this will be performed today.         ALKA YUSUF MD             D: 07/15/2019   T: 07/15/2019   MT:       Name:     JOSHUA REYNOLDS   MRN:      [id number]        Account:       [id number]   :      1955           Consult Date:  07/15/2019      Document: [id number]       cc: Lolita Garrison MD

## 2019-07-16 NOTE — ANESTHESIA POSTPROCEDURE EVALUATION
Patient: Krystyna Peña    Procedure(s):  right hip closed reduction    Diagnosis:right hip dislocated  Diagnosis Additional Information: No value filed.    Anesthesia Type:  General, LMA    Note:  Anesthesia Post Evaluation    Patient location during evaluation: PACU  Patient participation: Able to fully participate in evaluation  Level of consciousness: sleepy but conscious  Pain management: adequate  Airway patency: patent  Cardiovascular status: acceptable  Respiratory status: acceptable  Hydration status: acceptable  PONV: controlled     Anesthetic complications: None          Last vitals:  Vitals:    07/15/19 1905 07/15/19 1915 07/15/19 1940   BP: 158/81  172/81   Pulse: 77     Resp: 9 21 16   Temp:   97.2  F (36.2  C)   SpO2: 97% 98% 99%         Electronically Signed By: Prateek Anand MD  July 15, 2019  8:02 PM

## 2019-07-16 NOTE — PROGRESS NOTES
Orthopedic Rounding Note    POD 1 Right hip closed reduction    Assessment: Patient is doing well, tolerating hinged knee brace POD1 after right hip closed reduction. Right foot/ankle are N/V intact. Patient is most concerned about restrictions related to driving.    Plan: Work with PT on safe transferring. WBAT in knee immobilizer-only remove for hygiene.  Once can safely walk with knee immobilizer, she may discharge. Switched pain meds from oxycodone to Norco due to oxy intolerance. Follow up with Rosa Cloud in 10-14 days.    Elida Castillo PA-C  [phone]

## 2019-07-16 NOTE — CONSULTS
Care Transition Initial Assessment - SW     Met with: Patient and awaiting to meet with patient's     Active Problems:    Dislocation of right hip, initial encounter (H)    Hip dislocation, right (H)    Hip dislocation, right, initial encounter (H)    History of closed hip dislocation       DATA  Lives With: spouse   Living Arrangements: house- multi level home  Identified issues/concerns regarding health management: transportation concerns, DME needs  Resources List: DME  Other Resources: Transportation Services  Transportation Anticipated: family or friend will provide    ASSESSMENT  Cognitive Status:  awake, alert and oriented  Concerns to be addressed: transportation and DME needs  SW consulted to assist with discharge planning, emotional support and transportation arrangements.  Pt is a 63 yr old female who admitted on 7/15/19 after a hip dislocation. Patient had hip replacement on same hip 1.5 yrs ago. Patient also had shoulder surgery in June 2019 and is supposed to start OP rehab on 7/1/7/19- patient concerned about how she will get there as her  works in IT and goes from job to job across the [facility]. Patient also concerned about a vacation that she and her  have planned for this coming Friday to travel to a cabin in the [facility]. Patient states, 'I don't know what I will do if restrooms along the way have do not have tall toilets.' Patient feels she will be ok at home alone although states she will need DME; walker,  and/or raised toilet seat/commode.   PLAN  Financial costs for the patient includes: TBD  Patient given options and choices for discharge: Pt anticipates discharging to home, may benefit by Home PT, DME vendor list provided  Patient/family is agreeable to the plan?  TBD  Transportation/person available to transport on day of discharge  is TBD and have they been notified/set up TBD  Patient Goals and Preferences: Home with , DME put in place, possible home  therapies  Patient anticipates discharging to:  Home  Per patient request, SW to visit with  once he arrives at the hospital to discuss discharge needs.  SW to continue to follow and assist with discharge planning.    ADDENDUM: SW notified that  arrived. SW met with patient and  to discuss discharge needs. SW offered DME list although  indicated that he found a place to purchase the equipment and will pick it up on the way home with patient. SW also offered transportation resources. Again  declined stating he will arrange his work schedule in order to take patient to OP PT. SW requested that PT distribute walker for patient prior to discharge.    SW has identified no other social service needs at this time. SW will remain available should other needs arise.

## 2019-07-16 NOTE — PLAN OF CARE
A&OX4. VSS: on Capno with 2L oxygen nc. Did not get OOB this shift. CMS: NWB RLE, KI on. Amputated toes noted. Pain managed with IV Dilaudid this shift. Pt. stated Tylenol gives her stomach problems. Purewick in place voiding adequately. IS and fluids encouraged. Nursing continue to monitor.

## 2019-07-18 ENCOUNTER — TELEPHONE (OUTPATIENT)
Dept: INTERNAL MEDICINE | Facility: CLINIC | Age: 64
End: 2019-07-18

## 2019-07-18 NOTE — UTILIZATION REVIEW
Admission Status; Secondary Review Determination       As part of the [facility] Utilization review plan, a self-audit is done on Medicare inpatient admission with less than 2 midnights stay. The 2014 IPPS Final Rule allows outpatient billing in the event that a hospital determines that an inpatient admission was not medically necessary under utilization review process.      (x) Observation status would be Appropriate- Short Stay- Post discharge review.     RATIONALE FOR DETERMINATION  63-year-old female with history of right total hip arthroplasty approximately 1-1/2 years ago presented with abrupt dislocation of her right hip while putting on a sock.  Patient admitted for orthopedic evaluation and closed hip reduction under anesthesia.  Patient was admitted and discharge after one night stay. Record was sent by  for a PA review. Based on the  severity of illness, intensity of service provided, expected LOS and risk for adverse outcome make the care appropriate for further outpatient/observation; however, doesn't meet criteria for hospital inpatient admission.         The information on this document is developed by the utilization review team in order for the business office to ensure compliance.  This only denotes the appropriateness of proper admission status and does not reflect the quality of care rendered.         The definitions of Inpatient Status and Observation Status used in making the determination above are those provided in the CMS Coverage Manual, Chapter 1 and Chapter 6, section 70.4.      Sincerely,   Toby Rios MD  Physician Advisor  [facility]

## 2019-07-18 NOTE — TELEPHONE ENCOUNTER
IP F/U    Date: 7/16/19  Diagnosis: Dislocation Of Right Hip, Initial Encounter (H), S/P Closed Reduction Of Dislocated Total Hip Prosthesis  Is patient active in care coordination? No  Was patient in TCU? No

## 2019-07-19 NOTE — TELEPHONE ENCOUNTER
"ED/Discharge Protocol    \"Hi, my name is Loyda Talon, a registered nurse, and I am calling on behalf of Dr. Savage's office at [facility].  I am calling to follow up and see how things are going for you after your recent visit.\"    \"I see that you were in the (ER/UC/IP) on 7-15-19.    How are you doing now that you are home?\" Doing much better.    Is patient experiencing symptoms that may require a hospital visit?  Not at this time.    Discharge Instructions    \"Let's review your discharge instructions.  What is/are the follow-up recommendations?  Pt. Response: f/u with ortho provider.    \"Were you instructed to make a follow-up appointment?\"  Pt. Response: Yes.  Has appointment been made?   Yes with her ortho provider.     \"When you see the provider, I would recommend that you bring your discharge instructions with you.    Medications    \"How many new medications are you on since your hospitalization/ED visit?\"    0-1  \"How many of your current medicines changed (dose, timing, name, etc.) while you were in the hospital/ED visit?\"   0-1  \"Do you have questions about your medications?\"   No  \"Were you newly diagnosed with heart failure, COPD, diabetes or did you have a heart attack?\"  Medication reconciliation completed? Yes    Was MTM referral placed (*Make sure to put transitions as reason for referral)?   No    Call Summary    \"Do you have any questions or concerns about your condition or care plan at the moment?\"    No  Triage nurse advice given: follow discharge instructions and keep f/u appointment with ortho.    Patient was in ER 2 in the past year (assess appropriateness of ER visits.)      \"If you have questions or things don't continue to improve, we encourage you contact us through the main clinic number,  [phone].  Even if the clinic is not open, triage nurses are available 24/7 to help you.     We would like you to know that our clinic has extended hours (provide information).  We also have urgent " "care (provide details on closest location and hours/contact info)\"      \"Thank you for your time and take care!\"        "

## 2019-07-29 ENCOUNTER — TRANSFERRED RECORDS (OUTPATIENT)
Dept: HEALTH INFORMATION MANAGEMENT | Facility: CLINIC | Age: 64
End: 2019-07-29

## 2019-07-31 ENCOUNTER — TRANSFERRED RECORDS (OUTPATIENT)
Dept: HEALTH INFORMATION MANAGEMENT | Facility: CLINIC | Age: 64
End: 2019-07-31

## 2019-08-02 ENCOUNTER — TRANSFERRED RECORDS (OUTPATIENT)
Dept: HEALTH INFORMATION MANAGEMENT | Facility: CLINIC | Age: 64
End: 2019-08-02

## 2019-08-13 ENCOUNTER — OFFICE VISIT (OUTPATIENT)
Dept: URGENT CARE | Facility: URGENT CARE | Age: 64
End: 2019-08-13
Payer: COMMERCIAL

## 2019-08-13 VITALS
BODY MASS INDEX: 29.63 KG/M2 | HEART RATE: 74 BPM | SYSTOLIC BLOOD PRESSURE: 142 MMHG | WEIGHT: 162 LBS | RESPIRATION RATE: 16 BRPM | OXYGEN SATURATION: 100 % | DIASTOLIC BLOOD PRESSURE: 78 MMHG

## 2019-08-13 DIAGNOSIS — N76.0 VAGINITIS AND VULVOVAGINITIS: Primary | ICD-10-CM

## 2019-08-13 DIAGNOSIS — R30.0 DYSURIA: ICD-10-CM

## 2019-08-13 LAB
ALBUMIN UR-MCNC: NEGATIVE MG/DL
APPEARANCE UR: CLEAR
BILIRUB UR QL STRIP: NEGATIVE
COLOR UR AUTO: YELLOW
GLUCOSE UR STRIP-MCNC: NEGATIVE MG/DL
HGB UR QL STRIP: NEGATIVE
KETONES UR STRIP-MCNC: NEGATIVE MG/DL
LEUKOCYTE ESTERASE UR QL STRIP: NEGATIVE
NITRATE UR QL: NEGATIVE
NON-SQ EPI CELLS #/AREA URNS LPF: ABNORMAL /LPF
PH UR STRIP: 6.5 PH (ref 5–7)
RBC #/AREA URNS AUTO: ABNORMAL /HPF
SOURCE: ABNORMAL
SP GR UR STRIP: 1.01 (ref 1–1.03)
SPECIMEN SOURCE: NORMAL
UROBILINOGEN UR STRIP-ACNC: 0.2 EU/DL (ref 0.2–1)
WBC #/AREA URNS AUTO: ABNORMAL /HPF
WET PREP SPEC: NORMAL

## 2019-08-13 PROCEDURE — 99213 OFFICE O/P EST LOW 20 MIN: CPT | Performed by: PHYSICIAN ASSISTANT

## 2019-08-13 PROCEDURE — 87210 SMEAR WET MOUNT SALINE/INK: CPT | Performed by: PHYSICIAN ASSISTANT

## 2019-08-13 PROCEDURE — 81001 URINALYSIS AUTO W/SCOPE: CPT | Performed by: PHYSICIAN ASSISTANT

## 2019-08-13 RX ORDER — TERCONAZOLE 0.4 %
1 CREAM WITH APPLICATOR VAGINAL AT BEDTIME
Qty: 45 G | Refills: 0 | Status: SHIPPED | OUTPATIENT
Start: 2019-08-13 | End: 2019-10-28

## 2019-08-13 NOTE — PATIENT INSTRUCTIONS
(N76.0) Vaginitis and vulvovaginitis  (primary encounter diagnosis)  Comment:   Plan: terconazole (TERAZOL 7) 0.4 % vaginal cream        Cool compress to area.      Follow up should symptoms fail to improve as anticipated.      (R30.0) Dysuria  Comment:   Plan: UA with Microscopic reflex to Culture, Wet prep

## 2019-08-13 NOTE — PROGRESS NOTES
Patient presents with:  Vaginal Problem: Pt has burning and redness in vagina X 2 days    SUBJECTIVE:  Krystyna Peña is a 64 year old female who presents with vaginal itching, burning and discharge.    Onset of symptoms 2 day(s) ago, gradually worsening since.     Pain:irritation .     Vaginal bleeding: No      Vaginal symptoms: as above  Patient's last menstrual period was 10/01/2003.    Has had yeast infections in the past, has NOT recently been on antibiotics.    SH: traveling to the [place] [employer] on Saturday    Past Medical History:   Diagnosis Date     Arthritis      Asymptomatic varicose veins      Benign neoplasm of colon ,     Adenoma     Gastroesophageal reflux disease      Hypertension      Major depression      Current Outpatient Medications   Medication Sig Dispense Refill     aspirin 81 MG EC tablet Take 81 mg by mouth daily       lisinopril (PRINIVIL/ZESTRIL) 10 MG tablet Take 10 mg by mouth daily       order for DME Equipment being ordered: Walker Wheels () and Walker ()  Treatment Diagnosis: difficulty walking 1 each 0     senna-docusate (SENOKOT-S/PERICOLACE) 8.6-50 MG tablet Take 2 tablets by mouth 2 times daily as needed for constipation 20 tablet 0     Social History     Socioeconomic History     Marital status:      Spouse name: Not on file     Number of children: 2     Years of education: Not on file     Highest education level: Not on file   Occupational History     Employer: [employer]   Social Needs     Financial resource strain: Not on file     Food insecurity:     Worry: Not on file     Inability: Not on file     Transportation needs:     Medical: Not on file     Non-medical: Not on file   Tobacco Use     Smoking status: Former Smoker     Packs/day: 1.00     Types: Cigarettes, Cigars     Last attempt to quit: 2013     Years since quittin.3     Smokeless tobacco: Never Used   Substance and Sexual Activity     Alcohol use: Yes      Comment: 1 per day     Drug use: No     Sexual activity: Yes     Partners: Male   Lifestyle     Physical activity:     Days per week: Not on file     Minutes per session: Not on file     Stress: Not on file   Relationships     Social connections:     Talks on phone: Not on file     Gets together: Not on file     Attends [employer] service: Not on file     Active member of club or organization: Not on file     Attends meetings of clubs or organizations: Not on file     Relationship status: Not on file     Intimate partner violence:     Fear of current or ex partner: Not on file     Emotionally abused: Not on file     Physically abused: Not on file     Forced sexual activity: Not on file   Other Topics Concern      Service Not Asked     Blood Transfusions Not Asked     Caffeine Concern Not Asked     Occupational Exposure Not Asked     Hobby Hazards Not Asked     Sleep Concern Not Asked     Stress Concern Not Asked     Weight Concern Not Asked     Special Diet Not Asked     Back Care Not Asked     Exercise Yes     Bike Helmet Not Asked     Seat Belt Yes     Self-Exams Yes     Parent/sibling w/ CABG, MI or angioplasty before 65F 55M? Not Asked   Social History Narrative     Not on file       ROS:   CONSTITUTIONAL:NEGATIVE for fever, chills, change in weight  INTEGUMENTARY/SKIN: as per HPI  ENT/MOUTH: NEGATIVE for ear, mouth and throat problems  RESP:NEGATIVE for significant cough or SOB  CV: NEGATIVE for chest pain, palpitations or peripheral edema  : as per HPI  HEME/ALLERGY/IMMUNE: NEGATIVE for bleeding problems  Review of systems negative except as stated above.    OBJECTIVE:  BP (!) 142/78 (BP Location: Left arm, Patient Position: Sitting, Cuff Size: Adult Regular)   Pulse 74   Resp 16   Wt 73.5 kg (162 lb)   LMP 10/01/2003   SpO2 100%   BMI 29.63 kg/m    : deferred per patient, self wet prep obtained.    GENERAL APPEARANCE: healthy, alert and no distress  ABDOMEN:  soft, nontender, no HSM or  masses and bowel sounds normal  BACK: No CVA tenderness  SKIN: no suspicious lesions or rashes    (N76.0) Vaginitis and vulvovaginitis  (primary encounter diagnosis)  Comment:   Plan: terconazole (TERAZOL 7) 0.4 % vaginal cream        Cool compress to area.      Follow up should symptoms fail to improve as anticipated.      (R30.0) Dysuria  Comment:   Plan: UA with Microscopic reflex to Culture, Wet prep

## 2019-09-12 ENCOUNTER — TELEPHONE (OUTPATIENT)
Dept: INTERNAL MEDICINE | Facility: CLINIC | Age: 64
End: 2019-09-12

## 2019-09-12 NOTE — TELEPHONE ENCOUNTER
Patient is calling to ask what she can take OTC for her cough. She is pretty sure it is due to her allergies and it bothers her especially at night. She wants to make whatever she takes doesn't affect her kidneys.

## 2019-09-12 NOTE — TELEPHONE ENCOUNTER
Call to patient, informed patient that she could check with pharmacist on which over the counter medications she could take for her cough. Patient states she has been taking Robitussin non- drowsy but she is having a hard time with coughing at night and being able to sleep. Writer recommended patient could try cough suppressant without non-drowsy formula. Patient is requesting if primary care provider could write prescription for something to help with cough and help her sleep, Please advise,     Thank you

## 2019-09-12 NOTE — TELEPHONE ENCOUNTER
Call to patient, left detailed voicemail of primary care provider's message and to call clinic with any further questions.

## 2019-09-12 NOTE — TELEPHONE ENCOUNTER
She should use a cough syrup or tablet with guaifenesin and dextromethorphan.  This would be Mucinex DM or Robitussin cough and chest congestion.  These are not available by prescription, they are just over-the-counter.  If she is not taking anything for allergies she can start with over-the-counter Claritin or Allegra once a day.  If that still is not adequately controlling symptoms, then she should add Flonase or Nasacort to help reduce the drainage.  If there is continued issues, she would need to be seen.

## 2019-09-20 ENCOUNTER — TRANSFERRED RECORDS (OUTPATIENT)
Dept: HEALTH INFORMATION MANAGEMENT | Facility: CLINIC | Age: 64
End: 2019-09-20

## 2019-10-28 ENCOUNTER — HOSPITAL ENCOUNTER (INPATIENT)
Facility: CLINIC | Age: 64
LOS: 2 days | Discharge: HOME HEALTH | End: 2019-10-30
Attending: EMERGENCY MEDICINE | Admitting: ORTHOPAEDIC SURGERY
Payer: COMMERCIAL

## 2019-10-28 ENCOUNTER — ANESTHESIA (OUTPATIENT)
Dept: SURGERY | Facility: CLINIC | Age: 64
End: 2019-10-28
Payer: COMMERCIAL

## 2019-10-28 ENCOUNTER — APPOINTMENT (OUTPATIENT)
Dept: GENERAL RADIOLOGY | Facility: CLINIC | Age: 64
End: 2019-10-28
Attending: ORTHOPAEDIC SURGERY
Payer: COMMERCIAL

## 2019-10-28 ENCOUNTER — APPOINTMENT (OUTPATIENT)
Dept: GENERAL RADIOLOGY | Facility: CLINIC | Age: 64
End: 2019-10-28
Attending: EMERGENCY MEDICINE
Payer: COMMERCIAL

## 2019-10-28 ENCOUNTER — ANESTHESIA EVENT (OUTPATIENT)
Dept: SURGERY | Facility: CLINIC | Age: 64
End: 2019-10-28
Payer: COMMERCIAL

## 2019-10-28 DIAGNOSIS — S73.004A HIP DISLOCATION, RIGHT, INITIAL ENCOUNTER (H): ICD-10-CM

## 2019-10-28 DIAGNOSIS — Z96.649 HISTORY OF REVISION OF TOTAL HIP ARTHROPLASTY: Primary | ICD-10-CM

## 2019-10-28 DIAGNOSIS — S73.004A DISLOCATION OF RIGHT HIP, INITIAL ENCOUNTER (H): ICD-10-CM

## 2019-10-28 PROBLEM — S73.004D: Status: ACTIVE | Noted: 2019-10-28

## 2019-10-28 LAB
ABO + RH BLD: NORMAL
ABO + RH BLD: NORMAL
ANION GAP SERPL CALCULATED.3IONS-SCNC: 7 MMOL/L (ref 3–14)
BLD GP AB SCN SERPL QL: NORMAL
BLOOD BANK CMNT PATIENT-IMP: NORMAL
BUN SERPL-MCNC: 23 MG/DL (ref 7–30)
CALCIUM SERPL-MCNC: 8.5 MG/DL (ref 8.5–10.1)
CHLORIDE SERPL-SCNC: 107 MMOL/L (ref 94–109)
CO2 SERPL-SCNC: 23 MMOL/L (ref 20–32)
CREAT SERPL-MCNC: 0.98 MG/DL (ref 0.52–1.04)
ERYTHROCYTE [DISTWIDTH] IN BLOOD BY AUTOMATED COUNT: 13.5 % (ref 10–15)
GFR SERPL CREATININE-BSD FRML MDRD: 61 ML/MIN/{1.73_M2}
GLUCOSE SERPL-MCNC: 72 MG/DL (ref 70–99)
HCT VFR BLD AUTO: 37.1 % (ref 35–47)
HGB BLD-MCNC: 12.3 G/DL (ref 11.7–15.7)
MCH RBC QN AUTO: 34.9 PG (ref 26.5–33)
MCHC RBC AUTO-ENTMCNC: 33.2 G/DL (ref 31.5–36.5)
MCV RBC AUTO: 105 FL (ref 78–100)
PLATELET # BLD AUTO: 205 10E9/L (ref 150–450)
POTASSIUM SERPL-SCNC: 5.4 MMOL/L (ref 3.4–5.3)
RBC # BLD AUTO: 3.52 10E12/L (ref 3.8–5.2)
SODIUM SERPL-SCNC: 137 MMOL/L (ref 133–144)
SPECIMEN EXP DATE BLD: NORMAL
WBC # BLD AUTO: 4.5 10E9/L (ref 4–11)

## 2019-10-28 PROCEDURE — 36415 COLL VENOUS BLD VENIPUNCTURE: CPT | Performed by: ORTHOPAEDIC SURGERY

## 2019-10-28 PROCEDURE — 36000069 ZZH SURGERY LEVEL 5 EA 15 ADDTL MIN: Performed by: ORTHOPAEDIC SURGERY

## 2019-10-28 PROCEDURE — 86900 BLOOD TYPING SEROLOGIC ABO: CPT | Performed by: ANESTHESIOLOGY

## 2019-10-28 PROCEDURE — G0378 HOSPITAL OBSERVATION PER HR: HCPCS

## 2019-10-28 PROCEDURE — 25800030 ZZH RX IP 258 OP 636: Performed by: ANESTHESIOLOGY

## 2019-10-28 PROCEDURE — 73502 X-RAY EXAM HIP UNI 2-3 VIEWS: CPT

## 2019-10-28 PROCEDURE — 0SR90JA REPLACEMENT OF RIGHT HIP JOINT WITH SYNTHETIC SUBSTITUTE, UNCEMENTED, OPEN APPROACH: ICD-10-PCS | Performed by: ORTHOPAEDIC SURGERY

## 2019-10-28 PROCEDURE — 25800030 ZZH RX IP 258 OP 636: Performed by: ORTHOPAEDIC SURGERY

## 2019-10-28 PROCEDURE — 40000965 ZZH STATISTIC END TITIAL CO2 MONITORING

## 2019-10-28 PROCEDURE — 25000128 H RX IP 250 OP 636: Performed by: EMERGENCY MEDICINE

## 2019-10-28 PROCEDURE — 25000128 H RX IP 250 OP 636: Performed by: ORTHOPAEDIC SURGERY

## 2019-10-28 PROCEDURE — 80048 BASIC METABOLIC PNL TOTAL CA: CPT | Performed by: ORTHOPAEDIC SURGERY

## 2019-10-28 PROCEDURE — 99285 EMERGENCY DEPT VISIT HI MDM: CPT | Mod: 25

## 2019-10-28 PROCEDURE — 40000275 ZZH STATISTIC RCP TIME EA 10 MIN

## 2019-10-28 PROCEDURE — 25000128 H RX IP 250 OP 636: Performed by: ANESTHESIOLOGY

## 2019-10-28 PROCEDURE — 71000012 ZZH RECOVERY PHASE 1 LEVEL 1 FIRST HR: Performed by: ORTHOPAEDIC SURGERY

## 2019-10-28 PROCEDURE — 37000008 ZZH ANESTHESIA TECHNICAL FEE, 1ST 30 MIN: Performed by: ORTHOPAEDIC SURGERY

## 2019-10-28 PROCEDURE — 40000306 ZZH STATISTIC PRE PROC ASSESS II: Performed by: ORTHOPAEDIC SURGERY

## 2019-10-28 PROCEDURE — 86901 BLOOD TYPING SEROLOGIC RH(D): CPT | Performed by: ANESTHESIOLOGY

## 2019-10-28 PROCEDURE — 40000986 XR PELVIS AD HIP PORTABLE RIGHT 1 VIEW

## 2019-10-28 PROCEDURE — 25000128 H RX IP 250 OP 636: Performed by: NURSE ANESTHETIST, CERTIFIED REGISTERED

## 2019-10-28 PROCEDURE — 96374 THER/PROPH/DIAG INJ IV PUSH: CPT

## 2019-10-28 PROCEDURE — 27210794 ZZH OR GENERAL SUPPLY STERILE: Performed by: ORTHOPAEDIC SURGERY

## 2019-10-28 PROCEDURE — 25000125 ZZHC RX 250: Performed by: NURSE ANESTHETIST, CERTIFIED REGISTERED

## 2019-10-28 PROCEDURE — 0SP90JZ REMOVAL OF SYNTHETIC SUBSTITUTE FROM RIGHT HIP JOINT, OPEN APPROACH: ICD-10-PCS | Performed by: ORTHOPAEDIC SURGERY

## 2019-10-28 PROCEDURE — 37000009 ZZH ANESTHESIA TECHNICAL FEE, EACH ADDTL 15 MIN: Performed by: ORTHOPAEDIC SURGERY

## 2019-10-28 PROCEDURE — 25800030 ZZH RX IP 258 OP 636: Performed by: NURSE ANESTHETIST, CERTIFIED REGISTERED

## 2019-10-28 PROCEDURE — 85027 COMPLETE CBC AUTOMATED: CPT | Performed by: ORTHOPAEDIC SURGERY

## 2019-10-28 PROCEDURE — C1776 JOINT DEVICE (IMPLANTABLE): HCPCS | Performed by: ORTHOPAEDIC SURGERY

## 2019-10-28 PROCEDURE — 99152 MOD SED SAME PHYS/QHP 5/>YRS: CPT

## 2019-10-28 PROCEDURE — 25000125 ZZHC RX 250: Performed by: ORTHOPAEDIC SURGERY

## 2019-10-28 PROCEDURE — 27265 TREAT HIP DISLOCATION: CPT | Mod: RT

## 2019-10-28 PROCEDURE — 86850 RBC ANTIBODY SCREEN: CPT | Performed by: ANESTHESIOLOGY

## 2019-10-28 PROCEDURE — 96376 TX/PRO/DX INJ SAME DRUG ADON: CPT

## 2019-10-28 PROCEDURE — 36000067 ZZH SURGERY LEVEL 5 1ST 30 MIN: Performed by: ORTHOPAEDIC SURGERY

## 2019-10-28 PROCEDURE — 71000013 ZZH RECOVERY PHASE 1 LEVEL 1 EA ADDTL HR: Performed by: ORTHOPAEDIC SURGERY

## 2019-10-28 PROCEDURE — 96375 TX/PRO/DX INJ NEW DRUG ADDON: CPT

## 2019-10-28 PROCEDURE — 12000000 ZZH R&B MED SURG/OB

## 2019-10-28 DEVICE — IMP LINER ACETABULUM ZIM XLPE 50X36MM 00-6305-050-36: Type: IMPLANTABLE DEVICE | Site: HIP | Status: FUNCTIONAL

## 2019-10-28 DEVICE — IMPLANTABLE DEVICE: Type: IMPLANTABLE DEVICE | Site: HIP | Status: FUNCTIONAL

## 2019-10-28 RX ORDER — CELECOXIB 100 MG/1
100 CAPSULE ORAL 2 TIMES DAILY
Status: DISCONTINUED | OUTPATIENT
Start: 2019-10-28 | End: 2019-10-30 | Stop reason: HOSPADM

## 2019-10-28 RX ORDER — AMOXICILLIN 250 MG
2 CAPSULE ORAL 2 TIMES DAILY
Status: DISCONTINUED | OUTPATIENT
Start: 2019-10-28 | End: 2019-10-30 | Stop reason: HOSPADM

## 2019-10-28 RX ORDER — AMOXICILLIN 250 MG
1 CAPSULE ORAL 2 TIMES DAILY PRN
Status: DISCONTINUED | OUTPATIENT
Start: 2019-10-28 | End: 2019-10-30 | Stop reason: HOSPADM

## 2019-10-28 RX ORDER — ONDANSETRON 2 MG/ML
4 INJECTION INTRAMUSCULAR; INTRAVENOUS EVERY 6 HOURS PRN
Status: DISCONTINUED | OUTPATIENT
Start: 2019-10-28 | End: 2019-10-30 | Stop reason: HOSPADM

## 2019-10-28 RX ORDER — AMOXICILLIN 250 MG
2 CAPSULE ORAL 2 TIMES DAILY PRN
Status: DISCONTINUED | OUTPATIENT
Start: 2019-10-28 | End: 2019-10-30 | Stop reason: HOSPADM

## 2019-10-28 RX ORDER — LABETALOL 20 MG/4 ML (5 MG/ML) INTRAVENOUS SYRINGE
10
Status: DISCONTINUED | OUTPATIENT
Start: 2019-10-28 | End: 2019-10-28 | Stop reason: HOSPADM

## 2019-10-28 RX ORDER — OXYCODONE HYDROCHLORIDE 5 MG/1
5-10 TABLET ORAL
Status: DISCONTINUED | OUTPATIENT
Start: 2019-10-28 | End: 2019-10-28

## 2019-10-28 RX ORDER — NALOXONE HYDROCHLORIDE 0.4 MG/ML
.1-.4 INJECTION, SOLUTION INTRAMUSCULAR; INTRAVENOUS; SUBCUTANEOUS
Status: DISCONTINUED | OUTPATIENT
Start: 2019-10-28 | End: 2019-10-28

## 2019-10-28 RX ORDER — BUPIVACAINE HYDROCHLORIDE AND EPINEPHRINE 2.5; 5 MG/ML; UG/ML
60 INJECTION, SOLUTION INFILTRATION; PERINEURAL ONCE
Status: DISCONTINUED | OUTPATIENT
Start: 2019-10-28 | End: 2019-10-29

## 2019-10-28 RX ORDER — LIDOCAINE HYDROCHLORIDE 10 MG/ML
INJECTION, SOLUTION INFILTRATION; PERINEURAL PRN
Status: DISCONTINUED | OUTPATIENT
Start: 2019-10-28 | End: 2019-10-28

## 2019-10-28 RX ORDER — PROPOFOL 10 MG/ML
INJECTION, EMULSION INTRAVENOUS PRN
Status: DISCONTINUED | OUTPATIENT
Start: 2019-10-28 | End: 2019-10-28

## 2019-10-28 RX ORDER — ONDANSETRON 2 MG/ML
INJECTION INTRAMUSCULAR; INTRAVENOUS PRN
Status: DISCONTINUED | OUTPATIENT
Start: 2019-10-28 | End: 2019-10-28

## 2019-10-28 RX ORDER — NEOSTIGMINE METHYLSULFATE 1 MG/ML
VIAL (ML) INJECTION PRN
Status: DISCONTINUED | OUTPATIENT
Start: 2019-10-28 | End: 2019-10-28

## 2019-10-28 RX ORDER — KETAMINE HYDROCHLORIDE 10 MG/ML
INJECTION INTRAMUSCULAR; INTRAVENOUS PRN
Status: DISCONTINUED | OUTPATIENT
Start: 2019-10-28 | End: 2019-10-28

## 2019-10-28 RX ORDER — SODIUM CHLORIDE 9 MG/ML
1000 INJECTION, SOLUTION INTRAVENOUS CONTINUOUS
Status: DISCONTINUED | OUTPATIENT
Start: 2019-10-28 | End: 2019-10-28

## 2019-10-28 RX ORDER — FENTANYL CITRATE 50 UG/ML
50 INJECTION, SOLUTION INTRAMUSCULAR; INTRAVENOUS ONCE
Status: COMPLETED | OUTPATIENT
Start: 2019-10-28 | End: 2019-10-28

## 2019-10-28 RX ORDER — FENTANYL CITRATE 50 UG/ML
INJECTION, SOLUTION INTRAMUSCULAR; INTRAVENOUS PRN
Status: DISCONTINUED | OUTPATIENT
Start: 2019-10-28 | End: 2019-10-28

## 2019-10-28 RX ORDER — ONDANSETRON 4 MG/1
4 TABLET, ORALLY DISINTEGRATING ORAL EVERY 30 MIN PRN
Status: DISCONTINUED | OUTPATIENT
Start: 2019-10-28 | End: 2019-10-28 | Stop reason: HOSPADM

## 2019-10-28 RX ORDER — DEXAMETHASONE SODIUM PHOSPHATE 4 MG/ML
INJECTION, SOLUTION INTRA-ARTICULAR; INTRALESIONAL; INTRAMUSCULAR; INTRAVENOUS; SOFT TISSUE PRN
Status: DISCONTINUED | OUTPATIENT
Start: 2019-10-28 | End: 2019-10-28

## 2019-10-28 RX ORDER — AMOXICILLIN 250 MG
1 CAPSULE ORAL 2 TIMES DAILY
Status: DISCONTINUED | OUTPATIENT
Start: 2019-10-28 | End: 2019-10-30 | Stop reason: HOSPADM

## 2019-10-28 RX ORDER — FENTANYL CITRATE 50 UG/ML
25-50 INJECTION, SOLUTION INTRAMUSCULAR; INTRAVENOUS
Status: DISCONTINUED | OUTPATIENT
Start: 2019-10-28 | End: 2019-10-28 | Stop reason: HOSPADM

## 2019-10-28 RX ORDER — ONDANSETRON 4 MG/1
4 TABLET, ORALLY DISINTEGRATING ORAL EVERY 6 HOURS PRN
Status: DISCONTINUED | OUTPATIENT
Start: 2019-10-28 | End: 2019-10-30 | Stop reason: HOSPADM

## 2019-10-28 RX ORDER — HYDRALAZINE HYDROCHLORIDE 20 MG/ML
2.5-5 INJECTION INTRAMUSCULAR; INTRAVENOUS EVERY 10 MIN PRN
Status: DISCONTINUED | OUTPATIENT
Start: 2019-10-28 | End: 2019-10-28 | Stop reason: HOSPADM

## 2019-10-28 RX ORDER — MEPERIDINE HYDROCHLORIDE 50 MG/ML
12.5 INJECTION INTRAMUSCULAR; INTRAVENOUS; SUBCUTANEOUS
Status: COMPLETED | OUTPATIENT
Start: 2019-10-28 | End: 2019-10-28

## 2019-10-28 RX ORDER — DIMENHYDRINATE 50 MG/ML
25 INJECTION, SOLUTION INTRAMUSCULAR; INTRAVENOUS
Status: DISCONTINUED | OUTPATIENT
Start: 2019-10-28 | End: 2019-10-28 | Stop reason: HOSPADM

## 2019-10-28 RX ORDER — FENTANYL CITRATE 50 UG/ML
50 INJECTION, SOLUTION INTRAMUSCULAR; INTRAVENOUS
Status: COMPLETED | OUTPATIENT
Start: 2019-10-28 | End: 2019-10-28

## 2019-10-28 RX ORDER — GLYCOPYRROLATE 0.2 MG/ML
INJECTION, SOLUTION INTRAMUSCULAR; INTRAVENOUS PRN
Status: DISCONTINUED | OUTPATIENT
Start: 2019-10-28 | End: 2019-10-28

## 2019-10-28 RX ORDER — NALOXONE HYDROCHLORIDE 0.4 MG/ML
.1-.4 INJECTION, SOLUTION INTRAMUSCULAR; INTRAVENOUS; SUBCUTANEOUS
Status: DISCONTINUED | OUTPATIENT
Start: 2019-10-28 | End: 2019-10-30 | Stop reason: HOSPADM

## 2019-10-28 RX ORDER — PROPOFOL 10 MG/ML
INJECTION, EMULSION INTRAVENOUS CONTINUOUS PRN
Status: DISCONTINUED | OUTPATIENT
Start: 2019-10-28 | End: 2019-10-28

## 2019-10-28 RX ORDER — ACETAMINOPHEN 325 MG/1
650 TABLET ORAL EVERY 4 HOURS PRN
Status: DISCONTINUED | OUTPATIENT
Start: 2019-10-31 | End: 2019-10-28

## 2019-10-28 RX ORDER — ONDANSETRON 2 MG/ML
4 INJECTION INTRAMUSCULAR; INTRAVENOUS EVERY 30 MIN PRN
Status: DISCONTINUED | OUTPATIENT
Start: 2019-10-28 | End: 2019-10-28 | Stop reason: HOSPADM

## 2019-10-28 RX ORDER — CLINDAMYCIN PHOSPHATE 900 MG/50ML
900 INJECTION, SOLUTION INTRAVENOUS EVERY 8 HOURS
Status: COMPLETED | OUTPATIENT
Start: 2019-10-28 | End: 2019-10-29

## 2019-10-28 RX ORDER — ACETAMINOPHEN 650 MG/1
650 SUPPOSITORY RECTAL EVERY 4 HOURS PRN
Status: DISCONTINUED | OUTPATIENT
Start: 2019-10-28 | End: 2019-10-30 | Stop reason: HOSPADM

## 2019-10-28 RX ORDER — BISACODYL 10 MG
10 SUPPOSITORY, RECTAL RECTAL DAILY PRN
Status: DISCONTINUED | OUTPATIENT
Start: 2019-10-28 | End: 2019-10-30 | Stop reason: HOSPADM

## 2019-10-28 RX ORDER — BISACODYL 10 MG
10 SUPPOSITORY, RECTAL RECTAL DAILY PRN
Status: DISCONTINUED | OUTPATIENT
Start: 2019-10-28 | End: 2019-10-29

## 2019-10-28 RX ORDER — ONDANSETRON 2 MG/ML
4 INJECTION INTRAMUSCULAR; INTRAVENOUS EVERY 6 HOURS PRN
Status: DISCONTINUED | OUTPATIENT
Start: 2019-10-28 | End: 2019-10-28

## 2019-10-28 RX ORDER — LIDOCAINE 40 MG/G
CREAM TOPICAL
Status: DISCONTINUED | OUTPATIENT
Start: 2019-10-28 | End: 2019-10-30 | Stop reason: HOSPADM

## 2019-10-28 RX ORDER — SODIUM CHLORIDE, SODIUM LACTATE, POTASSIUM CHLORIDE, CALCIUM CHLORIDE 600; 310; 30; 20 MG/100ML; MG/100ML; MG/100ML; MG/100ML
INJECTION, SOLUTION INTRAVENOUS CONTINUOUS
Status: DISCONTINUED | OUTPATIENT
Start: 2019-10-28 | End: 2019-10-28 | Stop reason: HOSPADM

## 2019-10-28 RX ORDER — HYDROMORPHONE HYDROCHLORIDE 1 MG/ML
0.5 INJECTION, SOLUTION INTRAMUSCULAR; INTRAVENOUS; SUBCUTANEOUS
Status: COMPLETED | OUTPATIENT
Start: 2019-10-28 | End: 2019-10-28

## 2019-10-28 RX ORDER — HYDROMORPHONE HYDROCHLORIDE 1 MG/ML
0.2 INJECTION, SOLUTION INTRAMUSCULAR; INTRAVENOUS; SUBCUTANEOUS
Status: DISCONTINUED | OUTPATIENT
Start: 2019-10-28 | End: 2019-10-28

## 2019-10-28 RX ORDER — PROPOFOL 10 MG/ML
1 INJECTION, EMULSION INTRAVENOUS ONCE
Status: COMPLETED | OUTPATIENT
Start: 2019-10-28 | End: 2019-10-28

## 2019-10-28 RX ORDER — FLUMAZENIL 0.1 MG/ML
0.2 INJECTION, SOLUTION INTRAVENOUS
Status: DISCONTINUED | OUTPATIENT
Start: 2019-10-28 | End: 2019-10-28

## 2019-10-28 RX ORDER — AMOXICILLIN 250 MG
2 CAPSULE ORAL 2 TIMES DAILY
Status: DISCONTINUED | OUTPATIENT
Start: 2019-10-28 | End: 2019-10-28

## 2019-10-28 RX ORDER — HYDROMORPHONE HYDROCHLORIDE 1 MG/ML
0.3 INJECTION, SOLUTION INTRAMUSCULAR; INTRAVENOUS; SUBCUTANEOUS
Status: DISCONTINUED | OUTPATIENT
Start: 2019-10-28 | End: 2019-10-30 | Stop reason: HOSPADM

## 2019-10-28 RX ORDER — SODIUM CHLORIDE 9 MG/ML
INJECTION, SOLUTION INTRAVENOUS CONTINUOUS
Status: DISCONTINUED | OUTPATIENT
Start: 2019-10-28 | End: 2019-10-30 | Stop reason: HOSPADM

## 2019-10-28 RX ORDER — ACETAMINOPHEN 325 MG/1
650 TABLET ORAL EVERY 4 HOURS PRN
Status: DISCONTINUED | OUTPATIENT
Start: 2019-10-28 | End: 2019-10-30 | Stop reason: HOSPADM

## 2019-10-28 RX ORDER — OXYCODONE HYDROCHLORIDE 5 MG/1
5-10 TABLET ORAL
Status: DISCONTINUED | OUTPATIENT
Start: 2019-10-28 | End: 2019-10-30 | Stop reason: HOSPADM

## 2019-10-28 RX ORDER — CEFAZOLIN SODIUM 2 G/100ML
2 INJECTION, SOLUTION INTRAVENOUS ONCE
Status: COMPLETED | OUTPATIENT
Start: 2019-10-28 | End: 2019-10-28

## 2019-10-28 RX ORDER — LIDOCAINE 40 MG/G
CREAM TOPICAL
Status: DISCONTINUED | OUTPATIENT
Start: 2019-10-28 | End: 2019-10-28 | Stop reason: HOSPADM

## 2019-10-28 RX ORDER — SODIUM CHLORIDE, SODIUM LACTATE, POTASSIUM CHLORIDE, CALCIUM CHLORIDE 600; 310; 30; 20 MG/100ML; MG/100ML; MG/100ML; MG/100ML
INJECTION, SOLUTION INTRAVENOUS CONTINUOUS
Status: DISCONTINUED | OUTPATIENT
Start: 2019-10-28 | End: 2019-10-29

## 2019-10-28 RX ORDER — ACETAMINOPHEN 325 MG/1
975 TABLET ORAL EVERY 8 HOURS
Status: DISCONTINUED | OUTPATIENT
Start: 2019-10-28 | End: 2019-10-30 | Stop reason: HOSPADM

## 2019-10-28 RX ORDER — ONDANSETRON 4 MG/1
4 TABLET, ORALLY DISINTEGRATING ORAL EVERY 6 HOURS PRN
Status: DISCONTINUED | OUTPATIENT
Start: 2019-10-28 | End: 2019-10-28

## 2019-10-28 RX ORDER — POLYETHYLENE GLYCOL 3350 17 G/17G
17 POWDER, FOR SOLUTION ORAL DAILY
Status: DISCONTINUED | OUTPATIENT
Start: 2019-10-29 | End: 2019-10-30 | Stop reason: HOSPADM

## 2019-10-28 RX ADMIN — Medication 30 MG: at 18:38

## 2019-10-28 RX ADMIN — ROCURONIUM BROMIDE 10 MG: 10 INJECTION INTRAVENOUS at 18:49

## 2019-10-28 RX ADMIN — ROCURONIUM BROMIDE 40 MG: 10 INJECTION INTRAVENOUS at 18:36

## 2019-10-28 RX ADMIN — FENTANYL CITRATE 100 MCG: 50 INJECTION, SOLUTION INTRAMUSCULAR; INTRAVENOUS at 18:35

## 2019-10-28 RX ADMIN — CEFAZOLIN SODIUM 2 G: 2 INJECTION, SOLUTION INTRAVENOUS at 18:31

## 2019-10-28 RX ADMIN — GLYCOPYRROLATE 0.6 MG: 0.2 INJECTION, SOLUTION INTRAMUSCULAR; INTRAVENOUS at 20:03

## 2019-10-28 RX ADMIN — FENTANYL CITRATE: 50 INJECTION INTRAMUSCULAR; INTRAVENOUS at 13:16

## 2019-10-28 RX ADMIN — HYDROMORPHONE HYDROCHLORIDE 0.5 MG: 1 INJECTION, SOLUTION INTRAMUSCULAR; INTRAVENOUS; SUBCUTANEOUS at 12:24

## 2019-10-28 RX ADMIN — FENTANYL CITRATE 50 MCG: 50 INJECTION INTRAMUSCULAR; INTRAVENOUS at 17:51

## 2019-10-28 RX ADMIN — HYDROMORPHONE HYDROCHLORIDE 1 MG: 1 INJECTION, SOLUTION INTRAMUSCULAR; INTRAVENOUS; SUBCUTANEOUS at 19:20

## 2019-10-28 RX ADMIN — FENTANYL CITRATE: 50 INJECTION INTRAMUSCULAR; INTRAVENOUS at 13:17

## 2019-10-28 RX ADMIN — HYDROMORPHONE HYDROCHLORIDE 0.5 MG: 1 INJECTION, SOLUTION INTRAMUSCULAR; INTRAVENOUS; SUBCUTANEOUS at 10:06

## 2019-10-28 RX ADMIN — SODIUM CHLORIDE, POTASSIUM CHLORIDE, SODIUM LACTATE AND CALCIUM CHLORIDE: 600; 310; 30; 20 INJECTION, SOLUTION INTRAVENOUS at 18:25

## 2019-10-28 RX ADMIN — DEXAMETHASONE SODIUM PHOSPHATE 4 MG: 4 INJECTION, SOLUTION INTRA-ARTICULAR; INTRALESIONAL; INTRAMUSCULAR; INTRAVENOUS; SOFT TISSUE at 18:35

## 2019-10-28 RX ADMIN — Medication 4 MG: at 20:03

## 2019-10-28 RX ADMIN — SODIUM CHLORIDE, POTASSIUM CHLORIDE, SODIUM LACTATE AND CALCIUM CHLORIDE: 600; 310; 30; 20 INJECTION, SOLUTION INTRAVENOUS at 18:51

## 2019-10-28 RX ADMIN — SODIUM CHLORIDE 1000 ML: 9 INJECTION, SOLUTION INTRAVENOUS at 13:06

## 2019-10-28 RX ADMIN — TRANEXAMIC ACID 1 G: 1 INJECTION, SOLUTION INTRAVENOUS at 18:57

## 2019-10-28 RX ADMIN — ONDANSETRON HYDROCHLORIDE 4 MG: 2 INJECTION, SOLUTION INTRAVENOUS at 19:09

## 2019-10-28 RX ADMIN — CLINDAMYCIN PHOSPHATE 900 MG: 900 INJECTION, SOLUTION INTRAVENOUS at 22:56

## 2019-10-28 RX ADMIN — TRANEXAMIC ACID 800 G: 1 INJECTION, SOLUTION INTRAVENOUS at 20:21

## 2019-10-28 RX ADMIN — HYDROMORPHONE HYDROCHLORIDE 1 MG: 1 INJECTION, SOLUTION INTRAMUSCULAR; INTRAVENOUS; SUBCUTANEOUS at 19:07

## 2019-10-28 RX ADMIN — MIDAZOLAM 2 MG: 1 INJECTION INTRAMUSCULAR; INTRAVENOUS at 18:34

## 2019-10-28 RX ADMIN — MEPERIDINE HYDROCHLORIDE 12.5 MG: 50 INJECTION, SOLUTION INTRAMUSCULAR; INTRAVENOUS; SUBCUTANEOUS at 21:12

## 2019-10-28 RX ADMIN — PROPOFOL 30 MG: 10 INJECTION, EMULSION INTRAVENOUS at 18:41

## 2019-10-28 RX ADMIN — SODIUM CHLORIDE, POTASSIUM CHLORIDE, SODIUM LACTATE AND CALCIUM CHLORIDE: 600; 310; 30; 20 INJECTION, SOLUTION INTRAVENOUS at 22:56

## 2019-10-28 RX ADMIN — FENTANYL CITRATE 50 MCG: 50 INJECTION, SOLUTION INTRAMUSCULAR; INTRAVENOUS at 20:55

## 2019-10-28 RX ADMIN — PROPOFOL 69 MG: 10 INJECTION, EMULSION INTRAVENOUS at 13:09

## 2019-10-28 RX ADMIN — PROPOFOL 140 MG: 10 INJECTION, EMULSION INTRAVENOUS at 18:35

## 2019-10-28 RX ADMIN — HYDROMORPHONE HYDROCHLORIDE 0.5 MG: 1 INJECTION, SOLUTION INTRAMUSCULAR; INTRAVENOUS; SUBCUTANEOUS at 15:24

## 2019-10-28 RX ADMIN — SODIUM CHLORIDE, POTASSIUM CHLORIDE, SODIUM LACTATE AND CALCIUM CHLORIDE: 600; 310; 30; 20 INJECTION, SOLUTION INTRAVENOUS at 21:26

## 2019-10-28 RX ADMIN — HYDROMORPHONE HYDROCHLORIDE 0.5 MG: 1 INJECTION, SOLUTION INTRAMUSCULAR; INTRAVENOUS; SUBCUTANEOUS at 10:54

## 2019-10-28 RX ADMIN — PROPOFOL 40 MCG/KG/MIN: 10 INJECTION, EMULSION INTRAVENOUS at 19:26

## 2019-10-28 RX ADMIN — LIDOCAINE HYDROCHLORIDE 50 MG: 10 INJECTION, SOLUTION INFILTRATION; PERINEURAL at 18:38

## 2019-10-28 ASSESSMENT — ENCOUNTER SYMPTOMS
STRIDOR: 0
SEIZURES: 0
DYSRHYTHMIAS: 0

## 2019-10-28 ASSESSMENT — COPD QUESTIONNAIRES: COPD: 0

## 2019-10-28 ASSESSMENT — LIFESTYLE VARIABLES: TOBACCO_USE: 0

## 2019-10-28 NOTE — ED NOTES
Bed: ED15  Expected date: 10/28/19  Expected time: 9:23 AM  Means of arrival: Ambulance  Comments:  BV2

## 2019-10-28 NOTE — PROGRESS NOTES
RT- Attended conscious sedation. Ambu bag and suction set up and ready if needed. Patient was placed on ETCO2 with 2L oxygen. Stayed until patient was awake and alert.     Leena Chen, RT  October 28, 2019 1:38 PM

## 2019-10-28 NOTE — SEDATION DOCUMENTATION
Patient received conscious sedation by direction of Dr. Allen . Patient given a total of 180mg of iv propofol and 100mcg of iv fentanyl . Sedation given for attempt to reduce displaced right hip. Attempts where unsuccessful and patient will go to OR . No resp. Distress ET CO2 37-40. Patient talkative and resting

## 2019-10-28 NOTE — ANESTHESIA PREPROCEDURE EVALUATION
Anesthesia Pre-Procedure Evaluation    Patient: Krystyna Peña   MRN: [id number] : 1955          Preoperative Diagnosis: Dislocation of right hip, initial encounter (H) [S73.004A]    Procedure(s):  Closed vs Open Reduction Right hip  REVISION, TOTAL ARTHROPLASTY, HIP    Past Medical History:   Diagnosis Date     Arthritis      Asymptomatic varicose veins      Benign neoplasm of colon ,     Adenoma     Gastroesophageal reflux disease      Hypertension      Major depression      Past Surgical History:   Procedure Laterality Date     AMPUTATE TOE(S) Left 2018    Procedure: AMPUTATE TOE(S);  Amputation left third digit;  Surgeon: Herb Michael DPM;  Location: [facility] OR     ARTHROPLASTY HIP Right 3/28/2016    Procedure: ARTHROPLASTY HIP;  Surgeon: Perez Meza MD;  Location: [facility] OR     C NONSPECIFIC PROCEDURE      Right arm plastic surgery     C NONSPECIFIC PROCEDURE      Right knee surgery     C NONSPECIFIC PROCEDURE  10/03    L popliteal AVM repair     C NONSPECIFIC PROCEDURE      Removal bone spur left foot     C NONSPECIFIC PROCEDURE      amputation of toes left & right toes     C REPAIR SPIEGELIAN HERNIA       C TOTAL KNEE ARTHROPLASTY  10/03    LT     CLOSED REDUCTION HIP Right 7/15/2019    Procedure: Closed reduction, right total hip arthroplasty dislocation.;  Surgeon: Perez Meza MD;  Location: [facility] OR     COLONOSCOPY       HC CORRECT BUNION,SIMPLE      RT     HC CORRECT BUNION,SIMPLE      LT     HC KNEE SCOPE, DIAGNOSTIC      LT     REVERSE ARTHROPLASTY SHOULDER Left 10/30/2018    Procedure: Left reverse total shoulder arthroplasty;  Surgeon: Aaron Christianson MD;  Location: [facility] OR     REVERSE ARTHROPLASTY SHOULDER Right 2019    Procedure: Right reverse total shoulder arthroplasty;  Surgeon: Aaron Christianson MD;  Location: [facility] OR     ROTATOR CUFF REPAIR RT/LT      right     VASCULAR SURGERY  left leg bypass      Anesthesia Evaluation     . Pt  has had prior anesthetic. Type: General    No history of anesthetic complications          ROS/MED HX    ENT/Pulmonary:     (+)DORIAN risk factors hypertension, , . .   (-) tobacco use, asthma and COPD   Neurologic:  - neg neurologic ROS    (-) seizures and CVA   Cardiovascular:     (+) hypertension----. : . . . :. valvular problems/murmurs type: MR mild MR:. Previous cardiac testing date:results:date: results:ECG reviewed date:6/19 results:NSR.  LAE.  Inverted T in V1. date: results:         (-) CAD and arrhythmias   METS/Exercise Tolerance:     Hematologic: Comments: Lab Test        10/28/19     07/15/19     06/14/19      --          06/12/19 06/03/19      --          03/22/16      --          09/12/11                       1733          1005          0654           --           1129          1603           --           1510           --           1202          WBC          4.5          5.7           --           --           --          5.2            < >        7.0            < >        7.8           HGB          12.3         13.6         11.8           < >         --          13.4           < >        12.8           < >        15.9*         MCV          105*         104*          --           --           --          104*           < >        102*           < >        100           PLT          205          304           --           --          187          277            < >        291            < >        265           INR           --           --           --           --           --           --           --          0.96          --          0.90           < > = values in this interval not displayed.                  Lab Test        07/15/19     06/14/19     06/12/19     06/03/19     02/27/19                       1005          0654          1129          1603          1412          NA           135           --           --          131*         135           POTASSIUM    4.8           --            --          4.7          4.8           CHLORIDE     105           --           --          99           104           CO2          21            --           --          17*          22            BUN          20            --           --          21           38*           CR           0.98          --          0.89         1.07*        1.34*         ANIONGAP     10            --           --          15*          9             YANA          9.5           --           --          9.4          9.3           GLC          113*         98            --          84           106*         - neg hematologic  ROS       Musculoskeletal:   (+) arthritis,  -       GI/Hepatic:     (+) GERD Asymptomatic on medication,       Renal/Genitourinary:     (+) chronic renal disease, type: CRI, Pt does not require dialysis, Pt has no history of transplant,       Endo:  - neg endo ROS    (-) Type I DM, Type II DM, thyroid disease, chronic steroid usage and obesity   Psychiatric:     (+) psychiatric history depression      Infectious Disease:  - neg infectious disease ROS       Malignancy:      - no malignancy   Other:    - neg other ROS                      Physical Exam  Normal systems: cardiovascular, pulmonary and dental    Airway   Mallampati: III  TM distance: >3 FB  Neck ROM: full    Dental     Cardiovascular   Rhythm and rate: regular and normal  (-) no friction rub, no systolic click and no murmur    Pulmonary    breath sounds clear to auscultation(-) no rhonchi, no decreased breath sounds, no wheezes, no rales and no stridor            Lab Results   Component Value Date    WBC 5.7 07/15/2019    HGB 13.6 07/15/2019    HCT 40.0 07/15/2019     07/15/2019    CRP <2.9 02/06/2019    SED 13 02/06/2019     07/15/2019    POTASSIUM 4.8 07/15/2019    CHLORIDE 105 07/15/2019    CO2 21 07/15/2019    BUN 20 07/15/2019    CR 0.98 07/15/2019     (H) 07/15/2019    YANA 9.5 07/15/2019    PHOS 4.3 01/06/2019    MAG 1.3 (L)  "01/06/2019    ALBUMIN 3.4 12/11/2018    PROTTOTAL 7.0 12/11/2018    ALT 19 02/06/2019    AST 16 02/06/2019    ALKPHOS 92 12/11/2018    BILITOTAL 0.5 12/11/2018    LIPASE 114 12/11/2018    PTT 28 09/12/2011    INR 0.96 03/22/2016    TSH 3.14 01/02/2019       Preop Vitals  BP Readings from Last 3 Encounters:   10/28/19 (!) 170/87   08/13/19 (!) 142/78   07/16/19 145/81    Pulse Readings from Last 3 Encounters:   10/28/19 71   08/13/19 74   07/16/19 73      Resp Readings from Last 3 Encounters:   10/28/19 14   08/13/19 16   07/16/19 14    SpO2 Readings from Last 3 Encounters:   10/28/19 99%   08/13/19 100%   07/16/19 95%      Temp Readings from Last 1 Encounters:   10/28/19 98.3  F (36.8  C)    Ht Readings from Last 1 Encounters:   07/15/19 1.575 m (5' 2\")      Wt Readings from Last 1 Encounters:   10/28/19 69.4 kg (153 lb)    Estimated body mass index is 27.98 kg/m  as calculated from the following:    Height as of 7/15/19: 1.575 m (5' 2\").    Weight as of this encounter: 69.4 kg (153 lb).       Anesthesia Plan      History & Physical Review  History and physical reviewed and following examination; no interval change.    ASA Status:  3 .    NPO Status:  > 8 hours    Plan for General and ETT with Intravenous induction. Maintenance will be Balanced.    PONV prophylaxis:  Ondansetron (or other 5HT-3) and Dexamethasone or Solumedrol       Postoperative Care  Postoperative pain management:  IV analgesics.      Consents  Anesthetic plan, risks, benefits and alternatives discussed with:  Patient or representative, Patient and Spouse..                 Prateek Anand MD    No further questions or concerns prior to OR.  Anesthetic plan and physical exam repeated.    Chalino Zuniga MD                .  "

## 2019-10-28 NOTE — PHARMACY-ADMISSION MEDICATION HISTORY
Admission medication history interview status for this patient is complete. See [facility] admission navigator for allergy information, prior to admission medications and immunization status.     Medication history interview source(s):Patient  Medication history resources (including written lists, pill bottles, clinic record):None  [facility]    Changes made to PTA medication list:  Added:  None  Deleted: Norco & Terazole vaginal cream  Changed:  None    Actions taken by pharmacist (provider contacted, etc):None     Additional medication history information:None    Medication reconciliation/reorder completed by provider prior to medication history?  No     Do you take OTC medications (eg tylenol, ibuprofen, fish oil, eye/ear drops, etc)? Yes     For patients on insulin therapy: No    Prior to Admission medications    Medication Sig Last Dose Taking? Auth Provider   aspirin 81 MG EC tablet Take 81 mg by mouth daily 10/28/2019 at am Yes Unknown, Entered By History   lisinopril (PRINIVIL/ZESTRIL) 10 MG tablet Take 10 mg by mouth daily 10/28/2019 at am Yes Unknown, Entered By History   senna-docusate (SENOKOT-S/PERICOLACE) 8.6-50 MG tablet Take 2 tablets by mouth 2 times daily as needed for constipation prn Yes Osvaldo Kolb MD   order for DME Equipment being ordered: [employer] Wheels () and Walker ()  Treatment Diagnosis: difficulty walking   Richard Mills MD

## 2019-10-28 NOTE — ED TRIAGE NOTES
Pt presents via EMS after pt was sitting on her bed and went to cross her legs and pt feels she dislocated her right hip. Pt is A&O, ABC's intact.

## 2019-10-28 NOTE — ED PROVIDER NOTES
History     Chief Complaint:    Hip Pain      HPI   Krystyna Peña is a 64 year old female, s/p right total hip arthroplasty, who presents to the ED via EMS for evaluation of right hip pain. The patient states that she was sitting on her bed when she went to cross her legs and felt like she dislocated her right hip. She subsequently called EMS. She denies any falls or trauma otherwise. She denies numbness. Of note, the patient had a difficult reduction in July and required admission and orthopedic surgery to take the patient to the operating room for reduction under anesthesia. She had coffee this morning but has not eaten or drank otherwise.     Allergies:  Morphine  Cephalexin     Medications:    Aspirin 81 mg   Lisinopril     Past Medical History:    Arthritis  Asymptomatic varicose veins  Benign neoplasm of colon  GERD  HTN  Depression    Past Surgical History:    Amputate toes  Hip arthroplasty  Right arm plastic surgery  Right knee surgery  L popliteal AVM repair  Remove bone spur left foot  Repair spigelian hernia  L TKA  Closed hip reduction  Correct bunion  Reverse shoulder arthroplasty bilateral  Rotator cuff repair    Family History:    Mother: breast cancer  Father: colorectal cancer  Brother: diabetes    Social History:  Former smoker.  Positive for alcohol use.   Denies drug use.   Marital Status:        Review of Systems   All other systems reviewed and are negative.      Physical Exam   First Vitals:  BP: (!) 150/96  Pulse: 76  Temp: 98.6  F (37  C)  Resp: 18  Weight: 69.4 kg (153 lb)  SpO2: 98 %      Physical Exam  General: Resting on the bed.  Head: No obvious trauma to head.  Ears, Nose, Throat:  External ears normal.  Nose normal.    Eyes:  Conjunctivae clear.    Neck: Normal range of motion.  Neck supple.   CV: Regular rate and rhythm.  No murmurs.      Respiratory: Effort normal and breath sounds normal.  No wheezing or crackles.   Gastrointestinal: Soft.  No distension. There is no  tenderness.    Musculoskeletal: Right leg is shortened compared to left.  Tenderness over the right posterior hip.  Reduced range of motion of the hip as well as secondary to pain.  2+ DP pulses.  Sensation intact.  Able to wiggle toes.    Skin: Skin is warm and dry.  No rash noted.   Emergency Department Course   Imaging:  Radiographic findings were communicated with the patient who voiced understanding of the findings.  XR Pelvis and hip, right, 2 views:   IMPRESSION: Dislocation of the right hip arthroplasty as per radiology.    [facility]    Procedure: Procedural sedation for hip dislocation   Date/Time: 10/28/2019 12:43 PM  Performed by: Luciana Allen MD  Authorized by: Luciana Allen MD     UNIVERSAL PROTOCOL   Site Marked: Yes  Prior Images Obtained and Reviewed:  Yes  Required items: Required blood products, implants, devices and special equipment available    Patient identity confirmed:  Verbally with patient  Patient was reevaluated immediately before administering moderate or deep sedation or anesthesia  Confirmation Checklist:  Patient's identity using two indicators, relevant allergies and procedure was appropriate and matched the consent or emergent situation  Time out: Immediately prior to the procedure a time out was called    Preparation: Patient was prepped and draped in usual sterile fashion      SEDATION    Patient Sedated: Yes    Sedation Type:  Moderate (conscious) sedation  Sedation:  See MAR for details and propofol  Vital signs: Vital signs monitored during sedation    PROCEDURE   Patient Tolerance:  Patient tolerated the procedure well with no immediate complications  Describe Procedure: Sedation was maintained until the procedure was complete. The patient was monitored by staff until recovered.  Time of Sedation in Minutes by Physician:  15  :        Interventions:  1006 Dilaudid 0.5 mg IV  1054 Dilaudid 0.5 mg IV  1224 Dilaudid 0.5 mg IV  1306 NS 1,000 mL  IV  1309 Propofol 200 mg IV  1316 Fentanyl 50 mcg  1317 Fentanyl 50 mcg    Emergency Department Course:  Nursing notes and vitals reviewed. (7265) I performed an exam of the patient as documented above.     IV inserted. Medicine administered as documented above.    The patient was sent for a hip and pelvis XR while in the emergency department, findings above.     1213 I consulted with Nikki ELIZABETH, orthopedics, regarding the patient's history and presentation here in the emergency department.    1247 I rechecked the patient and discussed the results of her workup thus far.     1309 Patient sedated and I attempted to reduced the patient's hip with Dr. Solomon.     I consulted with Dr. Meza of the hospitalist services. They are in agreement to accept the patient for admission.    Findings and plan explained to the Patient who consents to admission. Discussed the patient with Dr. Meza, who will admit the patient to a obs bed for further monitoring, evaluation, and treatment.    Impression & Plan    Medical Decision Making:  Krystyna Peña is a 64 year old female presents today for evaluation of hip pain and deformity.  The patient has a history of hip prostheses and felt sudden pain after crossing her leg.  The patient has brisk capillary refill and has normal strength and sensation in the foot and ankle.  Plain films confirm a dislocated prosthesis without fracture.   I spoke to the on call doctor for the on-call orthopedic surgeon, who agrees with the plan for reduction in the ED. orthopedics did agree to come down to the emergency department to perform reduction as in reviewing the notes from prior at the ED doctor was unable to perform this x2 and they were had difficulty in the OR.  Attempted to reduce the patient's hip with Dr. Solomon; this was unsuccessful. Patient will be admitted to Dr. Meza with plan for repeat attempt at reduction under anesthesia in the OR.       Diagnosis:    ICD-10-CM    1. Dislocation of  right hip, initial encounter (H) S73.004A        Disposition:  Admitted to Dr. Meza.     Scribe Disclosure:  I,  Pa Gómez, am serving as a scribe on 10/28/2019 at 9:46 AM to personally document services performed by Luciana Allen MD based on my observations and the provider's statements to me.        Pa Gómez  10/28/2019   [facility] EMERGENCY DEPARTMENT       Luciana Allen MD  10/28/19 1440

## 2019-10-28 NOTE — PROGRESS NOTES
Orthopedic Surgery    Right total hip arthroplasty dislocation     Patient 2nd Right DAE dislocation  Attempted reduction in ED by Dr Solomon    Plan:  Patient scheduled for a closed reduction/possible open DAE reduction to be performed by Dr. Meza later today, 10/28/19.  Risks and benefits of the procedure are discussed and reviewed with the patient.  They opt to proceed.  Continue NPO Status  Bedrest until postop  Nonweightbearing Right LE  Pain medication as needed, minimize narcotics as able  X-rays and treatment plan are reviewed and agreed upon by Dr. Solomon and Dr Meza.

## 2019-10-28 NOTE — ED NOTES
[facility]  ED Nurse Handoff Report    Krystyna Peña is a 64 year old female   ED Chief complaint: Hip Pain  . ED Diagnosis:   Final diagnoses:   Dislocation of right hip, initial encounter (H)     Allergies:   Allergies   Allergen Reactions     Morphine      respiratory distress     Cephalexin Rash       Code Status: Full Code  Activity level - Baseline/Home:  Independent. Activity Level - Current:   Total Care. Lift room needed: Yes. Bariatric: No   Needed: No   Isolation: No. Infection: Not Applicable.     Vital Signs:   Vitals:    10/28/19 1324 10/28/19 1325 10/28/19 1330 10/28/19 1345   BP:   133/66 (!) 140/69   Pulse:   77 77   Resp:       Temp:       TempSrc:       SpO2: 98% 99% 97% 94%   Weight:           Cardiac Rhythm:  ,   Cardiac  Cardiac Rhythm: Normal sinus rhythm  Ectopy: None  Pain level: 0-10 Pain Scale: 7  Patient confused: No. Patient Falls Risk: Yes.   Elimination Status: Has voided   Patient Report - Initial Complaint: hip dislocation. Focused Assessment: Musculoskeletal - Pain Body Location:  (right hip pain, leg shortened and rotated slightly medially) RLE Extremity Movement: active ROM severely impaired  CMS Intact: Yes   Tests Performed: x-ray. Abnormal Results: Labs Ordered and Resulted from Time of ED Arrival Up to the Time of Departure from the ED - No data to display  .   Treatments provided: MAR  Family Comments: none present  OBS brochure/video discussed/provided to patient:  Yes  ED Medications:   Medications   naloxone (NARCAN) injection 0.1-0.4 mg (has no administration in time range)   flumazenil (ROMAZICON) injection 0.2 mg (has no administration in time range)   0.9% sodium chloride BOLUS (1,000 mLs Intravenous New Bag 10/28/19 1306)     Followed by   sodium chloride 0.9% infusion (has no administration in time range)   midazolam (VERSED) injection 1 mg (has no administration in time range)   HYDROmorphone (PF) (DILAUDID) injection 0.5 mg (0.5 mg  Intravenous Given 10/28/19 1006)   HYDROmorphone (PF) (DILAUDID) injection 0.5 mg (0.5 mg Intravenous Given 10/28/19 1054)   HYDROmorphone (PF) (DILAUDID) injection 0.5 mg (0.5 mg Intravenous Given 10/28/19 1224)   propofol (DIPRIVAN) injection 10 mg/mL vial (69 mg Intravenous Given 10/28/19 1309)   fentaNYL (PF) (SUBLIMAZE) injection 50 mcg ( Intravenous Given 10/28/19 1317)     Followed by   fentaNYL (PF) (SUBLIMAZE) injection 50 mcg ( Intravenous Given 10/28/19 1316)     Drips infusing:  Yes  For the majority of the shift, the patient's behavior Green. Interventions performed were .     Severe Sepsis OR Septic Shock Diagnosis Present: No      ED Nurse Name/Phone Number: Reyna Valentin RN,   1:55 PM

## 2019-10-28 NOTE — BRIEF OP NOTE
Dx Pre/Post Recurrent DL R DAE  Revision DAE  Derrick  No spec  No anticipated complications  EBL est 300 please see dictation    RAL

## 2019-10-29 ENCOUNTER — APPOINTMENT (OUTPATIENT)
Dept: PHYSICAL THERAPY | Facility: CLINIC | Age: 64
End: 2019-10-29
Attending: ORTHOPAEDIC SURGERY
Payer: COMMERCIAL

## 2019-10-29 ENCOUNTER — APPOINTMENT (OUTPATIENT)
Dept: OCCUPATIONAL THERAPY | Facility: CLINIC | Age: 64
End: 2019-10-29
Attending: ORTHOPAEDIC SURGERY
Payer: COMMERCIAL

## 2019-10-29 LAB
ANION GAP SERPL CALCULATED.3IONS-SCNC: 7 MMOL/L (ref 3–14)
BUN SERPL-MCNC: 18 MG/DL (ref 7–30)
CALCIUM SERPL-MCNC: 8.4 MG/DL (ref 8.5–10.1)
CHLORIDE SERPL-SCNC: 105 MMOL/L (ref 94–109)
CO2 SERPL-SCNC: 23 MMOL/L (ref 20–32)
CREAT SERPL-MCNC: 0.99 MG/DL (ref 0.52–1.04)
ERYTHROCYTE [DISTWIDTH] IN BLOOD BY AUTOMATED COUNT: 13.3 % (ref 10–15)
GFR SERPL CREATININE-BSD FRML MDRD: 60 ML/MIN/{1.73_M2}
GLUCOSE SERPL-MCNC: 107 MG/DL (ref 70–99)
HCT VFR BLD AUTO: 36.4 % (ref 35–47)
HGB BLD-MCNC: 11.7 G/DL (ref 11.7–15.7)
MCH RBC QN AUTO: 34.1 PG (ref 26.5–33)
MCHC RBC AUTO-ENTMCNC: 32.1 G/DL (ref 31.5–36.5)
MCV RBC AUTO: 106 FL (ref 78–100)
PLATELET # BLD AUTO: 201 10E9/L (ref 150–450)
POTASSIUM SERPL-SCNC: 4.8 MMOL/L (ref 3.4–5.3)
POTASSIUM SERPL-SCNC: 5.8 MMOL/L (ref 3.4–5.3)
RBC # BLD AUTO: 3.43 10E12/L (ref 3.8–5.2)
SODIUM SERPL-SCNC: 135 MMOL/L (ref 133–144)
WBC # BLD AUTO: 6.6 10E9/L (ref 4–11)

## 2019-10-29 PROCEDURE — 25000125 ZZHC RX 250: Performed by: ORTHOPAEDIC SURGERY

## 2019-10-29 PROCEDURE — 36415 COLL VENOUS BLD VENIPUNCTURE: CPT | Performed by: PHYSICIAN ASSISTANT

## 2019-10-29 PROCEDURE — 99221 1ST HOSP IP/OBS SF/LOW 40: CPT | Performed by: HOSPITALIST

## 2019-10-29 PROCEDURE — 99207 ZZC CONSULT E&M CHANGED TO INITIAL LEVEL: CPT | Performed by: HOSPITALIST

## 2019-10-29 PROCEDURE — 97530 THERAPEUTIC ACTIVITIES: CPT | Mod: GP

## 2019-10-29 PROCEDURE — 93005 ELECTROCARDIOGRAM TRACING: CPT

## 2019-10-29 PROCEDURE — 97116 GAIT TRAINING THERAPY: CPT | Mod: GP

## 2019-10-29 PROCEDURE — 93010 ELECTROCARDIOGRAM REPORT: CPT | Performed by: INTERNAL MEDICINE

## 2019-10-29 PROCEDURE — 25000132 ZZH RX MED GY IP 250 OP 250 PS 637: Performed by: ORTHOPAEDIC SURGERY

## 2019-10-29 PROCEDURE — 97166 OT EVAL MOD COMPLEX 45 MIN: CPT | Mod: GO

## 2019-10-29 PROCEDURE — 85027 COMPLETE CBC AUTOMATED: CPT | Performed by: ORTHOPAEDIC SURGERY

## 2019-10-29 PROCEDURE — 40000275 ZZH STATISTIC RCP TIME EA 10 MIN

## 2019-10-29 PROCEDURE — 80048 BASIC METABOLIC PNL TOTAL CA: CPT | Performed by: ORTHOPAEDIC SURGERY

## 2019-10-29 PROCEDURE — 97161 PT EVAL LOW COMPLEX 20 MIN: CPT | Mod: GP

## 2019-10-29 PROCEDURE — 25000132 ZZH RX MED GY IP 250 OP 250 PS 637: Performed by: INTERNAL MEDICINE

## 2019-10-29 PROCEDURE — 85018 HEMOGLOBIN: CPT | Performed by: ORTHOPAEDIC SURGERY

## 2019-10-29 PROCEDURE — 12000000 ZZH R&B MED SURG/OB

## 2019-10-29 PROCEDURE — 25800030 ZZH RX IP 258 OP 636: Performed by: ORTHOPAEDIC SURGERY

## 2019-10-29 PROCEDURE — 97535 SELF CARE MNGMENT TRAINING: CPT | Mod: GO

## 2019-10-29 PROCEDURE — 36415 COLL VENOUS BLD VENIPUNCTURE: CPT | Performed by: ORTHOPAEDIC SURGERY

## 2019-10-29 PROCEDURE — 84132 ASSAY OF SERUM POTASSIUM: CPT | Performed by: PHYSICIAN ASSISTANT

## 2019-10-29 PROCEDURE — 25000128 H RX IP 250 OP 636: Performed by: PHYSICIAN ASSISTANT

## 2019-10-29 PROCEDURE — 25000128 H RX IP 250 OP 636: Performed by: ORTHOPAEDIC SURGERY

## 2019-10-29 RX ORDER — TRAMADOL HYDROCHLORIDE 50 MG/1
50 TABLET ORAL EVERY 6 HOURS PRN
Qty: 55 TABLET | Refills: 0 | Status: SHIPPED | OUTPATIENT
Start: 2019-10-29 | End: 2019-11-19

## 2019-10-29 RX ORDER — NICOTINE POLACRILEX 4 MG
15-30 LOZENGE BUCCAL
Status: DISCONTINUED | OUTPATIENT
Start: 2019-10-29 | End: 2019-10-30 | Stop reason: HOSPADM

## 2019-10-29 RX ORDER — TRAMADOL HYDROCHLORIDE 50 MG/1
50 TABLET ORAL EVERY 6 HOURS PRN
Status: DISCONTINUED | OUTPATIENT
Start: 2019-10-29 | End: 2019-10-30 | Stop reason: HOSPADM

## 2019-10-29 RX ORDER — TRIAMTERENE/HYDROCHLOROTHIAZID 37.5-25 MG
1 TABLET ORAL DAILY
Status: DISCONTINUED | OUTPATIENT
Start: 2019-10-30 | End: 2019-10-30

## 2019-10-29 RX ORDER — FUROSEMIDE 10 MG/ML
40 INJECTION INTRAMUSCULAR; INTRAVENOUS ONCE
Status: COMPLETED | OUTPATIENT
Start: 2019-10-29 | End: 2019-10-29

## 2019-10-29 RX ORDER — ACETAMINOPHEN 325 MG/1
975 TABLET ORAL EVERY 8 HOURS
Qty: 280 TABLET | Refills: 0 | Status: SHIPPED | OUTPATIENT
Start: 2019-10-29 | End: 2020-05-26

## 2019-10-29 RX ORDER — LISINOPRIL 10 MG/1
10 TABLET ORAL DAILY
Status: DISCONTINUED | OUTPATIENT
Start: 2019-10-29 | End: 2019-10-29

## 2019-10-29 RX ORDER — DEXTROSE MONOHYDRATE 25 G/50ML
25-50 INJECTION, SOLUTION INTRAVENOUS
Status: DISCONTINUED | OUTPATIENT
Start: 2019-10-29 | End: 2019-10-30 | Stop reason: HOSPADM

## 2019-10-29 RX ORDER — AMOXICILLIN 250 MG
1 CAPSULE ORAL 2 TIMES DAILY PRN
Qty: 60 TABLET | Refills: 1 | Status: SHIPPED | OUTPATIENT
Start: 2019-10-29 | End: 2019-10-31

## 2019-10-29 RX ORDER — ASPIRIN 325 MG
325 TABLET, DELAYED RELEASE (ENTERIC COATED) ORAL DAILY
Qty: 31 TABLET | Refills: 0 | Status: SHIPPED | OUTPATIENT
Start: 2019-10-29 | End: 2020-05-26

## 2019-10-29 RX ADMIN — SODIUM CHLORIDE: 9 INJECTION, SOLUTION INTRAVENOUS at 00:43

## 2019-10-29 RX ADMIN — ASPIRIN 325 MG: 325 TABLET, DELAYED RELEASE ORAL at 08:04

## 2019-10-29 RX ADMIN — CLINDAMYCIN PHOSPHATE 900 MG: 900 INJECTION, SOLUTION INTRAVENOUS at 06:46

## 2019-10-29 RX ADMIN — TRAMADOL HYDROCHLORIDE 50 MG: 50 TABLET, FILM COATED ORAL at 09:25

## 2019-10-29 RX ADMIN — HYDROMORPHONE HYDROCHLORIDE 0.3 MG: 1 INJECTION, SOLUTION INTRAMUSCULAR; INTRAVENOUS; SUBCUTANEOUS at 13:43

## 2019-10-29 RX ADMIN — HYDROMORPHONE HYDROCHLORIDE 0.3 MG: 1 INJECTION, SOLUTION INTRAMUSCULAR; INTRAVENOUS; SUBCUTANEOUS at 19:32

## 2019-10-29 RX ADMIN — FUROSEMIDE 40 MG: 10 INJECTION, SOLUTION INTRAVENOUS at 09:24

## 2019-10-29 RX ADMIN — SENNOSIDES AND DOCUSATE SODIUM 2 TABLET: 8.6; 5 TABLET ORAL at 19:32

## 2019-10-29 RX ADMIN — POLYETHYLENE GLYCOL 3350 17 G: 17 POWDER, FOR SOLUTION ORAL at 08:05

## 2019-10-29 RX ADMIN — SENNOSIDES AND DOCUSATE SODIUM 1 TABLET: 8.6; 5 TABLET ORAL at 08:04

## 2019-10-29 RX ADMIN — TRAMADOL HYDROCHLORIDE 50 MG: 50 TABLET, FILM COATED ORAL at 16:13

## 2019-10-29 RX ADMIN — TRAMADOL HYDROCHLORIDE 50 MG: 50 TABLET, FILM COATED ORAL at 04:20

## 2019-10-29 RX ADMIN — SENNOSIDES AND DOCUSATE SODIUM 1 TABLET: 8.6; 5 TABLET ORAL at 00:46

## 2019-10-29 ASSESSMENT — ACTIVITIES OF DAILY LIVING (ADL)
ADLS_ACUITY_SCORE: 14
PREVIOUS_RESPONSIBILITIES: MEAL PREP;HOUSEKEEPING;LAUNDRY;MEDICATION MANAGEMENT;FINANCES;DRIVING;WORK
ADLS_ACUITY_SCORE: 14
ADLS_ACUITY_SCORE: 13

## 2019-10-29 NOTE — OP NOTE
Procedure Date: 10/28/2019      PREOPERATIVE DIAGNOSES:     1.  Recurrent right hip dislocation.   2.  Status post right total hip arthroplasty.      POSTOPERATIVE DIAGNOSIS:     1.  Recurrent right hip dislocation.     2.  Status post right total hip arthroplasty.      PROCEDURE PERFORMED:  Right total hip arthroplasty revision.      SURGEON:  Perez Meza MD      ASSISTANT:  Alem Cloud PA-C      ANESTHESIA:  General.      ESTIMATED BLOOD LOSS:  100 mL.      COMPLICATIONS:  None.      DESCRIPTION OF PROCEDURE:  The patient was taken to the operating room where after successful administration of general anesthetic, antibiotic prophylaxis, tranexamic acid and lateral decubitus position, the hip was reduced using flexion and internal rotation with traction.  When the hip was reduced, I was unable to dislocated using an extension and external rotation and adduction.  Given the patient's preoperative wishes, I then proceeded with open revision.      A posterior approach was used and extended.  The fascia was split.  Hematoma was present in the posterior capsule was absent and retracted.  The sciatic nerve was palpated within a large area of scar and carefully protected throughout the procedure.  It was immediately noted that the posterior quadrant of the liner showed deficient polyethylene and a defect.  This likely was due to recurrent impingement with extension and external rotation.  The trochanter was debrided, but I felt that this was likely from the neck of the femoral component.  I mobilized the femur after removing the head and exchanged the polyethylene.  I trialed and the hip was stable with a +0 construct to 70 degrees of internal rotation.  Given the patient's preoperative wishes and extensive discussion, I elected to both lengthen the leg to improve stability and also reduce the risk of neck impingement.  A +10 x 36 mm head was selected and there was no ability to dislocate the hip in either  direction.  There was no evidence of impingement on the socket face.  In this manner, I elected not to fully revise the hip, but to revise the polyethylene insert and also to revise the femoral component head.  At this point, copious irrigation was performed with antibiotic and Betadine.  I did mobilize a portion of the capsule for 2 figure-of-eight FiberWire sutures and the remaining layered anatomic closure.  There were no complications.  Again, there was no further evidence of impingement and the hip was stable in all directions.  On multiple occasions I attempted to reproduce the patient's described figure-of-4 position and no instability was able to be elicited.  She was taken to recovery room in stable condition.  There were no complications.         ALKA YUSUF MD             D: 10/28/2019   T: 10/29/2019   MT: [place]      Name:     JOSHUA REYNOLDS   MRN:      [id number]        Account:        [id number]   :      1955           Procedure Date: 10/28/2019      Document: [id number]

## 2019-10-29 NOTE — PROGRESS NOTES
10/29/19 1000   Quick Adds   Type of Visit Initial PT Evaluation   Living Environment   Lives With spouse   Living Arrangements house   Home Accessibility stairs to enter home;stairs within home   Living Environment Comment Patient lives in a house with her spouse. There are 5 stairs (one rail) to enter and an additional 12-13 stairs (one rail) to the bedroom level. Spouse works full time but can assist when at home.    Self-Care   Usual Activity Tolerance good   Current Activity Tolerance fair   Equipment Currently Used at Home cane, straight;walker, rolling;commode   Activity/Exercise/Self-Care Comment Pt works full time for [employer] and is on her feet throughout the day.    Functional Level Prior   Ambulation 0-->independent   Transferring 0-->independent   Fall history within last six months no   General Information   Onset of Illness/Injury or Date of Surgery - Date 10/28/19   Referring Physician Derrick SALGUERO   Patient/Family Goals Statement Return home   Pertinent History of Current Problem (include personal factors and/or comorbidities that impact the POC) 64 year old female s/p R DAE revision. Pt admitted to the ED d/t R hip dislocation.    Precautions/Limitations fall precautions;right hip precautions   Weight-Bearing Status - RLE weight-bearing as tolerated   Cognitive Status Examination   Orientation orientation to person, place and time   Level of Consciousness alert   Pain Assessment   Patient Currently in Pain Yes, see Vital Sign flowsheet  (10/10)   Range of Motion (ROM)   ROM Comment Decreased R LE AROM secondary to pain/weakness. Decreased L ankle AROM - drop foot at baseline.    Strength   Strength Comments Sufficient for mobility with SBA/CGA.    Bed Mobility   Bed Mobility Comments Supine>sit with SBA with cues for precautions   Transfer Skills   Transfer Comments Sit to stand with CGA   Gait   Gait Comments Ambulates with FWW and CGA   Balance   Balance Comments Requires bilateral UE support on FWW  "for safe dynamic mobility.    Coordination   Coordination no deficits were identified   Modality Interventions   Planned Modality Interventions Cryotherapy   Planned Modality Interventions Comments PRN   General Therapy Interventions   Planned Therapy Interventions bed mobility training;gait training;ROM;strengthening;transfer training;progressive activity/exercise   Clinical Impression   Criteria for Skilled Therapeutic Intervention yes, treatment indicated   PT Diagnosis Impaired gait   Influenced by the following impairments Pain, decreased R LE AROM/strength, impaired balance   Functional limitations due to impairments Decreased IND with bed mobility, transfers, ambulation and stairs   Clinical Presentation Stable/Uncomplicated   Clinical Presentation Rationale Stable medically   Clinical Decision Making (Complexity) Low complexity   Therapy Frequency 2x/day   Predicted Duration of Therapy Intervention (days/wks) 3 days   Anticipated Discharge Disposition Home with Assist   Risk & Benefits of therapy have been explained Yes   Patient, Family & other staff in agreement with plan of care Yes   [facility] TM \"6 Clicks\"   2016, Trustees of [facility], under license to [employer].  All rights reserved.   6 Clicks Short Forms Basic Mobility Inpatient Short Form   [facility]  \"6 Clicks\" V.2 Basic Mobility Inpatient Short Form   1. Turning from your back to your side while in a flat bed without using bedrails? 3 - A Little   2. Moving from lying on your back to sitting on the side of a flat bed without using bedrails? 3 - A Little   3. Moving to and from a bed to a chair (including a wheelchair)? 3 - A Little   4. Standing up from a chair using your arms (e.g., wheelchair, or bedside chair)? 3 - A Little   5. To walk in hospital room? 3 - A Little   6. Climbing 3-5 steps with a railing? 3 - A Little   Basic Mobility Raw Score (Score out of 24.Lower scores equate to lower levels of " function) 18   Total Evaluation Time   Total Evaluation Time (Minutes) 5

## 2019-10-29 NOTE — PLAN OF CARE
PRIMARY DIAGNOSIS: Dislocated Right Hip w/ Revision  OUTPATIENT/OBSERVATION GOALS TO BE MET BEFORE DISCHARGE:  1. Stable vital signs Yes  2. Tolerating diet:Yes  3. Pain controlled with oral pain medications:  Block still effective- no PO pain meds given on my shift.  4. Positive bowel sounds:  Yes  5. Voiding without difficulty:  No- Purewick in place- To pulled in PACU @2200  6. Able to ambulate:  No  7. Provider specific discharge goals met:  No    Discharge Planner Nurse   Safe discharge environment identified: Yes  Barriers to discharge: Yes- Ambulation- Urination and Pain control       Entered by: Naomi Vyas 10/29/2019 1:38 AM        Please review provider order for any additional goals.   Nurse to notify provider when observation goals have been met and patient is ready for discharge.

## 2019-10-29 NOTE — PLAN OF CARE
PRIMARY DIAGNOSIS: Dislocated Right Hip w/ Revision  OUTPATIENT/OBSERVATION GOALS TO BE MET BEFORE DISCHARGE:  1. Stable vital signs Yes  2. Tolerating diet:Yes  3. Pain controlled with oral pain medications:  50 mg of Tramadol given for oain  4. Positive bowel sounds:  Yes  5. Voiding without difficulty:  No- Purewick in place- To pulled in PACU pulled @2200  6. Able to ambulate:  No  7. Provider specific discharge goals met:  No     Discharge Planner Nurse   Safe discharge environment identified: Yes  Barriers to discharge: Yes- Ambulation- Urination and Pain control     Vitals are Temp: 95.4  F (35.2  C) Temp src: Oral BP: (!) 143/76 Pulse: 91 Heart Rate: 58 Resp: 12 SpO2: 98 %.  Patient is Alert and Oriented x4. They have not moved during my shift. On the floor at 2230; declines wanting to move and dangle.  Pt is a Clear Liquid and advance to Regular diet.  They are complaining of pain in their right leg/hip. Ice applied for pain. Patient has Normal Saline 0.9% running at 100 mL per hour. Continue POC.       Please review provider order for any additional goals.   Nurse to notify provider when observation goals have been met and patient is ready for discharge.

## 2019-10-29 NOTE — PLAN OF CARE
Afeb, BPs slightly elevated, K+ elevated this am, hospitalist  saw pt and started telemetry, gave IV lasix to help bring level down. Pt voided large amounts, K+ recheck 4.8. Tele reading SR/SB. Aquacel clean and dry, cms intact, pain ratings quite high with any movement. Tramadol given during the night, repeated this morning but pt wanted IV analgesic before pm therapy  so given with good control. Up with assist of 1 and walker. Plans to go home on discharge in 1-2 days.

## 2019-10-29 NOTE — CONSULTS
Hospitalist Consultation      Krystyna Peña MRN# [id number]   YOB: 1955 Age: 64 year old   Date of Admission: 10/28/2019     Requesting Physician:  Mr. Meza  Reason for consult: Medical management           Assessment and Plan:   This patient is a 64 year old female with a PMH significant for HTN and right total hip arthroplasty with previous hip dislocation who is POD 1 s/p right total hip arthroplasty revision after dislocation on 10/28.    # S/p right total hip arthroplasty revision  Patient has a history of right total hip arthroplasty for osteoarthritis.  Earlier this summer she dislocated her right hip which required surgical reduction.  On 10/28 she was crossing her legs and dislocated her right hip again which was unable to be reduced in the ED.  Surgery was called who brought her to the OR.   -Doing well, cont PT/OT and pain control as per primary    #Hypertension  Patient has a long-standing history of hypertension.  She did not tolerate amlodipine due to leg swelling and in 2018 was changed from Maxzide to lisinopril for better control.  She is now hyperkalemic possibly related to lisinopril.  Pressures are approximately 140 systolically.  -Discontinue lisinopril  -Restart Maxide  -Will need follow-up with primary care for adjustment of blood pressure medications    #Hyperkalemia  Patient has asymptomatic hyperkalemia with potassium of 5.9 this morning.  She has no history of hyperkalemia and has not started any new medications recently.  Possibly related to lisinopril?  -Hold lisinopril  -ECG  -Monitor on telemetry  -Given recent surgery will avoid Kayexalate and give furosemide 40 mg IV instead  -Recheck potassium in 4 hours  -Also started on Celebrex by primary team (may not be able to be on with elevated K)  -BMP in AM      DVT Prophylaxis: on PCDs and Aspirin 325 mg as per primary  D/C planning: To home once cleared by surgery             History of Present Illness:   This patient  is a 64 year old female who is POD 1 s/p right total hip arthroplasty revision after dislocating her hip yesterday.  Intra-op report reviewed and showed no intra-op complications.   I/o's reviewed, Currently net positive with good UOP since OR. Hgb stable this am at 11.7.  Overnight did pretty well, no complaints, VSS.   Currently, today vickie diet, pain controlled, no CP, SOB, no n/v.   O/w other medical problems have been stable, with no recent c/o illness.               Past Medical History:     Past Medical History:   Diagnosis Date     Arthritis      Asymptomatic varicose veins      Benign neoplasm of colon 11/06, 7/13    Adenoma     Gastroesophageal reflux disease      Hypertension      Major depression                Past Surgical History:     Past Surgical History:   Procedure Laterality Date     AMPUTATE TOE(S) Left 4/12/2018    Procedure: AMPUTATE TOE(S);  Amputation left third digit;  Surgeon: Herb Michael DPM;  Location: [facility] OR     ARTHROPLASTY HIP Right 3/28/2016    Procedure: ARTHROPLASTY HIP;  Surgeon: Perez Meza MD;  Location: [facility] OR     C NONSPECIFIC PROCEDURE  1972    Right arm plastic surgery     C NONSPECIFIC PROCEDURE  1972    Right knee surgery     C NONSPECIFIC PROCEDURE  10/03    L popliteal AVM repair     C NONSPECIFIC PROCEDURE  11/04    Removal bone spur left foot     C NONSPECIFIC PROCEDURE  4/07    amputation of toes left & right toes     C REPAIR SPIEGELIAN HERNIA  2002     C TOTAL KNEE ARTHROPLASTY  10/03    LT     CLOSED REDUCTION HIP Right 7/15/2019    Procedure: Closed reduction, right total hip arthroplasty dislocation.;  Surgeon: Perez Meza MD;  Location: [facility] OR     COLONOSCOPY       HC CORRECT BUNION,SIMPLE  1998    RT     HC CORRECT BUNION,SIMPLE  2001    LT     HC KNEE SCOPE, DIAGNOSTIC      LT     REVERSE ARTHROPLASTY SHOULDER Left 10/30/2018    Procedure: Left reverse total shoulder arthroplasty;  Surgeon: Aaron Christianson MD;  Location: [facility] OR     REVERSE  ARTHROPLASTY SHOULDER Right 2019    Procedure: Right reverse total shoulder arthroplasty;  Surgeon: Aaron Christianson MD;  Location: [facility] OR     ROTATOR CUFF REPAIR RT/LT      right     VASCULAR SURGERY  left leg bypass                  Social History:     Social History     Tobacco Use     Smoking status: Former Smoker     Packs/day: 1.00     Types: Cigarettes, Cigars     Last attempt to quit: 2013     Years since quittin.5     Smokeless tobacco: Never Used   Substance Use Topics     Alcohol use: Yes     Comment: 1 per day     Drug use: No                 Family History:     family history includes Breast Cancer in her mother; Cancer - colorectal in her father; Diabetes in her brother.              Allergies:     Allergies   Allergen Reactions     Morphine      respiratory distress     Cephalexin Rash             Medications:     Prior to Admission medications    Medication Sig Last Dose Taking? Auth Provider   acetaminophen (TYLENOL) 325 MG tablet Take 3 tablets (975 mg) by mouth every 8 hours  Yes Alem Cloud PA-C   aspirin (ASA) 325 MG EC tablet Take 1 tablet (325 mg) by mouth daily  Yes Alem Cloud PA-C   lisinopril (PRINIVIL/ZESTRIL) 10 MG tablet Take 10 mg by mouth daily 10/28/2019 at am Yes Unknown, Entered By History   senna-docusate (SENOKOT-S/PERICOLACE) 8.6-50 MG tablet Take 1 tablet by mouth 2 times daily as needed for constipation  Yes Alem Colud PA-C   traMADol (ULTRAM) 50 MG tablet Take 1 tablet (50 mg) by mouth every 6 hours as needed for moderate pain  Yes Alem Cloud PA-C   order for DME Equipment being ordered: Walker Wheels () and Walker ()  Treatment Diagnosis: difficulty walking   Richard Mills MD               Review of Systems:   A comprehensive greater than 10 system review of systems was carried out.  Pertinent positives and negatives are noted above.  Otherwise negative for contributory info.             Physical Exam:   Vitals were reviewed  Blood pressure (!) 144/74, pulse 91, temperature 98.4  F (36.9  C), resp. rate 16, weight 69.4 kg (153 lb), last menstrual period 10/01/2003, SpO2 96 %, not currently breastfeeding.  Exam:    GENERAL:  Comfortable.  PSYCH: pleasant, oriented, No acute distress.  HEENT:  PERRLA. Normal conjunctiva, normal hearing, nasal mucosa and Oropharynx are normal.  NECK:  Supple, no neck vein distention, adenopathy or bruits, normal thyroid.  HEART:  Normal S1, S2 with no murmur, no pericardial rub, S3 or S4.  LUNGS:  Clear to auscultation, normal Respiratory effort.  ABDOMEN:  Soft, no hepatosplenomegaly, normal bowel sounds.  EXTREMITIES:  No pedal edema, +2 pulses bilateral and equal.  SKIN:  Dry to touch, No rash, wound or ulcerations.  NEUROLOGIC:  Nonfocal with normal cranial nerve and motor power and sensation.            Data:   Past 24 hours labs, studies, and imaging were reviewed.  Recent Labs   Lab 10/29/19  0641 10/28/19  1733   WBC 6.6 4.5   HGB 11.7 12.3   HCT 36.4 37.1   * 105*    205     Recent Labs   Lab 10/29/19  0641 10/28/19  1733    137   POTASSIUM 5.8* 5.4*   CHLORIDE 105 107   CO2 23 23   ANIONGAP 7 7   * 72   BUN 18 23   CR 0.99 0.98   GFRESTIMATED 60* 61   GFRESTBLACK 69 70   YANA 8.4* 8.5       Anne Zuniga PA-C    Pt discussed with Dr. Tian who agrees with the care as discussed above.

## 2019-10-29 NOTE — PROGRESS NOTES
10/29/19 1059   Quick Adds   Type of Visit Initial Occupational Therapy Evaluation   Living Environment   Lives With spouse   Living Arrangements house   Home Accessibility stairs to enter home;stairs within home   Transportation Anticipated car, drives self;family or friend will provide   Living Environment Comment Pt lives with spouse in a house, stairs to enter and within to bedroom level, whirlpool tub only, owns [facility].    Self-Care   Usual Activity Tolerance good   Current Activity Tolerance fair   Equipment Currently Used at Home cane, straight;walker, rolling;commode   Activity/Exercise/Self-Care Comment Pt works full time for [employer] and is on her feet throughout the day.    Functional Level   Ambulation 0-->independent   Transferring 0-->independent   Toileting 0-->independent   Bathing 0-->independent   Dressing 0-->independent   Eating 0-->independent   Communication 0-->understands/communicates without difficulty   Swallowing 0-->swallows foods/liquids without difficulty   Cognition 0 - no cognition issues reported   Fall history within last six months no   Which of the above functional risks had a recent onset or change? transferring;toileting;bathing;dressing   Prior Functional Level Comment Pt reports indep in all ADLs, IADLs and mobility tasks no AD at baseline. Pt works full-time at [employer].   General Information   Onset of Illness/Injury or Date of Surgery - Date 10/28/19   Referring Physician Perez Meza MD   Patient/Family Goals Statement Pt's goal is to d/c home   Additional Occupational Profile Info/Pertinent History of Current Problem Per chart: Pt is a 64 year old s/p R DAE revision. Pt admitted to the ED d/t R hip dislocation   Precautions/Limitations fall precautions;right hip precautions   Weight-Bearing Status - RLE weight-bearing as tolerated   Visual Perception   Visual Perception Wears glasses   Sensory Examination   Sensory Comments Pt denies numbness/tingling in BUEs   Pain  Assessment   Patient Currently in Pain Yes, see Vital Sign flowsheet  (8/10 pain in RLE)   Range of Motion (ROM)   ROM Comment BUEs WFL   Strength   Strength Comments BUEs WFL   Transfer Skill: Bed to Chair/Chair to Bed   Level of [place]: Bed to Chair contact guard   Physical Assist/Nonphysical Assist: Bed to Chair 1 person assist   Weight-Bearing Restrictions weight-bearing as tolerated   Assistive Device - Transfer Skill Bed to Chair Chair to Bed Rehab Eval rolling walker   Transfer Skill: Sit to Stand   Level of [place]: Sit/Stand contact guard   Physical Assist/Nonphysical Assist: Sit/Stand 1 person assist   Transfer Skill: Sit to Stand weight-bearing as tolerated   Assistive Device for Transfer: Sit/Stand rolling walker   Transfer Skill: Toilet Transfer   Level of [place]: Toilet contact guard   Physical Assist/Nonphysical Assist: Toilet 1 person assist   Weight-Bearing Restrictions: Toilet weight-bearing as tolerated   Assistive Device rolling walker   Balance   Balance Comments CGA provided while in stance   Upper Body Dressing   Level of [place]: Dress Upper Body contact guard   Physical Assist/Nonphysical Assist: Dress Upper Body 1 person assist   Lower Body Dressing   Level of [place]: Dress Lower Body minimum assist (75% patients effort)   Physical Assist/Nonphysical Assist: Dress Lower Body 1 person assist   Toileting   Level of [place]: Toilet minimum assist (75% patients effort)   Physical Assist/Nonphysical Assist: Toilet 1 person assist   Grooming   Level of [place]: Grooming contact guard   Physical Assist/Nonphysical Assist: Grooming 1 person assist   Instrumental Activities of Daily Living (IADL)   Previous Responsibilities meal prep;housekeeping;laundry;medication management;finances;driving;work   IADL Comments Pt has support of spouse as needed in home environment   Activities of Daily Living Analysis   Impairments Contributing to Impaired Activities of Daily  "Living pain;post surgical precautions   ADL Comments Pt presents to OT below baseline level of functioning in all areas of self cares including bathing, dressing, grooming and toileting   General Therapy Interventions   Planned Therapy Interventions ADL retraining;IADL retraining;strengthening;transfer training   Clinical Impression   Criteria for Skilled Therapeutic Interventions Met yes, treatment indicated   OT Diagnosis Impaired ADLs, IADLs and mobility tasks   Influenced by the following impairments Decreased functional activity tolerance, pain, post-surgical precautions   Assessment of Occupational Performance 5 or more Performance Deficits   Identified Performance Deficits Bathing, dressing, grooming, toileting, homemaking, transfers   Clinical Decision Making (Complexity) Moderate complexity   Therapy Frequency Daily   Predicted Duration of Therapy Intervention (days/wks) 3 days   Anticipated Discharge Disposition Other (see comments)  (Defer to ortho surgeon)   Risks and Benefits of Treatment have been explained. Yes   Patient, Family & other staff in agreement with plan of care Yes   Clinical Impression Comments Pt would benefit from skilled OT to maximize safety and indep in all ADLs, IADLs and mobility tasks due to current deficits impacting function    [facility] AM-PAC  \"6 Clicks\" Daily Activity Inpatient Short Form   1. Putting on and taking off regular lower body clothing? 3 - A Little   2. Bathing (including washing, rinsing, drying)? 3 - A Little   3. Toileting, which includes using toilet, bedpan or urinal? 3 - A Little   4. Putting on and taking off regular upper body clothing? 3 - A Little   5. Taking care of personal grooming such as brushing teeth? 3 - A Little   6. Eating meals? 4 - None   Daily Activity Raw Score (Score out of 24.Lower scores equate to lower levels of function) 19   Total Evaluation Time   Total Evaluation Time (Minutes) 10     "

## 2019-10-29 NOTE — CONSULTS
Consult Date:  10/28/2019      CHIEF COMPLAINT:  Right hip dislocation.      HISTORY OF PRESENT ILLNESS:  The patient is a 64-year-old female known to me who is 3 years status post primary right total hip arthroplasty for osteoarthritis.  She had no problems with her hip until earlier this summer where she sustained a dislocation tying her shoes.  She described a figure-4 position with her hip.  The hip was unable to be reduced in the emergency room and closed reduction under general anesthesia was required.  She had no interval problems, but then dislocated her hip earlier today.  Dr. Solomon, the trauma call surgeon was unable to reduce the hip.  Consequently, she was seen in consultation.      PAST MEDICAL AND SURGICAL HISTORY:  Noncontributory.  She has no other complaints or problems.  She does have a history of a foot drop.  For the full history, please see the chart.      PHYSICAL EXAMINATION:  Her leg is shortened and internally rotated.  She has weak ankle function, but normal perfusion of the foot.  Her general appearance and affect are unremarkable.      RADIOGRAPHIC EVALUATION:  X-rays show a dislocated total hip arthroplasty.  Components themselves appear unremarkable.  Component rotation position appears excellent.      IMPRESSION:  Recurrent right total hip arthroplasty dislocation.      RECOMMENDATIONS:  I reviewed that closed reduction likely would be successful, but I also reviewed the option of open reduction with component revision.  The indications of this approach are reviewed.  I outlined that her position of dislocation is atypical and will be further evaluated  intraoperatively.  She wishes full revision understanding of the risks associated with this including leg length inequality, nerve palsy and recurrent instability.  Surgery will be planned today.         ALKA YUSUF MD             D: 10/28/2019   T: 10/28/2019   MT: [place]      Name:     JOSHUA REYNOLDS   MRN:      [id number]         Account:       [id number]   :      1955           Consult Date:  10/28/2019      Document: [id number]

## 2019-10-29 NOTE — PROVIDER NOTIFICATION
Provider Text Page: Pt. admitted on floor @2200 R Hip Dislocation and surgery. States sensitive to Tylenol and oxycodone. Tramadol works best. May I please have for pain? Thank you!      Dr. Miller gave an order for 50 mg Tramadol q6 for pain.   Continue POC.

## 2019-10-29 NOTE — PLAN OF CARE
PT: Orders received, evaluation completed and treatment initiated. 64 year old female s/p R DAE revision. Pt admitted to the ED d/t R hip dislocation. IND with mobility at baseline. Works full time for [employer]. Patient lives in a house with her spouse. There are 5 stairs (one rail) to enter and an additional 12-13 stairs (one rail) to the bedroom level. Spouse works full time but can assist when at home. Spouse is planning on having surgery next month.     Discharge Planner PT   Patient plan for discharge: Home  Current status: Education/review on posterior hip precautions. Supine>sit with SBA; cues needed to maintain precautions. Sit to stand with CGA; pt does not follow cues for safe hand transition. Pt ambulates 125' with FWW and CGA. Pt demonstrates trendelenberg gait - pt reports this is baseline 2/2 L LE drop foot. Able to perform standing toileting hygiene task with SBA and no UE support on FWW. No LOB.  Barriers to return to prior living situation: Stairs - will address in PT  Recommendations for discharge: Home with assist from spouse on the stairs. May benefit from OP PT due to baseline gait deviations.  Rationale for recommendations: Pt moving fair despite 10/10 pain. Will continue IP PT to maximize IND/safety with mobility prior to discharge.        Entered by: Citlali Le 10/29/2019 10:37 AM

## 2019-10-29 NOTE — PHARMACY
Pharmacy has reviewed the patient's profile for causes of hyperkalemia. Celebrex may be contributing to the increase levels of potassium. All NSAIDS may cause hyperkalemia. Noted that  patient was on lisinopril PTA but was stopped due high K levels.     Please let us know if you would like further assistance.     Marci Laurent, [facility]

## 2019-10-29 NOTE — ANESTHESIA POSTPROCEDURE EVALUATION
Patient: Krystyna Peña    Procedure(s):  Closed vs Open Reduction Right hip  REVISION, TOTAL ARTHROPLASTY, HIP    Diagnosis:Dislocation of right hip, initial encounter (H) [S73.004A]  Diagnosis Additional Information: No value filed.    Anesthesia Type:  General, ETT    Note:  Anesthesia Post Evaluation    Patient location during evaluation: PACU  Patient participation: Able to fully participate in evaluation  Level of consciousness: awake and alert  Pain management: adequate  Airway patency: patent  Cardiovascular status: acceptable  Respiratory status: acceptable  Hydration status: acceptable  PONV: controlled             Last vitals:  Vitals:    10/28/19 1645 10/28/19 1700 10/28/19 1713   BP: (!) 148/74 (!) 147/77 (!) 170/87   Pulse: 72 71    Resp:   14   Temp:   98.3  F (36.8  C)   SpO2: 98% 96% 99%         Electronically Signed By: Chalino Zuniga MD  October 28, 2019  8:34 PM

## 2019-10-29 NOTE — ANESTHESIA CARE TRANSFER NOTE
Patient: Krystyna Peña    Procedure(s):  Closed vs Open Reduction Right hip  REVISION, TOTAL ARTHROPLASTY, HIP    Diagnosis: Dislocation of right hip, initial encounter (H) [S73.004A]  Diagnosis Additional Information: No value filed.    Anesthesia Type:   General, ETT     Note:  Airway :Face Mask  Patient transferred to:PACU  Comments: VSS.  Spontaneously breathing O2 per open face mask.  Report given to RN.Handoff Report: Identifed the Patient, Identified the Reponsible Provider, Reviewed the pertinent medical history, Discussed the surgical course, Reviewed Intra-OP anesthesia mangement and issues during anesthesia, Set expectations for post-procedure period and Allowed opportunity for questions and acknowledgement of understanding      Vitals: (Last set prior to Anesthesia Care Transfer)    CRNA VITALS  10/28/2019 1951 - 10/28/2019 2029      10/28/2019             Pulse:  118    SpO2:  100 %    Resp Rate (observed):  (!) 3                Electronically Signed By: JOCELYN Sanchez CRNA  October 28, 2019  8:29 PM

## 2019-10-29 NOTE — PLAN OF CARE
OT: Order received, eval completed and treatment initiated. Pt is a 64 year old s/p R DAE revision. Pt admitted to the ED d/t R hip dislocation. Pt lives with spouse in a house, stairs to enter and within to bedroom level, whirlpool tub only, owns [place]. Pt reports indep in all ADLs, IADLs and mobility tasks no AD at baseline. Pt works full-time at [employer].    Discharge Planner OT   Patient plan for discharge: Home  Current status: Pt completed sit > stand from chair to FWW with CGA, ambulated to/from BR FWW level with CGA. Pt completed toilet transfer on/off toilet with use of grab bar, vcs for optimal positioning of RLE for safe technique. 8/10 pain in RLE reported. Pt educated on compensatory technique and LH AE for LB dressing in order to maintain hip precautions - pt required consistent vcs and demo for maintaining hip precautions while completing dressing tasks. Pt was able to thread brief and socks with use of reacher and sock aide with min A. Pt required min A for toileting tasks. Time spent educating pt on home safety modifications/recommendations, DME/AE (would benefit from RTS, reacher, sock aide), car transfer technique, pet care, and safe activities post DEA revision, verbalized understanding, reinforcement and ongoing education recommended.   Barriers to return to prior living situation: Stairs, hip precautions  Recommendations for discharge: Home w/ spouse assist for LB dressing, bathing and IADLs (homemaking, pet care, driving, cleaning, laundry)  Rationale for recommendations: Pt is below baseline level of functioning with regards to ADLS, IADLs and mobility tasks. Recommend ongoing skilled OT while IP to maximize safety and indep in daily tasks in prep for safe return home.        Entered by: Lety Lin 10/29/2019 11:55 AM

## 2019-10-29 NOTE — PROGRESS NOTES
Orthopedic Surgery  10/29/2019  POD 1    S: Patient voices no unexpected ortho complaints today. Denies chest pain or shortness of breath. Arrived on the floor late last night. Has not ambulated yet.    O: Blood pressure (!) 143/76, pulse 91, temperature 95.4  F (35.2  C), temperature source Oral, resp. rate 12, weight 69.4 kg (153 lb), last menstrual period 10/01/2003, SpO2 98 %, not currently breastfeeding.  Lab Results   Component Value Date    HGB 12.3 10/28/2019     Lab Results   Component Value Date    INR 0.96 03/22/2016     I/O last 3 completed shifts:  In: 1200 [I.V.:1200]  Out: 100 [Blood:100]  Distal extremity CMSI bilaterally.  Calves are negative bilaterally, both soft and nontender.  The dressing is C/D/I.      A: Ms. Peña is doing well status post Procedure(s):  Closed vs Open Reduction Right hip  REVISION, TOTAL ARTHROPLASTY, HIP.    P: Posterior hip precautions to be followed. Continue physical therapy. Continue DVT pphx with ASA. Anticipate discharge to home possibly this evening more likely tomorrow am as has yet to ambulate.    Alem Cloud PA-C  [phone]

## 2019-10-30 ENCOUNTER — APPOINTMENT (OUTPATIENT)
Dept: PHYSICAL THERAPY | Facility: CLINIC | Age: 64
End: 2019-10-30
Payer: COMMERCIAL

## 2019-10-30 ENCOUNTER — APPOINTMENT (OUTPATIENT)
Dept: OCCUPATIONAL THERAPY | Facility: CLINIC | Age: 64
End: 2019-10-30
Payer: COMMERCIAL

## 2019-10-30 VITALS
SYSTOLIC BLOOD PRESSURE: 124 MMHG | BODY MASS INDEX: 27.98 KG/M2 | TEMPERATURE: 97.6 F | RESPIRATION RATE: 16 BRPM | WEIGHT: 153 LBS | OXYGEN SATURATION: 98 % | DIASTOLIC BLOOD PRESSURE: 65 MMHG | HEART RATE: 85 BPM

## 2019-10-30 LAB
ANION GAP SERPL CALCULATED.3IONS-SCNC: 8 MMOL/L (ref 3–14)
BUN SERPL-MCNC: 18 MG/DL (ref 7–30)
CALCIUM SERPL-MCNC: 8.3 MG/DL (ref 8.5–10.1)
CHLORIDE SERPL-SCNC: 102 MMOL/L (ref 94–109)
CO2 SERPL-SCNC: 25 MMOL/L (ref 20–32)
CREAT SERPL-MCNC: 1.11 MG/DL (ref 0.52–1.04)
GFR SERPL CREATININE-BSD FRML MDRD: 52 ML/MIN/{1.73_M2}
GLUCOSE SERPL-MCNC: 100 MG/DL (ref 70–99)
HGB BLD-MCNC: 10.6 G/DL (ref 11.7–15.7)
INTERPRETATION ECG - MUSE: NORMAL
POTASSIUM SERPL-SCNC: 3.7 MMOL/L (ref 3.4–5.3)
SODIUM SERPL-SCNC: 135 MMOL/L (ref 133–144)

## 2019-10-30 PROCEDURE — 25000132 ZZH RX MED GY IP 250 OP 250 PS 637: Performed by: INTERNAL MEDICINE

## 2019-10-30 PROCEDURE — 97116 GAIT TRAINING THERAPY: CPT | Mod: GP | Performed by: PHYSICAL THERAPY ASSISTANT

## 2019-10-30 PROCEDURE — 80048 BASIC METABOLIC PNL TOTAL CA: CPT | Performed by: ORTHOPAEDIC SURGERY

## 2019-10-30 PROCEDURE — 99231 SBSQ HOSP IP/OBS SF/LOW 25: CPT | Performed by: HOSPITALIST

## 2019-10-30 PROCEDURE — 36415 COLL VENOUS BLD VENIPUNCTURE: CPT | Performed by: ORTHOPAEDIC SURGERY

## 2019-10-30 PROCEDURE — 25000132 ZZH RX MED GY IP 250 OP 250 PS 637: Performed by: PHYSICIAN ASSISTANT

## 2019-10-30 PROCEDURE — 97530 THERAPEUTIC ACTIVITIES: CPT | Mod: GP | Performed by: PHYSICAL THERAPY ASSISTANT

## 2019-10-30 PROCEDURE — 85018 HEMOGLOBIN: CPT | Performed by: ORTHOPAEDIC SURGERY

## 2019-10-30 PROCEDURE — 82947 ASSAY GLUCOSE BLOOD QUANT: CPT | Performed by: ORTHOPAEDIC SURGERY

## 2019-10-30 PROCEDURE — 97535 SELF CARE MNGMENT TRAINING: CPT | Mod: GO | Performed by: REHABILITATION PRACTITIONER

## 2019-10-30 PROCEDURE — 99207 ZZC CDG-MDM COMPONENT: MEETS LOW - DOWN CODED: CPT | Performed by: HOSPITALIST

## 2019-10-30 PROCEDURE — 25000132 ZZH RX MED GY IP 250 OP 250 PS 637: Performed by: ORTHOPAEDIC SURGERY

## 2019-10-30 RX ORDER — HYDROXYZINE HYDROCHLORIDE 10 MG/1
10 TABLET, FILM COATED ORAL EVERY 6 HOURS PRN
Status: DISCONTINUED | OUTPATIENT
Start: 2019-10-30 | End: 2019-10-30 | Stop reason: HOSPADM

## 2019-10-30 RX ORDER — HYDROXYZINE HYDROCHLORIDE 10 MG/1
10 TABLET, FILM COATED ORAL EVERY 6 HOURS PRN
Qty: 45 TABLET | Refills: 0 | Status: SHIPPED | OUTPATIENT
Start: 2019-10-30 | End: 2019-10-31

## 2019-10-30 RX ADMIN — SENNOSIDES AND DOCUSATE SODIUM 2 TABLET: 8.6; 5 TABLET ORAL at 08:14

## 2019-10-30 RX ADMIN — TRAMADOL HYDROCHLORIDE 50 MG: 50 TABLET, FILM COATED ORAL at 11:11

## 2019-10-30 RX ADMIN — ASPIRIN 325 MG: 325 TABLET, DELAYED RELEASE ORAL at 08:14

## 2019-10-30 RX ADMIN — TRAMADOL HYDROCHLORIDE 50 MG: 50 TABLET, FILM COATED ORAL at 02:07

## 2019-10-30 RX ADMIN — HYDROXYZINE HYDROCHLORIDE 10 MG: 10 TABLET ORAL at 11:11

## 2019-10-30 RX ADMIN — TRIAMTERENE AND HYDROCHLOROTHIAZIDE 1 TABLET: 37.5; 25 TABLET ORAL at 08:14

## 2019-10-30 RX ADMIN — POLYETHYLENE GLYCOL 3350 17 G: 17 POWDER, FOR SOLUTION ORAL at 08:13

## 2019-10-30 ASSESSMENT — ACTIVITIES OF DAILY LIVING (ADL)
ADLS_ACUITY_SCORE: 13

## 2019-10-30 NOTE — PLAN OF CARE
Discharge Planner OT   Patient plan for discharge: home  Current status: Unable to recall hip precautions on request, required repeated clarification of precautions during session. Required verbal cues during session to maintain precautions during most tasks. Able to reach L foot for donning/doffing sock using figure 4 method while in bed, unable to reach R foot with same method. Required review of how to use sock aid, did not trial. SBA with bed mobility with HOB raised ~40 degrees, sit<>stand transfers completed with SBA. Ambulated with fww and SBA in room. Toilet transfer completed with CGA and commode handle on R side to simulate vanity location at home. Patient stated she plans to sponge bathe rather than shower following discharge. No LOB occurred.   Barriers to return to prior living situation: below baseline for ADLs/IADLs, hip precautions & difficulty recalling precautions    Recommendations for discharge: home with assist from spouse for IADLs (meal prep, housekeeping, driving), home OT/PT, AE as needed for LB dressing (reacher, sock aid, long handled shoe horn, elastic shoe laces)   Rationale for recommendations: Pt is below baseline for ADLs and IADLs and functional mobility, but reports that spouse will not assist with dressing tasks and pt is inconsistent with recalling/adhering to hip precautions and how to use adaptive equipment. Patient requires home OT at this time as attending OT in a clinic setting would be a considerable and significantly taxing effort, limiting her ability to participate in therapy session.       Entered by: Vinicius Montoya 10/30/2019 9:51 AM

## 2019-10-30 NOTE — PROGRESS NOTES
Noted need for home PT for assessment and further progression. Order placed.    Alem Cloud PA-C  [phone]

## 2019-10-30 NOTE — PROGRESS NOTES
Orthopedic Surgery  10/30/2019  POD 2    S: Patient voices no unexpected ortho complaints today. Denies chest pain or shortness of breath. Had a lot of pain and looks like K+ issues yesteday    O: Blood pressure 106/56, pulse 85, temperature 99.4  F (37.4  C), temperature source Oral, resp. rate 14, weight 69.4 kg (153 lb), last menstrual period 10/01/2003, SpO2 98 %, not currently breastfeeding.  Lab Results   Component Value Date    HGB 11.7 10/29/2019     Lab Results   Component Value Date    INR 0.96 03/22/2016     I/O last 3 completed shifts:  In: 2544 [P.O.:1790; I.V.:754]  Out: 2300 [Urine:2300]  Distal extremity CMSI bilaterally.  Calves are negative bilaterally, both soft and nontender.  The dressing is C/D/I.      A: Ms. Peña is doing well status post Procedure(s):  Revision right total hip arthroplasty.    P: Continue physical therapy. Continue DVT pphx with ASA due to high mobility and low risk factors for DVT. Anticipate discharge to home today or tomorrow when medically stable.    Alem Cloud PA-C  [phone]

## 2019-10-30 NOTE — CONSULTS
Discharge Planner   Discharge Plans in progress: home with possible home care  Barriers to discharge plan: none  Follow up plan: Therapy reached out to [facility] with concerns for pt not maintaining her hip precautions properly. They are recommending home therapy. Discharge order already in w/o therapy orders. ALDAIR reached out to Dr. Meza' office for home care orders. ALDAIR spoke with Michael who will reach out to [facility] for possible home care therapy orders. Pt ok to send referral to [facility].       Entered by: Mee Velasquez 10/30/2019 11:13 AM

## 2019-10-30 NOTE — PLAN OF CARE
Discharge Planner PT   Patient plan for discharge: home  Current status: gait with RW to 125 feet up and down 4 steps 1 rail and SEC  CGA to SBA will need 2nd walker for upstairs orders entered. Not ready for walking with SEC at this time  Barriers to return to prior living situation:   none  Recommendations for discharge: Home per plan established by the PT and with assist from spouse on the stairs. May benefit from OP PT due to baseline gait deviations.  Rationale for recommendations:  Will continue IP PT to maximize IND/safety with mobility prior to discharge.   Entered by: Concepcion Covarrubias 10/30/2019 9:29 AM     PT- planned discharge to home with home PT services per chart review. Recommend to PT for discharge goals are met issued RW for home use.

## 2019-10-30 NOTE — PROGRESS NOTES
[facility]    Hospitalist Progress Note  Name: Krystyna Peña    MRN: [id number]  Provider:  Herb Woods DO MPH  Date of Service: 10/30/2019    Summary of Stay: Krystyna Peña is a 64 year old female with a PMH significant for HTN and right total hip arthroplasty with previous hip dislocation who is POD 1 s/p right total hip arthroplasty revision after dislocation on 10/28.     # S/p right total hip arthroplasty revision  Patient has a history of right total hip arthroplasty for osteoarthritis.  Earlier this summer she dislocated her right hip which required surgical reduction.  On 10/28 she was crossing her legs and dislocated her right hip again which was unable to be reduced in the ED.  Surgery was called who brought her to the OR.   -Doing well, cont PT/OT and pain control as per primary  -Home today per surgery     #Hypertension  Patient has a long-standing history of hypertension.  She did not tolerate amlodipine due to leg swelling and in 2018 was changed from Maxzide to lisinopril for better control.  She is now hyperkalemic possibly related to lisinopril.  Has been restarted on Maxzide here but only took first dose 5 minutes ago.  BP well controlled currently.  -Discontinue lisinopril  -Discontinue Maxzide for now given good BP control  -She is concerned that she will not be able to get in to see her PCP for over a week.  As a result I feel it is too high risk to start her on diuretics or lisinopril as risk of fatal arrhythmias related to potassium dysregulation is high.  -She will check BP twice daily at home and call her PCP with these results next week to discuss follow-up plans regarding anti-HTN regimen and checking BMP.     #Hyperkalemia  Patient has asymptomatic hyperkalemia with potassium of 5.9.  She has no history of hyperkalemia and has not started any new medications recently.  Possibly related to lisinopril.  K now 3.7.  -Stop lisinopril  -Resolved hyperkalemia with  furosemide  -Management as above    DVT Prophylaxis: Defer to primary service  Code Status: Full Code  Disposition: Expected discharge in 0 days to home. Goals prior to discharge include rehab.   Incidental Findings: None.  Family updated today: No     Interval History   Assumed care from previous hospitalist. The history was fully reviewed.  The patient reports doing well. No chest pain or shortness of breath. No nausea, vomiting, diarrhea, constipation. No fevers. No other specific complaints identified.     -Data reviewed today: I personally reviewed all new labs and imaging results over the last 24 hours.     Physical Exam   Temp: 98  F (36.7  C) Temp src: Oral BP: 123/67 Pulse: 85 Heart Rate: 83 Resp: 16 SpO2: 98 % O2 Device: None (Room air)    Vitals:    10/28/19 0941   Weight: 69.4 kg (153 lb)     Vital Signs with Ranges  Temp:  [97.9  F (36.6  C)-99.4  F (37.4  C)] 98  F (36.7  C)  Pulse:  [85] 85  Heart Rate:  [73-90] 83  Resp:  [14-18] 16  BP: (100-123)/(56-70) 123/67  SpO2:  [97 %-98 %] 98 %  I/O last 3 completed shifts:  In: 1574 [P.O.:1490; I.V.:84]  Out: 2720 [Urine:2720]    GENERAL: No apparent distress. Awake, alert, and fully oriented.  HEENT: Normocephalic, atraumatic. Extraocular movements intact.  CARDIOVASCULAR: Regular rate and rhythm without murmurs or rubs. No S3.  PULMONARY: Clear bilaterally.  GASTROINTESTINAL: Soft, non-tender, non-distended. Bowel sounds normoactive.   EXTREMITIES: No cyanosis or clubbing. No edema.  NEUROLOGICAL: CN 2-12 grossly intact, no focal neurological deficits.  DERMATOLOGICAL: No rash, ulcer, bruising, nor jaundice.     Medications     sodium chloride Stopped (10/29/19 6596)       acetaminophen  975 mg Oral Q8H     aspirin  325 mg Oral Daily     celecoxib  100 mg Oral BID     polyethylene glycol  17 g Oral Daily     senna-docusate  1 tablet Oral BID    Or     senna-docusate  2 tablet Oral BID     sodium chloride (PF)  3 mL Intracatheter Q8H     Data      Laboratory:  Recent Labs   Lab 10/30/19  0556 10/29/19  0641 10/28/19  1733   WBC  --  6.6 4.5   HGB 10.6* 11.7 12.3   HCT  --  36.4 37.1   MCV  --  106* 105*   PLT  --  201 205     Recent Labs   Lab 10/30/19  0556 10/29/19  1004 10/29/19  0641 10/28/19  1733     --  135 137   POTASSIUM 3.7 4.8 5.8* 5.4*   CHLORIDE 102  --  105 107   CO2 25  --  23 23   ANIONGAP 8  --  7 7   *  --  107* 72   BUN 18  --  18 23   CR 1.11*  --  0.99 0.98   GFRESTIMATED 52*  --  60* 61   GFRESTBLACK 60*  --  69 70   YANA 8.3*  --  8.4* 8.5     No results for input(s): CULT in the last 168 hours.    Imaging:  No results found for this or any previous visit (from the past 24 hour(s)).      Herb Woods DO MPH  [facility] Hospitalist  [address] [place] [address]  Pager: [phone]  10/30/2019

## 2019-10-30 NOTE — PLAN OF CARE
[facility] Orthopedic Nursing Progress Note       Assessment   Assessment:  Post-operative day #2  Total hip arthoplasty revision(Right)    CMS: is intact. Calves non-tender bilaterally.  Lungs: are clear  BS: active, + flatus  Urine: voiding adequate amounts  Surgical Site: Dressing is clean dry intact.   Pain: controlled with po pain meds. Ice to incisional area.   Activity: BRPs w/A1/walker  Slept well.      Jeyson Tapia RN

## 2019-10-30 NOTE — PROGRESS NOTES
"SPIRITUAL HEALTH SERVICES  SPIRITUAL ASSESSMENT Progress Note  [facility] Orthopedics 6th floor    PRIMARY FOCUS:     Emotional/spiritual/Spiritism distress    Support for coping    ILLNESS CIRCUMSTANCES:   Reviewed documentation. Reflective conversation shared with pt, \"Nancy\", which integrated elements of illness and family narratives.     Context of Serious Illness/Symptom(s) - Nancy shares that she had a total hip revision after dislocating her hip twice.     Resources for Support - Nancy names her spouse, her dog, and her fani.     DISTRESS:     Emotional/Existential/Relational Distress - Nancy shares the following:    Reflected on her experiences of her hip being dislocated twice - the pain/fear she experienced and how she will \"have to take some time to trust it again.\"     Her time working with the [employer], her upcoming care home, and the emotional \"fatigue\" she has.     Spiritual/Cheondoism Distress - None expressed.     Social/Cultural/Economic Distress - None expressed.     SPIRITUAL/Denominational COPING:     Shinto/Fani - Mosque    Spiritual Practice(s) - Communion; prayer; attending Mass.     Emotional/Existential/Relational Connections - Nancy shares that she enjoys traveling with her  and time with her dog at home.   GOALS OF CARE:    Goals of Care - Restorative.     Meaning/Sense-Making - Nancy engaged in reflective conversation, sharing that she is glad that \"the surgery is behind me\" and how much she is looking forward to care home and traveling more with her spouse.     PLAN: Pt to discharge this afternoon. SH remains available.    Casey Delarosa M.Div.  Staff   Pager [phone]  Pronouns: he/him/his  "

## 2019-10-30 NOTE — PLAN OF CARE
/57   Pulse 91   Temp 98.1  F (36.7  C) (Axillary)   Resp 16   Wt 69.4 kg (153 lb)   LMP 10/01/2003   SpO2 98%   BMI 27.98 kg/m    Orientation: A&Ox4  Pain: taking Tramadol with little relief. IV dilaudid also give per pt request due to increased pain after walk.  Pt states sensitive to Tylenol and Oxycodone.  CMS: Intact, pt reports left foot drop.  Dressing: Aquacel is CDI  Activity: A1 with walker and gaitbelt  Diet:Regular  Cardio: placed on remote tele due to K+5.8 after IV lasix K+ at 4.8 will continue to monitor via tele.  Voiding: Spontaneously without difficulty  Discharge Plan: Home in 1-2 days.

## 2019-10-30 NOTE — PROGRESS NOTES
[facility]  Met with pt to discuss plans for HC.  Pt to be discharged home today and has agreed to have [facility] follow with services of PT and OT. Patient care support center processing referral.  Pt verbalized understanding that initial visit is scheduled for 10/31 or 11/1/19.  Pt has 24 hour phone number for [facility] for any questions or concerns.

## 2019-10-31 ENCOUNTER — TELEPHONE (OUTPATIENT)
Dept: INTERNAL MEDICINE | Facility: CLINIC | Age: 64
End: 2019-10-31

## 2019-10-31 NOTE — TELEPHONE ENCOUNTER
IP F/U    Date: 10/30/19  Diagnosis: Dislocation of right hip  Is patient active in care coordination? No  Was patient in TCU? No

## 2019-10-31 NOTE — TELEPHONE ENCOUNTER
"  ED for acute condition Discharge Protocol    \"Hi, my name is Viri Worthington RN, a registered nurse, and I am calling from [facility].  I am calling to follow up and see how things are going for you after your recent emergency visit.\"    Tell me how you are doing now that you are home?\"     Patient states she is feeling well. Started physical therapy today, that is going well. No current concerns.    Discharge Instructions    \"Let's review your discharge instructions.  What is/are the follow-up recommendations?  Pt. Response: Follow up with primary care provider in one week for blood pressure    \"Has an appointment with your primary care provider been scheduled?\"  Not yet, patient states she will have to call back after she speaks with her husbad as she cannot currently drive    Medications    \"Tell me what changed about your medicines when you discharged?\"    Started on a few different things for pain. Medications reviewed.    \"What questions do you have about your medications?\"   None        Call Summary    \"What questions or concerns do you have about your recent visit and your follow-up care?\"     none    \"If you have questions or things don't continue to improve, we encourage you contact us through the main clinic number (give number).  Even if the clinic is not open, triage nurses are available 24/7 to help you.     We would like you to know that our clinic has extended hours (provide information).  We also have urgent care (provide details on closest location and hours/contact info)\"    \"Thank you for your time and take care!\"                "

## 2019-10-31 NOTE — PLAN OF CARE
Occupational Therapy Discharge Summary    Reason for therapy discharge:    Discharged to home with home therapy.    Progress towards therapy goal(s). See goals on Care Plan in [facility] electronic health record for goal details.  Goals not met.  Barriers to achieving goals:   discharge from facility.    Therapy recommendation(s):    Continued therapy is recommended.  Rationale/Recommendations:  Below baseline for ADLs/IADLs, reported spouse will not be assisting with ADLs, difficulty recalling/implementing hip precautions and how to use adaptive equiptment.

## 2019-10-31 NOTE — TELEPHONE ENCOUNTER
[facility] now requests orders and shares plan of care/discharge summaries for some patients through [employer].  Please REPLY TO THIS MESSAGE OR ROUTE BACK TO THE AUTHOR in order to give authorization for orders when needed.  This is considered a verbal order, you will still receive a faxed copy of orders for signature.  Thank you for your assistance in improving collaboration for our patients.    ORDER For home care visits will be requested through Dr Meza.    MD SUMMARY/PLAN OF CARE Update on medication. Patient not taking BP medication post op DAE revision on 10/28/19. BP today 144/80. Patient has own BP device and will monitor BP and will phone PCP with updates. Patient taking aspirin 81 mg and tramadol only for meds at this time.

## 2019-11-06 NOTE — DISCHARGE SUMMARY
Diagnosis: right hip dislocation with history of DAE  Procedure: Revision DAE with poly and head exchange  Surgeon: Perez Meza MD  Patient underwent Revision DAE with poly and head exchange without complication. Patient had typical transient post-op anemia. Patient's hospital stay included physical therapy and DVT prophylaxis. After discussion with patient regarding no history of DVT and current high mobility, patient is discharged with ASA for home DVT pphx. Patient will follow -up in the office 10-14days post-op for wound check. Please refer to chart for any other specifics of this hospital stay.  Alem Cloud PA-C

## 2019-11-11 ENCOUNTER — TRANSFERRED RECORDS (OUTPATIENT)
Dept: HEALTH INFORMATION MANAGEMENT | Facility: CLINIC | Age: 64
End: 2019-11-11

## 2019-11-12 ENCOUNTER — PATIENT OUTREACH (OUTPATIENT)
Dept: CARE COORDINATION | Facility: CLINIC | Age: 64
End: 2019-11-12

## 2019-11-12 DIAGNOSIS — S73.004A HIP DISLOCATION, RIGHT, INITIAL ENCOUNTER (H): Primary | ICD-10-CM

## 2019-11-12 NOTE — PROGRESS NOTES
Clinic Care Coordination Contact    Clinic Care Coordination Contact  OUTREACH    Referral Information:  Referral Source: Home Care  Primary Diagnosis: Orthopedic    Chief Complaint   Patient presents with     Clinic Care Coordination - Initial     [facility] Discharge      Universal Utilization: Multiple hospitalizations noted. Appropriate given medical complexity.  Difficulty keeping appointments: No  Utilization    Last refreshed: 11/11/2019  8:33 AM:  Hospital Admissions 4           Last refreshed: 11/11/2019  8:33 AM:  ED Visits 0           Last refreshed: 11/11/2019  8:33 AM:  No Show Count (past year) 1              Current as of: 11/11/2019  8:33 AM            Clinical Concerns:  Current Medical Concerns:  Patient was hospitalized at [facility] from 10.28.2019 to 10.30.2019 for a DAE revision with poly and head exchange. Patient was discharged to home with [facility]. Pt is now participating in outpatient therapy, and following up with her surgeon.  Patient Active Problem List   Diagnosis     Asymptomatic varicose veins     CARDIOVASCULAR SCREENING; LDL GOAL LESS THAN 160     Back pain     Urinary frequency     Benign essential hypertension     Edema, unspecified type     CKD (chronic kidney disease) stage 3, GFR 30-59 ml/min (H)     S/p reverse total shoulder arthroplasty     Dislocation of right hip, initial encounter (H)     Hip dislocation, right (H)     Hip dislocation, right, initial encounter (H)     History of closed hip dislocation     History of revision of total hip arthroplasty     Closed dislocation of right hip, subsequent encounter       Current Behavioral Concerns: None noted at this time.    Education Provided to patient: Role of  CC and reasons for calling.    Health Maintenance Reviewed: Not assessed    Pt's spouse reports that since her discharge from [facility], Patient has been following up with outpatient therapy. Pt is also following up with surgeon.     Medication  Management:  Independent.      Functional Status:  Dependent ADLs: Ambulation-walker  Mobility Status: Independent w/Device    Living Situation:  Current living arrangement: I live in a private home with spouse  Type of residence: Private home - stairs    Diet/Exercise/Sleep:  Patient is participating in outpatient therapy.     Transportation:  Transportation means: Regular car, Family     Psychosocial:  Informal Support system: Family, Spouse  Financial/Insurance concerns: No     Resources and Interventions:  Community Resources: Outpatient PT  Equipment Currently Used at Home: cane, straight, walker, rolling, commode  Referrals Placed: None    Advance Care Plan/Directive  Advanced Care Plans/Directives on file: Yes  Type Advanced Care Plans/Directives: Advanced Directive - On File  Advanced Care Plan/Directive Status: Not Applicable    Patient/Caregiver understanding: Pt reports understanding and denies any additional questions or concerns at this times. [facility] engaged in AIDET communication during encounter.    Plan: Patient and spouse identify no needs for clinic care coordination at this time. Introduction to CC letter mailed to Patient on this date. No further outreaches will be made at this time unless a new referral is made or a change in the pt's status occurs. Patient was provided with this writer's contact information and encouraged to call with any questions or concerns.      Ching Millard, [facility]  Clinic Care Coordinator  Ph. [phone]  [email or web address]

## 2019-11-12 NOTE — LETTER
[facility]ARE [facility]ORDIN[facility]  [address]  [place] [address]  November 12, 2019    Krystyna Peña  [id number]   [place] [id number]      Dear Krystyna,    I am a clinic care coordinator who works with Viri Savage MD at the [facility]. Thank you for spending the time to talk with me. I wanted to further introduce myself and provide you with my contact information so that you can call me with questions or concerns about your health care. Included is a flyer about clinic care coordination and how I can further assist you.     Please feel free to contact me at [phone], with any questions or concerns. We at [facility] are focused on providing you with the highest-quality healthcare experience possible and that all starts with you.     Sincerely,         Ching Millard, [facility]  Clinic Care Coordinator  Ph. [phone]  [email or web address]

## 2019-11-18 ENCOUNTER — TRANSFERRED RECORDS (OUTPATIENT)
Dept: HEALTH INFORMATION MANAGEMENT | Facility: CLINIC | Age: 64
End: 2019-11-18

## 2019-11-19 ENCOUNTER — OFFICE VISIT (OUTPATIENT)
Dept: INTERNAL MEDICINE | Facility: CLINIC | Age: 64
End: 2019-11-19
Payer: COMMERCIAL

## 2019-11-19 VITALS
DIASTOLIC BLOOD PRESSURE: 66 MMHG | TEMPERATURE: 97.6 F | HEART RATE: 114 BPM | RESPIRATION RATE: 16 BRPM | OXYGEN SATURATION: 96 % | WEIGHT: 164.9 LBS | HEIGHT: 62 IN | SYSTOLIC BLOOD PRESSURE: 118 MMHG | BODY MASS INDEX: 30.35 KG/M2

## 2019-11-19 DIAGNOSIS — Z96.649 HISTORY OF REVISION OF TOTAL HIP ARTHROPLASTY: Primary | ICD-10-CM

## 2019-11-19 PROCEDURE — 99213 OFFICE O/P EST LOW 20 MIN: CPT | Performed by: NURSE PRACTITIONER

## 2019-11-19 RX ORDER — CYCLOBENZAPRINE HCL 5 MG
5 TABLET ORAL 3 TIMES DAILY PRN
Qty: 30 TABLET | Refills: 0 | Status: SHIPPED | OUTPATIENT
Start: 2019-11-19 | End: 2020-05-26

## 2019-11-19 ASSESSMENT — MIFFLIN-ST. JEOR: SCORE: 1246.23

## 2019-11-19 NOTE — PROGRESS NOTES
Subjective     Krystyna Peña is a 64 year old female who presents to clinic today for the following health issues:    HPI       Hospital Follow-up Visit:    Hospital/Nursing Home/IP Rehab Facility: [facility]  Date of Admission: 10/28/19  Date of Discharge: 10/30/19  Reason(s) for Admission: rt hip arthroplasty            Problems taking medications regularly:  None       Medication changes since discharge: None       Problems adhering to non-medication therapy:  None    Summary of hospitalization:  [facility] discharge summary reviewed  Diagnostic Tests/Treatments reviewed.  Follow up needed: none  Other Healthcare Providers Involved in Patient s Care:         None  Update since discharge: improved. Muscle cramping/spasms over incision, saw ortho yesterday    Post Discharge Medication Reconciliation: discharge medications reconciled, continue medications without change.  Plan of care communicated with patient     Coding guidelines for this visit:  Type of Medical   Decision Making Face-to-Face Visit       within 7 Days of discharge Face-to-Face Visit        within 14 days of discharge   Moderate Complexity [id number]   High Complexity 99011 [id number]                Patient Active Problem List   Diagnosis     Asymptomatic varicose veins     CARDIOVASCULAR SCREENING; LDL GOAL LESS THAN 160     Back pain     Urinary frequency     Benign essential hypertension     Edema, unspecified type     CKD (chronic kidney disease) stage 3, GFR 30-59 ml/min (H)     S/p reverse total shoulder arthroplasty     Dislocation of right hip, initial encounter (H)     Hip dislocation, right (H)     Hip dislocation, right, initial encounter (H)     History of closed hip dislocation     History of revision of total hip arthroplasty     Closed dislocation of right hip, subsequent encounter     Past Surgical History:   Procedure Laterality Date     AMPUTATE TOE(S) Left 4/12/2018    Procedure: AMPUTATE TOE(S);  Amputation  left third digit;  Surgeon: Herb Michael DPM;  Location: [facility] OR     ARTHROPLASTY HIP Right 3/28/2016    Procedure: ARTHROPLASTY HIP;  Surgeon: Perez Meza MD;  Location: [facility] OR     ARTHROPLASTY REVISION HIP Right 10/28/2019    Procedure: Revision right total hip arthroplasty;  Surgeon: Perez Meza MD;  Location: [facility] OR     C NONSPECIFIC PROCEDURE      Right arm plastic surgery     C NONSPECIFIC PROCEDURE      Right knee surgery     C NONSPECIFIC PROCEDURE  10/03    L popliteal AVM repair     C NONSPECIFIC PROCEDURE      Removal bone spur left foot     C NONSPECIFIC PROCEDURE      amputation of toes left & right toes     C REPAIR SPIEGELIAN HERNIA       C TOTAL KNEE ARTHROPLASTY  10/03    LT     CLOSED REDUCTION HIP Right 7/15/2019    Procedure: Closed reduction, right total hip arthroplasty dislocation.;  Surgeon: Perez Meza MD;  Location: [facility] OR     COLONOSCOPY       HC CORRECT BUNION,SIMPLE      RT     HC CORRECT BUNION,SIMPLE      LT     HC KNEE SCOPE, DIAGNOSTIC      LT     REVERSE ARTHROPLASTY SHOULDER Left 10/30/2018    Procedure: Left reverse total shoulder arthroplasty;  Surgeon: Aaron Christianson MD;  Location: [facility] OR     REVERSE ARTHROPLASTY SHOULDER Right 2019    Procedure: Right reverse total shoulder arthroplasty;  Surgeon: Aaron Christianson MD;  Location: [facility] OR     ROTATOR CUFF REPAIR RT/LT      right     VASCULAR SURGERY  left leg bypass        Social History     Tobacco Use     Smoking status: Former Smoker     Packs/day: 1.00     Types: Cigarettes, Cigars     Last attempt to quit: 2013     Years since quittin.6     Smokeless tobacco: Never Used   Substance Use Topics     Alcohol use: Yes     Comment: 1 per day     Family History   Problem Relation Age of Onset     Breast Cancer Mother      Cancer - colorectal Father         66     Diabetes Brother          Current Outpatient Medications   Medication Sig Dispense Refill     aspirin  "(ASA) 325 MG EC tablet Take 1 tablet (325 mg) by mouth daily (Patient taking differently: Take 81 mg by mouth daily ) 31 tablet 0     cyclobenzaprine (FLEXERIL) 5 MG tablet Take 1 tablet (5 mg) by mouth 3 times daily as needed for muscle spasms 30 tablet 0     order for DME Equipment being ordered: Walker Wheels () and Walker ()  Treatment Diagnosis: difficulty walking 1 each 0     acetaminophen (TYLENOL) 325 MG tablet Take 3 tablets (975 mg) by mouth every 8 hours (Patient not taking: Reported on 11/19/2019) 280 tablet 0         Reviewed and updated as needed this visit by Provider         Review of Systems   ROS COMP: Constitutional, HEENT, cardiovascular, pulmonary, gi and gu systems are negative, except as otherwise noted.      Objective    /66 (BP Location: Right arm, Patient Position: Sitting, Cuff Size: Adult Regular)   Pulse 114   Temp 97.6  F (36.4  C) (Oral)   Resp 16   Ht 1.575 m (5' 2\")   Wt 74.8 kg (164 lb 14.4 oz)   LMP 10/01/2003   SpO2 96%   BMI 30.16 kg/m    Body mass index is 30.16 kg/m .  Physical Exam   GENERAL: healthy, alert and no distress  RESP: lungs clear to auscultation - no rales, rhonchi or wheezes  RESP: tenderness w/o masses bilateral lateral chest walls  MS: mildly ataxic wide gait            Assessment & Plan       ICD-10-CM    1. History of revision of total hip arthroplasty Z96.649 cyclobenzaprine (FLEXERIL) 5 MG tablet        BMI:   Estimated body mass index is 30.16 kg/m  as calculated from the following:    Height as of this encounter: 1.575 m (5' 2\").    Weight as of this encounter: 74.8 kg (164 lb 14.4 oz).           NSAIDs, heat  Declined PT    Sangita Danielle, CORA  [facility]        "

## 2019-11-19 NOTE — PROGRESS NOTES
"Subjective     Krystyna HUMPHREYS White is a 64 year old female who presents to clinic today for the following health issues:    HPI   {Chronic Problem SUPERLIST:933205}    How many servings of fruits and vegetables do you eat daily?  { :987584}    On average, how many sweetened beverages do you drink each day (soda, juice, sweet tea, etc)?   { 1-11:935584}    How many days per week do you miss taking your medication? {0-7 :599173}    {ACUTE Problem  - brief histories:754791}  {additonal problems for provider to add (Optional):956295}    {HIST REVIEW/ LINKS 2 (Optional):035963}    {Additional problems for the provider to add (optional):220377}  Reviewed and updated as needed this visit by Provider         Review of Systems   {ROS COMP (Optional):311862}      Objective    /66 (BP Location: Right arm, Patient Position: Sitting, Cuff Size: Adult Regular)   Pulse 114   Temp 97.6  F (36.4  C) (Oral)   Resp 16   Ht 1.575 m (5' 2\")   Wt 74.8 kg (164 lb 14.4 oz)   LMP 10/01/2003   SpO2 96%   BMI 30.16 kg/m    Body mass index is 30.16 kg/m .  Physical Exam   {Exam List (Optional):777256}    {Diagnostic Test Results (Optional):898404::\"Diagnostic Test Results:\",\"Labs reviewed in Epic\"}        {PROVIDER CHARTING PREFERENCE:693383}        "

## 2019-11-19 NOTE — NURSING NOTE
"FVR on 10/28/19-10/30/19 rt hip arthroplasty, patient would also like to have bp checked as she has been off of me bp meds and a breast exam and she feels \"something\"and is tender on both sides.  /66 (BP Location: Right arm, Patient Position: Sitting, Cuff Size: Adult Regular)   Pulse 114   Temp 97.6  F (36.4  C) (Oral)   Resp 16   Ht 1.575 m (5' 2\")   Wt 74.8 kg (164 lb 14.4 oz)   LMP 10/01/2003   SpO2 96%   BMI 30.16 kg/m      "

## 2019-12-23 ENCOUNTER — TRANSFERRED RECORDS (OUTPATIENT)
Dept: HEALTH INFORMATION MANAGEMENT | Facility: CLINIC | Age: 64
End: 2019-12-23

## 2020-02-12 ENCOUNTER — HOSPITAL ENCOUNTER (OUTPATIENT)
Dept: MAMMOGRAPHY | Facility: CLINIC | Age: 65
Discharge: HOME | End: 2020-02-12
Attending: INTERNAL MEDICINE | Admitting: INTERNAL MEDICINE
Payer: COMMERCIAL

## 2020-02-12 DIAGNOSIS — Z12.31 VISIT FOR SCREENING MAMMOGRAM: ICD-10-CM

## 2020-02-12 PROCEDURE — 77063 BREAST TOMOSYNTHESIS BI: CPT

## 2020-02-28 ENCOUNTER — TRANSFERRED RECORDS (OUTPATIENT)
Dept: HEALTH INFORMATION MANAGEMENT | Facility: CLINIC | Age: 65
End: 2020-02-28

## 2020-04-02 ENCOUNTER — TRANSFERRED RECORDS (OUTPATIENT)
Dept: HEALTH INFORMATION MANAGEMENT | Facility: CLINIC | Age: 65
End: 2020-04-02

## 2020-05-06 ENCOUNTER — TRANSFERRED RECORDS (OUTPATIENT)
Dept: HEALTH INFORMATION MANAGEMENT | Facility: CLINIC | Age: 65
End: 2020-05-06

## 2020-05-26 ENCOUNTER — VIRTUAL VISIT (OUTPATIENT)
Dept: INTERNAL MEDICINE | Facility: CLINIC | Age: 65
End: 2020-05-26
Payer: COMMERCIAL

## 2020-05-26 ENCOUNTER — TRANSFERRED RECORDS (OUTPATIENT)
Dept: HEALTH INFORMATION MANAGEMENT | Facility: CLINIC | Age: 65
End: 2020-05-26

## 2020-05-26 DIAGNOSIS — M19.90 INFLAMMATORY ARTHRITIS: ICD-10-CM

## 2020-05-26 DIAGNOSIS — R60.9 EDEMA, UNSPECIFIED TYPE: Primary | ICD-10-CM

## 2020-05-26 DIAGNOSIS — N18.30 CKD (CHRONIC KIDNEY DISEASE) STAGE 3, GFR 30-59 ML/MIN (H): ICD-10-CM

## 2020-05-26 DIAGNOSIS — I89.0 LYMPHEDEMA: ICD-10-CM

## 2020-05-26 PROBLEM — Z87.828 HISTORY OF CLOSED HIP DISLOCATION: Status: RESOLVED | Noted: 2019-07-15 | Resolved: 2020-05-26

## 2020-05-26 PROBLEM — S73.004A HIP DISLOCATION, RIGHT (H): Status: RESOLVED | Noted: 2019-07-15 | Resolved: 2020-05-26

## 2020-05-26 PROBLEM — Z96.649 HISTORY OF REVISION OF TOTAL HIP ARTHROPLASTY: Status: RESOLVED | Noted: 2019-10-28 | Resolved: 2020-05-26

## 2020-05-26 PROBLEM — Z96.619 S/P REVERSE TOTAL SHOULDER ARTHROPLASTY: Status: RESOLVED | Noted: 2019-06-12 | Resolved: 2020-05-26

## 2020-05-26 PROBLEM — S73.004D: Status: RESOLVED | Noted: 2019-10-28 | Resolved: 2020-05-26

## 2020-05-26 PROBLEM — S73.004A HIP DISLOCATION, RIGHT, INITIAL ENCOUNTER (H): Status: RESOLVED | Noted: 2019-07-15 | Resolved: 2020-05-26

## 2020-05-26 PROBLEM — S73.004A DISLOCATION OF RIGHT HIP, INITIAL ENCOUNTER (H): Status: RESOLVED | Noted: 2019-07-15 | Resolved: 2020-05-26

## 2020-05-26 PROCEDURE — 99214 OFFICE O/P EST MOD 30 MIN: CPT | Mod: 95 | Performed by: INTERNAL MEDICINE

## 2020-05-26 NOTE — PROGRESS NOTES
"Krystyna Peña is a 64 year old female who is being evaluated via a billable telephone visit.      The patient has been notified of following:     \"This telephone visit will be conducted via a call between you and your physician/provider. We have found that certain health care needs can be provided without the need for a physical exam.  This service lets us provide the care you need with a short phone conversation.  If a prescription is necessary we can send it directly to your pharmacy.  If lab work is needed we can place an order for that and you can then stop by our lab to have the test done at a later time.    Telephone visits are billed at different rates depending on your insurance coverage. During this emergency period, for some insurers they may be billed the same as an in-person visit.  Please reach out to your insurance provider with any questions.    If during the course of the call the physician/provider feels a telephone visit is not appropriate, you will not be charged for this service.\"    Patient has given verbal consent for Telephone visit?  Yes    What phone number would you like to be contacted at? [phone]    How would you like to obtain your AVS? Mail a copy    Subjective     Krystyna Peña is a 64 year old female who presents via phone visit today for the following health issues:    HPI    Edema: She complains of significant increase edema for 3 days.  She has had longstanding mild swelling in the ankles and feet for years, felt to be related to some venous changes.  This usually would go away overnight would come on during the day.  It was never uncomfortable for her.  The past 3 days her legs up to the knees are very swollen, more so than she had ever had.  They are very tense without indentation and are very uncomfortable.  They do not decrease overnight, stay the same during the day.  She has not started any new medication, is currently not on any medicines.  She has not had any " steroid injection.  She is very concerned that this could indicate a change in her kidneys.    She is not having any new shortness of breath, PND, orthopnea, abdominal bloating.        Patient Active Problem List   Diagnosis     Asymptomatic varicose veins     CARDIOVASCULAR SCREENING; LDL GOAL LESS THAN 160     Back pain     Urinary frequency     Benign essential hypertension     Edema, unspecified type     CKD (chronic kidney disease) stage 3, GFR 30-59 ml/min (H)     S/p reverse total shoulder arthroplasty     Dislocation of right hip, initial encounter (H)     Hip dislocation, right (H)     Hip dislocation, right, initial encounter (H)     History of closed hip dislocation     History of revision of total hip arthroplasty     Closed dislocation of right hip, subsequent encounter     No current outpatient medications on file.          Reviewed and updated as needed this visit by Provider         Review of Systems   No fever, chills, cough, shortness of breath, PND, orthopnea, abdominal pain or bloating       Objective   Reported vitals:  LMP 10/01/2003    healthy, alert and no distress  PSYCH: Alert and oriented times 3; coherent speech, normal   rate and volume, able to articulate logical thoughts, able   to abstract reason, no tangential thoughts, no hallucinations   or delusions  Her affect is normal  RESP: No cough, no audible wheezing, able to talk in full sentences  Remainder of exam unable to be completed due to telephone visits            Assessment/Plan:  1. Edema, unspecified type  She has new, firm edema.  This might represent lymphedema, particularly since she has had a number of orthopedic issues with her legs.  It is relatively unlikely to be bilateral DVTs without any triggers and the fact that the symptoms of been equal bilaterally.  She has not had any previous issues with right-sided heart failure and no symptoms of dyspnea so does not very suspicious that that is the cause.  I agree that it is  reasonable to check her kidney functions that she has known CKD and could have some acute change happening.  Recommend she schedule lab appointment as soon as possible and then we can decide whether she needs to call her nephrologist right away.  May consider echocardiogram, lymphedema treatment.  Call for any new or acute changes.  - Basic metabolic panel  (Ca, Cl, CO2, Creat, Gluc, K, Na, BUN); Future  - Albumin Random Urine Quantitative with Creat Ratio; Future    2. CKD (chronic kidney disease) stage 3, GFR 30-59 ml/min (H)  As above  - Basic metabolic panel  (Ca, Cl, CO2, Creat, Gluc, K, Na, BUN); Future  - Albumin Random Urine Quantitative with Creat Ratio; Future    3. Inflammatory arthritis  She had started with the rheumatologist through  [email or web address] but that person left and she does need to establish with a new rheumatologist, referral given  - RHEUMATOLOGY REFERRAL; Future    Return in about 2 months (around 7/26/2020) for Physical Exam.      Phone call duration:  15 minutes    Viri Savage MD

## 2020-05-27 DIAGNOSIS — R60.9 EDEMA, UNSPECIFIED TYPE: ICD-10-CM

## 2020-05-27 DIAGNOSIS — N18.30 CKD (CHRONIC KIDNEY DISEASE) STAGE 3, GFR 30-59 ML/MIN (H): ICD-10-CM

## 2020-05-27 PROCEDURE — 82043 UR ALBUMIN QUANTITATIVE: CPT | Performed by: INTERNAL MEDICINE

## 2020-05-27 PROCEDURE — 80048 BASIC METABOLIC PNL TOTAL CA: CPT | Performed by: INTERNAL MEDICINE

## 2020-05-27 PROCEDURE — 36415 COLL VENOUS BLD VENIPUNCTURE: CPT | Performed by: INTERNAL MEDICINE

## 2020-05-28 LAB
ANION GAP SERPL CALCULATED.3IONS-SCNC: 14 MMOL/L (ref 3–14)
BUN SERPL-MCNC: 6 MG/DL (ref 7–30)
CALCIUM SERPL-MCNC: 8.1 MG/DL (ref 8.5–10.1)
CHLORIDE SERPL-SCNC: 97 MMOL/L (ref 94–109)
CO2 SERPL-SCNC: 22 MMOL/L (ref 20–32)
CREAT SERPL-MCNC: 0.82 MG/DL (ref 0.52–1.04)
CREAT UR-MCNC: 154 MG/DL
GFR SERPL CREATININE-BSD FRML MDRD: 75 ML/MIN/{1.73_M2}
GLUCOSE SERPL-MCNC: 86 MG/DL (ref 70–99)
MICROALBUMIN UR-MCNC: 29 MG/L
MICROALBUMIN/CREAT UR: 18.7 MG/G CR (ref 0–25)
POTASSIUM SERPL-SCNC: 3.7 MMOL/L (ref 3.4–5.3)
SODIUM SERPL-SCNC: 133 MMOL/L (ref 133–144)

## 2020-06-02 ENCOUNTER — TELEPHONE (OUTPATIENT)
Dept: INTERNAL MEDICINE | Facility: CLINIC | Age: 65
End: 2020-06-02

## 2020-06-02 NOTE — TELEPHONE ENCOUNTER
Ideally its better to be evaluated.   But if she doesn't want to be seen, she can take the medication including tramadol, muscle relaxer PRN.  Advise to call if her symptoms are not getting better.    Simin Richards MD

## 2020-06-02 NOTE — TELEPHONE ENCOUNTER
Patient fell on Sunday and is having pain in her left chest area and breast. She does not want to do a virtual visit but she wants to know if she can take tramadol and cyclobenzaprine because she has both at home.

## 2020-06-03 NOTE — TELEPHONE ENCOUNTER
Called patient, attempted to further triage, patient declined. Advised patient of provider's message below. Patient insists that she does not want to be seen. Patient agrees to plan and will call back with further concerns.

## 2020-07-27 ENCOUNTER — HOSPITAL ENCOUNTER (OUTPATIENT)
Dept: CARDIOLOGY | Facility: CLINIC | Age: 65
Discharge: HOME | End: 2020-07-27
Attending: INTERNAL MEDICINE | Admitting: INTERNAL MEDICINE
Payer: COMMERCIAL

## 2020-07-27 DIAGNOSIS — R60.9 EDEMA, UNSPECIFIED TYPE: ICD-10-CM

## 2020-07-27 PROCEDURE — 93306 TTE W/DOPPLER COMPLETE: CPT | Mod: 26 | Performed by: INTERNAL MEDICINE

## 2020-07-27 PROCEDURE — 93306 TTE W/DOPPLER COMPLETE: CPT

## 2020-07-30 ENCOUNTER — TELEPHONE (OUTPATIENT)
Dept: INTERNAL MEDICINE | Facility: CLINIC | Age: 65
End: 2020-07-30

## 2020-07-30 DIAGNOSIS — R60.0 LOCALIZED EDEMA: Primary | ICD-10-CM

## 2020-07-31 RX ORDER — FUROSEMIDE 20 MG
20 TABLET ORAL DAILY
Qty: 30 TABLET | Refills: 1 | Status: SHIPPED | OUTPATIENT
Start: 2020-07-31 | End: 2020-09-14

## 2020-07-31 NOTE — TELEPHONE ENCOUNTER
Advised patient that generally we are going to call with abnormal results with an 1-2 business days.  Otherwise we asked that they wait at least a week before calling for results.  We get so many in a day that we do not always get the letter out the same day.  Also, some tests like this do take a few days to get the results back.  Her echocardiogram is overall normal.  Her swelling is probably related to vein insufficiency or may be in part due to lymphedema.  If her symptoms are bothering a lot, we could refer her for therapy.

## 2020-07-31 NOTE — TELEPHONE ENCOUNTER
I will send a prescription for Lasix.  It may not help that much.  She may not want to take it first thing in the morning but certainly should not take it any later than 2:58 PM.  She would need to do labs for kidney function, sodium and potassium after she has been on it for 3 weeks

## 2020-07-31 NOTE — TELEPHONE ENCOUNTER
Called patient, advised of below. Patient agrees with plan, she will call to schedule a lab only appointment when she begins the Lasix.   Saundra Zuniga RN

## 2020-07-31 NOTE — TELEPHONE ENCOUNTER
Called patient, advised of below. Patient states she can not afford therapy right now. Patient is asking if she could try a diuretic.    Patient also wanted PCP to know she is having a stimulator put in her back. Unsure of the date. She will call back to schedule a pre-op.    Please advise.  Saundra Zuniga RN

## 2020-08-03 ENCOUNTER — NURSE TRIAGE (OUTPATIENT)
Dept: INTERNAL MEDICINE | Facility: CLINIC | Age: 65
End: 2020-08-03

## 2020-08-03 NOTE — TELEPHONE ENCOUNTER
Patient calling  She is using OTC medication for acid reflux but the last two weeks it has been very bad. Please advise what more patient can do. ok to call and antonio [phone]

## 2020-08-03 NOTE — TELEPHONE ENCOUNTER
Called patient for further triage.    Patient reports for the last two weeks, she wakes up every morning feeling nauseated following by x1 episode of vomiting on an empty stomach. Patient denies vomiting bile. Patient reports for the rest of the day she feels fine, and vomiting does not reoccur until the next morning. Patient notes her appetite seems decreased, but she is staying hydrated. Patient denies fever, fatigue headaches, abdominal pain, or diarrhea. Patient is passing normal stools daily. Patient reports no one else in the home is sick, no known exposure to Covid-19. Patient reports she has tried an over the counter medication for vomiting with no relief, patient does not recall name nor does she have the medication with her.    Nursing protocol advises patient should be seen same day. Patient declines. Patient states she would like to see primary care provider or a GI doctor. No appointments with primary care provider this week. Primary care provider, please advise.     Additional Information    MILD to MODERATE vomiting (e.g., 1-5 times/day) and lasts > 48 hours (2 days)    Negative: SEVERE headache and vomiting    Negative: Vomiting and abdomen looks much more swollen than usual    Negative: Fever > 103 F (39.4 C)    Negative: Fever > 101 F (38.3 C) and over 60 years of age    Negative: Fever > 100.0 F (37.8 C) and has a weak immune system (e.g., HIV positive, cancer chemo, organ transplant, splenectomy, chronic steroids)    Negative: Fever > 100.0 F (37.8 C) and bedridden (e.g., nursing home patient, stroke, chronic illness, recovering from surgery)    Negative: Taking any of the following medications: digoxin (Lanoxin), lithium, theophylline, phenytoin (Dilantin)    Negative: Drinking very little and has signs of dehydration (e.g., no urine > 12 hours, very dry mouth, very lightheaded)    Negative: Constant abdominal pain lasting > 2 hours    Negative: High-risk adult (e.g., brain tumor, V-P shunt,  "hernia)    Negative: Severe pain in one eye    Negative: Patient sounds very sick or weak to the triager    Negative: SEVERE vomiting (e.g., 6 or more times/day)    Negative: MODERATE vomiting (e.g., 3 - 5 times/day) and age > 60    Negative: Vomiting contains bile (green color)    Negative: Vomiting red blood or black (coffee ground) material    Negative: Insulin-dependent diabetes and glucose > 240 mg/dL (13 mmol/L)    Negative: Recent head injury (within 3 days)    Negative: Recent abdominal injury (within 7 days)    Negative: Vomiting occurs only while coughing    Negative: Pregnant < 20 Weeks and nausea/vomiting began in early pregnancy (i.e., 4-8 weeks pregnant)    Negative: Chest pain    Negative: Headache is main symptom    Negative: Shock suspected (e.g., cold/pale/clammy skin, too weak to stand, low BP, rapid pulse)    Negative: Difficult to awaken or acting confused (e.g., disoriented, slurred speech)    Negative: Sounds like a life-threatening emergency to the triager    Answer Assessment - Initial Assessment Questions  1. VOMITING SEVERITY: \"How many times have you vomited in the past 24 hours?\"      - MILD:  1 - 2 times/day     - MODERATE: 3 - 5 times/day, decreased oral intake without significant weight loss or symptoms of dehydration     - SEVERE: 6 or more times/day, vomits everything or nearly everything, with significant weight loss, symptoms of dehydration       Once. Patient reports for the last two weeks she vomits x1 very morning   2. ONSET: \"When did the vomiting begin?\"       Two weeks ago   3. FLUIDS: \"What fluids or food have you vomited up today?\" \"Have you been able to keep any fluids down?\"      Patient reports when she vomits it is on an empty stomach. Patient reports she is able to keep food and fluids down the rest of the day   4. ABDOMINAL PAIN: \"Are your having any abdominal pain?\" If yes : \"How bad is it and what does it feel like?\" (e.g., crampy, dull, intermittent, constant)     " "  No  5. DIARRHEA: \"Is there any diarrhea?\" If so, ask: \"How many times today?\"       No. Patient reports she is passing normal bowel movements daily   6. CONTACTS: \"Is there anyone else in the family with the same symptoms?\"       No  7. CAUSE: \"What do you think is causing your vomiting?\"      Patient is unsure   8. HYDRATION STATUS: \"Any signs of dehydration?\" (e.g., dry mouth [not only dry lips], too weak to stand) \"When did you last urinate?\"      No   9. OTHER SYMPTOMS: \"Do you have any other symptoms?\" (e.g., fever, headache, vertigo, vomiting blood or coffee grounds, recent head injury)      No  10. PREGNANCY: \"Is there any chance you are pregnant?\" \"When was your last menstrual period?\"        No    Protocols used: VOMITING-A-OH      "

## 2020-08-03 NOTE — TELEPHONE ENCOUNTER
Recommend that she take omeprazole (Priilosec) 20 mg over-the-counter 30 to 60 minutes before supper, avoid eating for at least 2 hours before bedtime, ideally it should be 3 hours or more.  Avoid all alcohol and caffeine.  Avoid anti-inflammatory pain relievers like Advil or Aleve.  If she continues to have the problems, then she will definitely need to see GI.

## 2020-08-04 NOTE — TELEPHONE ENCOUNTER
Called patient and advised of message below. Patient verbalized understanding.    Patient reports she will try the omeprazole, but she is hardly eating any regular meals because she has no appetite. Patient reports she has been eating something small like yogurt for dinner, or just taking a few bites.    Will route to primary care provider for review. Patient will call back with further concerns, or if symptoms do not improve.

## 2020-08-07 ENCOUNTER — OFFICE VISIT (OUTPATIENT)
Dept: FAMILY MEDICINE | Facility: CLINIC | Age: 65
End: 2020-08-07
Payer: COMMERCIAL

## 2020-08-07 VITALS
HEART RATE: 91 BPM | HEIGHT: 62 IN | TEMPERATURE: 98.4 F | SYSTOLIC BLOOD PRESSURE: 110 MMHG | OXYGEN SATURATION: 97 % | DIASTOLIC BLOOD PRESSURE: 72 MMHG | RESPIRATION RATE: 12 BRPM | BODY MASS INDEX: 29.81 KG/M2 | WEIGHT: 162 LBS

## 2020-08-07 DIAGNOSIS — R11.2 NAUSEA AND VOMITING, INTRACTABILITY OF VOMITING NOT SPECIFIED, UNSPECIFIED VOMITING TYPE: ICD-10-CM

## 2020-08-07 DIAGNOSIS — R10.31 ABDOMINAL PAIN, RIGHT LOWER QUADRANT: Primary | ICD-10-CM

## 2020-08-07 DIAGNOSIS — F10.20 UNCOMPLICATED ALCOHOL DEPENDENCE (H): ICD-10-CM

## 2020-08-07 LAB
BASOPHILS # BLD AUTO: 0 10E9/L (ref 0–0.2)
BASOPHILS NFR BLD AUTO: 0.4 %
DIFFERENTIAL METHOD BLD: ABNORMAL
EOSINOPHIL # BLD AUTO: 0.1 10E9/L (ref 0–0.7)
EOSINOPHIL NFR BLD AUTO: 1.2 %
ERYTHROCYTE [DISTWIDTH] IN BLOOD BY AUTOMATED COUNT: 12.8 % (ref 10–15)
HCT VFR BLD AUTO: 38.6 % (ref 35–47)
HGB BLD-MCNC: 13.5 G/DL (ref 11.7–15.7)
LYMPHOCYTES # BLD AUTO: 1.2 10E9/L (ref 0.8–5.3)
LYMPHOCYTES NFR BLD AUTO: 23.9 %
MCH RBC QN AUTO: 35.5 PG (ref 26.5–33)
MCHC RBC AUTO-ENTMCNC: 35 G/DL (ref 31.5–36.5)
MCV RBC AUTO: 102 FL (ref 78–100)
MONOCYTES # BLD AUTO: 1.1 10E9/L (ref 0–1.3)
MONOCYTES NFR BLD AUTO: 22.7 %
NEUTROPHILS # BLD AUTO: 2.6 10E9/L (ref 1.6–8.3)
NEUTROPHILS NFR BLD AUTO: 51.8 %
PLATELET # BLD AUTO: 189 10E9/L (ref 150–450)
RBC # BLD AUTO: 3.8 10E12/L (ref 3.8–5.2)
WBC # BLD AUTO: 4.9 10E9/L (ref 4–11)

## 2020-08-07 PROCEDURE — 80053 COMPREHEN METABOLIC PANEL: CPT | Performed by: FAMILY MEDICINE

## 2020-08-07 PROCEDURE — 85025 COMPLETE CBC W/AUTO DIFF WBC: CPT | Performed by: FAMILY MEDICINE

## 2020-08-07 PROCEDURE — 36415 COLL VENOUS BLD VENIPUNCTURE: CPT | Performed by: FAMILY MEDICINE

## 2020-08-07 PROCEDURE — 99214 OFFICE O/P EST MOD 30 MIN: CPT | Performed by: FAMILY MEDICINE

## 2020-08-07 ASSESSMENT — MIFFLIN-ST. JEOR: SCORE: 1228.08

## 2020-08-07 NOTE — PROGRESS NOTES
Subjective     Krystyna Peña is a 65 year old female who presents to clinic today for the following health issues:    HPI       ABDOMINAL   PAIN     Onset: 3 days    Description:   Character: Dull ache  Location: right lower quadrant  Radiation: None    Intensity: moderate    Progression of Symptoms:  same    Accompanying Signs & Symptoms:  Fever/Chills?: no   Gas/Bloating: no   Nausea: YES  Vomiting: YES  Diarrhea?: no   Constipation:no   Dysuria or Hematuria: no    History:   Trauma: no   Previous similar pain: no    Previous tests done: none    Precipitating factors:   Does the pain change with:     Food: no      BM: no     Urination: no     Alleviating factors:  none    Therapies Tried and outcome: hot shower    LMP:  not applicable     Per patient omeprazole has helped her some.   History of daily alcohol use- usually drinks about 2 glasses of darling daily but has been too sick to drink over the last couple of days.   Denies any body aches, chills, changes in smell or taste.   No vomiting today- has been able to keep 1/2 cup of soup down today.           Wt Readings from Last 4 Encounters:   08/07/20 73.5 kg (162 lb)   11/19/19 74.8 kg (164 lb 14.4 oz)   10/28/19 69.4 kg (153 lb)   08/13/19 73.5 kg (162 lb)         Patient Active Problem List   Diagnosis     Asymptomatic varicose veins     CARDIOVASCULAR SCREENING; LDL GOAL LESS THAN 160     Back pain     Urinary frequency     Benign essential hypertension     Edema, unspecified type     CKD (chronic kidney disease) stage 3, GFR 30-59 ml/min (H)     Inflammatory arthritis     Past Surgical History:   Procedure Laterality Date     AMPUTATE TOE(S) Left 4/12/2018    Procedure: AMPUTATE TOE(S);  Amputation left third digit;  Surgeon: Herb Michael DPM;  Location: [facility]     ARTHROPLASTY HIP Right 3/28/2016    Procedure: ARTHROPLASTY HIP;  Surgeon: Perez Meza MD;  Location: [facility]     ARTHROPLASTY REVISION HIP Right 10/28/2019    Procedure: Revision right  total hip arthroplasty;  Surgeon: Perez Meza MD;  Location: [facility]     C NONSPECIFIC PROCEDURE      Right arm plastic surgery     C NONSPECIFIC PROCEDURE      Right knee surgery     C NONSPECIFIC PROCEDURE  10/03    L popliteal AVM repair     C NONSPECIFIC PROCEDURE      Removal bone spur left foot     C NONSPECIFIC PROCEDURE      amputation of toes left & right toes     C REPAIR SPIEGELIAN HERNIA       C TOTAL KNEE ARTHROPLASTY  10/03    LT     CLOSED REDUCTION HIP Right 7/15/2019    Procedure: Closed reduction, right total hip arthroplasty dislocation.;  Surgeon: Perez Meza MD;  Location: [facility]     COLONOSCOPY       HC CORRECT BUNION,SIMPLE      RT     HC CORRECT BUNION,SIMPLE      LT     HC KNEE SCOPE, DIAGNOSTIC      LT     REVERSE ARTHROPLASTY SHOULDER Left 10/30/2018    Procedure: Left reverse total shoulder arthroplasty;  Surgeon: Aaron Christianson MD;  Location: [facility]     REVERSE ARTHROPLASTY SHOULDER Right 2019    Procedure: Right reverse total shoulder arthroplasty;  Surgeon: Aaron Christianson MD;  Location: [facility]     ROTATOR CUFF REPAIR RT/LT      right     VASCULAR SURGERY  left leg bypass        Social History     Tobacco Use     Smoking status: Former Smoker     Packs/day: 1.00     Types: Cigarettes, Cigars     Last attempt to quit: 2013     Years since quittin.3     Smokeless tobacco: Never Used   Substance Use Topics     Alcohol use: Not Currently     Family History   Problem Relation Age of Onset     Breast Cancer Mother      Cancer - colorectal Father         66     Diabetes Brother            Reviewed and updated as needed this visit by Provider         Review of Systems   Constitutional, HEENT, cardiovascular, pulmonary, GI, , musculoskeletal, neuro, skin, endocrine and psych systems are negative, except as otherwise noted.      Objective    LMP 10/01/2003   There is no height or weight on file to calculate BMI.  Physical Exam    GENERAL: healthy, alert and no distress  RESP: lungs clear to auscultation - no rales, rhonchi or wheezes  CV: regular rate and rhythm, normal S1 S2  ABDOMEN: soft, nontender, no hepatosplenomegaly, no masses and bowel sounds normal, no rebound tenderness or guarding   MS: no edema  PSYCH: mentation appears normal, affect normal/bright    Diagnostic Test Results:  Labs reviewed in Epic  Lab Results   Component Value Date    WBC 4.9 08/07/2020     Lab Results   Component Value Date    RBC 3.80 08/07/2020     Lab Results   Component Value Date    HGB 13.5 08/07/2020     Lab Results   Component Value Date    HCT 38.6 08/07/2020     No components found for: MCT  Lab Results   Component Value Date     08/07/2020     Lab Results   Component Value Date    MCH 35.5 08/07/2020     Lab Results   Component Value Date    MCHC 35.0 08/07/2020     Lab Results   Component Value Date    RDW 12.8 08/07/2020     Lab Results   Component Value Date     08/07/2020             Assessment & Plan       ICD-10-CM    1. Abdominal pain, right lower quadrant  R10.31 CBC with platelets and differential     Symptomatic COVID-19 Virus (Coronavirus) by PCR     Comprehensive metabolic panel (BMP + Alb, Alk Phos, ALT, AST, Total. Bili, TP)   2. Nausea and vomiting, intractability of vomiting not specified, unspecified vomiting type  R11.2    3. Uncomplicated alcohol dependence (H)  F10.20      - likely viral gastroenteritis. In the current setting of COVID-19 will rule this out as well. Advised to keep diet soft and bland for now and then ADAT.   - reviewed importance of alcohol cessation.   - with upcoming weekend recommend ER if symptoms worsen.         See Patient Instructions    Return in about 1 week (around 8/14/2020) for abdominal pain- with PCP .    Kathy Mireles MD  [facility]

## 2020-08-07 NOTE — TELEPHONE ENCOUNTER
"Pt calls. She reports having Right sided upper abdominal persistant pain. It is tender.   She was having nausea, and Dry heaves last night. Her right side is very sore this AM.   Feels like her stomach is \"sticking out\".   Attempted to ask her if she means she is bloated?     Her phone then just cut out and we have been unable to reach her back.     Attempted to contact pt. Left message to call clinic.       "

## 2020-08-07 NOTE — TELEPHONE ENCOUNTER
Call to pt. She is bloated. Not eating much. Lost 4 lbs. Started a few weeks ago.   Has gallbladder. She states this can wait over the weekend.   No openings next week. She is going to call [facility] to see if there are openings there. May need to go to ED if sx worsen. She agrees. .

## 2020-08-07 NOTE — PATIENT INSTRUCTIONS
Patient Education     Unknown Causes of Abdominal Pain (Female)    The exact cause of your belly (abdominal) pain is not clear. This does not mean that this is something to worry about. Everyone likes to know the exact cause of the problem. But sometimes with belly pain, there is no clear-cut cause, and this could be a good thing. The good news is that your symptoms can be treated, and you will feel better.   Your condition does not seem serious now. But sometimes the signs of a serious problem may take more time to appear. For this reason, it is important for you to watch for any new symptoms, problems, or worsening of your condition.  Over the next few days, the abdominal pain may come and go. Or it may be constant. Other common symptoms can include nausea and vomiting. Sometimes it can be difficult to tell if you feel nauseous. You may just feel bad and not connect that feeling to nausea. Constipation, diarrhea, and a fever may go along with the pain.  The pain may continue even if treated correctly over the following days. Depending on how things go, sometimes the cause can become clear and may need more or different treatment. Additional evaluations, medicines, or tests may also be needed.  Home care  Your healthcare provider may prescribe medicine for pain, symptoms, or an infection.  Follow the healthcare provider's instructions for taking these medicines.  General care    Rest as much as you can until your next exam. No strenuous activities.    Try to find positions that ease discomfort. A small pillow placed on the abdomen may help relieve pain.    Something warm on your abdomen (such as a heating pad) may help, but be careful not to burn yourself.  Diet    Don t force yourself to eat, especially if having cramps, vomiting, or diarrhea.    Water is important so you don't get dehydrated. Soup may also be good. Sports drinks may also help, especially if they are not too acidic. Don't drink sugary drinks as  this can make things worse. Take liquids in small amounts. Don t guzzle them.    Caffeine sometimes makes the pain and cramping worse.    Don t take dairy products if you have vomiting or diarrhea.    Don't eat large amounts at a time. Wait a few minutes between bites.    Eat a diet low in fiber (called a low-residue diet). Foods allowed include refined breads, white rice, fruit and vegetable juices without pulp, tender meats. These foods will pass more easily through the intestine.    Don t have whole-grain foods, whole fruits and vegetables, meats, seeds and nuts, fried or fatty foods, dairy, alcohol and spicy foods until your symptoms go away.  Follow-up care  Follow up with your healthcare provider, or as advised, if your pain does not begin to improve in the next 24 hours.  Call 911  Call 911 if any of these occur:    Trouble breathing    Confusion    Fainting or loss of consciousness    Rapid heart rate    Seizure  When to seek medical advice  Call your healthcare provider right away if any of these occur:    Pain gets worse or moves to the right lower abdomen    New or worsening vomiting or diarrhea    Swelling of the abdomen    Unable to pass stool for more than 3 days    Fever of 100.4 F (38 C) or higher, or as directed by your healthcare provider.    Blood in vomit or bowel movements (dark red or black color)    Yellow color of eyes and skin (jaundice)    Weakness, dizziness    Chest, arm, back, neck, or jaw pain    Unexpected vaginal bleeding or missed period    Can't keep down liquids or water and you are getting dehydrated  Date Last Reviewed: 6/1/2018 2000-2019 [employer]. [address] [place] [address], [place] [address]. All rights reserved. This information is not intended as a substitute for professional medical care. Always follow your healthcare professional's instructions.

## 2020-08-08 LAB
ALBUMIN SERPL-MCNC: 1.7 G/DL (ref 3.4–5)
ALP SERPL-CCNC: 86 U/L (ref 40–150)
ALT SERPL W P-5'-P-CCNC: 82 U/L (ref 0–50)
ANION GAP SERPL CALCULATED.3IONS-SCNC: 19 MMOL/L (ref 3–14)
AST SERPL W P-5'-P-CCNC: 114 U/L (ref 0–45)
BILIRUB SERPL-MCNC: 1.5 MG/DL (ref 0.2–1.3)
BUN SERPL-MCNC: 7 MG/DL (ref 7–30)
CALCIUM SERPL-MCNC: 6 MG/DL (ref 8.5–10.1)
CHLORIDE SERPL-SCNC: 92 MMOL/L (ref 94–109)
CO2 SERPL-SCNC: 19 MMOL/L (ref 20–32)
CREAT SERPL-MCNC: 1.05 MG/DL (ref 0.52–1.04)
GFR SERPL CREATININE-BSD FRML MDRD: 55 ML/MIN/{1.73_M2}
GLUCOSE SERPL-MCNC: 95 MG/DL (ref 70–99)
POTASSIUM SERPL-SCNC: 3.1 MMOL/L (ref 3.4–5.3)
PROT SERPL-MCNC: 6.5 G/DL (ref 6.8–8.8)
SODIUM SERPL-SCNC: 130 MMOL/L (ref 133–144)

## 2020-08-10 DIAGNOSIS — E87.6 HYPOKALEMIA: Primary | ICD-10-CM

## 2020-08-10 RX ORDER — POTASSIUM CHLORIDE 1500 MG/1
20 TABLET, EXTENDED RELEASE ORAL DAILY
Qty: 5 TABLET | Refills: 0 | Status: SHIPPED | OUTPATIENT
Start: 2020-08-10 | End: 2020-08-16

## 2020-08-13 DIAGNOSIS — R60.0 LOCALIZED EDEMA: ICD-10-CM

## 2020-08-13 DIAGNOSIS — E87.6 HYPOKALEMIA: Primary | ICD-10-CM

## 2020-08-13 PROCEDURE — 80048 BASIC METABOLIC PNL TOTAL CA: CPT | Performed by: INTERNAL MEDICINE

## 2020-08-13 PROCEDURE — 36415 COLL VENOUS BLD VENIPUNCTURE: CPT | Performed by: INTERNAL MEDICINE

## 2020-08-14 LAB
ANION GAP SERPL CALCULATED.3IONS-SCNC: 9 MMOL/L (ref 3–14)
BUN SERPL-MCNC: 9 MG/DL (ref 7–30)
CALCIUM SERPL-MCNC: 7.8 MG/DL (ref 8.5–10.1)
CHLORIDE SERPL-SCNC: 93 MMOL/L (ref 94–109)
CO2 SERPL-SCNC: 29 MMOL/L (ref 20–32)
CREAT SERPL-MCNC: 1.25 MG/DL (ref 0.52–1.04)
GFR SERPL CREATININE-BSD FRML MDRD: 45 ML/MIN/{1.73_M2}
GLUCOSE SERPL-MCNC: 93 MG/DL (ref 70–99)
POTASSIUM SERPL-SCNC: 3.2 MMOL/L (ref 3.4–5.3)
SODIUM SERPL-SCNC: 131 MMOL/L (ref 133–144)

## 2020-08-16 RX ORDER — POTASSIUM CHLORIDE 1500 MG/1
40 TABLET, EXTENDED RELEASE ORAL DAILY
Qty: 60 TABLET | Refills: 1 | Status: SHIPPED | OUTPATIENT
Start: 2020-08-16 | End: 2020-08-28

## 2020-08-17 ENCOUNTER — TRANSFERRED RECORDS (OUTPATIENT)
Dept: HEALTH INFORMATION MANAGEMENT | Facility: CLINIC | Age: 65
End: 2020-08-17

## 2020-08-19 ENCOUNTER — NURSE TRIAGE (OUTPATIENT)
Dept: INTERNAL MEDICINE | Facility: CLINIC | Age: 65
End: 2020-08-19

## 2020-08-19 NOTE — TELEPHONE ENCOUNTER
"BP Readings from Last 3 Encounters:   08/07/20 110/72   11/19/19 118/66   10/30/19 124/65       Additional Information    Negative: Systolic BP < 90 and feeling dizzy, lightheaded, or weak    Negative: Started suddenly after an allergic medicine, an allergic food, or bee sting    Negative: Shock suspected (e.g., cold/pale/clammy skin, too weak to stand, low BP, rapid pulse)    Negative: Difficult to awaken or acting confused  (e.g., disoriented, slurred speech)    Negative: Fainted    Negative: Chest pain    Negative: Bleeding (e.g., vomiting blood, rectal bleeding or tarry stools, severe vaginal bleeding)    Negative: Extra heart beats or heart is beating fast  (i.e., \"palpitations\")    Negative: Sounds like a life-threatening emergency to the triager    Negative: Systolic BP < 80 and NOT dizzy, lightheaded or weak (feels normal)    Negative: Abdominal pain    Negative: Major surgery in the past month    Negative: Fever > 100.5 F (38.1 C)    Negative: Drinking very little and has signs of dehydration (e.g., no urine > 12 hours, very dry mouth, very lightheaded)    Negative: Fall in systolic BP > 20 mm Hg from normal and feeling dizzy, lightheaded, or weak    Negative: Patient sounds very sick or weak to the triager    Negative: Systolic BP < 90 and NOT dizzy, lightheaded or weak    Negative: Systolic BP  while taking blood pressure medications and NOT dizzy, lightheaded or weak    Negative: Fall in systolic BP > 20 mm Hg from normal and NOT dizzy, lightheaded, or weak    Negative: Fall in systolic BP > 20 mmHg after standing up    Negative: Fall in systolic BP > 20 mmHg after eating a meal    Negative: Diastolic BP < 50 mm Hg    Brief dizziness or lightheadedness after standing up or eating    Answer Assessment - Initial Assessment Questions  1. BLOOD PRESSURE: \"What is the blood pressure?\" \"Did you take at least two measurements 5 minutes apart?\"      Took blood pressure at 10:40am today and was 81/58, @ " "12:00p was 85/59, and approx 15 min ago was 89/60.  Just took now and was 106/70.  2. ONSET: \"When did you take your blood pressure?\"      Took her blood pressure this morning.  3. HOW: \"How did you obtain the blood pressure?\" (e.g., visiting nurse, automatic home BP monitor)      Patient states she has a home blood pressure monitor.  4. HISTORY: \"Do you have a history of low blood pressure?\" \"What is your blood pressure normally?\"      110/70's  5. MEDICATIONS: \"Are you taking any medications for blood pressure?\" If yes: \"Have they been changed recently?\"      Patient states she is not taking any blood pressure medications.  6. PULSE RATE: \"Do you know what your pulse rate is?\"       Pulse now is 83.  7. OTHER SYMPTOMS: \"Have you been sick recently?\" \"Have you had a recent injury?\"      Has not been sick and no recent injury.  Had one episode of dizziness/lightheadedness earlier this morning.  Denies dizziness/lightheadedness now.  States she has been sitting down for a while relaxing.  8. PREGNANCY: \"Is there any chance you are pregnant?\" \"When was your last menstrual period?\"      NA    Protocols used: LOW BLOOD PRESSURE-A-OH    Patient will continue to monitor blood pressure.  If it gets low again, will call back.  States she is no longer dizzy or lightheaded.  "

## 2020-08-20 ENCOUNTER — TELEPHONE (OUTPATIENT)
Dept: INTERNAL MEDICINE | Facility: CLINIC | Age: 65
End: 2020-08-20

## 2020-08-20 DIAGNOSIS — I10 BENIGN ESSENTIAL HYPERTENSION: Primary | ICD-10-CM

## 2020-08-20 NOTE — TELEPHONE ENCOUNTER
Patient calling.  States she just tripped over the edging on her lawn and fell on the  side walk.  States the L side of her ribs hurt now.  She is asking for Tramadol.    Advised patient to go to [facility] urgent care for eval of her rib pain--to r/o fracture(s).    Patient states she really didn't want to go to urgent care--just wants a prescription for Tramadol for the pain.    This writer reiterated to patient its recommended go to urgent care for eval.  Patient had no further questions.

## 2020-08-28 DIAGNOSIS — E87.6 HYPOKALEMIA: ICD-10-CM

## 2020-08-28 RX ORDER — POTASSIUM CHLORIDE 1500 MG/1
TABLET, EXTENDED RELEASE ORAL
Qty: 60 TABLET | Refills: 1 | Status: SHIPPED | OUTPATIENT
Start: 2020-08-28 | End: 2022-03-08

## 2020-08-28 NOTE — TELEPHONE ENCOUNTER
Pharmacy did not receive 8/16/20 prescription. Resent.    Prescription approved per [facility] Protocol.

## 2020-09-14 ENCOUNTER — OFFICE VISIT (OUTPATIENT)
Dept: INTERNAL MEDICINE | Facility: CLINIC | Age: 65
End: 2020-09-14
Payer: COMMERCIAL

## 2020-09-14 VITALS
DIASTOLIC BLOOD PRESSURE: 80 MMHG | RESPIRATION RATE: 16 BRPM | OXYGEN SATURATION: 98 % | TEMPERATURE: 97.3 F | HEART RATE: 81 BPM | SYSTOLIC BLOOD PRESSURE: 122 MMHG | BODY MASS INDEX: 27.62 KG/M2 | WEIGHT: 151 LBS

## 2020-09-14 DIAGNOSIS — M54.50 LOW BACK PAIN, UNSPECIFIED BACK PAIN LATERALITY, UNSPECIFIED CHRONICITY, UNSPECIFIED WHETHER SCIATICA PRESENT: ICD-10-CM

## 2020-09-14 DIAGNOSIS — I10 BENIGN ESSENTIAL HYPERTENSION: ICD-10-CM

## 2020-09-14 DIAGNOSIS — N18.30 CKD (CHRONIC KIDNEY DISEASE) STAGE 3, GFR 30-59 ML/MIN (H): ICD-10-CM

## 2020-09-14 DIAGNOSIS — Z01.818 PRE-OP EXAM: Primary | ICD-10-CM

## 2020-09-14 LAB
ANION GAP SERPL CALCULATED.3IONS-SCNC: 3 MMOL/L (ref 3–14)
BUN SERPL-MCNC: 14 MG/DL (ref 7–30)
CALCIUM SERPL-MCNC: 9.4 MG/DL (ref 8.5–10.1)
CHLORIDE SERPL-SCNC: 106 MMOL/L (ref 94–109)
CO2 SERPL-SCNC: 27 MMOL/L (ref 20–32)
CREAT SERPL-MCNC: 1.25 MG/DL (ref 0.52–1.04)
GFR SERPL CREATININE-BSD FRML MDRD: 45 ML/MIN/{1.73_M2}
GLUCOSE SERPL-MCNC: 87 MG/DL (ref 70–99)
POTASSIUM SERPL-SCNC: 4.8 MMOL/L (ref 3.4–5.3)
SODIUM SERPL-SCNC: 136 MMOL/L (ref 133–144)

## 2020-09-14 PROCEDURE — 80048 BASIC METABOLIC PNL TOTAL CA: CPT | Performed by: PHYSICIAN ASSISTANT

## 2020-09-14 PROCEDURE — 99214 OFFICE O/P EST MOD 30 MIN: CPT | Performed by: PHYSICIAN ASSISTANT

## 2020-09-14 PROCEDURE — 36415 COLL VENOUS BLD VENIPUNCTURE: CPT | Performed by: PHYSICIAN ASSISTANT

## 2020-09-14 PROCEDURE — 93000 ELECTROCARDIOGRAM COMPLETE: CPT | Performed by: PHYSICIAN ASSISTANT

## 2020-09-14 NOTE — PROGRESS NOTES
[address] [address]  Phone: [phone]  Primary Provider: Viri Savage  Pre-op Performing Provider: ABE STALEY    PREOPERATIVE EVALUATION:  Today's date: 9/14/2020    Krystyna Peña is a 65 year old female who presents for a preoperative evaluation.    Surgical Information:  Surgery Details 9/14/2020   Surgery/Procedure: Stimlus in Back   Surgery Location: [facility]   Surgeon: Dr. Phillips   Surgery Date: 09/29/2020 patient to have permanent stimulator implanted on 10/26 as well    Time of Surgery: 1:30pm   Where patient plans to recover: At home with family   Additional recovery plan details: N/A     Fax number for surgical facility: [phone] and [phone]  Type of Anesthesia Anticipated: General    Subjective     HPI related to upcoming procedure: chronic low back pain   Preop Questions 9/14/2020   1. Have you ever had a heart attack or stroke? No   2. Have you ever had surgery on your heart or blood vessels, such as a stent placement, a coronary artery bypass, or surgery on an artery in your head, neck, heart, or legs? No   3. Do you have chest pain with activity? No   4. Do you have a history of  heart failure? No   5. Do you currently have a cold, bronchitis or symptoms of other infection? No   6. Do you have a cough, shortness of breath, or wheezing? No   7. Do you or anyone in your family have previous history of blood clots? No   8. Do you or does anyone in your family have a serious bleeding problem such as prolonged bleeding following surgeries or cuts? No   9. Have you ever had problems with anemia or been told to take iron pills? No   10. Have you had any abnormal blood loss such as black, tarry or bloody stools, or abnormal vaginal bleeding? No   11. Have you ever had a blood transfusion? No   Are you willing to have a blood transfusion if it is medically needed before, during, or after your  surgery? Yes   13. Have you or any of your relatives ever had problems with anesthesia? No   14. Do you have sleep apnea, excessive snoring or daytime drowsiness? No   15. Do you have any artifical heart valves or other implanted medical devices like a pacemaker, defibrillator, or continuous glucose monitor? No   16. Do you have artificial joints? YES - right hip    17. Are you allergic to latex? No   18. Is there any chance that you may be pregnant? No     Reviewed above on 10/20/2020 with patient and no changes     RX monitoring program ([place]) reviewed:     See problem list for active medical problems.  Problems all longstanding and stable, except as noted/documented.  See ROS for pertinent symptoms related to these conditions.    HYPERTENSION - Patient has longstanding history of HTN , currently denies any symptoms referable to elevated blood pressure. Specifically denies chest pain, palpitations, dyspnea, orthopnea, PND or peripheral edema. Blood pressure readings have been in normal range. Current medication regimen is as listed below no meds currently .   RENAL INSUFFICIENCY - Patient has a longstanding history of moderate-severe chronic renal insufficiency. Last Cr today       Review of Systems  CONSTITUTIONAL: NEGATIVE for fever, chills, change in weight  INTEGUMENTARY/SKIN: NEGATIVE for worrisome rashes, moles or lesions  EYES: NEGATIVE for vision changes or irritation  ENT/MOUTH: NEGATIVE for ear, mouth and throat problems  RESP: NEGATIVE for significant cough or SOB  CV: NEGATIVE for chest pain, palpitations or peripheral edema  GI: NEGATIVE for nausea, abdominal pain, heartburn, or change in bowel habits  MUSCULOSKELETAL: NEGATIVE for significant arthralgias or myalgia  NEURO: NEGATIVE for weakness, dizziness or paresthesias  HEME/ALLERGY/IMMUNE: NEGATIVE for bleeding problems  PSYCHIATRIC: NEGATIVE for changes in mood or affect  ROS otherwise negative    Patient Active Problem List    Diagnosis Date  Noted     Inflammatory arthritis 05/26/2020     Priority: Medium     CKD (chronic kidney disease) stage 3, GFR 30-59 ml/min (H) 06/03/2019     Priority: Medium     Edema, unspecified type 07/15/2017     Priority: Medium     Benign essential hypertension 11/27/2015     Priority: Medium     Urinary frequency 09/21/2014     Priority: Medium     Back pain 03/24/2014     Priority: Medium     CARDIOVASCULAR SCREENING; LDL GOAL LESS THAN 160 10/31/2010     Priority: Medium     Asymptomatic varicose veins 06/24/2003     Priority: Medium      Past Medical History:   Diagnosis Date     Arthritis      Asymptomatic varicose veins      Benign neoplasm of colon 11/06, 7/13    Adenoma     Gastroesophageal reflux disease      Hypertension      Major depression      Past Surgical History:   Procedure Laterality Date     AMPUTATE TOE(S) Left 4/12/2018    Procedure: AMPUTATE TOE(S);  Amputation left third digit;  Surgeon: Herb Michael DPM;  Location: [facility] OR     ARTHROPLASTY HIP Right 3/28/2016    Procedure: ARTHROPLASTY HIP;  Surgeon: Perez Meza MD;  Location: [facility] OR     ARTHROPLASTY REVISION HIP Right 10/28/2019    Procedure: Revision right total hip arthroplasty;  Surgeon: Perez Meza MD;  Location: [facility] OR     C NONSPECIFIC PROCEDURE  1972    Right arm plastic surgery     C NONSPECIFIC PROCEDURE  1972    Right knee surgery     C NONSPECIFIC PROCEDURE  10/03    L popliteal AVM repair     C NONSPECIFIC PROCEDURE  11/04    Removal bone spur left foot     C NONSPECIFIC PROCEDURE  4/07    amputation of toes left & right toes     C REPAIR SPIEGELIAN HERNIA  2002     C TOTAL KNEE ARTHROPLASTY  10/03    LT     CLOSED REDUCTION HIP Right 7/15/2019    Procedure: Closed reduction, right total hip arthroplasty dislocation.;  Surgeon: Perez Meza MD;  Location: [facility] OR     COLONOSCOPY       HC CORRECT BUNION,SIMPLE  1998    RT     HC CORRECT BUNION,SIMPLE  2001    LT     HC KNEE SCOPE, DIAGNOSTIC      LT     REVERSE  ARTHROPLASTY SHOULDER Left 10/30/2018    Procedure: Left reverse total shoulder arthroplasty;  Surgeon: Aaron Christianson MD;  Location: [facility] OR     REVERSE ARTHROPLASTY SHOULDER Right 2019    Procedure: Right reverse total shoulder arthroplasty;  Surgeon: Aaron Christianson MD;  Location: [facility] OR     ROTATOR CUFF REPAIR RT/LT      right     VASCULAR SURGERY  left leg bypass      Current Outpatient Medications   Medication Sig Dispense Refill     potassium chloride ER (K-TAB) 20 MEQ CR tablet TAKE 1 TABLET(20 MEQ) BY MOUTH DAILY 60 tablet 1     furosemide (LASIX) 20 MG tablet Take 1 tablet (20 mg) by mouth daily (Patient not taking: Reported on 2020) 30 tablet 1       Allergies   Allergen Reactions     Morphine      respiratory distress     Cephalexin Rash        Social History     Tobacco Use     Smoking status: Former Smoker     Packs/day: 1.00     Types: Cigarettes, Cigars     Last attempt to quit: 2013     Years since quittin.4     Smokeless tobacco: Never Used   Substance Use Topics     Alcohol use: Not Currently       History   Drug Use No            Objective   /80   Pulse 81   Temp 97.3  F (36.3  C) (Temporal)   Resp 16   Wt 68.5 kg (151 lb)   LMP 10/01/2003   SpO2 98%   BMI 27.62 kg/m    Physical Exam  GENERAL APPEARANCE: healthy, alert and no distress  EYES: Eyes grossly normal to inspection, PERRL and conjunctivae and sclerae normal  HENT: ear canals and TM's normal and nose and mouth without ulcers or lesions  RESP: lungs clear to auscultation - no rales, rhonchi or wheezes  CV: regular rate and rhythm, normal S1 S2, no S3 or S4 and no murmur, click or rub   ABDOMEN: soft, nontender, no HSM or masses and bowel sounds normal  MS: extremities normal- no gross deformities noted  SKIN: no suspicious lesions or rashes  NEURO: Normal strength and tone, sensory exam grossly normal, mentation intact and speech normal  PSYCH: mentation appears normal and affect normal/bright    Recent  Labs   Lab Test 08/13/20  1458 08/07/20  1500 08/07/20  1447  10/30/19  0556  10/29/19  0641   HGB  --   --  13.5  --  10.6*  --  11.7   PLT  --   --  189  --   --   --  201   * 130*  --    < > 135  --  135   POTASSIUM 3.2* 3.1*  --    < > 3.7   < > 5.8*   CR 1.25* 1.05*  --    < > 1.11*  --  0.99    < > = values in this interval not displayed.        PRE-OP Diagnostics:  Recent Results (from the past 24 hour(s))   Basic metabolic panel    Collection Time: 09/14/20 11:42 AM   Result Value Ref Range    Sodium 136 133 - 144 mmol/L    Potassium 4.8 3.4 - 5.3 mmol/L    Chloride 106 94 - 109 mmol/L    Carbon Dioxide 27 20 - 32 mmol/L    Anion Gap 3 3 - 14 mmol/L    Glucose 87 70 - 99 mg/dL    Urea Nitrogen 14 7 - 30 mg/dL    Creatinine 1.25 (H) 0.52 - 1.04 mg/dL    GFR Estimate 45 (L) >60 mL/min/[1.73_m2]    GFR Estimate If Black 52 (L) >60 mL/min/[1.73_m2]    Calcium 9.4 8.5 - 10.1 mg/dL     EKG: appears normal, NSR, normal axis, normal intervals, no acute ST/T changes c/w ischemia, no LVH by voltage criteria, unchanged from previous tracings         Assessment & Plan   The proposed surgical procedure is considered LOW risk.    REVISED CARDIAC RISK INDEX  The patient has the following serious cardiovascular risks for perioperative complications:  No serious cardiac risks = 0 points    INTERPRETATION: 0 points: Class I (very low risk - 0.4% complication rate)       Krystyna was seen today for pre-op exam.    Diagnoses and all orders for this visit:    Pre-op exam  -     EKG 12-lead complete w/read - Clinics  -     Basic metabolic panel    Low back pain, unspecified back pain laterality, unspecified chronicity, unspecified whether sciatica present  -     EKG 12-lead complete w/read - Clinics  -     Basic metabolic panel    CKD (chronic kidney disease) stage 3, GFR 30-59 ml/min (H)  -     Basic metabolic panel    Benign essential hypertension        The patient has the following additional risks and recommendations  for perioperative complications:     - No identified additional risk factors other than previously addressed     MEDICATION INSTRUCTIONS:  Patient is to take all scheduled medications on the day of surgery    RECOMMENDATION:  APPROVAL GIVEN to proceed with proposed procedure, without further diagnostic evaluation.    10/20/2020 Patient is cleared for permanent stimulator placement for surgery date 10/26/2020 -     No follow-ups on file.    Signed Electronically by: Mary Jolley PA-C    Copy of this evaluation report is provided to requesting physician.    Preop [facility] Preop Guidelines    Revised Cardiac Risk Index

## 2020-09-15 NOTE — PROGRESS NOTES
Call back from patient.     Message from provider was shared in regards to lab results.     No further follow up needed.     Also gave patient message to update her CTC next time in clinic so we are able to speak with her  over the phone.

## 2020-10-14 ENCOUNTER — TELEPHONE (OUTPATIENT)
Dept: INTERNAL MEDICINE | Facility: CLINIC | Age: 65
End: 2020-10-14

## 2020-10-14 NOTE — TELEPHONE ENCOUNTER
Reason for Call:  Patient Request    Detailed comments: Patient is requesting that we fax over her pre-op office notes from Mary Jolley to her back doctor ( Dr. Phillips).  She needs them ASAP, she is getting an implant in her back.  Please notify the patient after you have faxed over the documents.    Phone Number Patient can be reached at: Other phone number:  [phone].   Fax: [phone] Attn:  Samanta and Dr. Phillips    Best Time: Anytime    Can we leave a detailed message on this number? YES    Call taken on 10/14/2020 at 11:04 AM by Dakota Huynh

## 2020-10-19 ENCOUNTER — TELEPHONE (OUTPATIENT)
Dept: INTERNAL MEDICINE | Facility: CLINIC | Age: 65
End: 2020-10-19

## 2020-10-19 NOTE — TELEPHONE ENCOUNTER
Did patient have the surgery last month on 9/29 ?  Or did that procedure get post poned?  Or is she getting a permanent implant now after getting the trial implant for her low back pain?    I can add an addendum to her other pre op if she has not had any changes in the last month. It will still be originally date 9/14/2020 with the addendum date of tomorrow which should be sufficient for her procedure.   Please review the pre op questions with patient and confirm no changes since last month.

## 2020-10-19 NOTE — TELEPHONE ENCOUNTER
Reason for Call:  Other Patient request    Detailed comments: The patient saw Mary Jolley on 9/14 for a pre-op. She is wondering if Mary would be willing to change the date on the appointment to today's date as she is having an implant put in her back on 10/26 and she does not want to have to come in for another appointment. If this would be possible she would like it faxed to Dr. Phillips's office at [phone]. She would like a call back to discuss if this would be possible or not.    Phone Number Patient can be reached at: Home number on file [phone] (home)    Best Time: Any    Can we leave a detailed message on this number? YES    Call taken on 10/19/2020 at 2:51 PM by Jeannette Meza

## 2020-10-20 NOTE — TELEPHONE ENCOUNTER
Patient is having a permanent implant surgery scheduled for 10/26/20.   Patient has stated that nothing has changed since she has seen Mary Snowden PA-C last office visit.    Please addend the paperwork to today's date.     Please fax over the paperwork to Dr. Phillips at [phone].

## 2020-10-21 NOTE — TELEPHONE ENCOUNTER
Pt calling back to clinic.  States no addendum has been done to the paperwork.    On chart review it appears this was done yesterday.    RN called to Samanta with Dr. Phillips at [phone].  Clarified that paperwork was received.  aSmanta reviewed addendum.    RN alerted pt.  No further action needed at this time.

## 2020-11-20 ENCOUNTER — TRANSFERRED RECORDS (OUTPATIENT)
Dept: HEALTH INFORMATION MANAGEMENT | Facility: CLINIC | Age: 65
End: 2020-11-20

## 2020-12-01 ENCOUNTER — TELEPHONE (OUTPATIENT)
Dept: INTERNAL MEDICINE | Facility: CLINIC | Age: 65
End: 2020-12-01

## 2020-12-01 DIAGNOSIS — M79.18 BUTTOCK PAIN: Primary | ICD-10-CM

## 2020-12-01 NOTE — TELEPHONE ENCOUNTER
Tramadol is a controlled medication which does have potential for addiction and abuse.  It is used only for short-term for acute pain problems.  The orthopedist indicated that they were concerned about her ongoing use of tramadol which is one of the reasons why they were not going to continue it.  They said they gave her 1 more prescription to use sparingly on 20 November.  I would not recommend continuing this pain medication.  If she is having ongoing issues that the orthopedist has not been able to help her with, then I would refer her to pain clinic.

## 2020-12-01 NOTE — TELEPHONE ENCOUNTER
Pt advised, she states this is fine-would prefer to go to pain clinic in [place] or this area if possible.

## 2020-12-01 NOTE — TELEPHONE ENCOUNTER
Patient has been receiving tramadol from [facility] for her hamstring pain but they will no longer prescribe it for her. She was advised to contact her PCP for further refills. She is requesting a new RX for 50 mg tabs of tramadol which she takes 2 per day. Please send to [facility] on [address] in [place]

## 2020-12-02 ENCOUNTER — TELEPHONE (OUTPATIENT)
Dept: PALLIATIVE MEDICINE | Facility: CLINIC | Age: 65
End: 2020-12-02

## 2020-12-02 NOTE — TELEPHONE ENCOUNTER
Pain Management Center Referral      1. Confirmed address with patient? Yes  2. Confirmed phone number with patient? Yes  3. Confirmed referring provider? Yes  4. Is the PCP the same as the referring provider? Yes  5. Has the patient been to any previous pain clinics? Yes  (If yes, send ENRIQUE with welcome letter)  6. Which insurance are we to bill for this appointment?  [employer]    7. Informed pt of cancellation (48 hour) policy? Yes    REGARDING OPIOID MEDICATIONS: We will always address appropriateness of opioid pain medications, but we generally will not automatically take on a prescribing role. When we do take on prescribing of opioids for chronic pain, it is in collaboration with the referring physician for an intermediate period of time (months), with an expectation that the primary physician or provider will assume the prescribing role if medications are effective at stable doses with demonstrated compliance. Therefore, please do not assume that your prescribing responsibilities end on the day of pain clinic consultation.  8. Informed pt of prescribing policy? Yes    9.Please be aware that once you are established with a pain provider and location, you will need to continue have all future visits with that provider and location. It is best to determine what location is the most convenient for you and schedule with that one.    ** PATIENT INFORMED OF THIS POLICY Yes      9. Referring Provider: Viri Savage     10. Criteria for Triage Eval:   -Missed/Failed 1st appointment? N/A     -Medication Focused? N/A     -Mental Health Concerns? (e.g. Recent psych hospitalization/snap shot)? N/A     -Active substance abuse? N/A     -Patient behaviors (e.g. Offensive language/raised voice)? N/A    Anjelica WANG    [facility]

## 2020-12-14 NOTE — PROGRESS NOTES
"Krystyna Peña is a 65 year old female who is being evaluated via a billable telephone visit.      The patient has been notified of following:     \"This telephone visit will be conducted via a call between you and your physician/provider. We have found that certain health care needs can be provided without the need for a physical exam.  This service lets us provide the care you need with a short phone conversation.  If a prescription is necessary we can send it directly to your pharmacy.  If lab work is needed we can place an order for that and you can then stop by our lab to have the test done at a later time.    Telephone visits are billed at different rates depending on your insurance coverage. During this emergency period, for some insurers they may be billed the same as an in-person visit.  Please reach out to your insurance provider with any questions.    If during the course of the call the physician/provider feels a telephone visit is not appropriate, you will not be charged for this service.\"    Patient has given verbal consent for Telephone visit?  Yes    What phone number would you like to be contacted at? [phone]    How would you like to obtain your AVS? Patient declined AVS at this time.    Patsy Saucedo [facility]  [facility] Consultation    Date of visit: 12/15/2020    Reason for consultation:    Krystyna Peña is a 65 year old female who is seen in consultation today at the request of her primary care physician, Viri Savage     Consultation and Evaluation for: right hamstring/buttock pain    Review of [place] Prescription Monitoring Program (): Today I have also reviewed the patient's history of controlled substance use, as provided by [place] licensed pharmacies and prescriber dispensers.       Review of Electronic Chart: Today I have also reviewed available medical " information in the patient's medical record at [facility] ([facility]), including relevant provider notes, laboratory work, and imaging.     Krystyna Peña has been seen at a pain clinic in the past.  Seen by Dr. Phillips at [facility] for low back pain, has had SCS placed.    Chief Complaint:    No chief complaint on file.      Pain history:  Krystyna Peña is a 65 year old female who presents for initial evaluation of chief pain complaint of right hamstring pain.     She started having right buttock and the right groin in august after tripping and falling. She was seen by Dr. Meza because she has had a hip replacement and she was diagnosed with an ischial avulsion injury after xrays were completed. She was told there isn't any surgical option for this and that the area should heal over time. Unfortunately the pain has persisted. She has pain deep in the right buttock with sitting and prolonged sitting is very difficult. The pain is worsened by walking. She has been taking tramadol, 2-3 tablets/day with good improvement in her symptoms. She is able to sit longer and walk more comfortably with use of this medication.      She had a spinal cord stimulator placed in October which helps with her back pain, doesn't help much with her hamstring pain.        Onset: August 2020  Location/Radiation: right hamstring/buttock  Quality: aching  Severity/Intensity: 7/10 on average  Aggravating factors include: prolonged sitting, walking  Relieving factors include: lying down  Red Flags: The patient denies bowel or bladder incontinence, parasthesias, weakness, saddle anesthesia, unintentional weight loss, or fever/chills/sweats.         Pain Treatments:  1. Medications:       Current pain medications:  -Tramadol - no side effects, takes the edge off, makes sitting more tolerable. Helps about 5 hours.         Previous pain medications:  -Ibuprofen - makes her sick to the stomach  -Tylenol - makes her sick to the stomach  2. Physical Therapy:  nh  3. Pain psychology: hasn't tried  4. Surgery: none for this pain  5. Injections: none for this pain      Diagnostic tests:  MRI of L-spine was completed on 7/24/18 was reviewed with the patient and shows:  MR LUMBAR SPINE WITHOUT CONTRAST  7/24/2018 4:46 PM      HISTORY:  Other intervertebral disc degeneration, lumbosacral region.  Low back pain.     COMPARISON: 3/20/2009.     TECHNIQUE:  Sagittal T1 and STIR. Sagittal T2 and axial dual-echo T2.      FINDINGS:  Five lumbar vertebrae are assumed. Prominent degenerative  disc disease at L4-5. Fairly prominent degenerative disc disease at  L5-S1. Grade 1 degenerative spondylolisthesis at L3-4. Posterior  annular fissuring at L5-S1.      Findings by specific level:     There is facet degenerative change as follows: Mild right L2-3,  moderate right and mild left L3-4, mild left L4-5, mild-moderate  bilateral L5-S1.     T12-L1: No disc herniation or stenosis.     L1-2: Minimal right asymmetric disc bulging without stenosis.     L2-3: Minimal disc bulging without stenosis.     L3-4: Minimal disc bulging without stenosis.     L4-5: Disc bulging without stenosis.     L5-S1: Disc bulging without significant stenosis.                                                                       IMPRESSION:  1. Multilevel degenerative disc and facet disease.  2. No significant stenosis.         Labs:   Lab Results   Component Value Date    WBC 4.9 08/07/2020     Lab Results   Component Value Date    RBC 3.80 08/07/2020     Lab Results   Component Value Date    HGB 13.5 08/07/2020     Lab Results   Component Value Date    HCT 38.6 08/07/2020     Lab Results   Component Value Date     08/07/2020     Lab Results   Component Value Date    MCH 35.5 08/07/2020     Lab Results   Component Value Date    MCHC 35.0 08/07/2020     Lab Results   Component Value Date    RDW 12.8 08/07/2020     Lab Results   Component Value Date     08/07/2020     Last Comprehensive Metabolic  Panel:  Sodium   Date Value Ref Range Status   09/14/2020 136 133 - 144 mmol/L Final     Potassium   Date Value Ref Range Status   09/14/2020 4.8 3.4 - 5.3 mmol/L Final     Chloride   Date Value Ref Range Status   09/14/2020 106 94 - 109 mmol/L Final     Comment:     Results confirmed by repeat test     Carbon Dioxide   Date Value Ref Range Status   09/14/2020 27 20 - 32 mmol/L Final     Anion Gap   Date Value Ref Range Status   09/14/2020 3 3 - 14 mmol/L Final     Glucose   Date Value Ref Range Status   09/14/2020 87 70 - 99 mg/dL Final     Urea Nitrogen   Date Value Ref Range Status   09/14/2020 14 7 - 30 mg/dL Final     Creatinine   Date Value Ref Range Status   09/14/2020 1.25 (H) 0.52 - 1.04 mg/dL Final     GFR Estimate   Date Value Ref Range Status   09/14/2020 45 (L) >60 mL/min/[1.73_m2] Final     Comment:     Non  GFR Calc  Starting 12/18/2018, serum creatinine based estimated GFR (eGFR) will be   calculated using the Chronic Kidney Disease Epidemiology Collaboration   (CKD-EPI) equation.       Calcium   Date Value Ref Range Status   09/14/2020 9.4 8.5 - 10.1 mg/dL Final     Comment:     Results confirmed by repeat test     Bilirubin Total   Date Value Ref Range Status   08/07/2020 1.5 (H) 0.2 - 1.3 mg/dL Final     Alkaline Phosphatase   Date Value Ref Range Status   08/07/2020 86 40 - 150 U/L Final     ALT   Date Value Ref Range Status   08/07/2020 82 (H) 0 - 50 U/L Final     AST   Date Value Ref Range Status   08/07/2020 114 (H) 0 - 45 U/L Final                 Past Medical History:  Past Medical History:   Diagnosis Date     Arthritis      Asymptomatic varicose veins      Benign neoplasm of colon 11/06, 7/13    Adenoma     Gastroesophageal reflux disease      Hypertension      Major depression        Past Surgical History:  Past Surgical History:   Procedure Laterality Date     AMPUTATE TOE(S) Left 4/12/2018    Procedure: AMPUTATE TOE(S);  Amputation left third digit;  Surgeon: Herb Michael,  MEENA;  Location: [facility] OR     ARTHROPLASTY HIP Right 3/28/2016    Procedure: ARTHROPLASTY HIP;  Surgeon: Perez Meza MD;  Location: [facility] OR     ARTHROPLASTY REVISION HIP Right 10/28/2019    Procedure: Revision right total hip arthroplasty;  Surgeon: Perez Meza MD;  Location: [facility] OR     C REPAIR SPIEGELIAN HERNIA  2002     C TOTAL KNEE ARTHROPLASTY  10/03    LT     CLOSED REDUCTION HIP Right 7/15/2019    Procedure: Closed reduction, right total hip arthroplasty dislocation.;  Surgeon: Perez Meza MD;  Location: [facility] OR     COLONOSCOPY       HC CORRECT BUNION,SIMPLE  1998    RT     HC CORRECT BUNION,SIMPLE  2001    LT     HC KNEE SCOPE, DIAGNOSTIC      LT     REVERSE ARTHROPLASTY SHOULDER Left 10/30/2018    Procedure: Left reverse total shoulder arthroplasty;  Surgeon: Aaron Christianson MD;  Location: [facility] OR     REVERSE ARTHROPLASTY SHOULDER Right 6/12/2019    Procedure: Right reverse total shoulder arthroplasty;  Surgeon: Aaron Christianson MD;  Location: [facility] OR     ROTATOR CUFF REPAIR RT/LT  7/07    right     VASCULAR SURGERY  left leg bypass 2004     RUST NONSPECIFIC PROCEDURE  1972    Right arm plastic surgery     RUST NONSPECIFIC PROCEDURE  1972    Right knee surgery     RUST NONSPECIFIC PROCEDURE  10/03    L popliteal AVM repair     RUST NONSPECIFIC PROCEDURE  11/04    Removal bone spur left foot     RUST NONSPECIFIC PROCEDURE  4/07    amputation of toes left & right toes       Medications:  Current Outpatient Medications   Medication Sig Dispense Refill     potassium chloride ER (K-TAB) 20 MEQ CR tablet TAKE 1 TABLET(20 MEQ) BY MOUTH DAILY 60 tablet 1       Allergies:     Allergies   Allergen Reactions     Morphine      respiratory distress     Cephalexin Rash       Social History:  Home situation: lives in [place]  Occupation/Schooling: Worked for [employer] for 17 years  Tobacco use: denies  Drug use: denies  Alcohol use: denies  History of chemical dependency treatment: denies  Mental health admissions:  denies    Family history:  Family History   Problem Relation Age of Onset     Breast Cancer Mother      Cancer - colorectal Father         66     Diabetes Brother        Review of Systems:    POSTIVE IN BOLD  GENERAL: fever/chills, fatigue, general unwell feeling, weight gain/loss.  HEAD/EYES:  headache, dizziness, or vision changes.    EARS/NOSE/THROAT:  Nosebleeds, hearing loss, sinus infection, earache, tinnitus.  IMMUNE:  Allergies, cancer, immune deficiency, or infections.  SKIN:  Urticaria, rash, hives  HEME/Lymphatic:   anemia, easy bruising, easy bleeding.  RESPIRATORY:  cough, wheezing, or shortness of breath  CARDIOVASCULAR/Circulation:  Extremity edema, syncope, hypertension, tachycardia, or angina.  GASTROINTESTINAL:  abdominal pain, nausea/emesis, diarrhea, constipation,  hematochezia, or melena.  ENDOCRINE:  Diabetes, steroid use,  thyroid disease or osteoporosis.  MUSCULOSKELETAL: neck pain, back pain, arthralgia, arthritis, or gout.  GENITOURINARY:  frequency, urgency, dysuria, difficulty voiding, hematuria or incontinence.  NEUROLOGIC:  weakness, numbness, paresthesias, seizure, tremor, stroke or memory loss.  PSYCHIATRIC:  depression, anxiety, stress, suicidal thoughts or mood swings.         Assessment:  Ms. Peña is a 65 year old with past medical history including: Lumbar spondylosis/chronic low back pain (S/P SCS placement), Hypertension, CKD Stage 3, depression, right DAE who presents for evaluatioan and treatment of the followin. Right sided hamstring pain: Based on review of records (From [facility]) it appears that the patient sustained a ischial avulsion fracture and a hamstring strain after a fall in 2020. Her orthopedic providers have recommended conservative management and the patient's presents today for management of this chronic pain. Discussed that I would not recommend any type of steroid injection for this injury as it could delay healing of the avulsion injury. We  discussed medical management as detailed below.        Plan:  The following recommendations were given to the patient. Diagnosis, treatment options, risks, benefits, and alternatives were discussed, and all questions were answered. The patient expressed understanding of the plan for management.     I am recommending a multidisciplinary treatment plan to help this patient better manage her pain.  This includes:     1. Physical Therapy: Defer to PT at [facility]  2. Clinical Health Pain Psychologist: Patient traveling to [place] in 2 weeks for 2 months, defer as they cannot do video visits for pain psychology from a different state.  3. Self Care Recommendations: Gentle progressive exercise that does not increase pain. Follow recommendations from PT at [facility].  4. Diagnostic Studies: none  5. Medication Management:  1. Start lyrica 50mg hs and increase to 50mg BID after 3 nights. If no side effects increase further to 100mg BID.  2. If no improvement with the above, will prescribe a small supply of Tramadol 50mg TID prn (30 tablets) to get through her drive to [place]. Discussed that I would not recommend chronic use of this medication but given lack of other options feel that it is appropriate for short-term use during times that may flare her pain.  6. Further procedures recommended: None at this time.  7. Follow up: patient will call in 2 weeks to report the efficacy of lyrica      The duration of this telephone encounter was 21 minutes.      Jose Castillo,   [facility]

## 2020-12-15 ENCOUNTER — VIRTUAL VISIT (OUTPATIENT)
Dept: PALLIATIVE MEDICINE | Facility: CLINIC | Age: 65
End: 2020-12-15
Payer: COMMERCIAL

## 2020-12-15 DIAGNOSIS — M79.18 BUTTOCK PAIN: ICD-10-CM

## 2020-12-15 DIAGNOSIS — M79.18 CHRONIC MYOFASCIAL PAIN: Primary | ICD-10-CM

## 2020-12-15 DIAGNOSIS — S76.391S AVULSION OF RIGHT HAMSTRING MUSCLE, SEQUELA: ICD-10-CM

## 2020-12-15 DIAGNOSIS — G89.29 CHRONIC MYOFASCIAL PAIN: Primary | ICD-10-CM

## 2020-12-15 PROCEDURE — 99204 OFFICE O/P NEW MOD 45 MIN: CPT | Mod: 95 | Performed by: PHYSICAL MEDICINE & REHABILITATION

## 2020-12-15 RX ORDER — PREGABALIN 50 MG/1
CAPSULE ORAL
Qty: 105 CAPSULE | Refills: 0 | Status: SHIPPED | OUTPATIENT
Start: 2020-12-15 | End: 2022-03-08

## 2020-12-15 ASSESSMENT — PAIN SCALES - GENERAL: PAINLEVEL: MODERATE PAIN (5)

## 2020-12-15 NOTE — Clinical Note
Hi Dr. Savage,    It looks like Nancy continues to have buttock pain from the ischial avulsion injury she had after her fall in august. It isn't uncommon for pain from these type of injuries to persist for months due to the nature and location of the underlying issue. It is typically not anything that is surgically operated on so she has limited options. Steroid injections can further delay healing in this area so I did not recommend that today.    She will try lyrica to see if this helps reduce the sensitivity in that area. If that is not helpful, then i'll prescribe a small supply of tramadol to help her get through the drive she has coming up to [place]. I discussed with her that I wouldn't recommend long term use of this medication due to side effects and she seemed to understand.    Please let me know if you have any questions.    Jose Castillo, DO  [facility]

## 2020-12-15 NOTE — PATIENT INSTRUCTIONS
----------------------------------------------------------------  [facility] Number:  [phone]     Call with any questions about your care and for scheduling assistance.     Calls are returned Monday through Friday between 8 AM and 4:30 PM. We usually get back to you within 2 business days depending on the issue/request.    If we are prescribing your medications:    For opioid medication refills, call the clinic or send a [employer] message 7 days in advance.  Please include:    Name of requested medication    Name of the pharmacy.    For non-opioid medications, call your pharmacy directly to request a refill. Please allow 3-4 days to be processed.     Per [place] State Law:    All controlled substance prescriptions must be filled within 30 days of being written.      For those controlled substances allowing refills, pickup must occur within 30 days of last fill.      We believe regular attendance is key to your success in our program!      Any time you are unable to keep your appointment we ask that you call us at least 24 hours in advance to cancel.This will allow us to offer the appointment time to another patient.     Multiple missed appointments may lead to dismissal from the clinic.

## 2020-12-28 ENCOUNTER — TELEPHONE (OUTPATIENT)
Dept: PALLIATIVE MEDICINE | Facility: CLINIC | Age: 65
End: 2020-12-28

## 2020-12-28 NOTE — TELEPHONE ENCOUNTER
Reason for call:  Other   Patient called regarding (reason for call): call back  Additional comments: Pt calling to update Dr. Castillo on the pregabalin (LYRICA) 50 MG capsule. Pt states it is helping some, but not a lot. Pt is concerned about the pain she will have while driving to [place]. Pt states they are leaving 12/30.    Phone number to reach patient:  Home number on file [phone] (home)    Best Time:  anytime    Can we leave a detailed message on this number?  YES    Travel screening: Not Applicable     Anjelica WANG    [facility]

## 2020-12-28 NOTE — TELEPHONE ENCOUNTER
She can increase the lyrica to 150mg BID, any time lyrica is increased there is the chance it can cause some drowsiness or dizziness so watch closely for this. If the tramadol makes her sleepy, would not recommend this during her drive.     Jose Castillo, DO  [facility]

## 2020-12-28 NOTE — TELEPHONE ENCOUNTER
Called Nancy. She will increase her Lyrica to 150 mg bid. She understands this can potentially make her drowsy or dizzy do she will watch this closely. She will be gone to [place] for 2 months. She will contact her [facility] when she has about 5 days left of her medication for her refill.    Zaira Ventura RN  Care Coordinator  [facility]

## 2020-12-28 NOTE — TELEPHONE ENCOUNTER
Called Nancy.  She started the Lyrica on 12/15/2020. She is taking 2 capsule twice a day now. She is tolerating it fine. She is not having any side effect from it.   It is helping just a little bit with her pain, but not enough yet.  She is traveling to [place] on 12/31/2020 and will be driving. She will use her donut to sit on when in the car.  She is wondering if the Lyrica dose can be increased any more.  Did mention to her the possible use of Tramadol.  She says she has taken that in the past.  Does not take away the pain, but does take the edge off.  She will be doing some of the driving and states the Tramadol does make her sleepy.  She would not be able to take that during the day time, but maybe at night.      Routing to provider to review and advise    Zaira Ventura RN  Care Coordinator  [facility]

## 2021-03-02 ENCOUNTER — OFFICE VISIT (OUTPATIENT)
Dept: INTERNAL MEDICINE | Facility: CLINIC | Age: 66
End: 2021-03-02
Payer: COMMERCIAL

## 2021-03-02 VITALS
OXYGEN SATURATION: 97 % | HEART RATE: 98 BPM | SYSTOLIC BLOOD PRESSURE: 136 MMHG | HEIGHT: 62 IN | BODY MASS INDEX: 29.04 KG/M2 | RESPIRATION RATE: 16 BRPM | WEIGHT: 157.8 LBS | TEMPERATURE: 98.2 F | DIASTOLIC BLOOD PRESSURE: 82 MMHG

## 2021-03-02 DIAGNOSIS — Z12.11 SPECIAL SCREENING FOR MALIGNANT NEOPLASMS, COLON: Primary | ICD-10-CM

## 2021-03-02 DIAGNOSIS — N18.32 STAGE 3B CHRONIC KIDNEY DISEASE (H): ICD-10-CM

## 2021-03-02 DIAGNOSIS — R79.89 ELEVATED LFTS: ICD-10-CM

## 2021-03-02 DIAGNOSIS — I10 BENIGN ESSENTIAL HYPERTENSION: ICD-10-CM

## 2021-03-02 DIAGNOSIS — N63.0 LUMP OR MASS IN BREAST: ICD-10-CM

## 2021-03-02 PROCEDURE — 36415 COLL VENOUS BLD VENIPUNCTURE: CPT | Performed by: NURSE PRACTITIONER

## 2021-03-02 PROCEDURE — 99214 OFFICE O/P EST MOD 30 MIN: CPT | Performed by: NURSE PRACTITIONER

## 2021-03-02 PROCEDURE — 80053 COMPREHEN METABOLIC PANEL: CPT | Performed by: NURSE PRACTITIONER

## 2021-03-02 ASSESSMENT — MIFFLIN-ST. JEOR: SCORE: 1209.03

## 2021-03-02 NOTE — NURSING NOTE
"Patient needs a breast exam for a mammogram, would also likea referral for colonoscopy.  Vital signs:  Temp: 98.2  F (36.8  C) Temp src: Oral BP: (!) 152/87 Pulse: 98   Resp: 16 SpO2: 97 %     Height: 157.5 cm (5' 2\") Weight: 71.6 kg (157 lb 12.8 oz)  Estimated body mass index is 28.86 kg/m  as calculated from the following:    Height as of this encounter: 1.575 m (5' 2\").    Weight as of this encounter: 71.6 kg (157 lb 12.8 oz).        "

## 2021-03-02 NOTE — PATIENT INSTRUCTIONS
Colonoscopy    They will call you to set up     To schedule your mammogram, you may call the breast center at [phone].     Lab in [address]

## 2021-03-02 NOTE — PROGRESS NOTES
"    Assessment & Plan     Special screening for malignant neoplasms, colon  state she needs a double prep   - GASTROENTEROLOGY ADULT REF PROCEDURE ONLY; Future    Lump or mass in breast  Noted bilaterally   - MA Diagnostic Digital Bilateral; Future  - US Breast Bilateral Complete 4 Quadrants; Future    Stage 3b chronic kidney disease    - Comprehensive metabolic panel (BMP + Alb, Alk Phos, ALT, AST, Total. Bili, TP)    Benign essential hypertension  Blood pressure in good range   On no medication   - Comprehensive metabolic panel (BMP + Alb, Alk Phos, ALT, AST, Total. Bili, TP)    Elevated LFTs  Want recheck   She has had no alcohol x 1 year   - Comprehensive metabolic panel (BMP + Alb, Alk Phos, ALT, AST, Total. Bili, TP)      21 minutes spent on the date of the encounter doing chart review, history and exam, documentation and further activities as noted above       BMI:   Estimated body mass index is 28.86 kg/m  as calculated from the following:    Height as of this encounter: 1.575 m (5' 2\").    Weight as of this encounter: 71.6 kg (157 lb 12.8 oz).       Patient Instructions   Colonoscopy    They will call you to set up     To schedule your mammogram, you may call the breast center at [phone].     Lab in [address]           Return in about 6 months (around 9/2/2021).    JOCELYN Fu CNP [facility]    Anna Cruz is a 65 year old who presents for the following health issues     Patient needs a breast exam for a mammogram, would also likea referral for colonoscopy.  HPI   Feels like she has lumps on the side of her breasts     Needs referral for colonoscopy     Elevated LFT   Off alcohol x 1 year   Recheck today     Had low sodium and potassium at the time   9/2020          Review of Systems   Constitutional, HEENT, cardiovascular, pulmonary, GI, , musculoskeletal, neuro, skin, endocrine and psych systems are negative, except as otherwise noted.      Objective  " "  /82 (BP Location: Left arm, Patient Position: Sitting, Cuff Size: Adult Regular)   Pulse 98   Temp 98.2  F (36.8  C) (Oral)   Resp 16   Ht 1.575 m (5' 2\")   Wt 71.6 kg (157 lb 12.8 oz)   LMP 10/01/2003   SpO2 97%   BMI 28.86 kg/m    Body mass index is 28.86 kg/m .  Physical Exam   GENERAL:  alert and no distress  RESP: lungs clear to auscultation - no rales, rhonchi or wheezes  BREAST: lump left breast 12-1 o'clock and feels pain 4 oclock - right breast lump 7 and 9-11 o'clock   CV: regular rate and rhythm, normal S1 S2, no S3 or S4, no murmur, click or rub, no peripheral edema and peripheral pulses strong  MS: no gross musculoskeletal defects noted, no edema  NEURO: Normal strength and tone, mentation intact and speech normal  PSYCH: mentation appears normal, affect normal/bright    Colonoscopy   Mammogram             "

## 2021-03-03 LAB
ALBUMIN SERPL-MCNC: 4 G/DL (ref 3.4–5)
ALP SERPL-CCNC: 97 U/L (ref 40–150)
ALT SERPL W P-5'-P-CCNC: 18 U/L (ref 0–50)
ANION GAP SERPL CALCULATED.3IONS-SCNC: 8 MMOL/L (ref 3–14)
AST SERPL W P-5'-P-CCNC: 14 U/L (ref 0–45)
BILIRUB SERPL-MCNC: 0.7 MG/DL (ref 0.2–1.3)
BUN SERPL-MCNC: 21 MG/DL (ref 7–30)
CALCIUM SERPL-MCNC: 10.1 MG/DL (ref 8.5–10.1)
CHLORIDE SERPL-SCNC: 104 MMOL/L (ref 94–109)
CO2 SERPL-SCNC: 25 MMOL/L (ref 20–32)
CREAT SERPL-MCNC: 1.14 MG/DL (ref 0.52–1.04)
GFR SERPL CREATININE-BSD FRML MDRD: 50 ML/MIN/{1.73_M2}
GLUCOSE SERPL-MCNC: 96 MG/DL (ref 70–99)
POTASSIUM SERPL-SCNC: 4.2 MMOL/L (ref 3.4–5.3)
PROT SERPL-MCNC: 7.8 G/DL (ref 6.8–8.8)
SODIUM SERPL-SCNC: 137 MMOL/L (ref 133–144)

## 2021-03-09 ENCOUNTER — HOSPITAL ENCOUNTER (OUTPATIENT)
Dept: ULTRASOUND IMAGING | Facility: CLINIC | Age: 66
End: 2021-03-09
Attending: NURSE PRACTITIONER
Payer: COMMERCIAL

## 2021-03-09 ENCOUNTER — HOSPITAL ENCOUNTER (OUTPATIENT)
Dept: MAMMOGRAPHY | Facility: CLINIC | Age: 66
End: 2021-03-09
Attending: NURSE PRACTITIONER
Payer: COMMERCIAL

## 2021-03-09 DIAGNOSIS — N63.0 LUMP OR MASS IN BREAST: ICD-10-CM

## 2021-03-09 PROCEDURE — 76642 ULTRASOUND BREAST LIMITED: CPT | Mod: 50

## 2021-03-09 PROCEDURE — G0279 TOMOSYNTHESIS, MAMMO: HCPCS

## 2021-03-12 ENCOUNTER — IMMUNIZATION (OUTPATIENT)
Dept: NURSING | Facility: CLINIC | Age: 66
End: 2021-03-12
Payer: COMMERCIAL

## 2021-03-12 PROCEDURE — 0001A PR COVID VAC PFIZER DIL RECON 30 MCG/0.3 ML IM: CPT

## 2021-03-12 PROCEDURE — 91300 PR COVID VAC PFIZER DIL RECON 30 MCG/0.3 ML IM: CPT

## 2021-03-14 ENCOUNTER — HEALTH MAINTENANCE LETTER (OUTPATIENT)
Age: 66
End: 2021-03-14

## 2021-03-30 DIAGNOSIS — Z11.59 ENCOUNTER FOR SCREENING FOR OTHER VIRAL DISEASES: ICD-10-CM

## 2021-04-02 ENCOUNTER — IMMUNIZATION (OUTPATIENT)
Dept: NURSING | Facility: CLINIC | Age: 66
End: 2021-04-02
Attending: INTERNAL MEDICINE
Payer: COMMERCIAL

## 2021-04-02 PROCEDURE — 0002A PR COVID VAC PFIZER DIL RECON 30 MCG/0.3 ML IM: CPT

## 2021-04-02 PROCEDURE — 91300 PR COVID VAC PFIZER DIL RECON 30 MCG/0.3 ML IM: CPT

## 2021-04-14 DIAGNOSIS — Z11.59 ENCOUNTER FOR SCREENING FOR OTHER VIRAL DISEASES: ICD-10-CM

## 2021-04-14 LAB
LABORATORY COMMENT REPORT: NORMAL
SARS-COV-2 RNA RESP QL NAA+PROBE: NEGATIVE
SARS-COV-2 RNA RESP QL NAA+PROBE: NORMAL
SPECIMEN SOURCE: NORMAL
SPECIMEN SOURCE: NORMAL

## 2021-04-14 PROCEDURE — U0005 INFEC AGEN DETEC AMPLI PROBE: HCPCS | Performed by: INTERNAL MEDICINE

## 2021-04-14 PROCEDURE — U0003 INFECTIOUS AGENT DETECTION BY NUCLEIC ACID (DNA OR RNA); SEVERE ACUTE RESPIRATORY SYNDROME CORONAVIRUS 2 (SARS-COV-2) (CORONAVIRUS DISEASE [COVID-19]), AMPLIFIED PROBE TECHNIQUE, MAKING USE OF HIGH THROUGHPUT TECHNOLOGIES AS DESCRIBED BY CMS-2020-01-R: HCPCS | Performed by: INTERNAL MEDICINE

## 2021-04-16 ENCOUNTER — HOSPITAL ENCOUNTER (OUTPATIENT)
Facility: CLINIC | Age: 66
Discharge: HOME | End: 2021-04-16
Attending: INTERNAL MEDICINE | Admitting: INTERNAL MEDICINE
Payer: COMMERCIAL

## 2021-04-16 VITALS
WEIGHT: 157 LBS | TEMPERATURE: 97.5 F | BODY MASS INDEX: 28.89 KG/M2 | OXYGEN SATURATION: 98 % | DIASTOLIC BLOOD PRESSURE: 82 MMHG | SYSTOLIC BLOOD PRESSURE: 138 MMHG | RESPIRATION RATE: 18 BRPM | HEIGHT: 62 IN | HEART RATE: 81 BPM

## 2021-04-16 LAB — COLONOSCOPY: NORMAL

## 2021-04-16 PROCEDURE — 45378 DIAGNOSTIC COLONOSCOPY: CPT | Performed by: INTERNAL MEDICINE

## 2021-04-16 PROCEDURE — 99153 MOD SED SAME PHYS/QHP EA: CPT | Performed by: INTERNAL MEDICINE

## 2021-04-16 PROCEDURE — G0105 COLORECTAL SCRN; HI RISK IND: HCPCS | Performed by: INTERNAL MEDICINE

## 2021-04-16 PROCEDURE — G0500 MOD SEDAT ENDO SERVICE >5YRS: HCPCS | Performed by: INTERNAL MEDICINE

## 2021-04-16 PROCEDURE — 250N000011 HC RX IP 250 OP 636: Performed by: INTERNAL MEDICINE

## 2021-04-16 RX ORDER — FLUMAZENIL 0.1 MG/ML
0.2 INJECTION, SOLUTION INTRAVENOUS
Status: DISCONTINUED | OUTPATIENT
Start: 2021-04-16 | End: 2021-04-16 | Stop reason: HOSPADM

## 2021-04-16 RX ORDER — ONDANSETRON 2 MG/ML
4 INJECTION INTRAMUSCULAR; INTRAVENOUS EVERY 6 HOURS PRN
Status: DISCONTINUED | OUTPATIENT
Start: 2021-04-16 | End: 2021-04-16 | Stop reason: HOSPADM

## 2021-04-16 RX ORDER — NALOXONE HYDROCHLORIDE 0.4 MG/ML
0.2 INJECTION, SOLUTION INTRAMUSCULAR; INTRAVENOUS; SUBCUTANEOUS
Status: DISCONTINUED | OUTPATIENT
Start: 2021-04-16 | End: 2021-04-16 | Stop reason: HOSPADM

## 2021-04-16 RX ORDER — NALOXONE HYDROCHLORIDE 0.4 MG/ML
0.4 INJECTION, SOLUTION INTRAMUSCULAR; INTRAVENOUS; SUBCUTANEOUS
Status: DISCONTINUED | OUTPATIENT
Start: 2021-04-16 | End: 2021-04-16 | Stop reason: HOSPADM

## 2021-04-16 RX ORDER — PROCHLORPERAZINE MALEATE 5 MG
5 TABLET ORAL EVERY 6 HOURS PRN
Status: DISCONTINUED | OUTPATIENT
Start: 2021-04-16 | End: 2021-04-16 | Stop reason: HOSPADM

## 2021-04-16 RX ORDER — ONDANSETRON 4 MG/1
4 TABLET, ORALLY DISINTEGRATING ORAL EVERY 6 HOURS PRN
Status: DISCONTINUED | OUTPATIENT
Start: 2021-04-16 | End: 2021-04-16 | Stop reason: HOSPADM

## 2021-04-16 RX ORDER — LIDOCAINE 40 MG/G
CREAM TOPICAL
Status: DISCONTINUED | OUTPATIENT
Start: 2021-04-16 | End: 2021-04-16 | Stop reason: HOSPADM

## 2021-04-16 RX ORDER — ONDANSETRON 2 MG/ML
4 INJECTION INTRAMUSCULAR; INTRAVENOUS
Status: DISCONTINUED | OUTPATIENT
Start: 2021-04-16 | End: 2021-04-16 | Stop reason: HOSPADM

## 2021-04-16 RX ORDER — FENTANYL CITRATE 50 UG/ML
INJECTION, SOLUTION INTRAMUSCULAR; INTRAVENOUS PRN
Status: COMPLETED | OUTPATIENT
Start: 2021-04-16 | End: 2021-04-16

## 2021-04-16 RX ADMIN — MIDAZOLAM 0.5 MG: 1 INJECTION INTRAMUSCULAR; INTRAVENOUS at 12:15

## 2021-04-16 RX ADMIN — MIDAZOLAM 2 MG: 1 INJECTION INTRAMUSCULAR; INTRAVENOUS at 12:06

## 2021-04-16 RX ADMIN — FENTANYL CITRATE 100 MCG: 50 INJECTION, SOLUTION INTRAMUSCULAR; INTRAVENOUS at 12:06

## 2021-04-16 RX ADMIN — FENTANYL CITRATE 50 MCG: 50 INJECTION, SOLUTION INTRAMUSCULAR; INTRAVENOUS at 12:12

## 2021-04-16 RX ADMIN — FENTANYL CITRATE 50 MCG: 50 INJECTION, SOLUTION INTRAMUSCULAR; INTRAVENOUS at 12:09

## 2021-04-16 ASSESSMENT — MIFFLIN-ST. JEOR: SCORE: 1205.4

## 2021-04-16 NOTE — DISCHARGE INSTRUCTIONS
HIGH FIBER DIET  Fiber is present in all fruits, vegetables, cereals and grains. Fiber passes through the body undigested. A high fiber diet helps food move through the intestinal tract. The added bulk is helpful in preventing constipation. In people with diverticulosis it serves to clean out the pouches along the colon wall while preventing new ones from forming. A high fiber diet also reduces the risk of colon cancer, decreases blood cholesterol and prevents high blood sugar in people with diabetes.    The foods listed below are high in fiber and should be included in your diet. If you are not used to high fiber foods, start with 1 or 2 foods from this list. Every 3-4 days add a new one to your diet until you are eating 4 high fiber foods per day. This should give you 20-35 Gm of fiber/day. It is also important to drink a lot of water when you are on this diet (6-8 glasses a day). Water causes the fiber to swell and increases the benefit.    FOODS HIGH IN DIETARY FIBER:  BREADS: Made with 100% whole wheat flour; roula, wheat or rye crackers; tortillas, bran muffins  CEREALS: Whole grain cereal with bran (Chex, Raisin Bran, Corn Bran), oatmeal, rolled oats, granola, wheat flakes, brown rice  NUTS: Any nuts  FRUITS: All fresh fruits along with edible skins, (bananas, citrus fruit, mangoes, pears, prunes, raisins, apples, pineapple, apricot, melon, jams and marmalades), fruit juices (especially prune juice)  VEGETABLES: All types, preferably raw or lightly cooked: especially, celery, eggplant, potatoes, spinach, broccoli, brussel sprouts, winter squash, carrots, cauliflower, soybeans, lentils, fresh and dried beans of all kinds  OTHER: Popcorn, any spices      [id number] Karen Owen, [address] [place] [address], [place] [address]. All rights reserved. This information is not intended as a substitute for professional medical care. Always follow your healthcare professional's instructions.    Understanding  Diverticulosis and Diverticulitis     Pouches or diverticula usually occur in the lower part of the colon called the sigmoid.      Diverticulitis occurs when the pouches become inflamed.     The colon (large intestine) is the last part of the digestive tract. It absorbs water from stool and changes it from a liquid to a solid. In certain cases, small pouches called diverticula can form in the colon wall. This condition is called diverticulosis. The pouches can become infected. If this happens, it becomes a more serious problem called diverticulitis. These problems can be painful. But they can be managed.   Managing Your Condition  Diet changes or taking medications are often tried first. These may be enough to bring relief. If the case is bad, surgery may be done. You and your doctor can discuss the plan that is best for you.  If You Have Diverticulosis  Diet changes are often enough to control symptoms. The main changes are adding fiber (roughage) and drinking more water. Fiber absorbs water as it travels through your colon. This helps your stool stay soft and move smoothly. Water helps this process. If needed, you may be told to take over-the-counter stool softeners. To help relieve pain, antispasmodic medications may be prescribed.  If You Have Diverticulitis  Treatment depends on how bad your symptoms are.  For mild symptoms: You may be put on a liquid diet for a short time. You may also be prescribed antibiotics. If these two steps relieve your symptoms, you may then be prescribed a high-fiber diet. If you still have symptoms, your doctor will discuss further treatment options with you.  For severe symptoms: You may need to be admitted to the hospital. There, you can be given IV antibiotics and fluids. Once symptoms are under control, the above treatments may be tried. If these don t control your condition, your doctor may discuss the option of having surgery with you.  [place] to Colon Health  Help keep your  colon healthy with a diet that includes plenty of high-fiber fruits, vegetables, and whole grains. Drink plenty of liquids like water and juice. Your doctor may also recommend avoiding seeds and nuts.          [id number] Karen Owen, [address] [place] [address], [place] [address]. All rights reserved. This information is not intended as a substitute for professional medical care. Always follow your healthcare professional's instructions.

## 2021-04-16 NOTE — PRE-PROCEDURE
Pre-Endoscopy History and Physical     Krystyna Peña MRN# [id number]   YOB: 1955 Age: 65 year old     Date of Procedure: 4/16/2021  Primary care provider: Viri Savage  Type of Endoscopy: colonoscopy  Reason for Procedure: f/u polyp, father with colon CA at 66  Type of Anesthesia Anticipated: Moderate (conscious) sedation    HPI:    Krystyna is a 65 year old female who will be undergoing the above procedure.      A history and physical has been performed. The patient's medications and allergies have been reviewed. The risks and benefits of the procedure and the sedation options and risks were discussed with the patient.  All questions were answered and informed consent was obtained.      Allergies   Allergen Reactions     Morphine      respiratory distress     Cephalexin Rash        Current Facility-Administered Medications   Medication     0.9% sodium chloride BOLUS     lidocaine (LMX4) kit     lidocaine 1 % 0.1-1 mL     ondansetron (ZOFRAN) injection 4 mg     sodium chloride (PF) 0.9% PF flush 3 mL     sodium chloride (PF) 0.9% PF flush 3 mL     sodium chloride (PF) 0.9% PF flush 3 mL     sodium chloride (PF) 0.9% PF flush 3 mL       Patient Active Problem List   Diagnosis     Asymptomatic varicose veins     CARDIOVASCULAR SCREENING; LDL GOAL LESS THAN 160     Back pain     Urinary frequency     Benign essential hypertension     Edema, unspecified type     CKD (chronic kidney disease) stage 3, GFR 30-59 ml/min     Inflammatory arthritis        Past Medical History:   Diagnosis Date     Arthritis      Asymptomatic varicose veins      Benign neoplasm of colon 11/06, 7/13    Adenoma     Family history of colon cancer     dad     Gastroesophageal reflux disease      History of colonic polyps      Hypertension      Major depression         Past Surgical History:   Procedure Laterality Date     AMPUTATE TOE(S) Left 4/12/2018    Procedure: AMPUTATE TOE(S);  Amputation left third digit;  Surgeon: Alec  MEENA Estevez;  Location: [facility]     ARTHROPLASTY HIP Right 3/28/2016    Procedure: ARTHROPLASTY HIP;  Surgeon: Perez Meza MD;  Location: [facility]     ARTHROPLASTY REVISION HIP Right 10/28/2019    Procedure: Revision right total hip arthroplasty;  Surgeon: Perez Meza MD;  Location: [facility]     C REPAIR SPIEGELIAN HERNIA       C TOTAL KNEE ARTHROPLASTY  10/03    LT     CLOSED REDUCTION HIP Right 7/15/2019    Procedure: Closed reduction, right total hip arthroplasty dislocation.;  Surgeon: Perez Meza MD;  Location: [facility] OR     COLONOSCOPY       HC CORRECT BUNION,SIMPLE      RT     HC CORRECT BUNION,SIMPLE      LT     HC KNEE SCOPE, DIAGNOSTIC      LT     REVERSE ARTHROPLASTY SHOULDER Left 10/30/2018    Procedure: Left reverse total shoulder arthroplasty;  Surgeon: Aaron Christianson MD;  Location: [facility]     REVERSE ARTHROPLASTY SHOULDER Right 2019    Procedure: Right reverse total shoulder arthroplasty;  Surgeon: Aaron Christianson MD;  Location: [facility]     ROTATOR CUFF REPAIR RT/LT      right     VASCULAR SURGERY  left leg bypass      [facility] NONSPECIFIC PROCEDURE      Right arm plastic surgery     [facility] NONSPECIFIC PROCEDURE      Right knee surgery     [facility] NONSPECIFIC PROCEDURE  10/03    L popliteal AVM repair     [facility] NONSPECIFIC PROCEDURE      Removal bone spur left foot     [facility] NONSPECIFIC PROCEDURE      amputation of toes left & right toes       Social History     Tobacco Use     Smoking status: Former Smoker     Packs/day: 1.00     Types: Cigarettes, Cigars     Quit date: 2013     Years since quittin.0     Smokeless tobacco: Never Used   Substance Use Topics     Alcohol use: Not Currently       Family History   Problem Relation Age of Onset     Breast Cancer Mother      Cancer - colorectal Father         66     Colon Cancer Father      Diabetes Brother             Medications:     Medications Prior to Admission   Medication Sig Dispense Refill Last Dose  "    potassium chloride ER (K-TAB) 20 MEQ CR tablet TAKE 1 TABLET(20 MEQ) BY MOUTH DAILY (Patient not taking: Reported on 12/15/2020) 60 tablet 1      pregabalin (LYRICA) 50 MG capsule Take 1 capsule (50 mg) by mouth At Bedtime for 3 days, THEN 1 capsule (50 mg) 2 times daily for 3 days, THEN 2 capsules (100 mg) 2 times daily for 24 days. 105 capsule 0        Scheduled Medications:    sodium chloride 0.9%  500 mL Intravenous Once     sodium chloride (PF)  3 mL Intracatheter Q8H       PRN:  lidocaine 4%, lidocaine (buffered or not buffered), ondansetron, sodium chloride (PF), sodium chloride (PF), sodium chloride (PF)    PHYSICAL EXAM:   Ht 1.575 m (5' 2\")   Wt 71.2 kg (157 lb)   LMP 10/01/2003   BMI 28.72 kg/m   Estimated body mass index is 28.72 kg/m  as calculated from the following:    Height as of this encounter: 1.575 m (5' 2\").    Weight as of this encounter: 71.2 kg (157 lb).   RESP: lungs clear to auscultation - no rales, rhonchi or wheezes  CV: regular rates and rhythm    IMPRESSION   ASA Class 2 - Mild systemic disease      Signed Electronically by: Rell Cisneros MD  April 16, 2021    .            "

## 2021-05-30 ENCOUNTER — RECORDS - HEALTHEAST (OUTPATIENT)
Dept: ADMINISTRATIVE | Facility: CLINIC | Age: 66
End: 2021-05-30

## 2021-05-31 ENCOUNTER — RECORDS - HEALTHEAST (OUTPATIENT)
Dept: ADMINISTRATIVE | Facility: CLINIC | Age: 66
End: 2021-05-31

## 2021-10-24 ENCOUNTER — HEALTH MAINTENANCE LETTER (OUTPATIENT)
Age: 66
End: 2021-10-24

## 2022-03-08 ENCOUNTER — OFFICE VISIT (OUTPATIENT)
Dept: INTERNAL MEDICINE | Facility: CLINIC | Age: 67
End: 2022-03-08
Payer: COMMERCIAL

## 2022-03-08 VITALS
WEIGHT: 164.7 LBS | HEIGHT: 62 IN | TEMPERATURE: 98.1 F | BODY MASS INDEX: 30.31 KG/M2 | DIASTOLIC BLOOD PRESSURE: 88 MMHG | HEART RATE: 95 BPM | RESPIRATION RATE: 16 BRPM | SYSTOLIC BLOOD PRESSURE: 153 MMHG | OXYGEN SATURATION: 96 %

## 2022-03-08 DIAGNOSIS — Z01.818 PREOP GENERAL PHYSICAL EXAM: Primary | ICD-10-CM

## 2022-03-08 DIAGNOSIS — H26.9 CATARACT OF BOTH EYES, UNSPECIFIED CATARACT TYPE: ICD-10-CM

## 2022-03-08 PROCEDURE — 99214 OFFICE O/P EST MOD 30 MIN: CPT | Performed by: INTERNAL MEDICINE

## 2022-03-08 NOTE — PROGRESS NOTES
[place] [address] [facility] [address]  Phone: [phone]  Primary Provider: Lawrence Savage  Pre-op Performing Provider: LAWRENCE SAVAGE      PREOPERATIVE EVALUATION:  Today's date: 3/8/2022    Krystyna Peña is a 66 year old female who presents for a preoperative evaluation.    Surgical Information:  Surgery/Procedure: cataract  Surgery Location: [facility]  Surgeon: ELEAZAR Barbosa  Surgery Date: 03/23/22  Time of Surgery: am  Where patient plans to recover: At home with family  Fax number for surgical facility: [phone]    Type of Anesthesia Anticipated: Local with MAC and Topical    Assessment & Plan     The proposed surgical procedure is considered LOW risk.    Preop general physical exam      Cataract of both eyes, unspecified cataract type      Elevated blood pressure: She had previously been treated for high blood pressure, resolved after weight loss.  It is higher today but she reports she is anxious.  She will be calling in a few more readings prior to the surgery.       Risks and Recommendations:  The patient has the following additional risks and recommendations for perioperative complications:   - No identified additional risk factors other than previously addressed    Medication Instructions:  Patient is on no chronic medications    RECOMMENDATION:  APPROVAL GIVEN to proceed with proposed procedure, without further diagnostic evaluation.    [id number]}    Subjective     HPI related to upcoming procedure: she has bilateral cataracts affecting vision    Preop Questions 3/8/2022   1. Have you ever had a heart attack or stroke? No   2. Have you ever had surgery on your heart or blood vessels, such as a stent placement, a coronary artery bypass, or surgery on an artery in your head, neck, heart, or legs? No   3. Do you have chest pain with activity? No   4. Do you have a history of  heart failure? No   5. Do you currently have a cold, bronchitis or symptoms  of other infection? No   6. Do you have a cough, shortness of breath, or wheezing? No   7. Do you or anyone in your family have previous history of blood clots? No   8. Do you or does anyone in your family have a serious bleeding problem such as prolonged bleeding following surgeries or cuts? No   9. Have you ever had problems with anemia or been told to take iron pills? No   10. Have you had any abnormal blood loss such as black, tarry or bloody stools, or abnormal vaginal bleeding? No   11. Have you ever had a blood transfusion? No   12. Are you willing to have a blood transfusion if it is medically needed before, during, or after your surgery? Yes   13. Have you or any of your relatives ever had problems with anesthesia? No   14. Do you have sleep apnea, excessive snoring or daytime drowsiness? No   15. Do you have any artifical heart valves or other implanted medical devices like a pacemaker, defibrillator, or continuous glucose monitor? No   16. Do you have artificial joints? YES - see PSH   17. Are you allergic to latex? No   18. Is there any chance that you may be pregnant? -       Health Care Directive:  Patient has a Health Care Directive on file      Preoperative Review of :   reviewed - no record of controlled substances prescribed.      Status of Chronic Conditions:  See problem list for active medical problems.  Problems all longstanding and stable, except as noted/documented.  See ROS for pertinent symptoms related to these conditions.      Review of Systems  GENERAL: negative for, fever, chills, weight loss, weight gain  EYES: cataracts  ENT: negative  RESPIRATORY: No dyspnea on exertion and No cough  CARDIOVASCULAR: negative for, palpitations, tachycardia, irregular heart beat and chest pain  GI: negative for, nausea, vomiting, abdominal pain, melena and hematochezia  : negative  MUSCULOSKELETAL: negative  NEUROLOGIC: negative for, headaches, seizures, local weakness, numbness or tingling of  hands and numbness or tingling of feet  SKIN: negative  ENDOCRINE: negative         Patient Active Problem List    Diagnosis Date Noted     Inflammatory arthritis 05/26/2020     Priority: Medium     CKD (chronic kidney disease) stage 3, GFR 30-59 ml/min (H) 06/03/2019     Priority: Medium     Edema, unspecified type 07/15/2017     Priority: Medium     Elevated blood pressure reading without diagnosis of hypertension 11/27/2015     Priority: Medium     Urinary frequency 09/21/2014     Priority: Medium     Back pain 03/24/2014     Priority: Medium     CARDIOVASCULAR SCREENING; LDL GOAL LESS THAN 160 10/31/2010     Priority: Medium     Asymptomatic varicose veins 06/24/2003     Priority: Medium      Past Medical History:   Diagnosis Date     Arthritis      Asymptomatic varicose veins      Benign neoplasm of colon 11/06, 7/13    Adenoma     Family history of colon cancer     dad     Gastroesophageal reflux disease      History of colonic polyps      Hypertension      Major depression      Past Surgical History:   Procedure Laterality Date     AMPUTATE TOE(S) Left 4/12/2018    Procedure: AMPUTATE TOE(S);  Amputation left third digit;  Surgeon: Herb Michael DPM;  [facility]: [facility]     ARTHROPLASTY HIP Right 3/28/2016    Procedure: ARTHROPLASTY HIP;  Surgeon: Perez Meza MD;  Location:  OR     ARTHROPLASTY REVISION HIP Right 10/28/2019    Procedure: Revision right total hip arthroplasty;  Surgeon: Perez Meza MD;  Location:  OR     CLOSED REDUCTION HIP Right 7/15/2019    Procedure: Closed reduction, right total hip arthroplasty dislocation.;  Surgeon: Perez Meza MD;  Location:  OR     COLONOSCOPY       COLONOSCOPY Left 4/16/2021    Procedure: COLONOSCOPY;  Surgeon: Rell Cisneros MD;  Location:  GI     HC CORRECT BUNION,SIMPLE  1998    RT     HC CORRECT BUNION,SIMPLE  2001    LT     HC KNEE SCOPE, DIAGNOSTIC      LT     IR CERVICAL EPIDURAL STEROID INJECTION  6/18/2007      "REVERSE ARTHROPLASTY SHOULDER Left 10/30/2018    Procedure: Left reverse total shoulder arthroplasty;  Surgeon: Aaron Christianson MD;  [facility]: [facility]     REVERSE ARTHROPLASTY SHOULDER Right 2019    Procedure: Right reverse total shoulder arthroplasty;  Surgeon: Aaron Christianson MD;  Location: [facility]     ROTATOR CUFF REPAIR RT/LT      right     VASCULAR SURGERY  left leg bypass      [facility] NONSPECIFIC PROCEDURE      Right arm plastic surgery     [facility] NONSPECIFIC PROCEDURE      Right knee surgery     [facility] NONSPECIFIC PROCEDURE  10/03    L popliteal AVM repair     [facility] NONSPECIFIC PROCEDURE      Removal bone spur left foot     [facility] NONSPECIFIC PROCEDURE      amputation of toes left & right toes     [facility] REPAIR SPIEGELIAN HERNIA       [facility] TOTAL KNEE ARTHROPLASTY  10/03    LT     Current Outpatient Medications   Medication Sig Dispense Refill   NO medications    Allergies   Allergen Reactions     Morphine      respiratory distress     Cephalexin Rash        Social History     Tobacco Use     Smoking status: Former Smoker     Packs/day: 1.00     Types: Cigarettes, Cigars     Quit date: 2013     Years since quittin.9     Smokeless tobacco: Never Used   Substance Use Topics     Alcohol use: Not Currently       History   Drug Use No         Objective         Physical Exam    Patient is alert, oriented, cooperative in no acute distress.  BP (!) 153/88 (BP Location: Right arm, Patient Position: Chair, Cuff Size: Adult Regular)   Pulse 95   Temp 98.1  F (36.7  C) (Tympanic)   Resp 16   Ht 1.575 m (5' 2\")   Wt 74.7 kg (164 lb 11.2 oz)   LMP 10/01/2003   SpO2 96%   Breastfeeding No   BMI 30.12 kg/m      HEENT: PERRL, EOMI, TM's are normal. Oropharynx is clear.  NECK: No lymphadenopathy or thyromegaly. Carotid pulses full without bruits.  LUNGS: clear  CV: normal S1, S2 without murmur, S3 or S4 present. Pulses are 2/2 throughout. No JVD.  ABDOMEN: Nondistended, soft, nontender, no " hepatosplenomegaly, no masses  EXTREMITIES: no edema present, unremarkable joints  NEUROLOGIC: Cranial nerves II-XII intact, reflexes 2/4 throughout, strength 5/5,       Recent Labs   Lab Test 03/02/21  1420 09/14/20  1142 08/07/20  1500 08/07/20  1447   HGB  --   --   --  13.5   PLT  --   --   --  189    136   < >  --    POTASSIUM 4.2 4.8   < >  --    CR 1.14* 1.25*   < >  --     < > = values in this interval not displayed.        Diagnostics:  No labs were ordered during this visit.   No EKG required for low risk surgery (cataract, skin procedure, breast biopsy, etc).    Revised Cardiac Risk Index (RCRI):  The patient has the following serious cardiovascular risks for perioperative complications:   - No serious cardiac risks = 0 points     RCRI Interpretation: 0 points: Class I (very low risk - 0.4% complication rate)           Signed Electronically by: Viri Savage MD  Copy of this evaluation report is provided to requesting physician.

## 2022-03-15 ENCOUNTER — HOSPITAL ENCOUNTER (OUTPATIENT)
Dept: MAMMOGRAPHY | Facility: CLINIC | Age: 67
Discharge: HOME | End: 2022-03-15
Attending: INTERNAL MEDICINE | Admitting: INTERNAL MEDICINE
Payer: COMMERCIAL

## 2022-03-15 DIAGNOSIS — Z12.31 VISIT FOR SCREENING MAMMOGRAM: ICD-10-CM

## 2022-03-15 PROCEDURE — 77067 SCR MAMMO BI INCL CAD: CPT

## 2022-04-10 ENCOUNTER — HEALTH MAINTENANCE LETTER (OUTPATIENT)
Age: 67
End: 2022-04-10

## 2022-06-09 ENCOUNTER — TELEPHONE (OUTPATIENT)
Dept: INTERNAL MEDICINE | Facility: CLINIC | Age: 67
End: 2022-06-09
Payer: COMMERCIAL

## 2022-06-09 NOTE — TELEPHONE ENCOUNTER
Reason for Call:  Other prescription    Detailed comments: was on an antibiotic and now has a rash on buttocks and would like some sort of medication for this. Has not tried anything OTC.     Phone Number Patient can be reached at: Home number on file [phone] (home)    Best Time: any    Can we leave a detailed message on this number? YES    Call taken on 6/9/2022 at 8:16 AM by Shiresa H. Ormond

## 2022-06-09 NOTE — TELEPHONE ENCOUNTER
"Pt went to [facility] and she got sick with Bronchitis. She was put on \"Cefurax\" 500 mg BID for 5 days. Started it on June 1 and finished it on Sunday 6/5. Got the rash the day after finishing. On Monday.      The rash is on her Right Buttocks only and is bumpy and feels sore, irritated. She cannot see it.     Advised to take a photo of it and to check to see if looks like little blisters. If she can, she is going to try to download to [employer] but she has never done this.   She states she had both Shingles shots.     Advised that she can try some Hydrocortisone cream.     Please advise.   "

## 2022-06-09 NOTE — TELEPHONE ENCOUNTER
Agree it could possibly be shingles which can be triggered by having a viral infection.  Sometimes the blisters break quickly and become scabs so she might not actually see any blisters but if there are scabs there or open sores, would suggest shingles.  Usually shingles will come out in a patch in a certain area, something that would be spreading over the entire buttock would be less likely to be shingles.  If it is shingles, it is too late to start on antiviral medication.  If it had been the antibiotic she was reacting to, I would expect it to be spreading over more of her body by now.   If it is blisters, open sores or scabs, then I would not recommend using anything topical.  If it is just red raised bumps, she can do the hydrocortisone cream.    Another potential cause could be bedbugs but frequently they will be patches that formed in different places including the arms so that seems less likely.

## 2022-07-15 ENCOUNTER — TRANSFERRED RECORDS (OUTPATIENT)
Dept: HEALTH INFORMATION MANAGEMENT | Facility: CLINIC | Age: 67
End: 2022-07-15

## 2022-07-15 LAB — PHQ9 SCORE: 5

## 2022-07-22 ENCOUNTER — NURSE TRIAGE (OUTPATIENT)
Dept: NURSING | Facility: CLINIC | Age: 67
End: 2022-07-22

## 2022-07-22 NOTE — TELEPHONE ENCOUNTER
Nurse Triage SBAR    Is this a 2nd Level Triage? NO    Situation: Patient is vomiting phlegm every morning- she also gets nauseated    Background: This has been going on for 1 month.  She vomits 2 times every morning.    Assessment: She states she sometimes vomits bile as well yesterday.  She did not vomit any bile today.  Patient states she does not have an appetite and is not eating much        Protocol Recommended Disposition:   Go To Office Now    Recommendation: Patient advised to go to urgent care. Patient would like to see if she can be seen in clinic. Patient transferred to scheduling. She was again advised to go to urgent care if she is unable to get an appointment.      Lolita Sky RN on 7/22/2022 at 10:42 AM        Reason for Disposition    Vomiting and abdomen looks much more swollen than usual    Additional Information    Negative: Shock suspected (e.g., cold/pale/clammy skin, too weak to stand, low BP, rapid pulse)    Negative: Difficult to awaken or acting confused (e.g., disoriented, slurred speech)    Negative: Sounds like a life-threatening emergency to the triager    Negative: Vomiting occurs only while coughing    Negative: Pregnant < 20 Weeks and nausea/vomiting began in early pregnancy (i.e., 4-8 weeks pregnant)    Negative: Chest pain    Negative: Headache is main symptom    Negative: SEVERE vomiting (e.g., 6 or more times/day) (Exception: patient sounds well, is drinking liquids, does not sound dehydrated, and vomiting has lasted less than 24 hours)    Negative: MODERATE vomiting (e.g., 3 - 5 times/day) and age > 60    Negative: Vomiting contains bile (green color)    Negative: Vomiting red blood or black (coffee ground) material    Negative: Insulin-dependent diabetes and glucose > 240 mg/dL (13 mmol/L)    Negative: Recent head injury (within 3 days)    Negative: Recent abdominal injury (within 7 days)    Negative: Drinking very little and has signs of dehydration (e.g., no urine > 12  hours, very dry mouth, very lightheaded)    Negative: Constant abdominal pain lasting > 2 hours    Negative: High-risk adult (e.g., brain tumor, V-P shunt, hernia)    Negative: Severe pain in one eye    Negative: Patient sounds very sick or weak to the triager    Protocols used: VOMITING-A-[place]

## 2022-07-27 ENCOUNTER — OFFICE VISIT (OUTPATIENT)
Dept: INTERNAL MEDICINE | Facility: CLINIC | Age: 67
End: 2022-07-27
Payer: COMMERCIAL

## 2022-07-27 VITALS
HEIGHT: 62 IN | TEMPERATURE: 99.4 F | BODY MASS INDEX: 29.26 KG/M2 | RESPIRATION RATE: 16 BRPM | WEIGHT: 159 LBS | HEART RATE: 122 BPM | SYSTOLIC BLOOD PRESSURE: 138 MMHG | DIASTOLIC BLOOD PRESSURE: 78 MMHG | OXYGEN SATURATION: 97 %

## 2022-07-27 DIAGNOSIS — R19.7 VOMITING AND DIARRHEA: Primary | ICD-10-CM

## 2022-07-27 DIAGNOSIS — R11.10 VOMITING AND DIARRHEA: Primary | ICD-10-CM

## 2022-07-27 LAB
ERYTHROCYTE [DISTWIDTH] IN BLOOD BY AUTOMATED COUNT: 17.1 % (ref 10–15)
HCT VFR BLD AUTO: 40.3 % (ref 35–47)
HGB BLD-MCNC: 14.3 G/DL (ref 11.7–15.7)
MCH RBC QN AUTO: 38.3 PG (ref 26.5–33)
MCHC RBC AUTO-ENTMCNC: 35.5 G/DL (ref 31.5–36.5)
MCV RBC AUTO: 108 FL (ref 78–100)
PLATELET # BLD AUTO: 115 10E3/UL (ref 150–450)
RBC # BLD AUTO: 3.73 10E6/UL (ref 3.8–5.2)
WBC # BLD AUTO: 5.3 10E3/UL (ref 4–11)

## 2022-07-27 PROCEDURE — 80048 BASIC METABOLIC PNL TOTAL CA: CPT

## 2022-07-27 PROCEDURE — 99214 OFFICE O/P EST MOD 30 MIN: CPT

## 2022-07-27 PROCEDURE — 36415 COLL VENOUS BLD VENIPUNCTURE: CPT

## 2022-07-27 PROCEDURE — 85027 COMPLETE CBC AUTOMATED: CPT

## 2022-07-27 ASSESSMENT — ENCOUNTER SYMPTOMS
LIGHT-HEADEDNESS: 0
CHEST TIGHTNESS: 0
COUGH: 1
VOMITING: 1
SHORTNESS OF BREATH: 0
DIARRHEA: 1
DIFFICULTY URINATING: 0
ABDOMINAL PAIN: 0
CHILLS: 0
FATIGUE: 0
FEVER: 0
RHINORRHEA: 1
NAUSEA: 1
SORE THROAT: 0
HEMATOCHEZIA: 0
DIZZINESS: 0
PALPITATIONS: 0
DYSURIA: 0

## 2022-07-27 NOTE — PATIENT INSTRUCTIONS
I would try flonase nasal spray which you can get over the counter. You could also try cetirizine (this is generic for Zyrtec) to help with the runny nose and postnasal drip. We will also check some blood work to make sure your electrolytes look okay with the diarrhea.

## 2022-07-27 NOTE — PROGRESS NOTES
"  Assessment & Plan     (R11.10,  R19.7) Vomiting and diarrhea  (primary encounter diagnosis)  Comment:   Pt presents to clinic with sx of nausea, vomiting, and diarrhea, rhinitis, dry cough >4 weeks. Vomiting is occurring at least 1x/day. Having diarrhea >3x/day and has had difficulty making it to the bathroom. Diarrhea is loose but not watery. Denies any hematemesis. Often is vomiting up clear phlegm. She has had moderate relief with pepto-bismol. Has not been on any antibiotics recently. Trying to eat a bland diet at home to prevent worsening of symptoms. She has not taken a covid test yet.     Plan: Likely her symptoms are due to a viral infection and the rhinitis and postnasal drainage is causing her to have nausea and associated vomiting and diarrhea. I would like her to try using cetirizine and flonase daily for the next 2 weeks to see if this helps with her symptoms. I have also advised her to take a home covid test. I do not think her symptoms warrant an enteric stool panel test at this time. Will also check CBC and electrolytes.  -BASIC METABOLIC PANEL   -CBC with platelets  -Cetirizine OTC  -Flonase OTC            30 minutes spent on the date of the encounter doing chart review, history and exam, documentation and further activities per the note       BMI:   Estimated body mass index is 29.08 kg/m  as calculated from the following:    Height as of this encounter: 1.575 m (5' 2\").    Weight as of this encounter: 72.1 kg (159 lb).         No follow-ups on file.    JOCELYN Joy CNP  [facility] is a 67 year old, presenting for the following health issues:  Vomiting and Throat Problem (Feels a lump in throat and is bothersome at times when eating)      Vomiting   Associated symptoms include cough and diarrhea. Pertinent negatives include no abdominal pain, no chills and no fever.   History of Present Illness       Reason for visit:  Vomiting every morning " "phelm  Symptom onset:  More than a month  Symptoms include:  Throwing up every morning, phelm once bile  Symptom intensity:  Severe  Symptom progression:  Staying the same  Had these symptoms before:  Yes  Has tried/received treatment for these symptoms:  No  What makes it worse:  No  What makes it better:  No    She eats 0-1 servings of fruits and vegetables daily.She consumes 0 sweetened beverage(s) daily.She exercises with enough effort to increase her heart rate 9 or less minutes per day.  She exercises with enough effort to increase her heart rate 3 or less days per week.   She is taking medications regularly.       Review of Systems   Constitutional: Negative for chills, fatigue and fever.   HENT: Positive for rhinorrhea. Negative for ear pain and sore throat.    Respiratory: Positive for cough. Negative for chest tightness and shortness of breath.    Cardiovascular: Negative for chest pain and palpitations.   Gastrointestinal: Positive for diarrhea, nausea and vomiting. Negative for abdominal pain and hematochezia.   Genitourinary: Negative for difficulty urinating and dysuria.   Neurological: Negative for dizziness and light-headedness.          Objective    /78   Pulse (!) 122   Temp 99.4  F (37.4  C) (Tympanic)   Resp 16   Ht 1.575 m (5' 2\")   Wt 72.1 kg (159 lb)   LMP 10/01/2003 (Exact Date)   SpO2 97%   Breastfeeding No   BMI 29.08 kg/m    Body mass index is 29.08 kg/m .     Physical Exam  Constitutional:       General: She is not in acute distress.     Appearance: Normal appearance. She is not ill-appearing, toxic-appearing or diaphoretic.   HENT:      Head: Normocephalic and atraumatic.   Eyes:      Conjunctiva/sclera: Conjunctivae normal.   Cardiovascular:      Rate and Rhythm: Normal rate and regular rhythm.   Pulmonary:      Effort: Pulmonary effort is normal.      Breath sounds: Normal breath sounds.   Abdominal:      General: Abdomen is flat. There is no distension.      Palpations: " Abdomen is soft.      Tenderness: There is no abdominal tenderness. There is no guarding or rebound. Negative signs include Bernal's sign and McBurney's sign.   Neurological:      Mental Status: She is alert.              .  ..

## 2022-07-28 LAB
ANION GAP SERPL CALCULATED.3IONS-SCNC: 11 MMOL/L (ref 3–14)
BUN SERPL-MCNC: 7 MG/DL (ref 7–30)
CALCIUM SERPL-MCNC: 8.1 MG/DL (ref 8.5–10.1)
CHLORIDE BLD-SCNC: 95 MMOL/L (ref 94–109)
CO2 SERPL-SCNC: 26 MMOL/L (ref 20–32)
CREAT SERPL-MCNC: 0.8 MG/DL (ref 0.52–1.04)
GFR SERPL CREATININE-BSD FRML MDRD: 80 ML/MIN/1.73M2
GLUCOSE BLD-MCNC: 109 MG/DL (ref 70–99)
POTASSIUM BLD-SCNC: 3.9 MMOL/L (ref 3.4–5.3)
SODIUM SERPL-SCNC: 132 MMOL/L (ref 133–144)

## 2022-07-29 ENCOUNTER — TELEPHONE (OUTPATIENT)
Dept: INTERNAL MEDICINE | Facility: CLINIC | Age: 67
End: 2022-07-29

## 2022-07-29 NOTE — TELEPHONE ENCOUNTER
S-(situation): Patient calls with questions regarding home COVID test result.     B-(background): Patient had appointment Wednesday with Samanta South for vomiting and diarrhea and Samanta had suggested she may want to do home Covid test. Patient states test is showing 2 lines and asks if this means it's positive. Informed patient that she would need to read the directions to find out if this is a positive result or not.     A-(assessment): Patient's  read the directions and states the results are positive. Patient advised to begin isolation, she is not sure when Covid symptoms would have started as she has been sick for a wile. However, states she feels better today than she did the last 2 days.     R-(recommendations): Discussed antiviral treatment options, patient declines for now as she is feeling better today. Informed patient that if she changes her mind or symptoms worsen to call the clinic to request Virtual Visit for Covid treatment - this needs to be started within 5 days of symptoms starting. Also informed her that it is difficult to determine when symptoms started so recommended she consider today day 0 of Covid Isolation. She needs to stay home and away from others for at least 5 days, AND fever free for 24 hours (with no medicine that reduces fever), AND her other symptoms are better. She can leave the home if she meets these criteria, but needs to wear a mask when around other for full 10 days of isolation. Patient asks about retesting, informed patient that she can retest at 5 days if she wants, but it is still positive, then needs to stay home for full 10 days of isolation. She asks about her , he is fully vaccinated and doing home test now. Informed her he does not have to remain at home but should wear a mask around others and monitor for symptoms over the next 10 days. If his test today is negative, he should retest in 5 days or sooner if symptoms develop.     [facility]  [facility]

## 2022-08-22 ENCOUNTER — TELEPHONE (OUTPATIENT)
Dept: INTERNAL MEDICINE | Facility: CLINIC | Age: 67
End: 2022-08-22

## 2022-08-22 DIAGNOSIS — R19.7 DIARRHEA, UNSPECIFIED TYPE: Primary | ICD-10-CM

## 2022-08-22 NOTE — TELEPHONE ENCOUNTER
I can order stool panel testing on her today. She will have to come to lab and  kit for stool sample. I can also place order for GI but she will not be able to be seen before Wednesday. Let me know what she says.     Thanks.

## 2022-08-22 NOTE — TELEPHONE ENCOUNTER
Patient calls c/o continued symptoms same as 7- appointment with RIKKI South     She is asking if she should be referred to GI?     Best number to call back [phone].

## 2022-08-22 NOTE — TELEPHONE ENCOUNTER
Call to pt. She is still having BMs about 2 times a day. It is watery. Looks like mucus. Not formed stool at all.     Coughing up phlegm in AM. Gagging. Not vomiting food.   Taking Pepto bismol. Imodium daily.   Lost 5 pounds.   She states the AM is worse for her.     Eating bland food daily. Not eating much. Tried to eat some pasta salad for lunch. Nothing sounds good at all.   Eats apple sauce.     No stool studies done yet.   She is not on Medicare, only [employer].     Leaving for [place] for a week on Wednesday.   Please advise.

## 2022-08-22 NOTE — TELEPHONE ENCOUNTER
Call to pt. She is going to have her   the kit today to drop off tomorrow AM.     She would like the referral too. Please place for [facility] clinic referral.

## 2022-08-30 NOTE — TELEPHONE ENCOUNTER
Call to pt. She felt great all week. Takes TUMS at night.   She will try cutting down to every other night and see how she does.

## 2022-10-15 ENCOUNTER — HEALTH MAINTENANCE LETTER (OUTPATIENT)
Age: 67
End: 2022-10-15

## 2022-12-07 ENCOUNTER — APPOINTMENT (OUTPATIENT)
Dept: CT IMAGING | Facility: CLINIC | Age: 67
DRG: 432 | End: 2022-12-07
Attending: EMERGENCY MEDICINE
Payer: MEDICARE

## 2022-12-07 ENCOUNTER — HOSPITAL ENCOUNTER (INPATIENT)
Facility: CLINIC | Age: 67
LOS: 2 days | Discharge: HOME | DRG: 432 | End: 2022-12-09
Attending: EMERGENCY MEDICINE | Admitting: INTERNAL MEDICINE
Payer: MEDICARE

## 2022-12-07 DIAGNOSIS — R93.89 THICKENED ENDOMETRIUM: Primary | ICD-10-CM

## 2022-12-07 DIAGNOSIS — E83.42 HYPOMAGNESEMIA: ICD-10-CM

## 2022-12-07 DIAGNOSIS — K70.31 ALCOHOLIC CIRRHOSIS OF LIVER WITH ASCITES (H): ICD-10-CM

## 2022-12-07 DIAGNOSIS — E80.6 HYPERBILIRUBINEMIA: ICD-10-CM

## 2022-12-07 LAB
ALBUMIN SERPL BCG-MCNC: 3.5 G/DL (ref 3.5–5.2)
ALP SERPL-CCNC: 267 U/L (ref 35–104)
ALT SERPL W P-5'-P-CCNC: 40 U/L (ref 10–35)
ANION GAP SERPL CALCULATED.3IONS-SCNC: 15 MMOL/L (ref 7–15)
APTT PPP: 31 SECONDS (ref 22–38)
AST SERPL W P-5'-P-CCNC: 171 U/L (ref 10–35)
BASOPHILS # BLD AUTO: 0 10E3/UL (ref 0–0.2)
BASOPHILS NFR BLD AUTO: 0 %
BILIRUB SERPL-MCNC: 9.4 MG/DL
BUN SERPL-MCNC: 8.2 MG/DL (ref 8–23)
CALCIUM SERPL-MCNC: 8.5 MG/DL (ref 8.8–10.2)
CHLORIDE SERPL-SCNC: 96 MMOL/L (ref 98–107)
CREAT SERPL-MCNC: 0.82 MG/DL (ref 0.51–0.95)
DEPRECATED HCO3 PLAS-SCNC: 25 MMOL/L (ref 22–29)
EOSINOPHIL # BLD AUTO: 0 10E3/UL (ref 0–0.7)
EOSINOPHIL NFR BLD AUTO: 0 %
ERYTHROCYTE [DISTWIDTH] IN BLOOD BY AUTOMATED COUNT: 17.1 % (ref 10–15)
ETHANOL SERPL-MCNC: <0.01 G/DL
GFR SERPL CREATININE-BSD FRML MDRD: 77 ML/MIN/1.73M2
GLUCOSE SERPL-MCNC: 124 MG/DL (ref 70–99)
HCT VFR BLD AUTO: 28.9 % (ref 35–47)
HGB BLD-MCNC: 10 G/DL (ref 11.7–15.7)
HOLD SPECIMEN: NORMAL
IMM GRANULOCYTES # BLD: 0.1 10E3/UL
IMM GRANULOCYTES NFR BLD: 1 %
INR PPP: 1.23 (ref 0.85–1.15)
LACTATE SERPL-SCNC: 1.7 MMOL/L (ref 0.7–2)
LIPASE SERPL-CCNC: 30 U/L (ref 13–60)
LYMPHOCYTES # BLD AUTO: 0.8 10E3/UL (ref 0.8–5.3)
LYMPHOCYTES NFR BLD AUTO: 8 %
MAGNESIUM SERPL-MCNC: 0.9 MG/DL (ref 1.7–2.3)
MAGNESIUM SERPL-MCNC: 1.7 MG/DL (ref 1.7–2.3)
MCH RBC QN AUTO: 44.2 PG (ref 26.5–33)
MCHC RBC AUTO-ENTMCNC: 34.6 G/DL (ref 31.5–36.5)
MCV RBC AUTO: 128 FL (ref 78–100)
MONOCYTES # BLD AUTO: 1 10E3/UL (ref 0–1.3)
MONOCYTES NFR BLD AUTO: 11 %
NEUTROPHILS # BLD AUTO: 7.4 10E3/UL (ref 1.6–8.3)
NEUTROPHILS NFR BLD AUTO: 80 %
NRBC # BLD AUTO: 0 10E3/UL
NRBC BLD AUTO-RTO: 0 /100
NT-PROBNP SERPL-MCNC: 555 PG/ML (ref 0–900)
PHOSPHATE SERPL-MCNC: 2.8 MG/DL (ref 2.5–4.5)
PLATELET # BLD AUTO: 224 10E3/UL (ref 150–450)
POTASSIUM SERPL-SCNC: 3.5 MMOL/L (ref 3.4–5.3)
PROT SERPL-MCNC: 6.5 G/DL (ref 6.4–8.3)
RBC # BLD AUTO: 2.26 10E6/UL (ref 3.8–5.2)
SODIUM SERPL-SCNC: 136 MMOL/L (ref 136–145)
WBC # BLD AUTO: 9.3 10E3/UL (ref 4–11)

## 2022-12-07 PROCEDURE — 99223 1ST HOSP IP/OBS HIGH 75: CPT | Mod: AI | Performed by: INTERNAL MEDICINE

## 2022-12-07 PROCEDURE — 80053 COMPREHEN METABOLIC PANEL: CPT | Performed by: EMERGENCY MEDICINE

## 2022-12-07 PROCEDURE — 250N000011 HC RX IP 250 OP 636: Performed by: EMERGENCY MEDICINE

## 2022-12-07 PROCEDURE — 74177 CT ABD & PELVIS W/CONTRAST: CPT | Mod: MG

## 2022-12-07 PROCEDURE — 83880 ASSAY OF NATRIURETIC PEPTIDE: CPT | Performed by: INTERNAL MEDICINE

## 2022-12-07 PROCEDURE — 83735 ASSAY OF MAGNESIUM: CPT | Performed by: INTERNAL MEDICINE

## 2022-12-07 PROCEDURE — 250N000013 HC RX MED GY IP 250 OP 250 PS 637: Performed by: INTERNAL MEDICINE

## 2022-12-07 PROCEDURE — 36415 COLL VENOUS BLD VENIPUNCTURE: CPT | Performed by: INTERNAL MEDICINE

## 2022-12-07 PROCEDURE — 84100 ASSAY OF PHOSPHORUS: CPT | Performed by: EMERGENCY MEDICINE

## 2022-12-07 PROCEDURE — 82607 VITAMIN B-12: CPT | Performed by: INTERNAL MEDICINE

## 2022-12-07 PROCEDURE — 36415 COLL VENOUS BLD VENIPUNCTURE: CPT | Performed by: EMERGENCY MEDICINE

## 2022-12-07 PROCEDURE — 250N000013 HC RX MED GY IP 250 OP 250 PS 637: Performed by: EMERGENCY MEDICINE

## 2022-12-07 PROCEDURE — 82077 ASSAY SPEC XCP UR&BREATH IA: CPT | Performed by: EMERGENCY MEDICINE

## 2022-12-07 PROCEDURE — 87340 HEPATITIS B SURFACE AG IA: CPT | Performed by: PHYSICIAN ASSISTANT

## 2022-12-07 PROCEDURE — 83690 ASSAY OF LIPASE: CPT | Performed by: EMERGENCY MEDICINE

## 2022-12-07 PROCEDURE — 83605 ASSAY OF LACTIC ACID: CPT | Performed by: INTERNAL MEDICINE

## 2022-12-07 PROCEDURE — 85610 PROTHROMBIN TIME: CPT | Performed by: EMERGENCY MEDICINE

## 2022-12-07 PROCEDURE — 86704 HEP B CORE ANTIBODY TOTAL: CPT | Performed by: PHYSICIAN ASSISTANT

## 2022-12-07 PROCEDURE — 120N000001 HC R&B MED SURG/OB

## 2022-12-07 PROCEDURE — G1010 CDSM STANSON: HCPCS

## 2022-12-07 PROCEDURE — 99285 EMERGENCY DEPT VISIT HI MDM: CPT | Mod: 25

## 2022-12-07 PROCEDURE — 85730 THROMBOPLASTIN TIME PARTIAL: CPT | Performed by: EMERGENCY MEDICINE

## 2022-12-07 PROCEDURE — 86706 HEP B SURFACE ANTIBODY: CPT | Performed by: PHYSICIAN ASSISTANT

## 2022-12-07 PROCEDURE — 85025 COMPLETE CBC W/AUTO DIFF WBC: CPT | Performed by: EMERGENCY MEDICINE

## 2022-12-07 PROCEDURE — 83735 ASSAY OF MAGNESIUM: CPT | Performed by: EMERGENCY MEDICINE

## 2022-12-07 PROCEDURE — 96374 THER/PROPH/DIAG INJ IV PUSH: CPT | Mod: 59

## 2022-12-07 RX ORDER — LIDOCAINE 40 MG/G
CREAM TOPICAL
Status: DISCONTINUED | OUTPATIENT
Start: 2022-12-07 | End: 2022-12-09 | Stop reason: HOSPADM

## 2022-12-07 RX ORDER — IOPAMIDOL 755 MG/ML
500 INJECTION, SOLUTION INTRAVASCULAR ONCE
Status: COMPLETED | OUTPATIENT
Start: 2022-12-07 | End: 2022-12-07

## 2022-12-07 RX ORDER — LORAZEPAM 2 MG/ML
1-2 INJECTION INTRAMUSCULAR EVERY 30 MIN PRN
Status: DISCONTINUED | OUTPATIENT
Start: 2022-12-07 | End: 2022-12-09 | Stop reason: HOSPADM

## 2022-12-07 RX ORDER — PANTOPRAZOLE SODIUM 40 MG/1
40 TABLET, DELAYED RELEASE ORAL
Status: DISCONTINUED | OUTPATIENT
Start: 2022-12-08 | End: 2022-12-09 | Stop reason: HOSPADM

## 2022-12-07 RX ORDER — FLUMAZENIL 0.1 MG/ML
0.2 INJECTION, SOLUTION INTRAVENOUS
Status: DISCONTINUED | OUTPATIENT
Start: 2022-12-07 | End: 2022-12-09 | Stop reason: HOSPADM

## 2022-12-07 RX ORDER — MAGNESIUM SULFATE HEPTAHYDRATE 40 MG/ML
2 INJECTION, SOLUTION INTRAVENOUS ONCE
Status: COMPLETED | OUTPATIENT
Start: 2022-12-07 | End: 2022-12-07

## 2022-12-07 RX ORDER — LORAZEPAM 1 MG/1
1-2 TABLET ORAL EVERY 30 MIN PRN
Status: DISCONTINUED | OUTPATIENT
Start: 2022-12-07 | End: 2022-12-09 | Stop reason: HOSPADM

## 2022-12-07 RX ORDER — FOLIC ACID 1 MG/1
1 TABLET ORAL DAILY
Status: DISCONTINUED | OUTPATIENT
Start: 2022-12-08 | End: 2022-12-09 | Stop reason: HOSPADM

## 2022-12-07 RX ORDER — ONDANSETRON 2 MG/ML
4 INJECTION INTRAMUSCULAR; INTRAVENOUS EVERY 6 HOURS PRN
Status: DISCONTINUED | OUTPATIENT
Start: 2022-12-07 | End: 2022-12-09 | Stop reason: HOSPADM

## 2022-12-07 RX ORDER — FOLIC ACID 1 MG/1
1 TABLET ORAL ONCE
Status: COMPLETED | OUTPATIENT
Start: 2022-12-07 | End: 2022-12-07

## 2022-12-07 RX ORDER — CLONIDINE HYDROCHLORIDE 0.1 MG/1
0.1 TABLET ORAL EVERY 8 HOURS
Status: DISCONTINUED | OUTPATIENT
Start: 2022-12-07 | End: 2022-12-07

## 2022-12-07 RX ORDER — OLANZAPINE 5 MG/1
5-10 TABLET, ORALLY DISINTEGRATING ORAL EVERY 6 HOURS PRN
Status: DISCONTINUED | OUTPATIENT
Start: 2022-12-07 | End: 2022-12-09 | Stop reason: HOSPADM

## 2022-12-07 RX ORDER — HALOPERIDOL 5 MG/ML
2.5-5 INJECTION INTRAMUSCULAR EVERY 6 HOURS PRN
Status: DISCONTINUED | OUTPATIENT
Start: 2022-12-07 | End: 2022-12-09 | Stop reason: HOSPADM

## 2022-12-07 RX ORDER — ONDANSETRON 4 MG/1
4 TABLET, ORALLY DISINTEGRATING ORAL EVERY 6 HOURS PRN
Status: DISCONTINUED | OUTPATIENT
Start: 2022-12-07 | End: 2022-12-09 | Stop reason: HOSPADM

## 2022-12-07 RX ORDER — MULTIPLE VITAMINS W/ MINERALS TAB 9MG-400MCG
1 TAB ORAL DAILY
Status: DISCONTINUED | OUTPATIENT
Start: 2022-12-07 | End: 2022-12-09 | Stop reason: HOSPADM

## 2022-12-07 RX ADMIN — MULTIPLE VITAMINS W/ MINERALS TAB 1 TABLET: TAB at 20:22

## 2022-12-07 RX ADMIN — THIAMINE HCL TAB 100 MG 100 MG: 100 TAB at 14:36

## 2022-12-07 RX ADMIN — FOLIC ACID 1 MG: 1 TABLET ORAL at 14:36

## 2022-12-07 RX ADMIN — IOPAMIDOL 150 ML: 755 INJECTION, SOLUTION INTRAVENOUS at 15:40

## 2022-12-07 RX ADMIN — MAGNESIUM SULFATE HEPTAHYDRATE 2 G: 40 INJECTION, SOLUTION INTRAVENOUS at 17:49

## 2022-12-07 ASSESSMENT — ENCOUNTER SYMPTOMS
COLOR CHANGE: 1
ACTIVITY CHANGE: 1
WEAKNESS: 1
VOMITING: 1
FEVER: 0
ABDOMINAL PAIN: 1
NAUSEA: 1
SHORTNESS OF BREATH: 1
APPETITE CHANGE: 1
ABDOMINAL DISTENTION: 1
CHILLS: 0
DIARRHEA: 1

## 2022-12-07 ASSESSMENT — ACTIVITIES OF DAILY LIVING (ADL)
ADLS_ACUITY_SCORE: 37
ADLS_ACUITY_SCORE: 37
ADLS_ACUITY_SCORE: 35
ADLS_ACUITY_SCORE: 33
ADLS_ACUITY_SCORE: 37

## 2022-12-07 NOTE — ED NOTES
DATE:  12/7/2022   TIME OF RECEIPT FROM LAB:  7544  LAB TEST:  Magnesium  LAB VALUE:  0.9  RESULTS GIVEN WITH READ-BACK TO (PROVIDER):  Dr. Ball  TIME LAB VALUE REPORTED TO PROVIDER:   7616

## 2022-12-07 NOTE — ED PROVIDER NOTES
Rapid Assessment Note    History:   Krystyna Peña is a 67 year old female with a history of daily alcohol use who presents with concerns for 2 weeks of increasing abdominal distention and generalized discomfort as it becomes more distended.  She notes 2 days ago she noticed that her skin looked yellow or than normal.  She has associated nausea and vomiting.  She has had diarrhea profusely for the last 2 weeks, to the point where she has to wear depends because it leaks out.  She notes bilateral lower extremity swelling as well.  She denies rash.  She denies fever or chills.  She notes she drinks approximately 5 highball sized glasses of darling per day for years.  She notes her last drink was last night.  She denies any history of alcohol withdrawal does not ceased drinking alcohol for prolonged periods.    Exam:   General:  Alert, interactive  Eyes: Scleral icterus.  Cardiovascular:  Well perfused  Lungs:  No respiratory distress, no accessory muscle use  Abdomen: Distended.  Still soft.  Generalized tenderness to deep palpation.  No palpable mass.  Neuro:  Moving all 4 extremities.  Not tremulous.  Skin:  Warm, dry.  Jaundiced.  Psych:  Normal affect      Plan of Care:   I evaluated the patient and developed an initial plan of care. I discussed this plan and explained that I, or one of my partners, would be returning to complete the evaluation.       12/7/2022  [employer]    Portions of this medical record were completed by a scribe. UPON MY REVIEW AND AUTHENTICATION BY ELECTRONIC SIGNATURE, this confirms (a) I performed the applicable clinical services, and (b) the record is accurate.      Ana Luisa Ball MD  12/07/22 1868

## 2022-12-07 NOTE — ED PROVIDER NOTES
History     Chief Complaint:  Abdominal Pain and Leg Swelling       HPI   Krystyna Peña is a 67 year old female with a history of daily alcohol use who presents with concerns for 2 weeks of increasing abdominal distention and generalized discomfort as it becomes more distended.  She notes 2 days ago she noticed that her skin looked yellow or than normal.  She has associated nausea and vomiting, not bloody or black.  She has had diarrhea profusely for the last 2 weeks, to the point where she has to wear depends because it leaks out.  She reports increased weakness and decreased activity due to distension. She notes bilateral lower extremity swelling as well. She reports shortness of breath with activity.  She denies rash.  She denies fever or chills.  She notes she drinks approximately 5 highball sized glasses of darling per day for years.  She notes her last drink was last night.  She denies any history of alcohol withdrawal does not ceased drinking alcohol for prolonged periods.    ROS:  Review of Systems   Constitutional: Positive for activity change and appetite change. Negative for chills and fever.   Respiratory: Positive for shortness of breath (with exertion).    Cardiovascular: Positive for leg swelling. Negative for chest pain.   Gastrointestinal: Positive for abdominal distention, abdominal pain, diarrhea, nausea and vomiting.   Skin: Positive for color change.   Neurological: Positive for weakness (generalized).   All other systems reviewed and are negative.      Allergies:  Morphine  Cephalexin     Medications:    Lisinopril  ASA    Past Medical History:    Past Medical History:   Diagnosis Date     Arthritis      Asymptomatic varicose veins      Benign neoplasm of colon 11/06, 7/13     Family history of colon cancer      Gastroesophageal reflux disease      History of colonic polyps      Hypertension      Major depression      Syncope    Chronic alcohol abuse    Past Surgical History:    Past  Surgical History:   Procedure Laterality Date     AMPUTATE TOE(S) Left 4/12/2018    Procedure: AMPUTATE TOE(S);  Amputation left third digit;  Surgeon: Herb Michael DPM;  Location: [facility] OR     ARTHROPLASTY HIP Right 3/28/2016    Procedure: ARTHROPLASTY HIP;  Surgeon: Perez Meza MD;  Location: [facility] OR     ARTHROPLASTY REVISION HIP Right 10/28/2019    Procedure: Revision right total hip arthroplasty;  Surgeon: Perez Meza MD;  Location: [facility] OR     CLOSED REDUCTION HIP Right 7/15/2019    Procedure: Closed reduction, right total hip arthroplasty dislocation.;  Surgeon: Perez Meza MD;  Location: [facility] OR     COLONOSCOPY       COLONOSCOPY Left 4/16/2021    Procedure: COLONOSCOPY;  Surgeon: Rell Cisneros MD;  Location:  GI     HC CORRECT BUNION,SIMPLE  1998    RT     HC CORRECT BUNION,SIMPLE  2001    LT     HC KNEE SCOPE, DIAGNOSTIC      LT     IR CERVICAL EPIDURAL STEROID INJECTION  6/18/2007     REVERSE ARTHROPLASTY SHOULDER Left 10/30/2018    Procedure: Left reverse total shoulder arthroplasty;  Surgeon: Aaron Christianson MD;  Location:  OR     REVERSE ARTHROPLASTY SHOULDER Right 6/12/2019    Procedure: Right reverse total shoulder arthroplasty;  Surgeon: Aaron Christianson MD;  Location: [facility] OR     ROTATOR CUFF REPAIR RT/LT  7/07    right     VASCULAR SURGERY  left leg bypass 2004     [facility] NONSPECIFIC PROCEDURE  1972    Right arm plastic surgery     [facility] NONSPECIFIC PROCEDURE  1972    Right knee surgery     [facility] NONSPECIFIC PROCEDURE  10/03    L popliteal AVM repair     [facility] NONSPECIFIC PROCEDURE  11/04    Removal bone spur left foot     [facility] NONSPECIFIC PROCEDURE  4/07    amputation of toes left & right toes     [facility] REPAIR SPIEGELIAN HERNIA  2002     [facility] TOTAL KNEE ARTHROPLASTY  10/03    LT        Family History:    family history includes Breast Cancer in her mother; Cancer - colorectal in her father; Colon Cancer in her father; Diabetes in her brother.    Social History:  Former smoker. Daily  alcohol use.   Here with her  with whom she lives  PCP: Viri Savage     Physical Exam     Patient Vitals for the past 24 hrs:   BP Temp Temp src Pulse Resp SpO2   12/07/22 1749 -- -- -- 93 -- 100 %   12/07/22 1748 (!) 144/77 -- -- 92 -- 99 %   12/07/22 1318 (!) 155/91 (!) 96.5  F (35.8  C) Temporal 94 20 98 %        Physical Exam  General: Adult female sitting upright  Eyes: PERRL, Conjunctive within normal limits.  Scleral icterus.  ENT: Moist mucous membranes, oropharynx clear.   CV: Normal S1S2, no murmur, rub or gallop. Regular rate and rhythm  Resp: Clear to auscultation bilaterally, no wheezes, rales or rhonchi.  Diminished at the bases bilaterally.  Normal respiratory effort.  GI: Abdomen is soft and protuberant.  Mild diffuse tenderness to palpation.  Hepatomegaly.  No rebound or guarding.  MSK: Bilateral symmetric lower extremity edema from the calf into the feet. Nontender. Normal active range of motion.  Skin: Warm and dry.  Jaundiced.  No rashes or lesions or ecchymoses on visible skin.    Neuro: Alert and oriented. Responds appropriately to all questions and commands. No focal findings appreciated. Normal muscle tone.  No tremors.  Psych: Normal mood and affect. Pleasant.    Emergency Department Course     Imaging:  CT Abdomen Pelvis w Contrast   Final Result   IMPRESSION:    1.  Enlarged and markedly heterogeneous liver with signs of portal   hypertension and cirrhosis. Ultimately underlying lesions are not   excluded, follow-up will be needed. Acute hepatitis is a consideration   in the differential.   2.  Small amount of ascites.   3.  Abnormally thickened endometrium for age and menstrual status,   ultrasound and/or endometrial biopsy recommended for further   evaluation.      JOY SALGADO MD            SYSTEM ID:  [id number]         Report per radiology    Laboratory:  Labs Ordered and Resulted from Time of ED Arrival to Time of ED Departure   COMPREHENSIVE METABOLIC PANEL - Abnormal        Result Value    Sodium 136      Potassium 3.5      Chloride 96 (*)     Carbon Dioxide (CO2) 25      Anion Gap 15      Urea Nitrogen 8.2      Creatinine 0.82      Calcium 8.5 (*)     Glucose 124 (*)     Alkaline Phosphatase 267 (*)      (*)     ALT 40 (*)     Protein Total 6.5      Albumin 3.5      Bilirubin Total 9.4 (*)     GFR Estimate 77     CBC WITH PLATELETS AND DIFFERENTIAL - Abnormal    WBC Count 9.3      RBC Count 2.26 (*)     Hemoglobin 10.0 (*)     Hematocrit 28.9 (*)      (*)     MCH 44.2 (*)     MCHC 34.6      RDW 17.1 (*)     Platelet Count 224      % Neutrophils 80      % Lymphocytes 8      % Monocytes 11      % Eosinophils 0      % Basophils 0      % Immature Granulocytes 1      NRBCs per 100 WBC 0      Absolute Neutrophils 7.4      Absolute Lymphocytes 0.8      Absolute Monocytes 1.0      Absolute Eosinophils 0.0      Absolute Basophils 0.0      Absolute Immature Granulocytes 0.1      Absolute NRBCs 0.0     INR - Abnormal    INR 1.23 (*)    MAGNESIUM - Abnormal    Magnesium 0.9 (*)    LIPASE - Normal    Lipase 30     ETHYL ALCOHOL LEVEL - Normal    Alcohol ethyl <0.01     PARTIAL THROMBOPLASTIN TIME - Normal    aPTT 31     PHOSPHORUS - Normal    Phosphorus 2.8          Emergency Department Course:    Reviewed:  I reviewed nursing notes, vitals and past medical history    Assessments:   I obtained history and examined the patient as noted above.    I rechecked the patient and explained findings. No new concerns. Discussed admission for ongoing mag supplementation and further care.       Consults:   I discussed the patient with Dr. Quiroz who accepted her for admission.    Interventions:  Medications   magnesium sulfate 2 g in water intermittent infusion (2 g Intravenous New Bag 12/7/22 5898)   thiamine (B-1) tablet 100 mg (100 mg Oral Given 12/7/22 1436)   folic acid (FOLVITE) tablet 1 mg (1 mg Oral Given 12/7/22 1436)   iopamidol (ISOVUE-370) solution 500 mL (150 mLs Intravenous Given  12/7/22 1540)   sodium chloride (PF) 0.9% PF flush 100 mL (60 mLs Intravenous Given 12/7/22 1541)        Disposition:  The patient was admitted to the hospital under the care of Dr. Quiroz.     Impression & Plan    Medical Decision Making:  Krystyna Peña is a 67-year-old female chronic alcohol abuse and hypertension who presents emergency department with concerns for increasing abdominal distention, abdominal pain, vomiting, and generalized weakness.  Shortness of breath only with exertion.  Ultimately, the findings today make the cause of her symptoms most likely related to her chronic alcohol abuse with cirrhosis, small ascites, liver disease, esophageal varices and evidence of portal hypertension.  Liver disease is also likely contributing to her third spacing and lower extremity edema.  She is anemic but there are no signs of active bleeding at this time.  No evidence clinically to suspect acute cardiac cause for her symptoms.  She is noted be hypomagnesemic, also likely in the setting of her chronic disease.  Given how low her magnesium is, she was supplemented and admitted for further care.  Discussed this plan with her and she verbalized understanding agreement.  All questions were answered prior to admission.    Diagnosis:    ICD-10-CM    1. Hypomagnesemia  E83.42       2. Hyperbilirubinemia  E80.6       3. Alcoholic cirrhosis of liver with ascites (H)  K70.31               12/7/2022   Ana Luisa Ball MD Jonkman, Tracy Dianne, MD  12/07/22 1804

## 2022-12-07 NOTE — H&P
"[facility] History and Physical    Krystyna Peña MRN# [id number]   Age: 67 year old YOB: 1955     Date of Admission:  12/7/2022    Home clinic: [facility]  Primary care provider: Viri Savage          Assessment and Plan:   Assessment:   Krystyna Peña is a 67 year old woman who came to attention today at [facility] ED with complaint of severe abdominal distention that is impairing her function over the last month.  She has been unable to eat and has lost significant amount of weight, is short of breath with activity and indicates its difficult to ambulate around the house.  She has been having frequent diarrhea over the last couple of weeks and is experiencing incontinence with this.      The patient indicates that she drinks about 5 tall glasses of darling every night and has done so for many years.  She has not gone more than a day without a drink during this time and does not know of any major problems with alcohol withdrawal.  She does not feel that she ever needs to drink to \"settle her nerves\".  She has quit in the past but that was decades ago.    On presentation to the emergency department, VS: /91, HR 94, RR 20, T 96.5  F.  Oxygen saturation 98% breathing room air at rest.  Examination is notable for bitemporal wasting, spider hemangiomas over the face, very significant abdominal distention without acute findings and marked peripheral edema.  CT abdomen and pelvis shows hepatomegaly with evidence of portal hypertension and cirrhosis.  \"Acute hepatitis is a consideration in the differential.  Small amount of ascites.\"  Also, incidentally noted is a thickened endometrial stripe for which ultrasound and/or endometrial biopsy is recommended.    Dx:  1.  Generalized weakness.  2.  ESLD with suspected severe alcoholic hepatitis.  Evidence of portal hypertension (esophageal varices), cirrhosis with small volume ascites and fixed " "coagulopathy.  Discriminant function I believe calculates out to about 23, so I do not believe gastroenterology will recommend steroids.  (Recognizing that an MRI with contrast could help to distinguish between cirrhosis and metastatic disease involving the liver, I note that the patient has a Nevro SCS (spinal cord stimulator) that she uses for chronic back pain.  I do not have the identifying information for this device but she may be able to get an MRI.  I do not think that we can do that here at [facility] but she may be able to get it arranged elsewhere.)  3.  Suspect severe malnutrition.  Obvious loss of muscular bulk.  4.  Incidentally noted thickened endometrial stripe on CT.  Pt describes \"jelly-like\" vaginal discharge.   5.  Mild, anemia with marked macrocytosis.       Plan:   1.  Admit to inpatient.  2.  Although the patient does not currently appear to be in alcohol withdrawal, and I believe she is relatively high risk.  I will place her on CIWA scale but not start gabapentin as yet.  3.  Although the patient reports losing weight due to inadequate oral intake, she appears to be keeping her intravascular volume adequate.  Giving her fluids I think will just result in worsening her edema.  In view of her apparent loss of muscular bulk, I suspect the creatinine overestimates the patient's kidney function.  4.  Nutrition consultation.  Check Vitamin B12. (Folate already started.)  5.  Consider addiction medicine consultation.  Patient will need some plan for abstinence.  6.  I spoke to the patient about the abnormal finding on CT scan.  At that point she told me that she is having some vaginal discharge which surprised her but she for which she has not yet sought attention.               Chief Complaint:   Abdominal distention with increasing difficulty walking.  Yellow skin and decreased appetite.  Diarrhea for the past couple of weeks.     History is obtained from the patient, emergency room physician and " "electronic medical record.  I note that Dr. Anderson was able to speak with the patient's  but he was not present when I arrived.  The patient is coherent and able to communicate.    Ms. Peña indicates that over the course the last month or so she has had development of a number of complaints.  She has noticed increasing abdominal girth during this time with associated yellow discoloration of her skin and diarrhea.  She has been unable to eat because she gets full very fast.  She reports weight loss of about 20 pounds but indicates that that occurred over the course of many months.  She has noticed that her lower extremities are significantly edematous as well and feel tight.  She has difficulty walking both due to lower extremity discomfort and shortness of breath.    As noted, the patient drinks daily and reports \"5 highballs of darling per day\".  It sounds as though she has been told by her  that she drinks excessively but she responds indicating that he also drinks.  She otherwise has never been in treatment but apparently thinks that she can just stop drinking anytime.    The patient denies fevers, sweats and chills.  As noted, of approximately 20 pounds weight loss over the last year though she is also recognized 5 to 7 pounds weight gain in the last couple of weeks.  She has noticed that her face looks thinner than it used to.  She complains of shortness of breath especially when she sits up.  She has a great deal of difficulty getting from a supine to a seated position because of abdominal bulk.  She does not have any chest pain.  Her diarrhea is mucousy and yellow.  She denies hematochezia and melena.  No problems with vomiting.  She is noted that her urine is dark.  When I talked to her about the abnormality in the uterus noted on CT scan, she did tell me that she has been puzzled by a red jellylike vaginal discharge over the past month or so.  She has noticed that her skin also has a yellow " hue.  She also has recognized that she is losing muscle in areas other than her face.        Past Medical History:     Past Medical History:   Diagnosis Date     Arthritis      Asymptomatic varicose veins      Benign neoplasm of colon 11/06, 7/13    Adenoma     Family history of colon cancer     dad     Gastroesophageal reflux disease      History of colonic polyps      Hypertension      Major depression      Syncope              Past Surgical History:      Past Surgical History:   Procedure Laterality Date     AMPUTATE TOE(S) Left 4/12/2018    Procedure: AMPUTATE TOE(S);  Amputation left third digit;  Surgeon: Herb Michael DPM;  Location: [facility] OR     ARTHROPLASTY HIP Right 3/28/2016    Procedure: ARTHROPLASTY HIP;  Surgeon: Perez Meza MD;  Location: [facility] OR     ARTHROPLASTY REVISION HIP Right 10/28/2019    Procedure: Revision right total hip arthroplasty;  Surgeon: Perez Meza MD;  Location:  OR     CLOSED REDUCTION HIP Right 7/15/2019    Procedure: Closed reduction, right total hip arthroplasty dislocation.;  Surgeon: Perez Meza MD;  Location:  OR     COLONOSCOPY       COLONOSCOPY Left 4/16/2021    Procedure: COLONOSCOPY;  Surgeon: Rell Cisneros MD;  Location:  GI     HC CORRECT BUNION,SIMPLE  1998    RT     HC CORRECT BUNION,SIMPLE  2001    LT     HC KNEE SCOPE, DIAGNOSTIC      LT     IR CERVICAL EPIDURAL STEROID INJECTION  6/18/2007     REVERSE ARTHROPLASTY SHOULDER Left 10/30/2018    Procedure: Left reverse total shoulder arthroplasty;  Surgeon: Aaron Christianson MD;  Location:  OR     REVERSE ARTHROPLASTY SHOULDER Right 6/12/2019    Procedure: Right reverse total shoulder arthroplasty;  Surgeon: Aaron Christianson MD;  Location:  OR     ROTATOR CUFF REPAIR RT/LT  7/07    right     VASCULAR SURGERY  left leg bypass 2004     [facility] NONSPECIFIC PROCEDURE  1972    Right arm plastic surgery     [facility] NONSPECIFIC PROCEDURE  1972    Right knee surgery     [facility] NONSPECIFIC PROCEDURE   10/03    L popliteal AVM repair     [facility] NONSPECIFIC PROCEDURE      Removal bone spur left foot     [facility] NONSPECIFIC PROCEDURE      amputation of toes left & right toes     [facility] REPAIR SPIEGELIAN HERNIA       [facility] TOTAL KNEE ARTHROPLASTY  10/03    LT             Social History:     Social History     Tobacco Use     Smoking status: Former     Packs/day: 1.00     Types: Cigarettes, Cigars     Quit date: 2013     Years since quittin.6     Smokeless tobacco: Never   Substance Use Topics     Alcohol use: Not Currently     Alcohol/week: 70.0 standard drinks      Types: (patient drinks about 5 highballs per night of darling)             Family History:     Family History   Problem Relation Age of Onset     Breast Cancer Mother      Cancer - colorectal Father         66     Colon Cancer Father      Diabetes Brother      Family history reviewed but considered noncontributory.         Allergies:     Allergies   Allergen Reactions     Morphine      respiratory distress     Cephalexin Rash             Medications:   None         Review of Systems:   The Review of Systems is negative other than noted in the HPI           Physical Exam:   Vitals were reviewed  Temp: 98.3  F (36.8  C) Temp src: Oral BP: 128/81 Pulse: 90   Resp: 18 SpO2: 99 % O2 Device: None (Room air)    Constitutional: Awake, alert, cooperative, no apparent distress, and appears stated age.  Eyes: Pupils round with post cataract surgical change, extra-ocular muscles intact and icteric bilaterally  ENT: Normocephalic, without obvious abnormality, atraumatic, gums normal and good dentition.  Neck: Supple, symmetrical, trachea midline, no adenopathy, thyroid symmetric, not enlarged and no tenderness, skin normal.  Hematologic / Lymphatic: No cervical lymphadenopathy and no supraclavicular lymphadenopathy.  Back: Symmetric, no curvature, spinous processes are non-tender on palpation, paraspinous muscles are non-tender on palpation, no costal  vertebral tenderness.  Lungs: No increased work of breathing, good air exchange, clear to auscultation bilaterally, no crackles or wheezing.  Cardiovascular: Regular rate and rhythm yuand no murmur noted.  Abdomen: Rounded and distended, soft and minimally tender even to deep palpation.  No obvious fluid wave.  Bowel sounds are normal and active.  Musculoskeletal: Universally decreased muscle bulk.  The shoulder girdle and extremities revealed markedly decreased bulk.  There is bitemporal muscular loss.  Neurologic: Awake, alert, oriented to name, place and time.  Cranial nerves II-XII are grossly intact.    Neuropsychiatric: Normal affect, mood, orientation, memory and insight.  Skin: jaundice noted and spider hemangiomata noted over the face and upper chest.         Data:     Results for orders placed or performed during the hospital encounter of 12/07/22 (from the past 24 hour(s))   [facility] Draw    Narrative    The following orders were created for panel order [facility].  Procedure                               Abnormality         Status                     ---------                               -----------         ------                     Extra Blue Top Tube[[id number]]                              Final result               Extra Red Top Tube[[id number]]                               Final result               Extra Green Top (Lithium...[[id number]]                      Final result               Extra Purple Top Tube[[id number]]                            Final result                 Please view results for these tests on the individual orders.   CBC + differential    Narrative    The following orders were created for panel order CBC + differential.  Procedure                               Abnormality         Status                     ---------                               -----------         ------                     CBC with platelets and d...[[id number]]  Abnormal            Final result                  Please view results for these tests on the individual orders.   Comprehensive metabolic panel   Result Value Ref Range    Sodium 136 136 - 145 mmol/L    Potassium 3.5 3.4 - 5.3 mmol/L    Chloride 96 (L) 98 - 107 mmol/L    Carbon Dioxide (CO2) 25 22 - 29 mmol/L    Anion Gap 15 7 - 15 mmol/L    Urea Nitrogen 8.2 8.0 - 23.0 mg/dL    Creatinine 0.82 0.51 - 0.95 mg/dL    Calcium 8.5 (L) 8.8 - 10.2 mg/dL    Glucose 124 (H) 70 - 99 mg/dL    Alkaline Phosphatase 267 (H) 35 - 104 U/L     (H) 10 - 35 U/L    ALT 40 (H) 10 - 35 U/L    Protein Total 6.5 6.4 - 8.3 g/dL    Albumin 3.5 3.5 - 5.2 g/dL    Bilirubin Total 9.4 (H) <=1.2 mg/dL    GFR Estimate 77 >60 mL/min/1.73m2   Extra Blue Top Tube   Result Value Ref Range    Hold Specimen JIC    Extra Red Top Tube   Result Value Ref Range    Hold Specimen JIC    Extra Green Top (Lithium Heparin) Tube   Result Value Ref Range    Hold Specimen JIC    Extra Purple Top Tube   Result Value Ref Range    Hold Specimen JIC    CBC with platelets and differential   Result Value Ref Range    WBC Count 9.3 4.0 - 11.0 10e3/uL    RBC Count 2.26 (L) 3.80 - 5.20 10e6/uL    Hemoglobin 10.0 (L) 11.7 - 15.7 g/dL    Hematocrit 28.9 (L) 35.0 - 47.0 %     (H) 78 - 100 fL    MCH 44.2 (H) 26.5 - 33.0 pg    MCHC 34.6 31.5 - 36.5 g/dL    RDW 17.1 (H) 10.0 - 15.0 %    Platelet Count 224 150 - 450 10e3/uL    % Neutrophils 80 %    % Lymphocytes 8 %    % Monocytes 11 %    % Eosinophils 0 %    % Basophils 0 %    % Immature Granulocytes 1 %    NRBCs per 100 WBC 0 <1 /100    Absolute Neutrophils 7.4 1.6 - 8.3 10e3/uL    Absolute Lymphocytes 0.8 0.8 - 5.3 10e3/uL    Absolute Monocytes 1.0 0.0 - 1.3 10e3/uL    Absolute Eosinophils 0.0 0.0 - 0.7 10e3/uL    Absolute Basophils 0.0 0.0 - 0.2 10e3/uL    Absolute Immature Granulocytes 0.1 <=0.4 10e3/uL    Absolute NRBCs 0.0 10e3/uL   Lipase   Result Value Ref Range    Lipase 30 13 - 60 U/L   Alcohol level blood   Result Value Ref Range    Alcohol ethyl  <0.01 <=0.01 g/dL   INR   Result Value Ref Range    INR 1.23 (H) 0.85 - 1.15   PTT   Result Value Ref Range    aPTT 31 22 - 38 Seconds   Magnesium   Result Value Ref Range    Magnesium 0.9 (LL) 1.7 - 2.3 mg/dL   Phosphorus   Result Value Ref Range    Phosphorus 2.8 2.5 - 4.5 mg/dL   Nt probnp inpatient   Result Value Ref Range    N terminal Pro BNP Inpatient 555 0 - 900 pg/mL   CT Abdomen Pelvis w Contrast    Narrative    CT ABDOMEN AND PELVIS WITH CONTRAST 12/7/2022 3:46 PM    CLINICAL HISTORY: Abdominal pain, distension.    TECHNIQUE: CT scan of the abdomen and pelvis was performed following  injection of IV contrast. Multiplanar reformats were obtained. Dose  reduction techniques were used.    CONTRAST: 80mL Isovue-370    COMPARISON: December 20, 2018    FINDINGS:   LOWER CHEST: No infiltrates or effusions.    HEPATOBILIARY: Markedly heterogeneously enhancing enlarged liver,  underlying lesions would be difficult to exclude. Recanalized  periumbilical vein and esophageal varices compatible with portal  hypertension and cirrhosis.    PANCREAS: No significant mass, duct dilatation, or inflammatory  change.    SPLEEN: Normal size.    ADRENAL GLANDS: No significant nodules.    KIDNEYS/BLADDER: No significant mass, stones, or hydronephrosis.    BOWEL: No obstruction or inflammatory change.    PELVIC ORGANS: Thickened endometrium for age and menstrual status at  11 mm, consider pelvic ultrasound and/or endometrial biopsy on a  nonemergent basis for further evaluation.    ADDITIONAL FINDINGS: Small amount of ascites. No free air.    MUSCULOSKELETAL: No frankly destructive bony lesions.      Impression    IMPRESSION:   1.  Enlarged and markedly heterogeneous liver with signs of portal  hypertension and cirrhosis. Ultimately underlying lesions are not  excluded, follow-up will be needed. Acute hepatitis is a consideration  in the differential.  2.  Small amount of ascites.  3.  Abnormally thickened endometrium for age and  menstrual status,  ultrasound and/or endometrial biopsy recommended for further  evaluation.    JOY SALGADO MD         SYSTEM ID:  [id number]   Lactic Acid STAT   Result Value Ref Range    Lactic Acid 1.7 0.7 - 2.0 mmol/L      All imaging studies reviewed by me.      Attestation:  I have reviewed today's vital signs, notes, medications, labs and imaging.  Total time: 45 minutes     Yunier Quiroz MD

## 2022-12-07 NOTE — ED TRIAGE NOTES
Pt presents to ED with c/o abdominal pain, distention, diarrhea. Pt reports that she has had symptoms for 2 weeks and she has started wearing depends due to bowel incontinence. Pt states that she thinks she is retaining fluid in her abdomen and legs/feet. She states that they feel swollen and tight. Pt becomes sob with activity.

## 2022-12-08 ENCOUNTER — APPOINTMENT (OUTPATIENT)
Dept: ULTRASOUND IMAGING | Facility: CLINIC | Age: 67
DRG: 432 | End: 2022-12-08
Attending: PHYSICIAN ASSISTANT
Payer: MEDICARE

## 2022-12-08 LAB
% LINING CELLS, BODY FLUID: 4 %
AFP SERPL-MCNC: 6 NG/ML
ALBUMIN BODY FLUID SOURCE: NORMAL
ALBUMIN FLD-MCNC: 0.5 G/DL
ALBUMIN SERPL BCG-MCNC: 2.9 G/DL (ref 3.5–5.2)
ALP SERPL-CCNC: 223 U/L (ref 35–104)
ALT SERPL W P-5'-P-CCNC: 31 U/L (ref 10–35)
ANION GAP SERPL CALCULATED.3IONS-SCNC: 13 MMOL/L (ref 7–15)
APPEARANCE FLD: CLEAR
AST SERPL W P-5'-P-CCNC: 129 U/L (ref 10–35)
ATRIAL RATE - MUSE: 93 BPM
BILIRUB SERPL-MCNC: 8.5 MG/DL
BUN SERPL-MCNC: 11 MG/DL (ref 8–23)
C COLI+JEJUNI+LARI FUSA STL QL NAA+PROBE: NOT DETECTED
C DIFF TOX B STL QL: NEGATIVE
CALCIUM SERPL-MCNC: 8.1 MG/DL (ref 8.8–10.2)
CELL COUNT BODY FLUID SOURCE: NORMAL
CHLORIDE SERPL-SCNC: 99 MMOL/L (ref 98–107)
COLOR FLD: YELLOW
CREAT SERPL-MCNC: 0.69 MG/DL (ref 0.51–0.95)
DEPRECATED HCO3 PLAS-SCNC: 23 MMOL/L (ref 22–29)
DIASTOLIC BLOOD PRESSURE - MUSE: NORMAL MMHG
EC STX1 GENE STL QL NAA+PROBE: NOT DETECTED
EC STX2 GENE STL QL NAA+PROBE: NOT DETECTED
ERYTHROCYTE [DISTWIDTH] IN BLOOD BY AUTOMATED COUNT: 17.3 % (ref 10–15)
GFR SERPL CREATININE-BSD FRML MDRD: >90 ML/MIN/1.73M2
GLUCOSE SERPL-MCNC: 105 MG/DL (ref 70–99)
GRAM STAIN RESULT: NORMAL
GRAM STAIN RESULT: NORMAL
HBV CORE AB SERPL QL IA: NONREACTIVE
HBV SURFACE AB SERPL IA-ACNC: 0.81 M[IU]/ML
HBV SURFACE AB SERPL IA-ACNC: NONREACTIVE M[IU]/ML
HBV SURFACE AG SERPL QL IA: NONREACTIVE
HCT VFR BLD AUTO: 25.4 % (ref 35–47)
HCV AB SERPL QL IA: NONREACTIVE
HGB BLD-MCNC: 8.7 G/DL (ref 11.7–15.7)
INTERPRETATION ECG - MUSE: NORMAL
LACTATE SERPL-SCNC: 1 MMOL/L (ref 0.7–2)
LACTATE SERPL-SCNC: 1.6 MMOL/L (ref 0.7–2)
LYMPHOCYTES NFR FLD MANUAL: 8 %
MAGNESIUM SERPL-MCNC: 1.4 MG/DL (ref 1.7–2.3)
MAGNESIUM SERPL-MCNC: 2.7 MG/DL (ref 1.7–2.3)
MCH RBC QN AUTO: 43.9 PG (ref 26.5–33)
MCHC RBC AUTO-ENTMCNC: 34.3 G/DL (ref 31.5–36.5)
MCV RBC AUTO: 128 FL (ref 78–100)
MONOS+MACROS NFR FLD MANUAL: 77 %
NEUTS BAND NFR FLD MANUAL: 11 %
NOROV GI+II ORF1-ORF2 JNC STL QL NAA+PR: NOT DETECTED
P AXIS - MUSE: 40 DEGREES
PHOSPHATE SERPL-MCNC: 2.3 MG/DL (ref 2.5–4.5)
PLATELET # BLD AUTO: 180 10E3/UL (ref 150–450)
POTASSIUM SERPL-SCNC: 3.4 MMOL/L (ref 3.4–5.3)
POTASSIUM SERPL-SCNC: 3.4 MMOL/L (ref 3.4–5.3)
POTASSIUM SERPL-SCNC: 3.7 MMOL/L (ref 3.4–5.3)
PR INTERVAL - MUSE: 110 MS
PROT FLD-MCNC: 0.9 G/DL
PROT SERPL-MCNC: 5.6 G/DL (ref 6.4–8.3)
PROTEIN BODY FLUID SOURCE: NORMAL
QRS DURATION - MUSE: 70 MS
QT - MUSE: 366 MS
QTC - MUSE: 455 MS
R AXIS - MUSE: -23 DEGREES
RBC # BLD AUTO: 1.98 10E6/UL (ref 3.8–5.2)
RVA NSP5 STL QL NAA+PROBE: NOT DETECTED
SALMONELLA SP RPOD STL QL NAA+PROBE: NOT DETECTED
SHIGELLA SP+EIEC IPAH STL QL NAA+PROBE: NOT DETECTED
SODIUM SERPL-SCNC: 135 MMOL/L (ref 136–145)
SYSTOLIC BLOOD PRESSURE - MUSE: NORMAL MMHG
T AXIS - MUSE: 3 DEGREES
V CHOL+PARA RFBL+TRKH+TNAA STL QL NAA+PR: NOT DETECTED
VENTRICULAR RATE- MUSE: 93 BPM
VIT B12 SERPL-MCNC: 788 PG/ML (ref 232–1245)
WBC # BLD AUTO: 7.6 10E3/UL (ref 4–11)
WBC # FLD AUTO: 134 /UL
Y ENTERO RECN STL QL NAA+PROBE: NOT DETECTED

## 2022-12-08 PROCEDURE — 258N000003 HC RX IP 258 OP 636: Performed by: INTERNAL MEDICINE

## 2022-12-08 PROCEDURE — 99232 SBSQ HOSP IP/OBS MODERATE 35: CPT | Performed by: HOSPITALIST

## 2022-12-08 PROCEDURE — 36415 COLL VENOUS BLD VENIPUNCTURE: CPT | Performed by: INTERNAL MEDICINE

## 2022-12-08 PROCEDURE — 87205 SMEAR GRAM STAIN: CPT | Performed by: PHYSICIAN ASSISTANT

## 2022-12-08 PROCEDURE — 250N000009 HC RX 250: Performed by: INTERNAL MEDICINE

## 2022-12-08 PROCEDURE — 0W9G3ZZ DRAINAGE OF PERITONEAL CAVITY, PERCUTANEOUS APPROACH: ICD-10-PCS | Performed by: RADIOLOGY

## 2022-12-08 PROCEDURE — 272N000706 US PARACENTESIS WITHOUT ALBUMIN

## 2022-12-08 PROCEDURE — 84157 ASSAY OF PROTEIN OTHER: CPT | Performed by: PHYSICIAN ASSISTANT

## 2022-12-08 PROCEDURE — 85018 HEMOGLOBIN: CPT | Performed by: INTERNAL MEDICINE

## 2022-12-08 PROCEDURE — 250N000011 HC RX IP 250 OP 636: Performed by: INTERNAL MEDICINE

## 2022-12-08 PROCEDURE — 83735 ASSAY OF MAGNESIUM: CPT | Performed by: INTERNAL MEDICINE

## 2022-12-08 PROCEDURE — 87070 CULTURE OTHR SPECIMN AEROBIC: CPT | Performed by: PHYSICIAN ASSISTANT

## 2022-12-08 PROCEDURE — 82105 ALPHA-FETOPROTEIN SERUM: CPT | Performed by: INTERNAL MEDICINE

## 2022-12-08 PROCEDURE — 87493 C DIFF AMPLIFIED PROBE: CPT | Performed by: PHYSICIAN ASSISTANT

## 2022-12-08 PROCEDURE — 80053 COMPREHEN METABOLIC PANEL: CPT | Performed by: INTERNAL MEDICINE

## 2022-12-08 PROCEDURE — 89051 BODY FLUID CELL COUNT: CPT | Performed by: PHYSICIAN ASSISTANT

## 2022-12-08 PROCEDURE — 250N000013 HC RX MED GY IP 250 OP 250 PS 637: Performed by: INTERNAL MEDICINE

## 2022-12-08 PROCEDURE — 36415 COLL VENOUS BLD VENIPUNCTURE: CPT | Performed by: HOSPITALIST

## 2022-12-08 PROCEDURE — 82042 OTHER SOURCE ALBUMIN QUAN EA: CPT | Performed by: PHYSICIAN ASSISTANT

## 2022-12-08 PROCEDURE — 87506 IADNA-DNA/RNA PROBE TQ 6-11: CPT | Performed by: PHYSICIAN ASSISTANT

## 2022-12-08 PROCEDURE — 83605 ASSAY OF LACTIC ACID: CPT | Performed by: INTERNAL MEDICINE

## 2022-12-08 PROCEDURE — 250N000009 HC RX 250: Performed by: RADIOLOGY

## 2022-12-08 PROCEDURE — 84100 ASSAY OF PHOSPHORUS: CPT | Performed by: INTERNAL MEDICINE

## 2022-12-08 PROCEDURE — 83605 ASSAY OF LACTIC ACID: CPT | Performed by: HOSPITALIST

## 2022-12-08 PROCEDURE — 84132 ASSAY OF SERUM POTASSIUM: CPT | Performed by: INTERNAL MEDICINE

## 2022-12-08 PROCEDURE — 86803 HEPATITIS C AB TEST: CPT | Performed by: PHYSICIAN ASSISTANT

## 2022-12-08 PROCEDURE — 120N000001 HC R&B MED SURG/OB

## 2022-12-08 RX ORDER — CARBOXYMETHYLCELLULOSE SODIUM 5 MG/ML
1 SOLUTION/ DROPS OPHTHALMIC
Status: DISCONTINUED | OUTPATIENT
Start: 2022-12-08 | End: 2022-12-09 | Stop reason: HOSPADM

## 2022-12-08 RX ORDER — POTASSIUM CHLORIDE 1.5 G/1.58G
40 POWDER, FOR SOLUTION ORAL ONCE
Status: COMPLETED | OUTPATIENT
Start: 2022-12-08 | End: 2022-12-08

## 2022-12-08 RX ORDER — MAGNESIUM SULFATE HEPTAHYDRATE 40 MG/ML
4 INJECTION, SOLUTION INTRAVENOUS ONCE
Status: COMPLETED | OUTPATIENT
Start: 2022-12-08 | End: 2022-12-08

## 2022-12-08 RX ADMIN — POTASSIUM CHLORIDE 40 MEQ: 1.5 POWDER, FOR SOLUTION ORAL at 09:48

## 2022-12-08 RX ADMIN — SODIUM PHOSPHATE, MONOBASIC, MONOHYDRATE 9 MMOL: 276; 142 INJECTION, SOLUTION INTRAVENOUS at 11:49

## 2022-12-08 RX ADMIN — MAGNESIUM SULFATE HEPTAHYDRATE 4 G: 40 INJECTION, SOLUTION INTRAVENOUS at 09:38

## 2022-12-08 RX ADMIN — Medication 1 DROP: at 21:44

## 2022-12-08 RX ADMIN — MULTIPLE VITAMINS W/ MINERALS TAB 1 TABLET: TAB at 08:18

## 2022-12-08 RX ADMIN — PANTOPRAZOLE SODIUM 40 MG: 40 TABLET, DELAYED RELEASE ORAL at 06:44

## 2022-12-08 RX ADMIN — THIAMINE HCL TAB 100 MG 100 MG: 100 TAB at 08:18

## 2022-12-08 RX ADMIN — FOLIC ACID 1 MG: 1 TABLET ORAL at 08:18

## 2022-12-08 RX ADMIN — LIDOCAINE HYDROCHLORIDE 10 ML: 10 INJECTION, SOLUTION EPIDURAL; INFILTRATION; INTRACAUDAL; PERINEURAL at 10:51

## 2022-12-08 ASSESSMENT — ACTIVITIES OF DAILY LIVING (ADL)
ADLS_ACUITY_SCORE: 31
ADLS_ACUITY_SCORE: 31
ADLS_ACUITY_SCORE: 37
ADLS_ACUITY_SCORE: 31
ADLS_ACUITY_SCORE: 37

## 2022-12-08 NOTE — PLAN OF CARE
Pt up with sba to [facility] and having loose stools. Sent cdiff which was negative and enteric panel pending. Jaundiced. Fair appetite. Lytes replaced. Alert and oriented. CIWA 1-2.

## 2022-12-08 NOTE — PLAN OF CARE
Pt alert and oriented x4. Up ax1 to commode. Denies pain. CIWA score of 1. Will continue plan of care.

## 2022-12-08 NOTE — CONSULTS
GASTROENTEROLOGY CONSULTATION      Krystyna Peña  [address] [place] [address]  67 year old female     Admission Date/Time: 12/7/2022  Primary Care Provider: Viri Savage  Referring / Attending Physician:  Yunier Quiroz MD     We were asked to see the patient in consultation by Dr. Quiroz for evaluation of liver disease.        HPI:  Krystyna Peña is a 67 year old female with medical history of alcohol dependency who presents to the emergency room with abdominal distention, shortness of breath, unintentional weight loss and generalized weakness found to have elevated LFTs and CT evidence of cirrhosis.    Patient reports that she drinks 5 large servings of hard alcohol every night.  She has done this daily for a number of years.  Over the past few months she has noticed worsening abdominal distention.  Her abdomen feels full and tight.  It causes her to be short of breath.  It makes her mobility quite limited.  She has noticed some swelling in her ankles in addition to yellowing of her eyes and skin.  Upon further questioning, she has been having diarrhea for the past week.  She notes that is very urgent and difficult to control.  She has been incontinent on a few occasions.  She denies black or bloody stool.  She has never been told that she has a drinking problem.  She thinks that she can stop drinking alcohol at any time.  She is never been told that she has liver cirrhosis.  She denies any history of viral hepatitis.    In the emergency room, LFTs were found to be elevated.  , ALT 40, alkaline phosphatase 267, total bilirubin 9.4.  LFTs last checked in March 2021 were within normal limits.  INR is elevated at 1.23.  Platelets are stable at 180 K.  A CT scan of the abdomen and pelvis was completed.  This revealed evidence of liver cirrhosis with portal hypertension, small amount of ascites, and an abnormally thickened endometrium.  Underlying liver lesions were not excluded.      PAST  "MEDICAL HISTORY:  Patient Active Problem List    Diagnosis Date Noted     Hypomagnesemia 2022     Priority: Medium     Hyperbilirubinemia 2022     Priority: Medium     Alcoholic cirrhosis of liver with ascites (H) 2022     Priority: Medium     Inflammatory arthritis 2020     Priority: Medium     CKD (chronic kidney disease) stage 3, GFR 30-59 ml/min (H) 2019     Priority: Medium     Edema, unspecified type 07/15/2017     Priority: Medium     Elevated blood pressure reading without diagnosis of hypertension 2015     Priority: Medium     Urinary frequency 2014     Priority: Medium     Back pain 2014     Priority: Medium     CARDIOVASCULAR SCREENING; LDL GOAL LESS THAN 160 10/31/2010     Priority: Medium     Asymptomatic varicose veins 2003     Priority: Medium          ROS: A comprehensive ten point review of systems was negative aside from those in mentioned in the HPI.       MEDICATIONS:   Prior to Admission medications    Not on File        ALLERGIES:   Allergies   Allergen Reactions     Morphine      respiratory distress     Cephalexin Rash        SOCIAL HISTORY:  Social History     Tobacco Use     Smoking status: Former     Packs/day: 1.00     Types: Cigarettes, Cigars     Quit date: 2013     Years since quittin.6     Smokeless tobacco: Never   Vaping Use     Vaping Use: Never used   Substance Use Topics     Alcohol use: Not Currently     Alcohol/week: 70.0 standard drinks     Types: 70 Shots of liquor per week     Drug use: No        FAMILY HISTORY:  Family History   Problem Relation Age of Onset     Breast Cancer Mother      Cancer - colorectal Father         66     Colon Cancer Father      Diabetes Brother         PHYSICAL EXAM:     /77 (BP Location: Right arm)   Pulse 91   Temp 97.9  F (36.6  C) (Oral)   Resp 16   Ht 1.575 m (5' 2\")   Wt 71.2 kg (157 lb)   LMP 10/01/2003 (Exact Date)   SpO2 99%   BMI 28.72 kg/m       PHYSICAL " EXAM:  GENERAL: Chronically ill-appearing  SKIN: no suspicious lesions, rashes, positive jaundice  HEAD: Normocephalic. Atraumatic.  NECK: Neck supple. No adenopathy.   EYES: No scleral icterus  GASTROINTESTINAL: Large, soft, non tender, no guarding/rebound  JOINT/EXTREMITIES:  no gross deformities noted, normal muscle tone  NEURO: CN 2-12 grossly intact, no focal deficits  PSYCH: Normal affect  Bilateral ankle edema 1+     ADDITIONAL COMMENTS:   I reviewed the patient's new clinical lab test results.     Recent Labs   Lab 12/07/22  1350   WBC 9.3   RBC 2.26*   HGB 10.0*   HCT 28.9*   *   MCH 44.2*   MCHC 34.6   RDW 17.1*        Recent Labs   Lab Test 12/07/22  1350 07/27/22  1343 03/02/21  1420   POTASSIUM 3.5 3.9 4.2   CHLORIDE 96* 95 104   CO2 25 26 25   BUN 8.2 7 21   ANIONGAP 15 11 8     Recent Labs   Lab Test 12/07/22  1350 03/02/21  1420 08/07/20  1500 08/13/19  0915 02/06/19  1349 01/04/19  1512 01/02/19  1341 12/11/18  1123 07/11/18  0946   ALBUMIN 3.5 4.0 1.7*  --   --   --   --  3.4  --    BILITOTAL 9.4* 0.7 1.5*  --   --   --   --  0.5  --    ALT 40* 18 82*  --    < >  --    < > 39  --    * 14 114*  --    < >  --   --  71*  --    PROTEIN  --   --   --  Negative  --  Negative  --   --  Negative   LIPASE 30  --   --   --   --   --   --  114  --     < > = values in this interval not displayed.       Recent Labs   Lab 12/07/22  1350   INR 1.23*     Recent Labs   Lab 12/07/22  1350   LIPASE 30     MELD-Na score: 18 at 12/7/2022  1:50 PM  MELD score: 17 at 12/7/2022  1:50 PM  Calculated from:  Serum Creatinine: 0.82 mg/dL (Using min of 1 mg/dL) at 12/7/2022  1:50 PM  Serum Sodium: 136 mmol/L at 12/7/2022  1:50 PM  Total Bilirubin: 9.4 mg/dL at 12/7/2022  1:50 PM  INR(ratio): 1.23 at 12/7/2022  1:50 PM  Age: 67 years     IMAGING / ENDOSCOPY       Recent Results (from the past 24 hour(s))   CT Abdomen Pelvis w Contrast    Narrative    CT ABDOMEN AND PELVIS WITH CONTRAST 12/7/2022 3:46  PM    CLINICAL HISTORY: Abdominal pain, distension.    TECHNIQUE: CT scan of the abdomen and pelvis was performed following  injection of IV contrast. Multiplanar reformats were obtained. Dose  reduction techniques were used.    CONTRAST: 80mL Isovue-370    COMPARISON: December 20, 2018    FINDINGS:   LOWER CHEST: No infiltrates or effusions.    HEPATOBILIARY: Markedly heterogeneously enhancing enlarged liver,  underlying lesions would be difficult to exclude. Recanalized  periumbilical vein and esophageal varices compatible with portal  hypertension and cirrhosis.    PANCREAS: No significant mass, duct dilatation, or inflammatory  change.    SPLEEN: Normal size.    ADRENAL GLANDS: No significant nodules.    KIDNEYS/BLADDER: No significant mass, stones, or hydronephrosis.    BOWEL: No obstruction or inflammatory change.    PELVIC ORGANS: Thickened endometrium for age and menstrual status at  11 mm, consider pelvic ultrasound and/or endometrial biopsy on a  nonemergent basis for further evaluation.    ADDITIONAL FINDINGS: Small amount of ascites. No free air.    MUSCULOSKELETAL: No frankly destructive bony lesions.      Impression    IMPRESSION:   1.  Enlarged and markedly heterogeneous liver with signs of portal  hypertension and cirrhosis. Ultimately underlying lesions are not  excluded, follow-up will be needed. Acute hepatitis is a consideration  in the differential.  2.  Small amount of ascites.  3.  Abnormally thickened endometrium for age and menstrual status,  ultrasound and/or endometrial biopsy recommended for further  evaluation.    JOY SALGADO MD         SYSTEM ID:  [id number]         CONSULTATION ASSESSMENT AND PLAN:    Krystyna Peña is a 67 year old with medical history of alcohol dependency who presents to the emergency room with abdominal distention, shortness of breath, unintentional weight loss and generalized weakness found to have elevated LFTs and CT evidence of cirrhosis.    1.  Liver  cirrhosis on imaging: Patient has a long history of alcohol dependency.  Suspect alcohol related cirrhosis with comorbid hypertension.  LFTs more reflective of a mild hepatitis.  Platelets are stable.  INR is mildly elevated.  CT indicating small amount of ascites.  We will complete a diagnostic paracentesis.  No current evidence of GI bleeding or encephalopathy.  No obvious infectious etiology.    -- Plan for diagnostic paracentesis today, recommend liver ultrasound as well  -- Follow LFTs, INR daily  -- Check viral hepatitis serology, AFP  -- Renal function appears stable.  Can consider initiating Lasix and spironolactone tomorrow.  -- MELD-NA 18 on admit  -- DF on admit 21, does not meet criteria for steroids  -- Discussed importance of abstinence from alcohol  -- We will arrange follow-up with [facility] upon discharge    2.  Abnormal endometrium: Ongoing evaluation by medicine team/gynecology.  Patient has had abnormal vaginal discharge.    I discussed the patient plan with Dr. Celestin, GI staff physician. Thank you for asking us to participate in the care of this patient.    Approximately 45 min of total time was spent providing patient care, including patient evaluation, reviewing documentation/ test results, and .     Charo Fischer PA-C  [facility] ( [facility])

## 2022-12-08 NOTE — PROGRESS NOTES
[facility]    Hospitalist Progress Note      Assessment & Plan   Krystyna Peña is a 67 year old woman who came to attention today at [facility] ED with complaint of severe abdominal distention that is impairing her function over the last month.      #Alcoholic cirrhosis with evidence of liver failure. Coagulopathy with elevation of INR. Alcohol use d/o: Patient with longstanding alcohol use.  Notes that she drinks 5 tall glasses of darling every night and has done so for years.  No prior history of alcohol withdrawal.  She is quit alcohol in the past but this was decades ago.  She presents for increased abdominal distention along with generalized weakness.  -ER work-up notable for patient being afebrile and hemodynamically stable.  CT abdomen pelvis done showed hepatomegaly with evidence of portal hypertension and cirrhosis.  Her lab work-up showed elevation of LFTs in a manner consistent with alcohol use.  Her INR was elevated at 1.23.  Her CBC showed a macrocytic anemia.  -Suspect patient's liver failure along with alcohol use is primary  of her generalized weakness and abdominal distention.  -Gastroenterology has been consulted.  Appreciate recommendations.  -Patient underwent paracentesis with 250 cc of fluid removed.  Follow-up labs.  -Daily MELD labs ordered.  Steroids not recommended at this time per gastroenterology.  -Alcohol cessation critical moving forward.  Monitor for signs of withdrawal.  None currently.  -Likely start spironolactone and Lasix tomorrow.    #Abnormal endometrium, vaginal discharge: She has noted thick vaginal discharge of late.  She describes it over the last 1 to 2 weeks.  CT scan shows abnormally thickened endometrium.  OB/GYN consulted.    #Hypophosphatemia, hypomagnesemia, Hyponatremia: Replacement protocols.    #Suspected severe malnutrition: In the setting of chronic disease with alcohol use disorder and cirrhosis as above.  Nutrition  consulted.    DVT Prophylaxis: Pneumatic Compression Devices  Code Status: Full Code  Dispo: Pending GI and OB/GYN evaluation.  Possibly tomorrow if does well over the next 24 hours.    Spike Tian MD    Interval History   Assumed care.  No events.  Patient notes she is feeling a bit better.  Had paracentesis this a.m. with 250 cc removed.  Denies any pain.  Discussed the need for alcohol cessation and she did voice understanding.  Offered but does not wish to meet with chemical dependency at this point.    -Data reviewed today: I reviewed all new labs and imaging results over the last 24 hours.    Physical Exam   Temp: 97.9  F (36.6  C) Temp src: Oral BP: 130/70 Pulse: 92   Resp: 18 SpO2: 98 % O2 Device: None (Room air)    Vitals:    12/07/22 2018   Weight: 71.2 kg (157 lb)     Vital Signs with Ranges  Temp:  [97.9  F (36.6  C)-98.4  F (36.9  C)] 97.9  F (36.6  C)  Pulse:  [90-93] 92  Resp:  [16-18] 18  BP: (115-144)/(70-81) 130/70  SpO2:  [98 %-100 %] 98 %  No intake/output data recorded.    Constitutional: Older than stated age.  Answers appropriately.  Chronically deconditioned.  HEENT: Normocephalic. MMM, No elevation of JVD noted. + Scleral icterus  Respiratory: Nl WOB, Clear bilaterally, No wheezes or crackles  Cardiovascular: Regular, no murmur  GI: + Distention, BS+, NT, ND  Skin/Integumen: WWP, mild jaundice.  Mild bilateral edema.  Neuro: CNII-XII intact. Moves all extremities. No tremor. A&Ox3.    Medications       folic acid  1 mg Oral Daily     multivitamin w/minerals  1 tablet Oral Daily     pantoprazole  40 mg Oral QAM AC     sodium chloride (PF)  3 mL Intracatheter Q8H     sodium phosphate  9 mmol Intravenous Once     thiamine  100 mg Oral Daily       Data   Recent Labs   Lab 12/08/22  0848 12/07/22  1350   WBC 7.6 9.3   HGB 8.7* 10.0*   * 128*    224   INR  --  1.23*   * 136   POTASSIUM 3.4  3.4 3.5   CHLORIDE 99 96*   CO2 23 25   BUN 11.0 8.2   CR 0.69 0.82   ANIONGAP 13 15    YANA 8.1* 8.5*   * 124*   ALBUMIN 2.9* 3.5   PROTTOTAL 5.6* 6.5   BILITOTAL 8.5* 9.4*   ALKPHOS 223* 267*   ALT 31 40*   * 171*   LIPASE  --  30       Recent Results (from the past 24 hour(s))   CT Abdomen Pelvis w Contrast    Narrative    CT ABDOMEN AND PELVIS WITH CONTRAST 12/7/2022 3:46 PM    CLINICAL HISTORY: Abdominal pain, distension.    TECHNIQUE: CT scan of the abdomen and pelvis was performed following  injection of IV contrast. Multiplanar reformats were obtained. Dose  reduction techniques were used.    CONTRAST: 80mL Isovue-370    COMPARISON: December 20, 2018    FINDINGS:   LOWER CHEST: No infiltrates or effusions.    HEPATOBILIARY: Markedly heterogeneously enhancing enlarged liver,  underlying lesions would be difficult to exclude. Recanalized  periumbilical vein and esophageal varices compatible with portal  hypertension and cirrhosis.    PANCREAS: No significant mass, duct dilatation, or inflammatory  change.    SPLEEN: Normal size.    ADRENAL GLANDS: No significant nodules.    KIDNEYS/BLADDER: No significant mass, stones, or hydronephrosis.    BOWEL: No obstruction or inflammatory change.    PELVIC ORGANS: Thickened endometrium for age and menstrual status at  11 mm, consider pelvic ultrasound and/or endometrial biopsy on a  nonemergent basis for further evaluation.    ADDITIONAL FINDINGS: Small amount of ascites. No free air.    MUSCULOSKELETAL: No frankly destructive bony lesions.      Impression    IMPRESSION:   1.  Enlarged and markedly heterogeneous liver with signs of portal  hypertension and cirrhosis. Ultimately underlying lesions are not  excluded, follow-up will be needed. Acute hepatitis is a consideration  in the differential.  2.  Small amount of ascites.  3.  Abnormally thickened endometrium for age and menstrual status,  ultrasound and/or endometrial biopsy recommended for further  evaluation.    JOY SALGADO MD         SYSTEM ID:  [id number]   US Paracentesis without  Albumin    Narrative    ULTRASOUND GUIDED PARACENTESIS   12/8/2022 11:04 AM     HISTORY:  new cirrhosis, small ascites on CT    PROCEDURE: Informed consent was obtained from the patient prior to the  procedure with discussion including the possible risks of bleeding,  infection and organ injury.    Limited ultrasound of the abdomen demonstrated an accessible fluid  pocket within the right upper quadrant.    The site was prepped and draped in the usual sterile fashion. A total  of 10 mL of 1% lidocaine was anesthetized within the subcutaneous and  peritoneal tissues for anesthetic. Under sonographic guidance, a 8  Lux[place]ish paracentesis catheter was advanced into the peritoneal fluid. A  permanent image was stored for documentation. Needle was removed and  catheter was used to aspirate 250 mL of dark yellow fluid in vacuum  bottles. A sample was sent for laboratory studies. There were no  immediate complications.      Impression    IMPRESSION: Successful ultrasound-guided diagnostic and therapeutic  paracentesis.    VIRAJ FARIAS MD         SYSTEM ID:  [id number]

## 2022-12-08 NOTE — CONSULTS
OBGYN  Chart reviewed. Thickened endometrium 11mm is non-emergent and is best evaluated in the clinic. Patient can schedule with us.

## 2022-12-08 NOTE — PROGRESS NOTES
Patient tolerated paracentesis well.  250 mL of lefty fluid drained done by Dr. Griffith. Dressing clean, dry and intact with no drainage. Returned to ED in stable condition, Report called to RN.

## 2022-12-08 NOTE — PHARMACY-ADMISSION MEDICATION HISTORY
Medication Reconciliation is completed.  Patient is currently not taking any medications prior to this admission @ [phone]                   December 7, 2022                    Herb Herbert RPH

## 2022-12-08 NOTE — PROGRESS NOTES
Briefly cared for pt- sepsis protocol flagged, LA 1.7. Fresh magnesium ordered for Mg protocol, will replace if needed when resulted. Discharge pending.

## 2022-12-08 NOTE — ED NOTES
[facility]  ED Nurse Handoff Report    Krystyna Peña is a 67 year old female   ED Chief complaint: Abdominal Pain and Leg Swelling  . ED Diagnosis:   Final diagnoses:   Hypomagnesemia   Hyperbilirubinemia   Alcoholic cirrhosis of liver with ascites (H)     Allergies:   Allergies   Allergen Reactions     Morphine      respiratory distress     Cephalexin Rash       Code Status: Full Code  Activity level - Baseline/Home:  Independent. Activity Level - Current:   Stand by Assist. Lift room needed: No. Bariatric: No   Needed: No   Isolation: No. Infection: Not Applicable.     Vital Signs:   Vitals:    12/07/22 1318 12/07/22 1748 12/07/22 1749   BP: (!) 155/91 (!) 144/77    Pulse: 94 92 93   Resp: 20     Temp: (!) 96.5  F (35.8  C)     TempSrc: Temporal     SpO2: 98% 99% 100%       Cardiac Rhythm:  ,      Pain level:    Patient confused: No. Patient Falls Risk: Yes.   Elimination Status: Has voided   Patient Report - Initial Complaint:    Pt presents to ED with c/o abdominal pain, distention, diarrhea. Pt reports that she has had symptoms for 2 weeks and she has started wearing depends due to bowel incontinence. Pt states that she thinks she is retaining fluid in her abdomen and legs/feet. She states that they feel swollen and tight. Pt becomes sob with activity.    . Focused Assessment:   Musculoskeletal Musculoskeletal  Musculoskeletal (Adult) Musculoskeletal WDL: .WDL except; all  General Mobility: generalized weakness      Gastrointestinal Gastrointestinal  Gastrointestinal Gastrointestinal WDL: .WDL except; GI symptoms  GI Signs/Symptoms: abdominal discomfort; abdominal fullness; nausea; vomiting; diarrhea  Abdominal Appearance: distended      Tests Performed: labs,imaging. Abnormal Results:   Labs Ordered and Resulted from Time of ED Arrival to Time of ED Departure   COMPREHENSIVE METABOLIC PANEL - Abnormal       Result Value    Sodium 136      Potassium 3.5      Chloride 96 (*)      Carbon Dioxide (CO2) 25      Anion Gap 15      Urea Nitrogen 8.2      Creatinine 0.82      Calcium 8.5 (*)     Glucose 124 (*)     Alkaline Phosphatase 267 (*)      (*)     ALT 40 (*)     Protein Total 6.5      Albumin 3.5      Bilirubin Total 9.4 (*)     GFR Estimate 77     CBC WITH PLATELETS AND DIFFERENTIAL - Abnormal    WBC Count 9.3      RBC Count 2.26 (*)     Hemoglobin 10.0 (*)     Hematocrit 28.9 (*)      (*)     MCH 44.2 (*)     MCHC 34.6      RDW 17.1 (*)     Platelet Count 224      % Neutrophils 80      % Lymphocytes 8      % Monocytes 11      % Eosinophils 0      % Basophils 0      % Immature Granulocytes 1      NRBCs per 100 WBC 0      Absolute Neutrophils 7.4      Absolute Lymphocytes 0.8      Absolute Monocytes 1.0      Absolute Eosinophils 0.0      Absolute Basophils 0.0      Absolute Immature Granulocytes 0.1      Absolute NRBCs 0.0     INR - Abnormal    INR 1.23 (*)    MAGNESIUM - Abnormal    Magnesium 0.9 (*)    LIPASE - Normal    Lipase 30     ETHYL ALCOHOL LEVEL - Normal    Alcohol ethyl <0.01     PARTIAL THROMBOPLASTIN TIME - Normal    aPTT 31     PHOSPHORUS - Normal    Phosphorus 2.8       CT Abdomen Pelvis w Contrast   Final Result   IMPRESSION:    1.  Enlarged and markedly heterogeneous liver with signs of portal   hypertension and cirrhosis. Ultimately underlying lesions are not   excluded, follow-up will be needed. Acute hepatitis is a consideration   in the differential.   2.  Small amount of ascites.   3.  Abnormally thickened endometrium for age and menstrual status,   ultrasound and/or endometrial biopsy recommended for further   evaluation.      JOY SALGADO MD            SYSTEM ID:  [id number]         Treatments provided: see MAR  Family Comments: n/a  OBS brochure/video discussed/provided to patient:  N/A  ED Medications:   Medications   magnesium sulfate 2 g in water intermittent infusion (2 g Intravenous New Bag 12/7/22 1738)   thiamine (B-1) tablet 100 mg (100 mg  Oral Given 12/7/22 1436)   folic acid (FOLVITE) tablet 1 mg (1 mg Oral Given 12/7/22 1436)   iopamidol (ISOVUE-370) solution 500 mL (150 mLs Intravenous Given 12/7/22 1540)   sodium chloride (PF) 0.9% PF flush 100 mL (60 mLs Intravenous Given 12/7/22 1541)     Drips infusing:  Yes  For the majority of the shift, the patient's behavior Green. Interventions performed were n/a.    Sepsis treatment initiated: No     Patient tested for COVID 19 prior to admission: NO    ED Nurse Name/Phone Number: Brea Gibbons RN,   6:10 PM    RECEIVING UNIT ED HANDOFF REVIEW    Above ED Nurse Handoff Report was reviewed: Yes  Reviewed by: Anum Lockhart RN on December 8, 2022 at 4:59 PM

## 2022-12-08 NOTE — PROCEDURES
[facility]    Procedure: Imaging Procedure Note    Date/Time: 12/8/2022 11:00 AM  Performed by: Ron Griffith MD  Authorized by: Ron Griffith MD       UNIVERSAL PROTOCOL   Site Marked: Yes  Prior Images Obtained and Reviewed:  Yes  Required items: Required blood products, implants, devices and special equipment available    Patient identity confirmed:  Verbally with patient, arm band and provided demographic data  NA - No sedation, light sedation, or local anesthesia  Confirmation Checklist:  Patient's identity using two indicators, relevant allergies, procedure was appropriate and matched the consent or emergent situation and correct equipment/implants were available  Time out: Immediately prior to the procedure a time out was called    Universal Protocol: the Joint Commission Universal Protocol was followed    Preparation: Patient was prepped and draped in usual sterile fashion    ESBL (mL):  5     ANESTHESIA    Anesthesia: Local infiltration  Local Anesthetic:  Lidocaine 1% without epinephrine  Anesthetic Total (mL):  10      SEDATION    Patient Sedated: No    See dictated procedure note for full details.    PROCEDURE  Describe Procedure:     Successful right upper quadrant paracentesis      Patient Tolerance:  Patient tolerated the procedure well with no immediate complications  Length of time physician/provider present for 1:1 monitoring during sedation: 0

## 2022-12-09 VITALS
WEIGHT: 157 LBS | SYSTOLIC BLOOD PRESSURE: 135 MMHG | OXYGEN SATURATION: 93 % | DIASTOLIC BLOOD PRESSURE: 78 MMHG | HEIGHT: 62 IN | HEART RATE: 91 BPM | RESPIRATION RATE: 18 BRPM | BODY MASS INDEX: 28.89 KG/M2 | TEMPERATURE: 98.7 F

## 2022-12-09 LAB
ALBUMIN SERPL BCG-MCNC: 2.8 G/DL (ref 3.5–5.2)
ALP SERPL-CCNC: 206 U/L (ref 35–104)
ALT SERPL W P-5'-P-CCNC: 27 U/L (ref 10–35)
ANION GAP SERPL CALCULATED.3IONS-SCNC: 13 MMOL/L (ref 7–15)
AST SERPL W P-5'-P-CCNC: 109 U/L (ref 10–35)
BILIRUB SERPL-MCNC: 6.7 MG/DL
BUN SERPL-MCNC: 9.6 MG/DL (ref 8–23)
CALCIUM SERPL-MCNC: 8.1 MG/DL (ref 8.8–10.2)
CHLORIDE SERPL-SCNC: 100 MMOL/L (ref 98–107)
CREAT SERPL-MCNC: 0.71 MG/DL (ref 0.51–0.95)
DEPRECATED HCO3 PLAS-SCNC: 25 MMOL/L (ref 22–29)
GFR SERPL CREATININE-BSD FRML MDRD: >90 ML/MIN/1.73M2
GLUCOSE SERPL-MCNC: 105 MG/DL (ref 70–99)
MAGNESIUM SERPL-MCNC: 2 MG/DL (ref 1.7–2.3)
PHOSPHATE SERPL-MCNC: 2 MG/DL (ref 2.5–4.5)
POTASSIUM SERPL-SCNC: 3.6 MMOL/L (ref 3.4–5.3)
POTASSIUM SERPL-SCNC: 3.7 MMOL/L (ref 3.4–5.3)
PROT SERPL-MCNC: 5.4 G/DL (ref 6.4–8.3)
SODIUM SERPL-SCNC: 138 MMOL/L (ref 136–145)

## 2022-12-09 PROCEDURE — 250N000013 HC RX MED GY IP 250 OP 250 PS 637: Performed by: HOSPITALIST

## 2022-12-09 PROCEDURE — 82040 ASSAY OF SERUM ALBUMIN: CPT | Performed by: HOSPITALIST

## 2022-12-09 PROCEDURE — 84132 ASSAY OF SERUM POTASSIUM: CPT | Performed by: HOSPITALIST

## 2022-12-09 PROCEDURE — 99239 HOSP IP/OBS DSCHRG MGMT >30: CPT | Performed by: HOSPITALIST

## 2022-12-09 PROCEDURE — 250N000013 HC RX MED GY IP 250 OP 250 PS 637: Performed by: INTERNAL MEDICINE

## 2022-12-09 PROCEDURE — 250N000013 HC RX MED GY IP 250 OP 250 PS 637: Performed by: PHYSICIAN ASSISTANT

## 2022-12-09 PROCEDURE — 36415 COLL VENOUS BLD VENIPUNCTURE: CPT | Performed by: HOSPITALIST

## 2022-12-09 PROCEDURE — 80053 COMPREHEN METABOLIC PANEL: CPT | Performed by: HOSPITALIST

## 2022-12-09 PROCEDURE — 83735 ASSAY OF MAGNESIUM: CPT | Performed by: HOSPITALIST

## 2022-12-09 PROCEDURE — 84100 ASSAY OF PHOSPHORUS: CPT | Performed by: INTERNAL MEDICINE

## 2022-12-09 RX ORDER — FOLIC ACID 1 MG/1
1 TABLET ORAL DAILY
Qty: 30 TABLET | Refills: 0 | Status: SHIPPED | OUTPATIENT
Start: 2022-12-10 | End: 2023-01-09

## 2022-12-09 RX ORDER — SPIRONOLACTONE 25 MG/1
50 TABLET ORAL DAILY
Status: DISCONTINUED | OUTPATIENT
Start: 2022-12-09 | End: 2022-12-09 | Stop reason: HOSPADM

## 2022-12-09 RX ORDER — FUROSEMIDE 20 MG
20 TABLET ORAL DAILY
Qty: 30 TABLET | Refills: 0 | Status: SHIPPED | OUTPATIENT
Start: 2022-12-09 | End: 2023-06-23

## 2022-12-09 RX ORDER — LANOLIN ALCOHOL/MO/W.PET/CERES
100 CREAM (GRAM) TOPICAL DAILY
Qty: 30 TABLET | Refills: 0 | Status: SHIPPED | OUTPATIENT
Start: 2022-12-10 | End: 2023-01-09

## 2022-12-09 RX ORDER — FUROSEMIDE 20 MG
20 TABLET ORAL DAILY
Status: DISCONTINUED | OUTPATIENT
Start: 2022-12-09 | End: 2022-12-09 | Stop reason: HOSPADM

## 2022-12-09 RX ORDER — PANTOPRAZOLE SODIUM 40 MG/1
40 TABLET, DELAYED RELEASE ORAL
Qty: 30 TABLET | Refills: 0 | Status: SHIPPED | OUTPATIENT
Start: 2022-12-10 | End: 2023-01-09

## 2022-12-09 RX ORDER — SPIRONOLACTONE 50 MG/1
50 TABLET, FILM COATED ORAL DAILY
Qty: 30 TABLET | Refills: 0 | Status: SHIPPED | OUTPATIENT
Start: 2022-12-09 | End: 2023-06-23

## 2022-12-09 RX ADMIN — SPIRONOLACTONE 50 MG: 25 TABLET ORAL at 10:22

## 2022-12-09 RX ADMIN — PANTOPRAZOLE SODIUM 40 MG: 40 TABLET, DELAYED RELEASE ORAL at 06:53

## 2022-12-09 RX ADMIN — FUROSEMIDE 20 MG: 20 TABLET ORAL at 10:22

## 2022-12-09 RX ADMIN — THIAMINE HCL TAB 100 MG 100 MG: 100 TAB at 08:45

## 2022-12-09 RX ADMIN — FOLIC ACID 1 MG: 1 TABLET ORAL at 08:45

## 2022-12-09 RX ADMIN — POTASSIUM & SODIUM PHOSPHATES POWDER PACK 280-160-250 MG 1 PACKET: 280-160-250 PACK at 08:43

## 2022-12-09 RX ADMIN — MULTIPLE VITAMINS W/ MINERALS TAB 1 TABLET: TAB at 08:45

## 2022-12-09 RX ADMIN — Medication 1 DROP: at 05:55

## 2022-12-09 ASSESSMENT — ACTIVITIES OF DAILY LIVING (ADL)
ADLS_ACUITY_SCORE: 36
ADLS_ACUITY_SCORE: 31
ADLS_ACUITY_SCORE: 36
ADLS_ACUITY_SCORE: 36

## 2022-12-09 NOTE — PROGRESS NOTES
"GASTROENTEROLOGY PROGRESS NOTE        SUBJECTIVE:  Patient denies abdominal pain.  She is slowly regaining her appetite.  No nausea or vomiting.  No melena or hematochezia.  She reports being committed to stopping alcohol use.     OBJECTIVE:    /78 (BP Location: Right arm)   Pulse 91   Temp 98.7  F (37.1  C) (Oral)   Resp 18   Ht 1.575 m (5' 2\")   Wt 71.2 kg (157 lb)   LMP 10/01/2003 (Exact Date)   SpO2 93%   BMI 28.72 kg/m    Temp (24hrs), Av.5  F (36.9  C), Min:98  F (36.7  C), Max:98.7  F (37.1  C)    Patient Vitals for the past 72 hrs:   Weight   22 2018 71.2 kg (157 lb)       Intake/Output Summary (Last 24 hours) at 2022 0904  Last data filed at 2022 0800  Gross per 24 hour   Intake 240 ml   Output 100 ml   Net 140 ml        PHYSICAL EXAM     Constitutional: No distress, jaundice  Abdomen: Soft nontender  No asterixis  1+ lower extremity edema         Additional Comments:  ROS, FH, SH: See initial GI consult for details.     I have reviewed the patient's new clinical lab results:     Recent Labs   Lab Test 22  0848 22  1350 22  1343 16  0620 16  1510   WBC 7.6 9.3 5.3   < > 7.0   HGB 8.7* 10.0* 14.3   < > 12.8   * 128* 108*   < > 102*    224 115*   < > 291   INR  --  1.23*  --   --  0.96    < > = values in this interval not displayed.     Recent Labs   Lab Test 22  0618 22  1453 22  0848 22  1350   POTASSIUM 3.7  3.6 3.7 3.4  3.4 3.5   CHLORIDE 100  --  99 96*   CO2 25  --  23 25   BUN 9.6  --  11.0 8.2   ANIONGAP 13  --  13 15     Recent Labs   Lab Test 22  0618 22  0848 22  1350 20  1500 19  0915 19  1349 19  1512 19  1341 18  1123 18  0946   ALBUMIN 2.8* 2.9* 3.5   < >  --   --   --   --  3.4  --    BILITOTAL 6.7* 8.5* 9.4*   < >  --   --   --   --  0.5  --    ALT 27 31 40*   < >  --    < >  --    < > 39  --    * 129* 171*   < >  --    < >  " --   --  71*  --    PROTEIN  --   --   --   --  Negative  --  Negative  --   --  Negative   LIPASE  --   --  30  --   --   --   --   --  114  --     < > = values in this interval not displayed.     MELD-Na score: 16 at 12/9/2022  6:18 AM  MELD score: 16 at 12/9/2022  6:18 AM  Calculated from:  Serum Creatinine: 0.71 mg/dL (Using min of 1 mg/dL) at 12/9/2022  6:18 AM  Serum Sodium: 138 mmol/L (Using max of 137 mmol/L) at 12/9/2022  6:18 AM  Total Bilirubin: 6.7 mg/dL at 12/9/2022  6:18 AM  INR(ratio): 1.23 at 12/7/2022  1:50 PM  Age: 67 years    ASSESSMENT/ PLAN  Krystyna Peña is a 66 y/o with history of alcohol abuse who is admitted with slowly progressive abdominal distension and found to have abnormal LFTs and CT findings c/w a new diagnosis of alcohol related cirrhosis.  Parecentesis performed with removal 250ml fluid showing no evidence of SBP and low protein, transudative ascites c/w portal hypertension.    1. Acute on chronic alcohol liver disease: Imaging suggestive of cirrhosis with portal hypertension.  Markers c/w non-severe hepatitis (no steroids indicated).  Meld score has improved from 18 down to 16 today.  LFTs improving. Platelets are stable.     -- Hepatitis B and hepatitis C serologies are negative  -- AFP is 6.  -- Diagnostic paracentesis without evidence of SBP, also consistent with portal hypertension.  -- Started low-dose Lasix 20 mg p.o. daily, and low-dose spironolactone 50 mg daily.  Follow kidney function and electrolytes daily. Would then increase to lasix 40 mg daily and spironolactone 100 mg as renal function tolerates.   -- No further in-patient GI work up needed, will arrange outpatient [facility] appt. as we will need eventual upper endoscopy and liver ultrasound.    2.  Diarrhea: Patient experiencing 1-2 episodes per day of loose stool.  Enteric stool panel and C. difficile have been negative.  Suspect related to malnutrition / chronic alcohol use.*She can use Imodium  and start fiber supplementation at home.      Discussed with Dr. Celestin.  Will no longer follow.  Please call with questions or change in condition.    Charo Fischer PA-C  [facility] ( [facility])

## 2022-12-09 NOTE — PROVIDER NOTIFICATION
Provider Notified : Paged md for artificial tears because patient is complaining of dry eyes and requests artificial tears.

## 2022-12-09 NOTE — PLAN OF CARE
Cared for [id number]    Pt A/O x 4, VSS, pt denies pain, headache, dizziness, N/V & SOB. Pt up Asst: 1 w/gait belt & walker. Lung sounds clear, RA. CIWA scores: 0. GI following, GYN and Nutrition consulted. Potassium, Mag & Phos protocol - Phos 2.0 - replaced per protocol - recheck in AM. Will continue with plan of care.

## 2022-12-09 NOTE — PLAN OF CARE
DC instructions given to pt and spouse, verbalized understanding.  All belongings with pt, IV DC'd and documented.      Pt left the unit via wheelchair. Spouse is driving pt home

## 2022-12-09 NOTE — PROGRESS NOTES
Patient has a negative C.diff test and negative enteric panel from 12/8/22.  If other sources of infectious diarrhea are not being ruled out, patient can be removed from enteric isolation.    .12/9/2022  .Swati Pacheco, Infection Prevention

## 2022-12-09 NOTE — DISCHARGE SUMMARY
[facility]    Discharge Summary  Hospitalist    Date of Admission:  12/7/2022  Date of Discharge:  12/9/2022  Discharging Provider: Spike Tian MD  Date of Service (when I saw the patient): 12/09/22    Discharge Diagnoses   #Alcoholic cirrhosis with evidence of liver failure  #Coagulopathy with elevation of INR secondary to cirrhosis  #Alcohol use disorder  #Abnormal endometrium on CT scan  #Suspected severe malnutrition in the setting of chronic disease    Hospital Course   Krystyna Peña is a 67 year old woman who came to attention today at [facility] ED with complaint of severe abdominal distention that is impairing her function over the last month.       #Alcoholic cirrhosis with evidence of liver failure. Coagulopathy with elevation of INR. Alcohol use d/o: Patient with longstanding alcohol use.  Notes that she drinks 5 tall glasses of darling every night and has done so for years.  No prior history of alcohol withdrawal.  She is quit alcohol in the past but this was decades ago.  She presents for increased abdominal distention along with generalized weakness.  -ER work-up notable for patient being afebrile and hemodynamically stable.  CT abdomen pelvis done showed hepatomegaly with evidence of portal hypertension and cirrhosis.  Her lab work-up showed elevation of LFTs in a manner consistent with alcohol use.  Her INR was elevated at 1.23.  Her CBC showed a macrocytic anemia.  -Suspect patient's liver failure along with alcohol use is primary  of her generalized weakness and abdominal distention.  -Gastroenterology has been consulted.  Appreciate recommendations.  -Patient underwent paracentesis with 250 cc of fluid removed.    Did not show evidence of SBP.  -Alcohol cessation critical moving forward.    There is no evidence of withdrawal.  Absolute critical need for cessation discussed with patient and patient's .  -Start lasix and spironolactone on  discharge.  She will also discharge on pantoprazole therapy.  Follow up with PCP in next 5 days with a repeat CMP, CBC and INR.  She will also follow-up with gastroenterology in the coming weeks for cirrhosis.  -I did offer patient physical therapy evaluation given generalized weakness but patient declines this and wants to go home.  I did encourage her to follow-up as above.  Return instructions were provided.     #Abnormal endometrium, vaginal discharge: She has noted thick vaginal discharge of late.  She describes it over the last 1 to 2 weeks.  CT scan shows abnormally thickened endometrium.  OB/GYN consult placed on discharge per their recommendation.      #Hypophosphatemia, hypomagnesemia, Hyponatremia: Replacement protocols.     #Suspected severe malnutrition: In the setting of chronic disease with alcohol use disorder and cirrhosis as above.  Encourage PO intake.     Spike Tian MD      Pending Results   These results will be followed up by Hospital medicine  Unresulted Labs Ordered in the Past 30 Days of this Admission     Date and Time Order Name Status Description    12/8/2022  9:17 AM Ascites Fluid Aerobic Bacterial Culture Routine In process           Code Status   Full Code       Primary Care Physician   Viri Savage    Physical Exam   Temp: 98.7  F (37.1  C) Temp src: Oral BP: 135/78 Pulse: 91   Resp: 18 SpO2: 93 % O2 Device: None (Room air)    Vitals:    12/07/22 2018   Weight: 71.2 kg (157 lb)     Vital Signs with Ranges  Temp:  [98  F (36.7  C)-98.7  F (37.1  C)] 98.7  F (37.1  C)  Pulse:  [91-95] 91  Resp:  [17-18] 18  BP: (130-147)/(51-78) 135/78  SpO2:  [93 %-100 %] 93 %  I/O last 3 completed shifts:  In: 120 [P.O.:120]  Out: 100 [Urine:100]    Constitutional: Older than stated age.  Answers appropriately.  Chronically deconditioned.  HEENT: Normocephalic. MMM, No elevation of JVD noted. + Scleral icterus  Respiratory: Nl WOB, Clear bilaterally, No wheezes or crackles  Cardiovascular: Regular,  no murmur  GI: + Distention, BS+, NT, ND  Skin/Integumen: WWP, mild jaundice.  Moderate bilateral edema.  Neuro: CNII-XII intact. Moves all extremities. No tremor. A&Ox3.    Discharge Disposition   Discharged to home  Condition at discharge: Stable    Consultations This Hospital Stay   GASTROENTEROLOGY IP CONSULT  OB GYN IP CONSULT  NUTRITION SERVICES ADULT IP CONSULT    Time Spent on this Encounter   I, Spike Tian MD, personally saw the patient today and spent greater than 30 minutes discharging this patient.    Discharge Orders      Ob/Gyn Referral      Reason for your hospital stay    Were hospitalized for abdominal distention and generalized weakness secondary to alcohol use and related cirrhosis.  You were seen by gastroenterology.  You underwent paracentesis.  You were started on medicine to help keep fluid off your belly and your legs.  You will need to follow-up with your outpatient primary care provider in the next 5 days with a repeat set of labs.  You also need to follow-up with gastroenterology in the coming weeks.  I have also placed a referral to gynecology for vaginal discharge with abnormal CT scan showing thickening of your uterus.     Follow-up and recommended labs and tests     Follow up with primary care provider, Viri Savage, within 5 days for hospital follow- up.  The following labs/tests are recommended: CBC, CMP, INR.    Follow-up with gastroenterology in the next 4 to 6 weeks for cirrhosis of the liver.    Follow-up with gynecology for thickening of the endometrium/uterus on CT scan along with vaginal discharge.     Activity    Your activity upon discharge: activity as tolerated     Diet    Follow this diet upon discharge: Orders Placed This Encounter      2 Gram Sodium Diet     Discharge Medications   Current Discharge Medication List      START taking these medications    Details   folic acid (FOLVITE) 1 MG tablet Take 1 tablet (1 mg) by mouth daily for 30 days  Qty: 30 tablet, Refills:  0    Associated Diagnoses: Alcoholic cirrhosis of liver with ascites (H)      furosemide (LASIX) 20 MG tablet Take 1 tablet (20 mg) by mouth daily for 30 days  Qty: 30 tablet, Refills: 0    Associated Diagnoses: Alcoholic cirrhosis of liver with ascites (H)      pantoprazole (PROTONIX) 40 MG EC tablet Take 1 tablet (40 mg) by mouth every morning (before breakfast) for 30 days  Qty: 30 tablet, Refills: 0    Associated Diagnoses: Alcoholic cirrhosis of liver with ascites (H)      spironolactone (ALDACTONE) 50 MG tablet Take 1 tablet (50 mg) by mouth daily for 30 days  Qty: 30 tablet, Refills: 0    Associated Diagnoses: Alcoholic cirrhosis of liver with ascites (H)      thiamine (B-1) 100 MG tablet Take 1 tablet (100 mg) by mouth daily for 30 days  Qty: 30 tablet, Refills: 0    Associated Diagnoses: Alcoholic cirrhosis of liver with ascites (H)           Allergies   Allergies   Allergen Reactions     Morphine      respiratory distress     Cephalexin Rash     Data   Most Recent 3 CBC's:Recent Labs   Lab Test 12/08/22  0848 12/07/22  1350 07/27/22  1343   WBC 7.6 9.3 5.3   HGB 8.7* 10.0* 14.3   * 128* 108*    224 115*      Most Recent 3 BMP's:  Recent Labs   Lab Test 12/09/22  0618 12/08/22  1453 12/08/22  0848 12/07/22  1350     --  135* 136   POTASSIUM 3.7  3.6 3.7 3.4  3.4 3.5   CHLORIDE 100  --  99 96*   CO2 25  --  23 25   BUN 9.6  --  11.0 8.2   CR 0.71  --  0.69 0.82   ANIONGAP 13  --  13 15   YANA 8.1*  --  8.1* 8.5*   *  --  105* 124*     Most Recent 2 LFT's:  Recent Labs   Lab Test 12/09/22  0618 12/08/22  0848   * 129*   ALT 27 31   ALKPHOS 206* 223*   BILITOTAL 6.7* 8.5*     Most Recent INR's and Anticoagulation Dosing History:  Anticoagulation Dose History     Recent Dosing and Labs Latest Ref Rng & Units 7/8/2005 7/9/2005 7/11/2005 7/14/2005 9/12/2011 3/22/2016 12/7/2022    INR 0.85 - 1.15 2.96(H) 1.61(H) 1.60(H) 2.13(H) 0.90 0.96 1.23(H)        Most Recent 3  Troponin's:  Recent Labs   Lab Test 01/04/19  1425 07/11/18  0932 04/21/18  0926 04/21/18  0911   TROPI <0.015 <0.015 <0.015  --    TROPONIN  --   --   --  0.00     Most Recent Cholesterol Panel:  Recent Labs   Lab Test 01/02/19  1341   CHOL 211*   *   HDL 73   TRIG 152*     Most Recent 6 Bacteria Isolates From Any Culture (See EPIC Reports for Culture Details):  Recent Labs   Lab Test 10/15/18  1449 12/14/16  1540 03/17/15  1315   CULT No MRSA isolated No yeast isolated <10,000 colonies/mL urogenital ester     Most Recent TSH, T4 and A1c Labs:  Recent Labs   Lab Test 01/02/19  1341   TSH 3.14

## 2022-12-09 NOTE — PLAN OF CARE
Goal Outcome Evaluation:       A&OX4. Ls clear . VSS stable. Abd distention and fulness. Had paracentesis done with only 250 ml was taken out. Sclera  yellow. Skin Jaundiced. Up with assist of 1 with walker and gait belt to bathroom. Reg diet. Continue to monitor,

## 2022-12-12 ENCOUNTER — PATIENT OUTREACH (OUTPATIENT)
Dept: INTERNAL MEDICINE | Facility: CLINIC | Age: 67
End: 2022-12-12

## 2022-12-12 NOTE — TELEPHONE ENCOUNTER
"IP F/U    Date: 12/9/22  Diagnosis: Alcoholic cirrhosis of liver with ascites   Is patient active in care coordination? No  Was patient in TCU? No    Hospital/TCU/ED for chronic condition Discharge Protocol    \"Hi, my name is Yaritza Marin RN, a registered nurse, and I am calling from [facility].  I am calling to follow up and see how things are going for you after your recent emergency visit/hospital/TCU stay.\"    Tell me how you are doing now that you are home?\" not good - states she is so bloated it is difficult to walk and then gets shortness of breath. Has gastroenterology follow up today      Discharge Instructions    \"Let's review your discharge instructions.  What is/are the follow-up recommendations?  Pt. Response: f/u with PCP    \"Has an appointment with your primary care provider been scheduled?\"   Yes. (confirm)    \"When you see the provider, I would recommend that you bring your medications with you.\"    Medications    \"Tell me what changed about your medicines when you discharged?\"    Changes to chronic meds?    0-1    \"What questions do you have about your medications?\"    None     New diagnoses of heart failure, COPD, diabetes, or MI?    No              Post Discharge Medication Reconciliation Status: discharge medications reconciled, continue medications without change.    Was MTM referral placed (*Make sure to put transitions as reason for referral)?   No    Call Summary    \"What questions or concerns do you have about your recent visit and your follow-up care?\"     none    \"If you have questions or things don't continue to improve, we encourage you contact us through the main clinic number (give number).  Even if the clinic is not open, triage nurses are available 24/7 to help you.     We would like you to know that our clinic has extended hours (provide information).  We also have urgent care (provide details on closest location and hours/contact info)\"      \"Thank you for " "your time and take care!\"                 "

## 2022-12-13 LAB — BACTERIA FLD CULT: NO GROWTH

## 2022-12-14 ENCOUNTER — OFFICE VISIT (OUTPATIENT)
Dept: OBGYN | Facility: CLINIC | Age: 67
End: 2022-12-14
Payer: COMMERCIAL

## 2022-12-14 VITALS
DIASTOLIC BLOOD PRESSURE: 78 MMHG | HEART RATE: 96 BPM | WEIGHT: 158 LBS | SYSTOLIC BLOOD PRESSURE: 118 MMHG | BODY MASS INDEX: 28.9 KG/M2

## 2022-12-14 DIAGNOSIS — Z12.4 SCREENING FOR MALIGNANT NEOPLASM OF CERVIX: ICD-10-CM

## 2022-12-14 DIAGNOSIS — R93.89 THICKENED ENDOMETRIUM: Primary | ICD-10-CM

## 2022-12-14 DIAGNOSIS — K70.31 ALCOHOLIC CIRRHOSIS OF LIVER WITH ASCITES (H): ICD-10-CM

## 2022-12-14 PROCEDURE — 87624 HPV HI-RISK TYP POOLED RSLT: CPT | Performed by: OBSTETRICS & GYNECOLOGY

## 2022-12-14 PROCEDURE — 88305 TISSUE EXAM BY PATHOLOGIST: CPT | Performed by: STUDENT IN AN ORGANIZED HEALTH CARE EDUCATION/TRAINING PROGRAM

## 2022-12-14 PROCEDURE — 99204 OFFICE O/P NEW MOD 45 MIN: CPT | Mod: 25 | Performed by: OBSTETRICS & GYNECOLOGY

## 2022-12-14 PROCEDURE — 58100 BIOPSY OF UTERUS LINING: CPT | Performed by: OBSTETRICS & GYNECOLOGY

## 2022-12-14 PROCEDURE — G0145 SCR C/V CYTO,THINLAYER,RESCR: HCPCS | Performed by: OBSTETRICS & GYNECOLOGY

## 2022-12-14 NOTE — PROGRESS NOTES
"Chief Complaint   Patient presents with     Consult         Here for thickened endometrium.    Initial Wt 71.7 kg (158 lb)   LMP 10/01/2003 (Exact Date)   BMI 28.90 kg/m   Estimated body mass index is 28.9 kg/m  as calculated from the following:    Height as of 22: 1.575 m (5' 2\").    Weight as of this encounter: 71.7 kg (158 lb).  BP completed using cuff size: regular    Questioned patient about current smoking habits.  Pt. quit smoking some time ago.      Yessi Hassan RN           "

## 2022-12-14 NOTE — PROGRESS NOTES
Assessment & Plan     Thickened endometrium  - Based on CT Abd/Pelvis when recently admitted with acute alcoholic-induced ESLD w/ ascites  - Possible causes include fluid in endometrial cavity, endometrial polyp, endometrial hyperplasia or CA  - EMBx performed today  - US Pelvic Complete with Transvaginal; Future  - ENDOMETRIAL BIOPSY W/O CERVICAL DILATION - done as noted below  - If EMBx is unrevealing, and U/S shows only fluid in endometrial cavity, then no further w/u needed  - If EMBx is unrevealing, and U/S shows thickened endometrium suggestive of solid tissue, then would recommend Op Hyst w/ IUM, D&C.  However Pt may not be a surgical candidate for this due to her ESLD and complications from that.  She is also inclined to decline a procedure that requires GETA  - If EMBx shows hyperplasia or CA, will Rx or refer to Gyn Onc as appropriate  - Of note, Pt plans to leave for [place] from 12/28-3/2023 for the winter.  She may require follow-up there depending on above w/u.    Screening for malignant neoplasm of cervix  - Overdue for final Pap/HPV co-test.  If normal, no further Paps required.  If abnl, would follow-up per [facility]  - Pap imaged thin layer screen with HPV - recommended age 30 - 65 years (select HPV order below)    Alcoholic cirrhosis of liver with ascites (H)  - Chronic, with ascites. To get therapeutic paracentesis before she leave for [place] for winter      Review of the result(s) of each unique test - CT Abd/Pelvis - from recent hospital admission for alcohol-related ESLD        No follow-ups on file.    Prabhakar Avila MD  [facility] JAI Cruz is a 67 year old, presenting for the following health issues:  Consult      Pt here for thickened endometrium on CT Abd/Pelvis 12/7/2022 which was done for abdominal distention and loss of appetite.  She was Dx'd with end-stage liver disease (ESLD) due to alcohol-related cirrhosis.  She has severe ascites and required  therapeutic paracentesis.  She actually has another paracentesis scheduled before she leaves for [place] for the winter on 12/28/2022. An incidental finding of a thickened endometrium on the CT was noted.  Dr. Kruse [facility] was consulted and recommended outpt f/u for EMBx, but she came to see me today instead.  Pt has been  > 15 years on no HRT. She has never had any VB.  She has no abnl discharge.  She is overdue for her final Pap/HPV co-test.         Hospital Follow-up Visit:    Hospital/Nursing Home/IP Rehab Facility: [facility]  Date of Admission: 12/7/2022  Date of Discharge: 12/9/2022  Reason(s) for Admission: ESLD w/ ascites    Was your hospitalization related to COVID-19? No   Problems taking medications regularly:  None  Medication changes since discharge: Lasix added  Problems adhering to non-medication therapy:  None    Summary of hospitalization:  [facility] discharge summary reviewed  Diagnostic Tests/Treatments reviewed.  Follow up needed: See plan above  Other Healthcare Providers Involved in Patient s Care:         Specialist appointment - GI  Update since discharge: stable.         Plan of care communicated with patient               Review of Systems   Genitourinary: Negative for pelvic pain and vaginal bleeding.            Objective    /78   Pulse 96   Wt 71.7 kg (158 lb)   LMP 10/01/2003 (Exact Date)   BMI 28.90 kg/m    Body mass index is 28.9 kg/m .  Physical Exam   Gen-Cachectic female in NAD with icterus  Abdomen- Abdomen soft, non-tender. BS normal. Liver not palpable due to ascites, positive findings: distended, ascites  Pelvic- Exam chaperoned by nurse, External genitalia atrophic but normal, [place]'s glands normal, [place]'s glands normal, Urethral meatus normal, Urethra normal, Bladder normal, Vagina with atrophy, no abnormal lesions, no abnormal discharge, Normal cervix without lesions or mucopus, no cervical motion  tenderness, Uterus normal size, shape, and contour, no masses, non-tender, Adnexa normal size without masses or tenderness bilaterally, Pelvic exam limited by ascites, Pap smear was Done,  HPV Done; EMBx performed as noted below.      PROCEDURE:  Endometrial Biopsy      Indications for procedure:  Evaluation of thickened endometrium on CT    Pre-Procedure Medications:  None    The proposed procedure to diagnose the above condition was explained to the patient.  Risks, side effects, benefits, and alternatives were discussed. All questions were answered to patient's satisfaction. The patient gave informed consent.       The patient was placed in the dorsal lithotomy position and the perineum was exposed.  External genitalia appeared normal.  The uterus was mobile with normal size, shape, and consistency, without tenderness.  The adnexae palpated normally on bimanual exam without mass or tenderness. The uterus was anteverted  Appropriate sterile technique was used throughout the procedure.  A speculum was inserted and the cervix was visualized.  The cervix was cleansed with antiseptic solution.  A tenaculum was applied to the anterior lip of the cervix.  The uterus was sounded to 8 cm.  The [place] Endometrial Suction Curette was used and 2 rotating pass(s) were made between the fundus and the internal os. An sample that consisted almost entirely of serous fluid was obtained and sent to pathology.  Instruments were removed.  The patient experienced mild cramping during the procedure.  This subsided rapidly after instruments were removed.  The patient remained supine for a few moments after the procedure and had no other complications. No heavy bleeding was observed.       The patient was instructed to report any fever, cramping after 48 hours, or bleeding for 24-48 hours heavier than normal menses. OTC NSAIDs may be used for cramping PRN. Sexual relations may be resumed in 2-3 days, or after bleeding has stopped.   Pt.  is to contact our office if she has not received the pathology report within 1-2 weeks of the procedure.           CT-Abd/Pelvis  Results for orders placed or performed during the hospital encounter of 12/07/22   CT Abdomen Pelvis w Contrast    Narrative    CT ABDOMEN AND PELVIS WITH CONTRAST 12/7/2022 3:46 PM    CLINICAL HISTORY: Abdominal pain, distension.    TECHNIQUE: CT scan of the abdomen and pelvis was performed following  injection of IV contrast. Multiplanar reformats were obtained. Dose  reduction techniques were used.    CONTRAST: 80mL [id number]-[id number]    COMPARISON: December 20, 2018    FINDINGS:   LOWER CHEST: No infiltrates or effusions.    HEPATOBILIARY: Markedly heterogeneously enhancing enlarged liver,  underlying lesions would be difficult to exclude. Recanalized  periumbilical vein and esophageal varices compatible with portal  hypertension and cirrhosis.    PANCREAS: No significant mass, duct dilatation, or inflammatory  change.    SPLEEN: Normal size.    ADRENAL GLANDS: No significant nodules.    KIDNEYS/BLADDER: No significant mass, stones, or hydronephrosis.    BOWEL: No obstruction or inflammatory change.    PELVIC ORGANS: Thickened endometrium for age and menstrual status at  11 mm, consider pelvic ultrasound and/or endometrial biopsy on a  nonemergent basis for further evaluation.    ADDITIONAL FINDINGS: Small amount of ascites. No free air.    MUSCULOSKELETAL: No frankly destructive bony lesions.      Impression    IMPRESSION:   1.  Enlarged and markedly heterogeneous liver with signs of portal  hypertension and cirrhosis. Ultimately underlying lesions are not  excluded, follow-up will be needed. Acute hepatitis is a consideration  in the differential.  2.  Small amount of ascites.  3.  Abnormally thickened endometrium for age and menstrual status,  ultrasound and/or endometrial biopsy recommended for further  evaluation.    JOY SALGADO MD         SYSTEM ID:  [id number]

## 2022-12-15 ENCOUNTER — VIRTUAL VISIT (OUTPATIENT)
Dept: INTERNAL MEDICINE | Facility: CLINIC | Age: 67
End: 2022-12-15
Payer: COMMERCIAL

## 2022-12-15 DIAGNOSIS — R93.89 THICKENED ENDOMETRIUM: ICD-10-CM

## 2022-12-15 DIAGNOSIS — K70.31 ALCOHOLIC CIRRHOSIS OF LIVER WITH ASCITES (H): Primary | ICD-10-CM

## 2022-12-15 DIAGNOSIS — F10.20 UNCOMPLICATED ALCOHOL DEPENDENCE (H): ICD-10-CM

## 2022-12-15 DIAGNOSIS — N18.31 STAGE 3A CHRONIC KIDNEY DISEASE (H): ICD-10-CM

## 2022-12-15 LAB
PATH REPORT.COMMENTS IMP SPEC: NORMAL
PATH REPORT.FINAL DX SPEC: NORMAL
PATH REPORT.GROSS SPEC: NORMAL
PATH REPORT.MICROSCOPIC SPEC OTHER STN: NORMAL
PATH REPORT.RELEVANT HX SPEC: NORMAL
PHOTO IMAGE: NORMAL

## 2022-12-15 PROCEDURE — 99495 TRANSJ CARE MGMT MOD F2F 14D: CPT | Mod: 95 | Performed by: NURSE PRACTITIONER

## 2022-12-15 NOTE — PROGRESS NOTES
Nancy is a 67 year old who is being evaluated via a billable video visit.      How would you like to obtain your AVS? Mail a copy  If the video visit is dropped, the invitation should be resent by:text to [phone]  Will anyone else be joining your video visit?  Buzz         Assessment & Plan     Alcoholic cirrhosis of liver with ascites (H)  She is not currently drinking alcohol  She has had paracentetisis and feels full - scheduled Tuesday for next one   Has lost 5 pounds    Seeing GI  Has lab with them Monday      Uncomplicated alcohol dependence (H)  Stopped drinking   No withdrawal     Stage 3a chronic kidney disease (H)      Thickened endometrium  Seeing ob gyn   Ultrasound scheduled   Saw them yesterday         27 minutes spent on the date of the encounter doing chart review, history and exam, documentation and further activities per the note     MED REC REQUIRED  Post Medication Reconciliation Status:  Discharge medications reconciled, continue medications without change    Patient Instructions   Lab and paracentesis per Gastroenterology     OB GYN for endometrium     Ask them for referral for [place] providers to continue care        No follow-ups on file.    JOCELYN Fu CNP  [facility]    Anna Cruz is a 67 year old, presenting for the following health issues:      [facility]       Hospital Follow-up Visit:    Hospital/Nursing Home/IP Rehab Facility: [facility]  Date of Admission: 12-7-22  Date of Discharge: 12-9-22  Reason(s) for Admission: thickened endometrium    Was your hospitalization related to COVID-19? No   Problems taking medications regularly:  None  Medication changes since discharge: None  Problems adhering to non-medication therapy:  None    Summary of hospitalization:  [facility] discharge summary reviewed  Diagnostic Tests/Treatments reviewed.  Follow up needed: ob gyn   Gastroenterology     Other  Healthcare Providers Involved in Patient s Care:         Specialist appointment - gastroneterology ob gyn   Update since discharge: improved.   Plan of care communicated with patient and family     She lost 5 pounds since discharge   She has paracentesis set up for Tuesday   She is getting lab done with [facility] Monday     Appointment for gastroenterology [facility]     Ultrasounds pelvic to be done - thickened endometrium   Saw OB GYN     Not drinking alcohol since discharge     Low sodium diet meals   Drinking protein shakes in am     They go to [place] for the winter after [place]   Discussed GI and OB - talking to their people in [place] for ongoing care       Review of Systems   Constitutional, HEENT, cardiovascular, pulmonary, GI, , musculoskeletal, neuro, skin, endocrine and psych systems are negative, except as otherwise noted.      Objective           Vitals:  No vitals were obtained today due to virtual visit.    Physical Exam   GENERAL:  alert and no distress  EYES: Eyes grossly normal to inspection.  No discharge or erythema, or obvious scleral/conjunctival abnormalities.  RESP: No audible wheeze, cough, or visible cyanosis.  No visible retractions or increased work of breathing.    SKIN: Visible skin clear. No significant rash, abnormal pigmentation or lesions.  NEURO: Cranial nerves grossly intact.  Mentation and speech appropriate for age.  PSYCH: Mentation appears normal, affect normal/bright, judgement and insight intact, normal speech and appearance well-groomed.    Lab pending from GI             Video-Visit Details    Video Start Time: 3:00 PM    Type of service:  Video Visit    Video End Time:3:27 PM    Originating Location (pt. Location): Home    Distant Location (provider location):  On-site    Platform used for Video Visit: [employer]

## 2022-12-15 NOTE — PATIENT INSTRUCTIONS
Lab and paracentesis per Gastroenterology     OB GYN for endometrium     Ask them for referral for [place] providers to continue care

## 2022-12-15 NOTE — NURSING NOTE
"Chief Complaint   Patient presents with     Hospital F/U     initial LMP 10/01/2003 (Exact Date)  Estimated body mass index is 28.9 kg/m  as calculated from the following:    Height as of 12/7/22: 1.575 m (5' 2\").    Weight as of 12/14/22: 71.7 kg (158 lb)..  bp completed using cuff size NA (Not Taken)  MILLIE AVALOS LPN  "

## 2022-12-16 LAB
BKR LAB AP GYN ADEQUACY: NORMAL
BKR LAB AP GYN INTERPRETATION: NORMAL
BKR LAB AP HPV REFLEX: NORMAL
BKR LAB AP PREVIOUS ABNORMAL: NORMAL
PATH REPORT.COMMENTS IMP SPEC: NORMAL
PATH REPORT.COMMENTS IMP SPEC: NORMAL
PATH REPORT.RELEVANT HX SPEC: NORMAL

## 2022-12-19 ENCOUNTER — TRANSFERRED RECORDS (OUTPATIENT)
Dept: HEALTH INFORMATION MANAGEMENT | Facility: CLINIC | Age: 67
End: 2022-12-19

## 2022-12-19 LAB
ALT SERPL-CCNC: 36 IU/L (ref 0–32)
AST SERPL-CCNC: 95 IU/L (ref 0–40)
CREATININE (EXTERNAL): 1.02 MG/DL (ref 0.57–1)
GFR ESTIMATED (EXTERNAL): 60 ML/MIN/1.73M2
GLUCOSE (EXTERNAL): 121 MG/DL (ref 70–99)
HUMAN PAPILLOMA VIRUS 16 DNA: NEGATIVE
HUMAN PAPILLOMA VIRUS 18 DNA: NEGATIVE
HUMAN PAPILLOMA VIRUS FINAL DIAGNOSIS: NORMAL
HUMAN PAPILLOMA VIRUS OTHER HR: NEGATIVE
INR (EXTERNAL): 1.3 (ref 0.9–1.2)
POTASSIUM (EXTERNAL): 4.2 MMOL/L (ref 3.5–5.2)

## 2022-12-20 ENCOUNTER — HOSPITAL ENCOUNTER (OUTPATIENT)
Dept: ULTRASOUND IMAGING | Facility: CLINIC | Age: 67
Discharge: HOME | End: 2022-12-20
Attending: PHYSICIAN ASSISTANT
Payer: COMMERCIAL

## 2022-12-20 DIAGNOSIS — K70.31 ALCOHOLIC CIRRHOSIS OF LIVER WITH ASCITES (H): ICD-10-CM

## 2022-12-20 PROCEDURE — 76705 ECHO EXAM OF ABDOMEN: CPT

## 2022-12-21 ENCOUNTER — TELEPHONE (OUTPATIENT)
Dept: OBGYN | Facility: CLINIC | Age: 67
End: 2022-12-21

## 2022-12-21 ENCOUNTER — ANCILLARY PROCEDURE (OUTPATIENT)
Dept: ULTRASOUND IMAGING | Facility: CLINIC | Age: 67
End: 2022-12-21
Attending: OBSTETRICS & GYNECOLOGY
Payer: COMMERCIAL

## 2022-12-21 DIAGNOSIS — R93.89 THICKENED ENDOMETRIUM: ICD-10-CM

## 2022-12-21 PROCEDURE — 76830 TRANSVAGINAL US NON-OB: CPT | Performed by: OBSTETRICS & GYNECOLOGY

## 2022-12-21 PROCEDURE — 76856 US EXAM PELVIC COMPLETE: CPT | Performed by: OBSTETRICS & GYNECOLOGY

## 2022-12-21 NOTE — TELEPHONE ENCOUNTER
I d/w Pt U/S results and recommendation to have her undergo an Op Hyst w/ IUM, D&C to r/o endometrial hyperplasia and CA.  However 2 barriers to care are her end-stage liver disease makes her possibly a poor surgical candidate to undergo GETA, and also she is leaving in 5 days for [place] and won't be back for several months.  I advised that she see a Gyn in [place] and provide her records off [facility] to see if they would be able to arrange the above procedure there, and also obtain clearance for GETA w/ PCP there.  She will do so.

## 2022-12-22 ENCOUNTER — TRANSFERRED RECORDS (OUTPATIENT)
Dept: HEALTH INFORMATION MANAGEMENT | Facility: CLINIC | Age: 67
End: 2022-12-22

## 2023-01-13 ENCOUNTER — TRANSFERRED RECORDS (OUTPATIENT)
Dept: HEALTH INFORMATION MANAGEMENT | Facility: CLINIC | Age: 68
End: 2023-01-13
Payer: COMMERCIAL

## 2023-02-18 NOTE — PLAN OF CARE
PRIMARY DIAGNOSIS: SYNCOPE/TIA  OUTPATIENT/OBSERVATION GOALS TO BE MET BEFORE DISCHARGE:  1. Orthostatic performed: Yes:          Lying Orthostatic BP: 137/77         Sitting Orthostatic BP: 137/77         Standing Orthostatic BP: 145/82      2. Diagnostic testing complete & at baseline neurologic testing: Yes     3. Cleared by consultants (if involved): No     4. Interpretation of cardiac rhythm per telemetry tech: SR 80s      5. Tolerating adequate PO diet and medications: Yes     6. Return to near baseline physical activity or neurologic status: Yes     Patient is alert and oriented x4. VSS besides BP has been increased 187/79 and 173/82. PRN labetalol given x2. Patient has been tolerating regular diet. Up SBA. Patient denies dizziness, lightheadedness or any episodes of confusion. Patient is on tele, SR 80. NS running at 100 mL/hr. Nephro consulted for today. Will continue to monitor patient.       Discharge Planner Nurse   Safe discharge environment identified: Yes  Barriers to discharge: No       Entered by: Jeannette Barillas 01/05/2019      Please review provider order for any additional goals.   Nurse to notify provider when observation goals have been met and patient is ready for discharge.                    · Possibly in setting of newly diagnosed heart failure however has lost appx 60 lbs unintentionally in the last 3-4 months  · Has never had colonoscopy  Will need GI referral on discharge for screening     · CT C/A/P without contrast with no acute inflammatory or neoplastic process in the chest/abdomen/pelvis within the limitations of noncontrast study per the results report

## 2023-03-06 ENCOUNTER — TRANSFERRED RECORDS (OUTPATIENT)
Dept: HEALTH INFORMATION MANAGEMENT | Facility: CLINIC | Age: 68
End: 2023-03-06
Payer: COMMERCIAL

## 2023-03-06 LAB
ALT SERPL-CCNC: 15 IU/L (ref 0–32)
AST SERPL-CCNC: 32 IU/L (ref 0–40)
CREATININE (EXTERNAL): 1.21 MG/DL (ref 0.57–1)
GFR ESTIMATED (EXTERNAL): 49 ML/MIN/1.73
GLUCOSE (EXTERNAL): 114 MG/DL (ref 70–99)
INR (EXTERNAL): 1.1 (ref 0.9–1.2)
POTASSIUM (EXTERNAL): 4.1 MMOL/L (ref 3.5–5.2)

## 2023-03-07 ENCOUNTER — TELEPHONE (OUTPATIENT)
Dept: INTERNAL MEDICINE | Facility: CLINIC | Age: 68
End: 2023-03-07
Payer: COMMERCIAL

## 2023-03-07 ENCOUNTER — TRANSFERRED RECORDS (OUTPATIENT)
Dept: HEALTH INFORMATION MANAGEMENT | Facility: CLINIC | Age: 68
End: 2023-03-07

## 2023-03-07 DIAGNOSIS — N63.10 MASSES OF BOTH BREASTS: Primary | ICD-10-CM

## 2023-03-07 DIAGNOSIS — N63.20 MASSES OF BOTH BREASTS: Primary | ICD-10-CM

## 2023-03-07 NOTE — TELEPHONE ENCOUNTER
I ordered a diagnostic mammogram with ultrasound but there is a good possibility she may have to come in to be seen after the mammogram.  They will call her to schedule an appointment.

## 2023-03-07 NOTE — TELEPHONE ENCOUNTER
Patient calling  She feels lumps and was told she needs a referral for a breast ultrasound with her mammo. Ok to call and antonio [phone]

## 2023-03-07 NOTE — TELEPHONE ENCOUNTER
"Pt states she has lumps on each breast. One on each breast.   She doesn't want to schedule an appointment for breast exam. She is seeing the lung doctor and GI doctor. She has \"too many appointments\".   She states she has been hospitalized 4 times since December. She has liver failure pt reports.   She states they could be lymph nodes but she doesn't know. They are not soft, more firm feeling.     Please advise for orders.     "

## 2023-03-10 ENCOUNTER — TRANSFERRED RECORDS (OUTPATIENT)
Dept: HEALTH INFORMATION MANAGEMENT | Facility: CLINIC | Age: 68
End: 2023-03-10
Payer: COMMERCIAL

## 2023-03-20 ENCOUNTER — TRANSFERRED RECORDS (OUTPATIENT)
Dept: HEALTH INFORMATION MANAGEMENT | Facility: CLINIC | Age: 68
End: 2023-03-20

## 2023-03-20 LAB
ALT SERPL-CCNC: 13 U/L (ref 6–29)
AST SERPL-CCNC: 28 U/L (ref 10–35)
CREATININE (EXTERNAL): 0.78 MG/DL (ref 0.5–1.05)
GFR ESTIMATED (EXTERNAL): 83 ML/MIN/1.73M2
GFR ESTIMATED (IF AFRICAN AMERICAN) (EXTERNAL): NORMAL ML/MIN/1.73M2
GLUCOSE (EXTERNAL): 110 MG/DL (ref 65–99)
HEP C HIM: NORMAL
HIV 1&2 EXT: NORMAL
POTASSIUM (EXTERNAL): 3.9 MMOL/L (ref 3.5–5.3)
TSH SERPL-ACNC: 1.89 MIU/L (ref 0.4–4.5)

## 2023-03-21 ENCOUNTER — TRANSFERRED RECORDS (OUTPATIENT)
Dept: HEALTH INFORMATION MANAGEMENT | Facility: CLINIC | Age: 68
End: 2023-03-21
Payer: COMMERCIAL

## 2023-03-21 LAB
ALT SERPL-CCNC: 14 IU/L (ref 0–32)
AST SERPL-CCNC: 30 IU/L (ref 0–40)
CREATININE (EXTERNAL): 0.87 MG/DL (ref 0.57–1)
GFR ESTIMATED (EXTERNAL): 73 ML/MIN/1.73M2
GLUCOSE (EXTERNAL): 100 MG/DL (ref 70–99)
POTASSIUM (EXTERNAL): 3.9 MMOL/L (ref 3.5–5.2)

## 2023-03-23 ENCOUNTER — HOSPITAL ENCOUNTER (OUTPATIENT)
Dept: ULTRASOUND IMAGING | Facility: CLINIC | Age: 68
Discharge: HOME | End: 2023-03-23
Attending: PHYSICIAN ASSISTANT | Admitting: PHYSICIAN ASSISTANT
Payer: COMMERCIAL

## 2023-03-23 DIAGNOSIS — K70.31 ALCOHOLIC CIRRHOSIS OF LIVER WITH ASCITES (H): ICD-10-CM

## 2023-03-23 PROCEDURE — 76705 ECHO EXAM OF ABDOMEN: CPT

## 2023-03-23 RX ORDER — FOLIC ACID 1 MG/1
1 TABLET ORAL
COMMUNITY
Start: 2023-02-24 | End: 2023-06-23

## 2023-03-23 RX ORDER — ALBUTEROL SULFATE 90 UG/1
AEROSOL, METERED RESPIRATORY (INHALATION)
COMMUNITY
Start: 2023-01-31 | End: 2023-06-23

## 2023-03-23 RX ORDER — HYDROXYZINE HYDROCHLORIDE 25 MG/1
TABLET, FILM COATED ORAL
COMMUNITY
Start: 2023-03-20 | End: 2023-06-23

## 2023-03-23 RX ORDER — CHOLESTYRAMINE 4 G/9G
POWDER, FOR SUSPENSION ORAL
COMMUNITY
Start: 2023-01-13 | End: 2023-06-23

## 2023-03-23 RX ORDER — CETIRIZINE HYDROCHLORIDE 10 MG/1
1 TABLET ORAL
COMMUNITY
Start: 2023-03-20

## 2023-03-23 RX ORDER — TRIAMCINOLONE ACETONIDE 1 MG/G
CREAM TOPICAL
COMMUNITY
Start: 2023-03-20 | End: 2023-06-23

## 2023-03-23 RX ORDER — RIFAMPIN 150 MG/1
CAPSULE ORAL
COMMUNITY
Start: 2023-01-27 | End: 2023-06-23

## 2023-03-23 RX ORDER — MONTELUKAST SODIUM 10 MG/1
1 TABLET ORAL EVERY MORNING
COMMUNITY
Start: 2023-03-20 | End: 2024-07-18

## 2023-03-23 RX ORDER — PANTOPRAZOLE SODIUM 40 MG/1
1 TABLET, DELAYED RELEASE ORAL
COMMUNITY
Start: 2023-02-24

## 2023-03-23 RX ORDER — URSODIOL 500 MG/1
1 TABLET, FILM COATED ORAL
COMMUNITY
Start: 2023-02-10 | End: 2023-06-23

## 2023-03-23 RX ORDER — LACTULOSE 10 G/15ML
SOLUTION ORAL
COMMUNITY
Start: 2023-03-10 | End: 2023-06-23

## 2023-03-23 RX ORDER — FUROSEMIDE 40 MG
1 TABLET ORAL
COMMUNITY
Start: 2023-02-24 | End: 2023-06-23

## 2023-03-24 ENCOUNTER — TRANSFERRED RECORDS (OUTPATIENT)
Dept: HEALTH INFORMATION MANAGEMENT | Facility: CLINIC | Age: 68
End: 2023-03-24
Payer: COMMERCIAL

## 2023-04-05 ENCOUNTER — TRANSFERRED RECORDS (OUTPATIENT)
Dept: HEALTH INFORMATION MANAGEMENT | Facility: CLINIC | Age: 68
End: 2023-04-05
Payer: COMMERCIAL

## 2023-04-06 ENCOUNTER — HOSPITAL ENCOUNTER (OUTPATIENT)
Dept: MRI IMAGING | Facility: CLINIC | Age: 68
Discharge: HOME | End: 2023-04-06
Attending: PHYSICIAN ASSISTANT | Admitting: PHYSICIAN ASSISTANT
Payer: COMMERCIAL

## 2023-04-06 DIAGNOSIS — R17 ELEVATED BILIRUBIN: ICD-10-CM

## 2023-04-06 DIAGNOSIS — K86.9 PANCREATIC LESION: ICD-10-CM

## 2023-04-06 DIAGNOSIS — K82.4 GALLBLADDER POLYP: ICD-10-CM

## 2023-04-06 DIAGNOSIS — R74.8 ELEVATED LIVER ENZYMES: ICD-10-CM

## 2023-04-06 PROCEDURE — 74183 MRI ABD W/O CNTR FLWD CNTR: CPT | Mod: 26 | Performed by: RADIOLOGY

## 2023-04-06 PROCEDURE — 999N000248 HC STATISTIC IV INSERT WITH US BY RN

## 2023-04-06 PROCEDURE — 74183 MRI ABD W/O CNTR FLWD CNTR: CPT

## 2023-04-06 PROCEDURE — A9585 GADOBUTROL INJECTION: HCPCS | Performed by: PHYSICIAN ASSISTANT

## 2023-04-06 PROCEDURE — 255N000002 HC RX 255 OP 636: Performed by: PHYSICIAN ASSISTANT

## 2023-04-06 RX ORDER — GADOBUTROL 604.72 MG/ML
7 INJECTION INTRAVENOUS ONCE
Status: COMPLETED | OUTPATIENT
Start: 2023-04-06 | End: 2023-04-06

## 2023-04-06 RX ADMIN — GADOBUTROL 7 ML: 604.72 INJECTION INTRAVENOUS at 09:17

## 2023-04-07 ENCOUNTER — HOSPITAL ENCOUNTER (OUTPATIENT)
Dept: MAMMOGRAPHY | Facility: CLINIC | Age: 68
Discharge: HOME | End: 2023-04-07
Attending: INTERNAL MEDICINE
Payer: COMMERCIAL

## 2023-04-07 ENCOUNTER — HOSPITAL ENCOUNTER (OUTPATIENT)
Dept: ULTRASOUND IMAGING | Facility: CLINIC | Age: 68
Discharge: HOME | End: 2023-04-07
Attending: INTERNAL MEDICINE
Payer: COMMERCIAL

## 2023-04-07 DIAGNOSIS — N63.10 MASSES OF BOTH BREASTS: ICD-10-CM

## 2023-04-07 DIAGNOSIS — N63.20 MASSES OF BOTH BREASTS: ICD-10-CM

## 2023-04-07 PROCEDURE — 77066 DX MAMMO INCL CAD BI: CPT

## 2023-04-07 PROCEDURE — 76642 ULTRASOUND BREAST LIMITED: CPT | Mod: 50

## 2023-04-14 ENCOUNTER — TRANSFERRED RECORDS (OUTPATIENT)
Dept: HEALTH INFORMATION MANAGEMENT | Facility: CLINIC | Age: 68
End: 2023-04-14
Payer: COMMERCIAL

## 2023-04-24 ENCOUNTER — TRANSFERRED RECORDS (OUTPATIENT)
Dept: HEALTH INFORMATION MANAGEMENT | Facility: CLINIC | Age: 68
End: 2023-04-24
Payer: COMMERCIAL

## 2023-05-02 ENCOUNTER — HOSPITAL ENCOUNTER (OUTPATIENT)
Dept: PET IMAGING | Facility: CLINIC | Age: 68
Discharge: HOME | End: 2023-05-02
Attending: INTERNAL MEDICINE | Admitting: INTERNAL MEDICINE
Payer: COMMERCIAL

## 2023-05-02 DIAGNOSIS — R91.1 SOLITARY PULMONARY NODULE: ICD-10-CM

## 2023-05-02 DIAGNOSIS — K70.31 ALCOHOLIC CIRRHOSIS OF LIVER WITH ASCITES (H): ICD-10-CM

## 2023-05-02 DIAGNOSIS — F17.211 NICOTINE DEPENDENCE, CIGARETTES, IN REMISSION: ICD-10-CM

## 2023-05-02 PROCEDURE — 78815 PET IMAGE W/CT SKULL-THIGH: CPT | Mod: PI

## 2023-05-02 PROCEDURE — 343N000001 HC RX 343: Performed by: INTERNAL MEDICINE

## 2023-05-02 PROCEDURE — A9552 F18 FDG: HCPCS | Performed by: INTERNAL MEDICINE

## 2023-05-02 RX ADMIN — FLUDEOXYGLUCOSE F-18 11.44 MILLICURIE: 500 INJECTION, SOLUTION INTRAVENOUS at 08:30

## 2023-05-11 ENCOUNTER — TRANSFERRED RECORDS (OUTPATIENT)
Dept: HEALTH INFORMATION MANAGEMENT | Facility: CLINIC | Age: 68
End: 2023-05-11
Payer: COMMERCIAL

## 2023-05-19 ENCOUNTER — TRANSFERRED RECORDS (OUTPATIENT)
Dept: HEALTH INFORMATION MANAGEMENT | Facility: CLINIC | Age: 68
End: 2023-05-19
Payer: COMMERCIAL

## 2023-05-23 ENCOUNTER — TRANSFERRED RECORDS (OUTPATIENT)
Dept: HEALTH INFORMATION MANAGEMENT | Facility: CLINIC | Age: 68
End: 2023-05-23
Payer: COMMERCIAL

## 2023-05-27 ENCOUNTER — HEALTH MAINTENANCE LETTER (OUTPATIENT)
Age: 68
End: 2023-05-27

## 2023-06-06 ENCOUNTER — TRANSFERRED RECORDS (OUTPATIENT)
Dept: HEALTH INFORMATION MANAGEMENT | Facility: CLINIC | Age: 68
End: 2023-06-06
Payer: COMMERCIAL

## 2023-06-22 ASSESSMENT — ACTIVITIES OF DAILY LIVING (ADL): CURRENT_FUNCTION: NO ASSISTANCE NEEDED

## 2023-06-22 ASSESSMENT — ENCOUNTER SYMPTOMS
ABDOMINAL PAIN: 0
MYALGIAS: 0
EYE PAIN: 0
CONSTIPATION: 0
ARTHRALGIAS: 1
DYSURIA: 0
NAUSEA: 0
FREQUENCY: 0
NERVOUS/ANXIOUS: 0
HEADACHES: 0
HEMATOCHEZIA: 0
FEVER: 0
HEARTBURN: 0
DIZZINESS: 0
COUGH: 0
PARESTHESIAS: 0
DIARRHEA: 0
SORE THROAT: 0
JOINT SWELLING: 0
WEAKNESS: 0
BREAST MASS: 0
HEMATURIA: 0
PALPITATIONS: 0
CHILLS: 0
SHORTNESS OF BREATH: 0

## 2023-06-23 ENCOUNTER — OFFICE VISIT (OUTPATIENT)
Dept: INTERNAL MEDICINE | Facility: CLINIC | Age: 68
End: 2023-06-23
Payer: COMMERCIAL

## 2023-06-23 DIAGNOSIS — R03.0 ELEVATED BLOOD PRESSURE READING WITHOUT DIAGNOSIS OF HYPERTENSION: ICD-10-CM

## 2023-06-23 DIAGNOSIS — K70.31 ALCOHOLIC CIRRHOSIS OF LIVER WITH ASCITES (H): ICD-10-CM

## 2023-06-23 DIAGNOSIS — Z00.00 ENCOUNTER FOR MEDICARE ANNUAL WELLNESS EXAM: ICD-10-CM

## 2023-06-23 DIAGNOSIS — Z00.00 ENCOUNTER FOR ANNUAL WELLNESS EXAM IN MEDICARE PATIENT: Primary | ICD-10-CM

## 2023-06-23 DIAGNOSIS — F10.20 UNCOMPLICATED ALCOHOL DEPENDENCE (H): ICD-10-CM

## 2023-06-23 DIAGNOSIS — R93.89 THICKENED ENDOMETRIUM: ICD-10-CM

## 2023-06-23 DIAGNOSIS — Z78.0 ASYMPTOMATIC POSTMENOPAUSAL STATUS: ICD-10-CM

## 2023-06-23 DIAGNOSIS — N18.31 STAGE 3A CHRONIC KIDNEY DISEASE (H): ICD-10-CM

## 2023-06-23 PROBLEM — E55.9 VITAMIN D DEFICIENCY: Status: ACTIVE | Noted: 2019-01-25

## 2023-06-23 PROBLEM — N14.0 ANALGESIC NEPHROPATHY: Status: ACTIVE | Noted: 2019-01-25

## 2023-06-23 PROBLEM — E83.42 HYPOMAGNESEMIA: Status: RESOLVED | Noted: 2022-12-07 | Resolved: 2023-06-23

## 2023-06-23 PROBLEM — K31.9 DISORDER OF STOMACH: Status: ACTIVE | Noted: 2023-03-28

## 2023-06-23 PROBLEM — E80.6 HYPERBILIRUBINEMIA: Status: RESOLVED | Noted: 2022-12-07 | Resolved: 2023-06-23

## 2023-06-23 PROBLEM — N17.9 ACUTE KIDNEY FAILURE (H): Status: ACTIVE | Noted: 2019-01-25

## 2023-06-23 PROBLEM — I10 ESSENTIAL (PRIMARY) HYPERTENSION: Status: ACTIVE | Noted: 2019-01-25

## 2023-06-23 PROBLEM — K82.4 POLYP OF GALLBLADDER: Status: ACTIVE | Noted: 2023-06-23

## 2023-06-23 PROBLEM — L29.9 PRURITUS: Status: ACTIVE | Noted: 2023-06-23

## 2023-06-23 PROBLEM — R17 INCREASED BILIRUBIN LEVEL: Status: ACTIVE | Noted: 2023-06-23

## 2023-06-23 PROCEDURE — 90714 TD VACC NO PRESV 7 YRS+ IM: CPT | Performed by: INTERNAL MEDICINE

## 2023-06-23 PROCEDURE — 99397 PER PM REEVAL EST PAT 65+ YR: CPT | Mod: 25 | Performed by: INTERNAL MEDICINE

## 2023-06-23 PROCEDURE — 99213 OFFICE O/P EST LOW 20 MIN: CPT | Mod: 25 | Performed by: INTERNAL MEDICINE

## 2023-06-23 PROCEDURE — 90471 IMMUNIZATION ADMIN: CPT | Performed by: INTERNAL MEDICINE

## 2023-06-23 ASSESSMENT — ENCOUNTER SYMPTOMS
ABDOMINAL PAIN: 0
DYSURIA: 0
EYE PAIN: 0
COUGH: 0
SHORTNESS OF BREATH: 0
HEARTBURN: 0
WEAKNESS: 0
PARESTHESIAS: 0
CHILLS: 0
HEMATOCHEZIA: 0
DIZZINESS: 0
NERVOUS/ANXIOUS: 0
MYALGIAS: 0
HEADACHES: 0
ARTHRALGIAS: 1
PALPITATIONS: 0
BREAST MASS: 0
CONSTIPATION: 0
FREQUENCY: 0
DIARRHEA: 0
NAUSEA: 0
SORE THROAT: 0
HEMATURIA: 0
JOINT SWELLING: 0
FEVER: 0

## 2023-06-23 ASSESSMENT — ACTIVITIES OF DAILY LIVING (ADL): CURRENT_FUNCTION: NO ASSISTANCE NEEDED

## 2023-06-23 ASSESSMENT — PAIN SCALES - GENERAL: PAINLEVEL: NO PAIN (0)

## 2023-06-23 NOTE — PROGRESS NOTES
"SUBJECTIVE:   Nanyc is a 67 year old who presents for Preventive Visit.      6/23/2023     7:55 AM   Additional Questions   Roomed by Leonora Delarosa     Are you in the first 12 months of your Medicare coverage?  Yes,  Visual Acuity:  Right Eye: 20/20   Left Eye: 20/20  Both Eyes: 20/20    Healthy Habits:     In general, how would you rate your overall health?  Good    Frequency of exercise:  None    Do you usually eat at least 4 servings of fruit and vegetables a day, include whole grains    & fiber and avoid regularly eating high fat or \"junk\" foods?  Yes    Taking medications regularly:  Yes    Medication side effects:  Not applicable    Ability to successfully perform activities of daily living:  No assistance needed    Home Safety:  No safety concerns identified    Hearing Impairment:  No hearing concerns    In the past 6 months, have you been bothered by leaking of urine?  No    In general, how would you rate your overall mental or emotional health?  Good      PHQ-2 Total Score: 0    Additional concerns today:  No    Problems:  1.  Alcoholic cirrhosis: Stable liver function, ascites is doing better, continues to follow with GI  2.  Alcohol dependence: Currently sober  3.  CKD: Labs been stable  4.  Elevated blood pressure: Readings at GI have been good, ischial reading here is elevated but recheck better  5.  Thickened endometrium: She had had a gynecology consult and they had recommended hysteroscopy.  [place] and did not follow through on that.  She has not had any further vaginal bleeding.      Patient Active Problem List   Diagnosis     CARDIOVASCULAR SCREENING; LDL GOAL LESS THAN 130     Back pain     Elevated blood pressure reading without diagnosis of hypertension     Edema, unspecified type     CKD (chronic kidney disease) stage 3, GFR 30-59 ml/min (H)     Inflammatory arthritis     Alcoholic cirrhosis of liver with ascites (H)     Thickened endometrium     Uncomplicated " alcohol dependence (H)     Current Outpatient Medications   Medication Sig Dispense Refill     cetirizine (ZYRTEC) 10 MG tablet Take 1 tablet by mouth 2 times daily       montelukast (SINGULAIR) 10 MG tablet Take 1 tablet by mouth every morning       pantoprazole (PROTONIX) 40 MG EC tablet Take 1 tablet by mouth 2 times daily          Have you ever done Advance Care Planning? (For example, a Health Directive, POLST, or a discussion with a medical provider or your loved ones about your wishes): Yes, advance care planning is on file.       Fall risk  Fallen 2 or more times in the past year?: No  Any fall with injury in the past year?: No    Cognitive Screening   1) Repeat 3 items (Leader, Season, Table)    2) Clock draw: NORMAL  3) 3 item recall: Recalls 3 objects  Results: 3 items recalled: COGNITIVE IMPAIRMENT LESS LIKELY    Mini-CogTM Copyright S Stefania. Licensed by the author for use in [facility]; reprinted with permission (doni@[facility]). All rights reserved.      Do you have sleep apnea, excessive snoring or daytime drowsiness?: no    Reviewed and updated as needed this visit by clinical staff   Tobacco  Allergies  Meds   Med Hx  Surg Hx  Fam Hx          Reviewed and updated as needed this visit by Provider                 Social History     Tobacco Use     Smoking status: Former     Packs/day: 1.00     Types: Cigarettes, Cigars     Quit date: 4/1/2013     Years since quitting: 10.2     Passive exposure: Past     Smokeless tobacco: Never   Substance Use Topics     Alcohol use: Not Currently     Alcohol/week: 70.0 standard drinks of alcohol     Types: 70 Shots of liquor per week             6/22/2023    10:03 AM   Alcohol Use   Prescreen: >3 drinks/day or >7 drinks/week? No     Do you have a current opioid prescription? No  Do you use any other controlled substances or medications that are not prescribed by a provider? None              Current providers sharing in care for this patient  include:   Patient Care Team:  Viri Savage MD as PCP - General (Internal Medicine)  Viri Savage MD as Assigned PCP  Rell Mcgovern MD as MD (Gastroenterology)  Prabhakar Avila MD as Assigned OBGYN Provider    The following health maintenance items are reviewed in Epic and correct as of today:  Health Maintenance   Topic Date Due     DEXA  Never done     HEPATITIS B IMMUNIZATION (1 of 3 - Risk 3-dose series) Never done     MEDICARE ANNUAL WELLNESS VISIT  01/02/2020     LIPID  01/02/2020     MICROALBUMIN  05/27/2021     COVID-19 Vaccine (6 - Pfizer series) 02/18/2023     ANNUAL REVIEW OF HM ORDERS  07/27/2023     ADVANCE CARE PLANNING  10/31/2023     DTAP/TDAP/TD IMMUNIZATION (2 - Td or Tdap) 11/19/2023     HEMOGLOBIN  12/08/2023     BMP  12/09/2023     MAMMO SCREENING  04/07/2024     LUNG CANCER SCREENING  05/02/2024     FALL RISK ASSESSMENT  06/23/2024     COLORECTAL CANCER SCREENING  04/16/2026     HEPATITIS C SCREENING  Completed     PHQ-2 (once per calendar year)  Completed     INFLUENZA VACCINE  Completed     Pneumococcal Vaccine: 65+ Years  Completed     URINALYSIS  Completed     ZOSTER IMMUNIZATION  Addressed     IPV IMMUNIZATION  Aged Out     MENINGITIS IMMUNIZATION  Aged Out           FHS-7:       3/8/2022    12:58 PM 3/15/2022    10:48 AM 3/15/2022    10:53 AM 4/7/2023    12:27 PM 6/22/2023    10:04 AM   Breast CA Risk Assessment (FHS-7)   Did any of your first-degree relatives have breast or ovarian cancer? Yes Yes  Yes    Did any of your relatives have bilateral breast cancer? Yes No  No Yes   Did any man in your family have breast cancer? Yes No  No No   Did any woman in your family have breast and ovarian cancer? No No  No Yes   Did any woman in your family have breast cancer before age 50 y? Yes Yes  Yes Yes   Do you have 2 or more relatives with breast and/or ovarian cancer? Unknown Yes No Yes Yes   Do you have 2 or more relatives with breast and/or bowel cancer? Unknown No Yes Yes Yes  "        Pertinent mammograms are reviewed under the imaging tab.    Review of Systems   Constitutional: Negative for chills and fever.   HENT: Negative for congestion, ear pain, hearing loss and sore throat.    Eyes: Negative for pain and visual disturbance.   Respiratory: Negative for cough and shortness of breath.    Cardiovascular: Negative for chest pain, palpitations and peripheral edema.   Gastrointestinal: Negative for abdominal pain, constipation, diarrhea, heartburn, hematochezia and nausea.   Breasts:  Negative for tenderness, breast mass and discharge.   Genitourinary: Negative for dysuria, frequency, genital sores, hematuria, pelvic pain, urgency, vaginal bleeding and vaginal discharge.   Musculoskeletal: Positive for arthralgias. Negative for joint swelling and myalgias.   Skin: Negative for rash.   Neurological: Negative for dizziness, weakness, headaches and paresthesias.   Psychiatric/Behavioral: Negative for mood changes. The patient is not nervous/anxious.          OBJECTIVE:   /61   Pulse 81   Temp (!) 96.2  F (35.7  C) (Tympanic)   Resp 18   Ht 1.575 m (5' 2\")   Wt 66.4 kg (146 lb 6.4 oz)   LMP 10/01/2003 (Exact Date)   SpO2 100%   Breastfeeding No   BMI 26.78 kg/m   Estimated body mass index is 28.9 kg/m  as calculated from the following:    Height as of this encounter: 1.575 m (5' 2\").    Weight as of 12/14/22: 71.7 kg (158 lb).  Physical Exam    HEENT: extraocular movements are intact, pupils equal and reactive to light and accommodation, TMs clear  NECK: Neck supple. No adenopathy. Thyroid symmetric, normal size,, Carotids without bruits.  PULMONARY: clear to auscultation  CARDIAC: regular rate and rhythm and no murmurs, clicks, or gallops  PULSES: 2/2 throughout  BACK: no spinal or CVAT  ABDOMINAL: Mildly distended but not tense, nontender, no masses, no hepatosplenomegaly        ASSESSMENT / PLAN:   (Z00.00) Encounter for annual wellness exam in Medicare patient  (primary " "encounter diagnosis)  Comment: Td done, due for bone density, declines COVID shot today  Plan:     (Z78.0) Asymptomatic postmenopausal status  Comment:   Plan: DX Hip/Pelvis/Spine, DX Wrist Heel Radius            (K70.31) Alcoholic cirrhosis of liver with ascites (H)  Comment: stable   Plan:     (N18.31) Stage 3a chronic kidney disease (H)  Comment: stable  Plan:     (F10.20) Uncomplicated alcohol dependence (H)  Comment: sober  Plan: Encouraged her to continue avoiding alcohol    (R03.0) Elevated blood pressure reading without diagnosis of hypertension  Comment: Good control blood pressure today  Plan:     (R93.89) Thickened endometrium  Comment: Advised to follow-up with neurology regarding this problem  Plan:     Patient has been advised of split billing requirements and indicates understanding: Yes      COUNSELING:  Reviewed preventive health counseling, as reflected in patient instructions      BMI:   Estimated body mass index is 28.9 kg/m  as calculated from the following:    Height as of this encounter: 1.575 m (5' 2\").    Weight as of 12/14/22: 71.7 kg (158 lb).         She reports that she quit smoking about 10 years ago. Her smoking use included cigarettes and cigars. She smoked an average of 1 pack per day. She has been exposed to tobacco smoke. She has never used smokeless tobacco.      Appropriate preventive services were discussed with this patient, including applicable screening as appropriate for cardiovascular disease, diabetes, osteopenia/osteoporosis, and glaucoma.  As appropriate for age/gender, discussed screening for colorectal cancer, prostate cancer, breast cancer, and cervical cancer. Checklist reviewing preventive services available has been given to the patient.    Reviewed patients plan of care and provided an AVS. The Basic Care Plan (routine screening as documented in Health Maintenance) for Krystyna meets the Care Plan requirement. This Care Plan has been established and reviewed with " the Patient.      Viri Savage MD  [facility]    Identified Health Risks:    I have reviewed Opioid Use Disorder and Substance Use Disorder risk factors and made any needed referrals.       She is at risk for lack of exercise and has been provided with information to increase physical activity for the benefit of her well-being.

## 2023-06-23 NOTE — NURSING NOTE
Prior to immunization administration, verified patients identity using patient s name and date of birth. Please see Immunization Activity for additional information.     Screening Questionnaire for Adult Immunization    Are you sick today?   No   Do you have allergies to medications, food, a vaccine component or latex?   No   Have you ever had a serious reaction after receiving a vaccination?   No   Do you have a long-term health problem with heart, lung, kidney, or metabolic disease (e.g., diabetes), asthma, a blood disorder, no spleen, complement component deficiency, a cochlear implant, or a spinal fluid leak?  Are you on long-term aspirin therapy?   No   Do you have cancer, leukemia, HIV/AIDS, or any other immune system problem?   No   Do you have a parent, brother, or sister with an immune system problem?   No   In the past 3 months, have you taken medications that affect  your immune system, such as prednisone, other steroids, or anticancer drugs; drugs for the treatment of rheumatoid arthritis, Crohn s disease, or psoriasis; or have you had radiation treatments?   No   Have you had a seizure, or a brain or other nervous system problem?   No   During the past year, have you received a transfusion of blood or blood    products, or been given immune (gamma) globulin or antiviral drug?   No   For women: Are you pregnant or is there a chance you could become       pregnant during the next month?   No   Have you received any vaccinations in the past 4 weeks?   No     Immunization questionnaire answers were all negative.        Patient instructed to remain in clinic for 15 minutes afterwards, and to report any adverse reactions.     Screening performed by Jennifer Hughes LPN on 6/23/2023 at 9:10 AM.

## 2023-06-27 ENCOUNTER — TELEPHONE (OUTPATIENT)
Dept: INTERNAL MEDICINE | Facility: CLINIC | Age: 68
End: 2023-06-27
Payer: COMMERCIAL

## 2023-06-27 NOTE — TELEPHONE ENCOUNTER
Nancy is calling stating that at her Wellness Exam on 6/23 she had discussed doing a DEXA exam. Orders were not placed. Please review and place orders if appropriate.     Nancy- [phone], ok to leave detailed message.    Loren Carson

## 2023-07-03 ENCOUNTER — HOSPITAL ENCOUNTER (EMERGENCY)
Facility: CLINIC | Age: 68
Discharge: HOME | End: 2023-07-03
Attending: PHYSICIAN ASSISTANT | Admitting: PHYSICIAN ASSISTANT
Payer: COMMERCIAL

## 2023-07-03 ENCOUNTER — APPOINTMENT (OUTPATIENT)
Dept: ULTRASOUND IMAGING | Facility: CLINIC | Age: 68
End: 2023-07-03
Attending: PHYSICIAN ASSISTANT
Payer: COMMERCIAL

## 2023-07-03 VITALS
WEIGHT: 146 LBS | HEART RATE: 86 BPM | HEIGHT: 62 IN | DIASTOLIC BLOOD PRESSURE: 76 MMHG | SYSTOLIC BLOOD PRESSURE: 164 MMHG | BODY MASS INDEX: 26.87 KG/M2 | RESPIRATION RATE: 16 BRPM | TEMPERATURE: 97.5 F | OXYGEN SATURATION: 99 %

## 2023-07-03 DIAGNOSIS — D72.819 LEUKOPENIA, UNSPECIFIED TYPE: ICD-10-CM

## 2023-07-03 DIAGNOSIS — K70.30 ALCOHOLIC CIRRHOSIS OF LIVER WITHOUT ASCITES (H): ICD-10-CM

## 2023-07-03 DIAGNOSIS — R14.0 ABDOMINAL DISTENSION: ICD-10-CM

## 2023-07-03 LAB
ALBUMIN SERPL BCG-MCNC: 4.1 G/DL (ref 3.5–5.2)
ALBUMIN SERPL BCG-MCNC: 4.1 G/DL (ref 3.5–5.2)
ALBUMIN UR-MCNC: 10 MG/DL
ALP SERPL-CCNC: 101 U/L (ref 35–104)
ALP SERPL-CCNC: 101 U/L (ref 35–104)
ALT SERPL W P-5'-P-CCNC: 19 U/L (ref 0–50)
ALT SERPL W P-5'-P-CCNC: 19 U/L (ref 0–50)
AMMONIA PLAS-SCNC: 23 UMOL/L (ref 11–51)
ANION GAP SERPL CALCULATED.3IONS-SCNC: 16 MMOL/L (ref 7–15)
APPEARANCE UR: CLEAR
AST SERPL W P-5'-P-CCNC: 39 U/L (ref 0–45)
AST SERPL W P-5'-P-CCNC: 39 U/L (ref 0–45)
BASOPHILS # BLD AUTO: 0 10E3/UL (ref 0–0.2)
BASOPHILS NFR BLD AUTO: 1 %
BILIRUB DIRECT SERPL-MCNC: 0.24 MG/DL (ref 0–0.3)
BILIRUB SERPL-MCNC: 0.8 MG/DL
BILIRUB SERPL-MCNC: 0.8 MG/DL
BILIRUB UR QL STRIP: NEGATIVE
BUN SERPL-MCNC: 12.8 MG/DL (ref 8–23)
CALCIUM SERPL-MCNC: 9.6 MG/DL (ref 8.8–10.2)
CHLORIDE SERPL-SCNC: 102 MMOL/L (ref 98–107)
COLOR UR AUTO: YELLOW
CREAT SERPL-MCNC: 0.9 MG/DL (ref 0.51–0.95)
DEPRECATED HCO3 PLAS-SCNC: 22 MMOL/L (ref 22–29)
EOSINOPHIL # BLD AUTO: 0.1 10E3/UL (ref 0–0.7)
EOSINOPHIL NFR BLD AUTO: 2 %
ERYTHROCYTE [DISTWIDTH] IN BLOOD BY AUTOMATED COUNT: 17.3 % (ref 10–15)
GFR SERPL CREATININE-BSD FRML MDRD: 69 ML/MIN/1.73M2
GLUCOSE SERPL-MCNC: 100 MG/DL (ref 70–99)
GLUCOSE UR STRIP-MCNC: NEGATIVE MG/DL
HCT VFR BLD AUTO: 37.2 % (ref 35–47)
HGB BLD-MCNC: 11.9 G/DL (ref 11.7–15.7)
HGB UR QL STRIP: NEGATIVE
HOLD SPECIMEN: NORMAL
IMM GRANULOCYTES # BLD: 0 10E3/UL
IMM GRANULOCYTES NFR BLD: 0 %
INR PPP: 1.02 (ref 0.85–1.15)
KETONES UR STRIP-MCNC: NEGATIVE MG/DL
LEUKOCYTE ESTERASE UR QL STRIP: ABNORMAL
LYMPHOCYTES # BLD AUTO: 1 10E3/UL (ref 0.8–5.3)
LYMPHOCYTES NFR BLD AUTO: 27 %
MCH RBC QN AUTO: 29.9 PG (ref 26.5–33)
MCHC RBC AUTO-ENTMCNC: 32 G/DL (ref 31.5–36.5)
MCV RBC AUTO: 94 FL (ref 78–100)
MONOCYTES # BLD AUTO: 0.4 10E3/UL (ref 0–1.3)
MONOCYTES NFR BLD AUTO: 10 %
MUCOUS THREADS #/AREA URNS LPF: PRESENT /LPF
NEUTROPHILS # BLD AUTO: 2.3 10E3/UL (ref 1.6–8.3)
NEUTROPHILS NFR BLD AUTO: 60 %
NITRATE UR QL: NEGATIVE
NRBC # BLD AUTO: 0 10E3/UL
NRBC BLD AUTO-RTO: 0 /100
PH UR STRIP: 7 [PH] (ref 5–7)
PLATELET # BLD AUTO: 160 10E3/UL (ref 150–450)
POTASSIUM SERPL-SCNC: 4.1 MMOL/L (ref 3.4–5.3)
PROT SERPL-MCNC: 6.8 G/DL (ref 6.4–8.3)
PROT SERPL-MCNC: 6.8 G/DL (ref 6.4–8.3)
RBC # BLD AUTO: 3.98 10E6/UL (ref 3.8–5.2)
RBC URINE: 1 /HPF
SODIUM SERPL-SCNC: 140 MMOL/L (ref 136–145)
SP GR UR STRIP: 1.02 (ref 1–1.03)
SQUAMOUS EPITHELIAL: 4 /HPF
UROBILINOGEN UR STRIP-MCNC: NORMAL MG/DL
WBC # BLD AUTO: 3.8 10E3/UL (ref 4–11)
WBC URINE: 5 /HPF

## 2023-07-03 PROCEDURE — 82248 BILIRUBIN DIRECT: CPT | Performed by: PHYSICIAN ASSISTANT

## 2023-07-03 PROCEDURE — 82140 ASSAY OF AMMONIA: CPT | Performed by: PHYSICIAN ASSISTANT

## 2023-07-03 PROCEDURE — 36415 COLL VENOUS BLD VENIPUNCTURE: CPT | Performed by: EMERGENCY MEDICINE

## 2023-07-03 PROCEDURE — 85610 PROTHROMBIN TIME: CPT | Performed by: PHYSICIAN ASSISTANT

## 2023-07-03 PROCEDURE — 82140 ASSAY OF AMMONIA: CPT | Performed by: EMERGENCY MEDICINE

## 2023-07-03 PROCEDURE — 85025 COMPLETE CBC W/AUTO DIFF WBC: CPT | Performed by: EMERGENCY MEDICINE

## 2023-07-03 PROCEDURE — 81001 URINALYSIS AUTO W/SCOPE: CPT | Performed by: EMERGENCY MEDICINE

## 2023-07-03 PROCEDURE — 76705 ECHO EXAM OF ABDOMEN: CPT

## 2023-07-03 PROCEDURE — 99285 EMERGENCY DEPT VISIT HI MDM: CPT | Mod: 25

## 2023-07-03 PROCEDURE — 81001 URINALYSIS AUTO W/SCOPE: CPT | Performed by: PHYSICIAN ASSISTANT

## 2023-07-03 PROCEDURE — 85025 COMPLETE CBC W/AUTO DIFF WBC: CPT | Performed by: PHYSICIAN ASSISTANT

## 2023-07-03 RX ORDER — LIDOCAINE HYDROCHLORIDE 10 MG/ML
10 INJECTION, SOLUTION EPIDURAL; INFILTRATION; INTRACAUDAL; PERINEURAL ONCE
Status: DISCONTINUED | OUTPATIENT
Start: 2023-07-03 | End: 2023-07-03 | Stop reason: HOSPADM

## 2023-07-03 ASSESSMENT — ACTIVITIES OF DAILY LIVING (ADL)
ADLS_ACUITY_SCORE: 35

## 2023-07-03 NOTE — DISCHARGE INSTRUCTIONS
Your labs are reassuring today.  There is no fluid for paracentesis today.  Follow-up with primary care or your liver specialist within the next 1 to 3 days.  For any new or worsening symptoms including fevers, abdominal pain, or any other emergent concern, present back to ED.

## 2023-07-03 NOTE — ED TRIAGE NOTES
A&O x4.  ABC's intact.      Pt arrives with c/o history of cirrhosis of the liver and started feeling abdominal bloated and leg swelling.

## 2023-07-03 NOTE — ED PROVIDER NOTES
History     Chief Complaint:  Abdominal Pain       HPI   Krystyna Peña is a 67 year old female with past medical history for alcoholic cirrhosis with ascites, alcohol dependence, who presents with abdominal distention.  Patient states that she has noticed an 11 pound weight gain over the past several weeks.  She also noted that her abdomen appeared more full than usual. Patient has had two paracentesis procedures in the past 6 months.  Patient is concerned that she may need a paracentesis today.  She otherwise denies recent falls.  She denies recent fevers, chills, chest pain, shortness of breath, abdominal pain, urinary or bowel symptoms.  No blood in stool or urine or hematemesis. Last alcoholic drink was Dec 2022.    Independent Historian:   None - Patient Only    Review of External Notes:   None       Medications:    cetirizine (ZYRTEC) 10 MG tablet  montelukast (SINGULAIR) 10 MG tablet  pantoprazole (PROTONIX) 40 MG EC tablet        Past Medical History:    Past Medical History:   Diagnosis Date     Arthritis      Asymptomatic varicose veins      Benign neoplasm of colon 11/06, 7/13     Family history of colon cancer      Gastroesophageal reflux disease      History of colonic polyps      Hypertension      Major depression      Syncope        Past Surgical History:    Past Surgical History:   Procedure Laterality Date     AMPUTATE TOE(S) Left 4/12/2018    Procedure: AMPUTATE TOE(S);  Amputation left third digit;  Surgeon: Herb Michael DPM;  Location: [facility] OR     ARTHROPLASTY HIP Right 3/28/2016    Procedure: ARTHROPLASTY HIP;  Surgeon: Perez Meza MD;  Location: [facility] OR     ARTHROPLASTY REVISION HIP Right 10/28/2019    Procedure: Revision right total hip arthroplasty;  Surgeon: Perez Meza MD;  Location:  OR     CLOSED REDUCTION HIP Right 7/15/2019    Procedure: Closed reduction, right total hip arthroplasty dislocation.;  Surgeon: Perez Meza MD;  Location:  OR      "COLONOSCOPY       COLONOSCOPY Left 4/16/2021    Procedure: COLONOSCOPY;  Surgeon: Rell Cisneros MD;  Location: [facility] GI     HC CORRECT BUNION,SIMPLE  1998    RT     HC CORRECT BUNION,SIMPLE  2001    LT     HC KNEE SCOPE, DIAGNOSTIC      LT     IR CERVICAL EPIDURAL STEROID INJECTION  6/18/2007     REVERSE ARTHROPLASTY SHOULDER Left 10/30/2018    Procedure: Left reverse total shoulder arthroplasty;  Surgeon: Aaron Christianson MD;  Location: [facility] OR     REVERSE ARTHROPLASTY SHOULDER Right 6/12/2019    Procedure: Right reverse total shoulder arthroplasty;  Surgeon: Aaron Christianson MD;  Location: [facility] OR     ROTATOR CUFF REPAIR RT/LT  7/07    right     VASCULAR SURGERY  left leg bypass 2004     [facility] NONSPECIFIC PROCEDURE  1972    Right arm plastic surgery     [facility] NONSPECIFIC PROCEDURE  1972    Right knee surgery     [facility] NONSPECIFIC PROCEDURE  10/03    L popliteal AVM repair     [facility] NONSPECIFIC PROCEDURE  11/04    Removal bone spur left foot     [facility] NONSPECIFIC PROCEDURE  4/07    amputation of toes left & right toes     [facility] REPAIR SPIEGELIAN HERNIA  2002     [facility] TOTAL KNEE ARTHROPLASTY  10/03    LT        Physical Exam     Patient Vitals for the past 24 hrs:   BP Temp Temp src Pulse Resp SpO2 Height Weight   07/03/23 1349 (!) 164/76 -- -- -- -- 99 % -- --   07/03/23 1334 (!) 147/80 -- -- -- -- 100 % -- --   07/03/23 1304 (!) 171/82 -- -- -- -- 100 % -- --   07/03/23 1249 (!) 170/85 -- -- -- -- 99 % -- --   07/03/23 1219 (!) 173/78 -- -- -- -- 98 % -- --   07/03/23 1204 (!) 165/77 -- -- -- -- 97 % -- --   07/03/23 1149 (!) 162/77 -- -- -- -- 96 % -- --   07/03/23 1102 (!) 171/79 -- -- 86 16 98 % -- --   07/03/23 0938 (!) 172/94 97.5  F (36.4  C) Temporal 89 18 97 % 1.575 m (5' 2\") 66.2 kg (146 lb)        Physical Exam  Constitutional: Alert, attentive, GCS 15  HENT:    Nose: Nose normal.    Mouth/Throat: Oropharynx is clear, mucous membranes are moist   Eyes: EOM are normal.   CV: regular rate and rhythm; no murmurs, rubs or " gallups.  +1 pitting edema in bilateral lower extremities.  Chest: Effort normal and breath sounds normal.   GI:  There is no tenderness. Moderate abdominal distention with fluid wave.  Active bowel sounds.  MSK: Normal range of motion.   Neurological: Alert, attentive  Skin: Skin is warm and dry.      Emergency Department Course     Imaging:  US Abdomen Limited   Final Result   Impression: No ascites identified.      SUDHAKAR LIN MD            SYSTEM ID:  [id number]         Report per radiology    Laboratory:  Labs Ordered and Resulted from Time of ED Arrival to Time of ED Departure   COMPREHENSIVE METABOLIC PANEL - Abnormal       Result Value    Sodium 140      Potassium 4.1      Chloride 102      Carbon Dioxide (CO2) 22      Anion Gap 16 (*)     Urea Nitrogen 12.8      Creatinine 0.90      Calcium 9.6      Glucose 100 (*)     Alkaline Phosphatase 101      AST 39      ALT 19      Protein Total 6.8      Albumin 4.1      Bilirubin Total 0.8      GFR Estimate 69     CBC WITH PLATELETS AND DIFFERENTIAL - Abnormal    WBC Count 3.8 (*)     RBC Count 3.98      Hemoglobin 11.9      Hematocrit 37.2      MCV 94      MCH 29.9      MCHC 32.0      RDW 17.3 (*)     Platelet Count 160      % Neutrophils 60      % Lymphocytes 27      % Monocytes 10      % Eosinophils 2      % Basophils 1      % Immature Granulocytes 0      NRBCs per 100 WBC 0      Absolute Neutrophils 2.3      Absolute Lymphocytes 1.0      Absolute Monocytes 0.4      Absolute Eosinophils 0.1      Absolute Basophils 0.0      Absolute Immature Granulocytes 0.0      Absolute NRBCs 0.0     ROUTINE UA WITH MICROSCOPIC REFLEX TO CULTURE - Abnormal    Color Urine Yellow      Appearance Urine Clear      Glucose Urine Negative      Bilirubin Urine Negative      Ketones Urine Negative      Specific Gravity Urine 1.018      Blood Urine Negative      pH Urine 7.0      Protein Albumin Urine 10 (*)     Urobilinogen Urine Normal      Nitrite Urine Negative      Leukocyte  Esterase Urine Trace (*)     Mucus Urine Present (*)     RBC Urine 1      WBC Urine 5      Squamous Epithelials Urine 4 (*)    AMMONIA - Normal    Ammonia 23     HEPATIC FUNCTION PANEL - Normal    Protein Total 6.8      Albumin 4.1      Bilirubin Total 0.8      Alkaline Phosphatase 101      AST 39      ALT 19      Bilirubin Direct 0.24     INR - Normal    INR 1.02     ALBUMIN FLUID   PROTEIN FLUID   AEROBIC BACTERIAL CULTURE ROUTINE   CELL COUNT WITH DIFFERENTIAL FLUID      Emergency Department Course & Assessments:       Interventions:  Medications   lidocaine (PF) (XYLOCAINE) 1 % injection 10 mL (has no administration in time range)        Assessments:  1045  I obtained patient history and performed physical examination as above.  1330 I rechecked patient and updated her on findings.      Independent Interpretation (X-rays, CTs, rhythm strip):  None    Consultations/Discussion of Management or Tests:  None        Social Determinants of Health affecting care:   None    Disposition:  The patient was discharged to home.     Impression & Plan    CMS Diagnoses: None    Medical Decision Making:  Patient is a well-appearing 67-year-old female with history of alcoholic cirrhosis with ascites and prior alcohol dependence who presents with generalized abdominal distention for the past week.  Vital signs are appropriate, she is afebrile.  Physical examination reveals moderate amount of abdominal distention without focal tenderness.  Small fluid wave noted.  Differential includes ascites, peritonitis, anemia.  Preliminary labs are obtained and are very reassuring.  Hemoglobin today is 11.9.  LFTs are within normal limits.  UA is negative for infection or hematuria.  With concern for ascites, radiology consulted who did bedside abdominal ultrasound.  No evidence of ascites on US and paracentesis is not indicated.  Results reviewed discussed with patient.  Her liver function and blood testing is reassuring today.  I recommend  continued supportive care at home and follow-up with primary care.  Patient did have recent follow-up with primary care last week however I like her primary care know that she was seen in the ED today.  Supportive cares and return precautions to the ED were discussed and patient was ready for discharge.    Diagnosis:    ICD-10-CM    1. Abdominal distension  R14.0       2. Alcoholic cirrhosis of liver without ascites (H)  K70.30       3. Leukopenia, unspecified type  D72.819            Discharge Medications:  New Prescriptions    No medications on file          7/3/2023   Maria Antonia Marroquin PA-C [place], Emily, PA-C  07/03/23 1828

## 2023-07-16 VITALS
HEART RATE: 81 BPM | HEIGHT: 62 IN | SYSTOLIC BLOOD PRESSURE: 126 MMHG | OXYGEN SATURATION: 100 % | BODY MASS INDEX: 26.94 KG/M2 | RESPIRATION RATE: 18 BRPM | DIASTOLIC BLOOD PRESSURE: 61 MMHG | WEIGHT: 146.4 LBS | TEMPERATURE: 96.2 F

## 2023-07-16 NOTE — PATIENT INSTRUCTIONS
Patient Education   Personalized Prevention Plan  You are due for the preventive services outlined below.  Your care team is available to assist you in scheduling these services.  If you have already completed any of these items, please share that information with your care team to update in your medical record.  Health Maintenance Due   Topic Date Due     Osteoporosis Screening  Never done     Kidney Microalbumin Urine Test  05/27/2021     COVID-19 Vaccine (6 - Pfizer series) 02/18/2023     ANNUAL REVIEW OF HM ORDERS  07/27/2023       Exercise for a Healthier Heart  You may wonder how you can improve the health of your heart. If you re thinking about exercise, you re on the right track. You don t need to become an athlete. But you do need a certain amount of brisk exercise to help strengthen your heart. If you have been diagnosed with a heart condition, your healthcare provider may advise exercise to help your condition. To help make exercise a habit, choose safe, fun activities.      Exercise with a friend. When activity is fun, you're more likely to stick with it.     Before you start  Check with your healthcare provider before starting an exercise program. This is especially important if you haven't been active for a while. It's also important if you have a long-term (chronic) health problem such as heart disease, diabetes, or obesity. Also check with your provider if you're at high risk for having these problems.   Why exercise?  Exercising regularly offers many healthy rewards. It can help you do all of these:     Improve your blood cholesterol level to help prevent further heart trouble.    Lower your blood pressure to help prevent a stroke or heart attack.    Control diabetes or reduce your risk of getting this disease.    Improve your heart and lung function.    Reach and stay at a healthy weight.    Make your muscles stronger so you can stay active.    Prevent falls and fractures by slowing the loss of  bone mass (osteoporosis).    Manage stress better.    Improve your sense of self and your body image.  Exercise tips      Ease into your routine. Set small goals. Then build on them. Talk with your healthcare provider first before starting an exercise routine if you're not sure what your activity level should be.    Exercise on most days. Aim for a total of at least 150 minutes (2 hours and 30 minutes) or more of moderate-intensity aerobic activity each week. You could also do 75 minutes (1 hour and 15 minutes) or more of vigorous-intensity aerobic activity each week. Or try for a combination of both. Moderate activity means that you breathe heavier and your heart rate increases, but you can still talk. Think about doing at least 30 minutes of moderate exercise, 5 times a week. It's OK to work up to the 30-minute period over time. Examples of moderate-intensity activity are brisk walking, gardening, and water aerobics.    Step up your daily activity level.  Along with your exercise program, try being more active the whole day. Walk instead of drive. Or park further away so that you take more steps each day. Do more household tasks or yard work. You may not be able to meet the advised amount of physical activity. But doing some moderate- or vigorous-intensity aerobic activity can help reduce your risk for heart disease. Your healthcare provider can help you figure out what is best for you.    Choose 1 or more activities you enjoy.  Walking is one of the easiest things you can do. You can also try swimming, riding a bike, dancing, or taking an exercise class.    Call 911  Call 911 right away if any of these occur:     Chest pain that doesn't go away quickly with rest    New burning, tightness, pressure, or heaviness in your chest, neck, shoulders, back, or arms    Abnormal or severe shortness of breath    A very fast or irregular heartbeat (palpitations)    Fainting  When to call your healthcare provider  Call your  healthcare provider if you have any of these:     Dizziness or lightheadedness    Mild shortness of breath or chest pain    Increased or new joint or muscle pain    Brayden last reviewed this educational content on 7/1/2022 2000-2023 [employer]. All rights reserved. This information is not intended as a substitute for professional medical care. Always follow your healthcare professional's instructions.

## 2023-08-07 ENCOUNTER — TELEPHONE (OUTPATIENT)
Dept: INTERNAL MEDICINE | Facility: CLINIC | Age: 68
End: 2023-08-07
Payer: COMMERCIAL

## 2023-08-07 NOTE — TELEPHONE ENCOUNTER
Patient had appt with [facility] for colonoscopy but she could not drink all the prep. Patient asking if she can have cologuard test instead. Please advise. Ok to call and antonio [phone]

## 2023-08-08 ENCOUNTER — TELEPHONE (OUTPATIENT)
Dept: INTERNAL MEDICINE | Facility: CLINIC | Age: 68
End: 2023-08-08
Payer: COMMERCIAL

## 2023-08-08 NOTE — TELEPHONE ENCOUNTER
No, she has had premalignant polyps so needs to do colonoscopy. She should speak with GI about the prep.

## 2023-08-08 NOTE — TELEPHONE ENCOUNTER
Called and spoke with patient to help her get pre-op scheduled.  Appointments in Next Year      Aug 11, 2023  8:00 AM  (Arrive by 7:40 AM)  Provider Visit with Viri Savage MD  [facility] ([facility] ) [phone]           Thank you,  Marvin Olivera, Triage RN [facility]  2:42 PM 8/8/2023

## 2023-08-08 NOTE — TELEPHONE ENCOUNTER
Called and spoke with patient. She said that in a letter from [facility], it asks for a Letter of clearance to have the colonoscopy. When pt was asked if she meant a pre op she said no, the letter says to obtain a letter of clearance and updated labs. [facility]  would not elaborate on updated labs as far as how recent until they had this letter.     The letter can be faxed to [phone]   # to [facility] [phone]      Patient number [phone]

## 2023-08-08 NOTE — TELEPHONE ENCOUNTER
Forms/Letter Request    Type of form/letter:  medical clearance letter to say she can get a colonoscopy- GI clinic will not schedule her until she has this letter    Have you been seen for this request: No    Do we have the form/letter: No    Who is the form from? Patient    Where did/will the form come from?     When is form/letter needed by: as soon as possible    How would you like the form/letter returned: Fax : [phone]  [facility]    Patient Notified form requests are processed in 3-5 business days:Yes    Could we send this information to you in [employer] or would you prefer to receive a phone call?:   Patient would prefer a phone call   Okay to leave a detailed message?: Yes at Home number on file [phone] (home)

## 2023-08-11 ENCOUNTER — OFFICE VISIT (OUTPATIENT)
Dept: INTERNAL MEDICINE | Facility: CLINIC | Age: 68
End: 2023-08-11
Payer: COMMERCIAL

## 2023-08-11 VITALS
WEIGHT: 150.6 LBS | HEART RATE: 102 BPM | HEIGHT: 62 IN | RESPIRATION RATE: 16 BRPM | DIASTOLIC BLOOD PRESSURE: 78 MMHG | BODY MASS INDEX: 27.71 KG/M2 | SYSTOLIC BLOOD PRESSURE: 148 MMHG | TEMPERATURE: 97.1 F | OXYGEN SATURATION: 96 %

## 2023-08-11 DIAGNOSIS — Z12.11 SCREEN FOR COLON CANCER: ICD-10-CM

## 2023-08-11 DIAGNOSIS — Z01.818 PREOP EXAMINATION: Primary | ICD-10-CM

## 2023-08-11 PROCEDURE — 99214 OFFICE O/P EST MOD 30 MIN: CPT | Performed by: INTERNAL MEDICINE

## 2023-08-11 ASSESSMENT — PAIN SCALES - GENERAL: PAINLEVEL: NO PAIN (0)

## 2023-08-11 NOTE — PROGRESS NOTES
[place] [address] [address]  [address]  [facility] [address]  Phone: [phone]  Primary Provider: Lawrence Savage  Pre-op Performing Provider: LAWRENCE SAVAGE      PREOPERATIVE EVALUATION:  Today's date: 8/11/2023    Krystyna Peña is a 68 year old female who presents for a preoperative evaluation.      8/11/2023     7:41 AM   Additional Questions   Roomed by Erlin       Surgical Information:  Surgery/Procedure: colonoscpoy   Surgery Location:[facility]  Surgeon: Jeremy SALGUERO   Surgery Date: 8/13/2023  Time of Surgery: 12:45 PM   Where patient plans to recover: At home with family  Fax number for surgical facility: [phone]    Assessment & Plan     The proposed surgical procedure is considered LOW risk.    Preop examination      Screen for colon cancer             - No identified additional risk factors other than previously addressed      Additional Medication Instructions:  Patient is to take all scheduled medications on the day of surgery    RECOMMENDATION:  APPROVAL GIVEN to proceed with proposed procedure, without further diagnostic evaluation.            Subjective       HPI related to upcoming procedure: she is to undergo colonoscopy for screening and GI requires preop evaluation          3/8/2022    12:57 PM   Preop Questions   1. Have you ever had a heart attack or stroke? No   2. Have you ever had surgery on your heart or blood vessels, such as a stent placement, a coronary artery bypass, or surgery on an artery in your head, neck, heart, or legs? No   3. Do you have chest pain with activity? No   4. Do you have a history of  heart failure? No   5. Do you currently have a cold, bronchitis or symptoms of other infection? No   6. Do you have a cough, shortness of breath, or wheezing? No   7. Do you or anyone in your family have previous history of blood clots? No   8. Do you or does anyone in your family have a serious bleeding problem such as prolonged bleeding  following surgeries or cuts? No   9. Have you ever had problems with anemia or been told to take iron pills? No   10. Have you had any abnormal blood loss such as black, tarry or bloody stools, or abnormal vaginal bleeding? No   11. Have you ever had a blood transfusion? No   12. Are you willing to have a blood transfusion if it is medically needed before, during, or after your surgery? Yes   13. Have you or any of your relatives ever had problems with anesthesia? No   14. Do you have sleep apnea, excessive snoring or daytime drowsiness? No   15. Do you have any artifical heart valves or other implanted medical devices like a pacemaker, defibrillator, or continuous glucose monitor? No   16. Do you have artificial joints? YES - see PSH   17. Are you allergic to latex? No     Health Care Directive:  Patient has a Health Care Directive on file      Preoperative Review of :   reviewed - no record of controlled substances prescribed.      Status of Chronic Conditions:  She has alcoholic cirrhosis of the liver but has recently had significant improvement with normalization of liver functions, normal INR, hemoglobin.  No further ascites.    Review of Systems  GENERAL: negative for, fever, chills, weight loss, weight gain  EYES: negative  ENT: negative  RESPIRATORY: No shortness of breath, dyspnea on exertion, cough, or hemoptysis  CARDIOVASCULAR: negative for, palpitations, tachycardia, irregular heart beat, and chest pain  GI: negative for, nausea, vomiting, abdominal pain, melena, and hematochezia  : negative  MUSCULOSKELETAL: negative  NEUROLOGIC: negative  SKIN: negative  ENDOCRINE: negative       Patient Active Problem List    Diagnosis Date Noted    Uncomplicated alcohol dependence (H) 12/15/2022     Priority: Medium    Thickened endometrium 12/14/2022     Priority: Medium    Alcoholic cirrhosis of liver with ascites (H) 12/07/2022     Priority: Medium    Inflammatory arthritis 05/26/2020     Priority: Medium     CKD (chronic kidney disease) stage 3, GFR 30-59 ml/min (H) 06/03/2019     Priority: Medium    Edema, unspecified type 07/15/2017     Priority: Medium    Elevated blood pressure reading without diagnosis of hypertension 11/27/2015     Priority: Medium    Back pain 03/24/2014     Priority: Medium    CARDIOVASCULAR SCREENING; LDL GOAL LESS THAN 130 10/31/2010     Priority: Medium      Past Medical History:   Diagnosis Date    Arthritis     Asymptomatic varicose veins     Benign neoplasm of colon 11/06, 7/13    Adenoma    Family history of colon cancer     dad    Gastroesophageal reflux disease     History of colonic polyps     Hypertension     Major depression     Syncope      Past Surgical History:   Procedure Laterality Date    AMPUTATE TOE(S) Left 4/12/2018    Procedure: AMPUTATE TOE(S);  Amputation left third digit;  Surgeon: Herb Michael DPM;  Location:  OR    ARTHROPLASTY HIP Right 3/28/2016    Procedure: ARTHROPLASTY HIP;  Surgeon: Perez Meza MD;  Location: [facility] OR    ARTHROPLASTY REVISION HIP Right 10/28/2019    Procedure: Revision right total hip arthroplasty;  Surgeon: Perez Meza MD;  Location:  OR    CLOSED REDUCTION HIP Right 7/15/2019    Procedure: Closed reduction, right total hip arthroplasty dislocation.;  Surgeon: Perez Meza MD;  Location:  OR    COLONOSCOPY      COLONOSCOPY Left 4/16/2021    Procedure: COLONOSCOPY;  Surgeon: Rell Cisneros MD;  Location:  GI    HC CORRECT BUNION,SIMPLE  1998    RT    HC CORRECT BUNION,SIMPLE  2001    LT    HC KNEE SCOPE, DIAGNOSTIC      LT    IR CERVICAL EPIDURAL STEROID INJECTION  6/18/2007    REVERSE ARTHROPLASTY SHOULDER Left 10/30/2018    Procedure: Left reverse total shoulder arthroplasty;  Surgeon: Aaron Christianson MD;  Location:  OR    REVERSE ARTHROPLASTY SHOULDER Right 6/12/2019    Procedure: Right reverse total shoulder arthroplasty;  Surgeon: Aaron Christianson MD;  Location:  OR    ROTATOR CUFF REPAIR RT/LT   "7/07    right    VASCULAR SURGERY  left leg bypass 2004    [facility] NONSPECIFIC PROCEDURE  1972    Right arm plastic surgery    [facility] NONSPECIFIC PROCEDURE  1972    Right knee surgery    [facility] NONSPECIFIC PROCEDURE  10/03    L popliteal AVM repair    [facility] NONSPECIFIC PROCEDURE  11/04    Removal bone spur left foot    [facility] NONSPECIFIC PROCEDURE  4/07    amputation of toes left & right toes    [facility] REPAIR SPIEGELIAN HERNIA  2002    [facility] TOTAL KNEE ARTHROPLASTY  10/03    LT     Current Outpatient Medications   Medication Sig Dispense Refill    cetirizine (ZYRTEC) 10 MG tablet Take 1 tablet by mouth 2 times daily      montelukast (SINGULAIR) 10 MG tablet Take 1 tablet by mouth every morning      pantoprazole (PROTONIX) 40 MG EC tablet Take 1 tablet by mouth 2 times daily         Allergies   Allergen Reactions    Morphine      respiratory distress    Cephalexin Rash        Social History     Tobacco Use    Smoking status: Former     Packs/day: 1.00     Types: Cigarettes, Cigars     Quit date: 4/1/2013     Years since quitting: 10.3     Passive exposure: Past    Smokeless tobacco: Never   Substance Use Topics    Alcohol use: Not Currently     Alcohol/week: 70.0 standard drinks of alcohol     Types: 70 Shots of liquor per week       History   Drug Use No         Objective     BP (!) 148/78   Pulse 102   Temp 97.1  F (36.2  C) (Tympanic)   Resp 16   Ht 1.575 m (5' 2\")   Wt 68.3 kg (150 lb 9.6 oz)   LMP 10/01/2003 (Exact Date)   SpO2 96%   BMI 27.55 kg/m      Physical Exam    Patient is alert, oriented, cooperative in no acute distress.  BP (!) 148/78   Pulse 102   Temp 97.1  F (36.2  C) (Tympanic)   Resp 16   Ht 1.575 m (5' 2\")   Wt 68.3 kg (150 lb 9.6 oz)   LMP 10/01/2003 (Exact Date)   SpO2 96%   BMI 27.55 kg/m      HEENT: PERRL, EOMI,  NECK: No lymphadenopathy or thyromegaly. Carotid pulses full without bruits.  LUNGS: clear  CV: normal S1, S2 without murmur, S3 or S4 present. Pulses are 2/2 throughout. No " JVD.  ABDOMEN: Nondistended, soft, nontender, no hepatosplenomegaly, no masses  EXTREMITIES: no edema present, unremarkable joints  NEUROLOGIC: Cranial nerves II-XII intact, reflexes 2/4 throughout, strength 5/5,   SKIN: without rashes or significant lesions     Recent Labs   Lab Test 07/03/23  0941 03/06/23  1255 12/19/22  1439 12/09/22  0618 12/08/22  1453 12/08/22  0848   HGB 11.9  --   --   --   --  8.7*     --   --   --   --  180   INR 1.02 1.1   < >  --   --   --      --   --  138  --  135*   POTASSIUM 4.1  --   --  3.7  3.6   < > 3.4  3.4   CR 0.90  --   --  0.71  --  0.69    < > = values in this interval not displayed.          Diagnostics:  No labs were ordered during this visit.   No EKG required for low risk surgery (cataract, skin procedure, breast biopsy, etc).    Revised Cardiac Risk Index (RCRI):  The patient has the following serious cardiovascular risks for perioperative complications:   - No serious cardiac risks = 0 points     RCRI Interpretation: 0 points: Class I (very low risk - 0.4% complication rate)         Signed Electronically by: Viri Savage MD  Copy of this evaluation report is provided to requesting physician.

## 2023-08-14 ENCOUNTER — TRANSFERRED RECORDS (OUTPATIENT)
Dept: HEALTH INFORMATION MANAGEMENT | Facility: CLINIC | Age: 68
End: 2023-08-14
Payer: COMMERCIAL

## 2023-09-06 ENCOUNTER — DOCUMENTATION ONLY (OUTPATIENT)
Dept: INFECTION CONTROL | Facility: CLINIC | Age: 68
End: 2023-09-06
Payer: COMMERCIAL

## 2023-09-06 DIAGNOSIS — Z20.9 INFECTIOUS DISEASE CONTACT: Primary | ICD-10-CM

## 2023-09-07 ENCOUNTER — LAB (OUTPATIENT)
Dept: LAB | Facility: CLINIC | Age: 68
End: 2023-09-07
Payer: COMMERCIAL

## 2023-09-07 DIAGNOSIS — Z20.9 INFECTIOUS DISEASE CONTACT: ICD-10-CM

## 2023-09-12 LAB — CANDIDA AURIS CONFIRMATION PCR: NEGATIVE

## 2023-10-13 ENCOUNTER — TRANSFERRED RECORDS (OUTPATIENT)
Dept: HEALTH INFORMATION MANAGEMENT | Facility: CLINIC | Age: 68
End: 2023-10-13
Payer: COMMERCIAL

## 2023-11-09 ENCOUNTER — HOSPITAL ENCOUNTER (OUTPATIENT)
Facility: CLINIC | Age: 68
Setting detail: OBSERVATION
Discharge: HOME | End: 2023-11-10
Admitting: HOSPITALIST
Payer: COMMERCIAL

## 2023-11-09 ENCOUNTER — APPOINTMENT (OUTPATIENT)
Dept: GENERAL RADIOLOGY | Facility: CLINIC | Age: 68
End: 2023-11-09
Payer: COMMERCIAL

## 2023-11-09 DIAGNOSIS — I10 BENIGN ESSENTIAL HYPERTENSION: ICD-10-CM

## 2023-11-09 DIAGNOSIS — R06.2 WHEEZING: ICD-10-CM

## 2023-11-09 DIAGNOSIS — K70.30 ALCOHOLIC CIRRHOSIS, UNSPECIFIED WHETHER ASCITES PRESENT (H): ICD-10-CM

## 2023-11-09 DIAGNOSIS — R74.01 ELEVATED AST (SGOT): ICD-10-CM

## 2023-11-09 DIAGNOSIS — R03.0 ELEVATED BLOOD PRESSURE READING WITHOUT DIAGNOSIS OF HYPERTENSION: ICD-10-CM

## 2023-11-09 DIAGNOSIS — R07.9 CHEST PAIN, UNSPECIFIED TYPE: Primary | ICD-10-CM

## 2023-11-09 LAB
ALBUMIN SERPL BCG-MCNC: 4.5 G/DL (ref 3.5–5.2)
ALP SERPL-CCNC: 111 U/L (ref 35–104)
ALT SERPL W P-5'-P-CCNC: 19 U/L (ref 0–50)
ANION GAP SERPL CALCULATED.3IONS-SCNC: 15 MMOL/L (ref 7–15)
AST SERPL W P-5'-P-CCNC: 59 U/L (ref 0–45)
ATRIAL RATE - MUSE: 100 BPM
BASOPHILS # BLD AUTO: 0 10E3/UL (ref 0–0.2)
BASOPHILS NFR BLD AUTO: 1 %
BILIRUB DIRECT SERPL-MCNC: 0.31 MG/DL (ref 0–0.3)
BILIRUB SERPL-MCNC: 0.9 MG/DL
BUN SERPL-MCNC: 12.7 MG/DL (ref 8–23)
CALCIUM SERPL-MCNC: 9 MG/DL (ref 8.8–10.2)
CHLORIDE SERPL-SCNC: 101 MMOL/L (ref 98–107)
CREAT SERPL-MCNC: 0.85 MG/DL (ref 0.51–0.95)
D DIMER PPP FEU-MCNC: 0.48 UG/ML FEU (ref 0–0.5)
DEPRECATED HCO3 PLAS-SCNC: 23 MMOL/L (ref 22–29)
DIASTOLIC BLOOD PRESSURE - MUSE: NORMAL MMHG
EGFRCR SERPLBLD CKD-EPI 2021: 74 ML/MIN/1.73M2
EOSINOPHIL # BLD AUTO: 0.1 10E3/UL (ref 0–0.7)
EOSINOPHIL NFR BLD AUTO: 3 %
ERYTHROCYTE [DISTWIDTH] IN BLOOD BY AUTOMATED COUNT: 14 % (ref 10–15)
GLUCOSE SERPL-MCNC: 114 MG/DL (ref 70–99)
HCT VFR BLD AUTO: 40 % (ref 35–47)
HGB BLD-MCNC: 13 G/DL (ref 11.7–15.7)
HOLD SPECIMEN: NORMAL
IMM GRANULOCYTES # BLD: 0 10E3/UL
IMM GRANULOCYTES NFR BLD: 0 %
INTERPRETATION ECG - MUSE: NORMAL
LYMPHOCYTES # BLD AUTO: 1.1 10E3/UL (ref 0.8–5.3)
LYMPHOCYTES NFR BLD AUTO: 23 %
MCH RBC QN AUTO: 32.7 PG (ref 26.5–33)
MCHC RBC AUTO-ENTMCNC: 32.5 G/DL (ref 31.5–36.5)
MCV RBC AUTO: 101 FL (ref 78–100)
MONOCYTES # BLD AUTO: 0.5 10E3/UL (ref 0–1.3)
MONOCYTES NFR BLD AUTO: 10 %
NEUTROPHILS # BLD AUTO: 3 10E3/UL (ref 1.6–8.3)
NEUTROPHILS NFR BLD AUTO: 63 %
NRBC # BLD AUTO: 0 10E3/UL
NRBC BLD AUTO-RTO: 0 /100
NT-PROBNP SERPL-MCNC: 151 PG/ML (ref 0–900)
P AXIS - MUSE: 79 DEGREES
PLATELET # BLD AUTO: 146 10E3/UL (ref 150–450)
POTASSIUM SERPL-SCNC: 3.8 MMOL/L (ref 3.4–5.3)
PR INTERVAL - MUSE: 160 MS
PROT SERPL-MCNC: 7.7 G/DL (ref 6.4–8.3)
QRS DURATION - MUSE: 78 MS
QT - MUSE: 366 MS
QTC - MUSE: 472 MS
R AXIS - MUSE: -35 DEGREES
RBC # BLD AUTO: 3.97 10E6/UL (ref 3.8–5.2)
SODIUM SERPL-SCNC: 139 MMOL/L (ref 135–145)
SYSTOLIC BLOOD PRESSURE - MUSE: NORMAL MMHG
T AXIS - MUSE: 60 DEGREES
TROPONIN T SERPL HS-MCNC: 13 NG/L
TROPONIN T SERPL HS-MCNC: 14 NG/L
VENTRICULAR RATE- MUSE: 100 BPM
WBC # BLD AUTO: 4.7 10E3/UL (ref 4–11)

## 2023-11-09 PROCEDURE — 84484 ASSAY OF TROPONIN QUANT: CPT

## 2023-11-09 PROCEDURE — 71046 X-RAY EXAM CHEST 2 VIEWS: CPT

## 2023-11-09 PROCEDURE — 99223 1ST HOSP IP/OBS HIGH 75: CPT | Mod: AI | Performed by: HOSPITALIST

## 2023-11-09 PROCEDURE — 83880 ASSAY OF NATRIURETIC PEPTIDE: CPT

## 2023-11-09 PROCEDURE — G0378 HOSPITAL OBSERVATION PER HR: HCPCS

## 2023-11-09 PROCEDURE — 82248 BILIRUBIN DIRECT: CPT

## 2023-11-09 PROCEDURE — 85025 COMPLETE CBC W/AUTO DIFF WBC: CPT

## 2023-11-09 PROCEDURE — 85379 FIBRIN DEGRADATION QUANT: CPT

## 2023-11-09 PROCEDURE — 36415 COLL VENOUS BLD VENIPUNCTURE: CPT

## 2023-11-09 PROCEDURE — 36415 COLL VENOUS BLD VENIPUNCTURE: CPT | Performed by: EMERGENCY MEDICINE

## 2023-11-09 PROCEDURE — 80053 COMPREHEN METABOLIC PANEL: CPT

## 2023-11-09 PROCEDURE — 250N000013 HC RX MED GY IP 250 OP 250 PS 637: Performed by: HOSPITALIST

## 2023-11-09 RX ORDER — ONDANSETRON 2 MG/ML
4 INJECTION INTRAMUSCULAR; INTRAVENOUS EVERY 6 HOURS PRN
Status: DISCONTINUED | OUTPATIENT
Start: 2023-11-09 | End: 2023-11-10 | Stop reason: HOSPADM

## 2023-11-09 RX ORDER — ONDANSETRON 4 MG/1
4 TABLET, ORALLY DISINTEGRATING ORAL EVERY 6 HOURS PRN
Status: DISCONTINUED | OUTPATIENT
Start: 2023-11-09 | End: 2023-11-10 | Stop reason: HOSPADM

## 2023-11-09 RX ORDER — FERROUS SULFATE 325(65) MG
325 TABLET ORAL DAILY
Status: DISCONTINUED | OUTPATIENT
Start: 2023-11-09 | End: 2023-11-10 | Stop reason: HOSPADM

## 2023-11-09 RX ORDER — PANTOPRAZOLE SODIUM 40 MG/1
40 TABLET, DELAYED RELEASE ORAL
Status: DISCONTINUED | OUTPATIENT
Start: 2023-11-09 | End: 2023-11-10 | Stop reason: HOSPADM

## 2023-11-09 RX ORDER — LACTOBACILLUS RHAMNOSUS GG 10B CELL
1 CAPSULE ORAL DAILY
COMMUNITY

## 2023-11-09 RX ORDER — LISINOPRIL 10 MG/1
10 TABLET ORAL DAILY
Status: DISCONTINUED | OUTPATIENT
Start: 2023-11-09 | End: 2023-11-10 | Stop reason: HOSPADM

## 2023-11-09 RX ORDER — ASPIRIN 81 MG/1
81 TABLET ORAL DAILY
Status: DISCONTINUED | OUTPATIENT
Start: 2023-11-09 | End: 2023-11-10 | Stop reason: HOSPADM

## 2023-11-09 RX ORDER — MONTELUKAST SODIUM 10 MG/1
10 TABLET ORAL EVERY MORNING
Status: DISCONTINUED | OUTPATIENT
Start: 2023-11-10 | End: 2023-11-10 | Stop reason: HOSPADM

## 2023-11-09 RX ORDER — LACTOBACILLUS RHAMNOSUS GG 10B CELL
1 CAPSULE ORAL DAILY
Status: DISCONTINUED | OUTPATIENT
Start: 2023-11-09 | End: 2023-11-10 | Stop reason: HOSPADM

## 2023-11-09 RX ORDER — FERROUS SULFATE 325(65) MG
325 TABLET, DELAYED RELEASE (ENTERIC COATED) ORAL DAILY
COMMUNITY

## 2023-11-09 RX ORDER — CETIRIZINE HYDROCHLORIDE 10 MG/1
10 TABLET ORAL
Status: DISCONTINUED | OUTPATIENT
Start: 2023-11-09 | End: 2023-11-10 | Stop reason: HOSPADM

## 2023-11-09 RX ORDER — HYDROCHLOROTHIAZIDE 12.5 MG/1
12.5 CAPSULE ORAL DAILY
Status: DISCONTINUED | OUTPATIENT
Start: 2023-11-09 | End: 2023-11-10 | Stop reason: HOSPADM

## 2023-11-09 RX ADMIN — CETIRIZINE HYDROCHLORIDE 10 MG: 10 TABLET, FILM COATED ORAL at 15:36

## 2023-11-09 RX ADMIN — LISINOPRIL 10 MG: 10 TABLET ORAL at 13:21

## 2023-11-09 RX ADMIN — HYDROCHLOROTHIAZIDE 12.5 MG: 12.5 CAPSULE ORAL at 13:21

## 2023-11-09 RX ADMIN — PANTOPRAZOLE SODIUM 40 MG: 40 TABLET, DELAYED RELEASE ORAL at 15:36

## 2023-11-09 ASSESSMENT — ACTIVITIES OF DAILY LIVING (ADL)
ADLS_ACUITY_SCORE: 20
ADLS_ACUITY_SCORE: 35
ADLS_ACUITY_SCORE: 20
ADLS_ACUITY_SCORE: 20
ADLS_ACUITY_SCORE: 35
ADLS_ACUITY_SCORE: 20
ADLS_ACUITY_SCORE: 20

## 2023-11-09 NOTE — PLAN OF CARE
Report from ED nurse as the patient was leaving ED to come to observation that the patient has a cat bite on her upper left arm and that patient just mentioned it as she was leaving.     Addendum: It is the patient's cat, cat is up to date on all their shots.

## 2023-11-09 NOTE — ED PROVIDER NOTES
History     Chief Complaint:  Chest Pain       The history is provided by the patient.      Krystyna Peña is a 68 year old female with a history of early COPD (no longer smoking), cirrhosis, hypertension, hyperlipidemia, and alcohol abuse who presents with ongoing intermittent chest pain since 2 weeks ago. She describes the pain as a heaviness/pressure, and it is exacerbated by exertion. Additionally, the pain is usually better in the morning, but will worsen throughout the day and become a 10/10 at night. The pain rizo not radiate anywhere else. Other symptoms mentioned include shortness of breath, slight nausea, and loss of appetite. She does not have a past medical history of blood clots or heart disease. Only pertinent family history is her mother who passed away from heart disease at the age of 96. She is a former smoker, and quit drinking about 10 years ago. She also does not currently drink alcohol. She denies recent long distance travels, surgeries, hormone therapies, or prolonged periods of immobilization. She denies diaphoresis, vomiting, abdominal pain, leg swelling/pain, fever, cough, congestion, or any other cold symptoms.     She is currently follows Nancy Espinoza from [facility] for her early COPD. Her number is [phone]    Independent Historian:   None - Patient Only    Review of External Notes:   None     Medications:    Zyrtec   Singulair   Protonix     Past Medical History:    Arthritis   Asymptomatic varicose veins   Benign neoplasm of colon   GERD   Colonic polyps   HTN   Depression  Syncope   CKD stage 3   Alcoholic cirrhosis   Thickened endometrium   Alcohol abuse   Hypovitaminosis D   Analgesic nephropathy    OA   Diverticulitis     Past Surgical History:    L third toe amputation   R DAE   B/L bunion correction   L reverse total shoulder arthroplasty   R rotator cuff repair   L leg bypass surgery   R arm plastic surgery   Unspecified R knee surgery   L popliteal AVM repair   L  "bone spur excision   Spiegelian hernia repair     L TKA     Physical Exam   Patient Vitals for the past 24 hrs:   BP Temp Temp src Pulse Resp SpO2 Height Weight   11/09/23 1130 (!) 163/75 -- -- 95 -- 97 % -- --   11/09/23 1115 (!) 154/70 -- -- 89 -- 96 % -- --   11/09/23 1100 (!) 161/79 -- -- 88 -- 96 % -- --   11/09/23 1045 (!) 155/75 -- -- 86 -- 95 % -- --   11/09/23 1031 (!) 161/78 -- -- 89 -- 96 % -- --   11/09/23 1026 (!) 168/71 -- -- 89 -- -- -- --   11/09/23 0827 (!) 186/100 97.2  F (36.2  C) Temporal 116 24 98 % 1.575 m (5' 2\") 69.4 kg (153 lb)        Physical Exam  General: Adult female sitting on gurney  HENT:   Head: Atraumatic.  Mouth/Throat: Oropharynx clear and moist.  Eyes: Conjunctive and EOM normal. PERRLA.  Neck: Normal ROM. No rigidity.  CV: Regular rate and rhythm. Normal S1, S2.   Resp: Lungs clear to auscultation bilaterally. Normal respiratory effort. No stridor or cough observed.  GI: Bowel sounds normal. Abdomen soft, non distended and nontender. No rebound or guarding.  MSK: Normal range of motion.  Skin: Warm and dry.  No rash.  Neuro: Awake, alert, oriented x 3.  Psych: Normal mood and affect.     Emergency Department Course   ECG  ECG taken at 0838, ECG read at 0842  Normal sinus rhythm   Left axis deviation   Nonspecific ST abnormality   Abnormal ECG   Rate 100 bpm. NY interval 160 ms. QRS duration 78 ms. QT/QTc 366/472 ms. P-R-T axes 79 -35 60.     Imaging:  Chest XR,  PA & LAT  Final Result  IMPRESSION: Thoracic spinal stimulator leads and bilateral total  shoulder arthroplasties. No acute findings. The lungs are clear and  there are no pleural effusions. Normal heart size. Spinal degenerative  changes.  ZAINAB SMITH MD      Results per radiology     Laboratory:  Labs Ordered and Resulted from Time of ED Arrival to Time of ED Departure   BASIC METABOLIC PANEL - Abnormal       Result Value    Sodium 139      Potassium 3.8      Chloride 101      Carbon Dioxide (CO2) 23      Anion " Gap 15      Urea Nitrogen 12.7      Creatinine 0.85      GFR Estimate 74      Calcium 9.0      Glucose 114 (*)    CBC WITH PLATELETS AND DIFFERENTIAL - Abnormal    WBC Count 4.7      RBC Count 3.97      Hemoglobin 13.0      Hematocrit 40.0       (*)     MCH 32.7      MCHC 32.5      RDW 14.0      Platelet Count 146 (*)     % Neutrophils 63      % Lymphocytes 23      % Monocytes 10      % Eosinophils 3      % Basophils 1      % Immature Granulocytes 0      NRBCs per 100 WBC 0      Absolute Neutrophils 3.0      Absolute Lymphocytes 1.1      Absolute Monocytes 0.5      Absolute Eosinophils 0.1      Absolute Basophils 0.0      Absolute Immature Granulocytes 0.0      Absolute NRBCs 0.0     HEPATIC FUNCTION PANEL - Abnormal    Protein Total 7.7      Albumin 4.5      Bilirubin Total 0.9      Alkaline Phosphatase 111 (*)     AST 59 (*)     ALT 19      Bilirubin Direct 0.31 (*)    TROPONIN T, HIGH SENSITIVITY - Normal    Troponin T, High Sensitivity 14     D DIMER QUANTITATIVE - Normal    D-Dimer Quantitative 0.48     NT PROBNP INPATIENT - Normal    N terminal Pro BNP Inpatient 151     TROPONIN T, HIGH SENSITIVITY - Normal    Troponin T, High Sensitivity 13        Emergency Department Course & Assessments:    Interventions:  Medications   lisinopril (ZESTRIL) tablet 10 mg (has no administration in time range)   hydrochlorothiazide (MICROZIDE) capsule 12.5 mg (has no administration in time range)      Assessments:  0908 I obtained history and examined the patient as noted above.  1120 I rechecked the patient and explained findings. I discussed the HEART score, and she preferred admission to the hospital.   1125 Dr. Ashby went to update the patient.     Independent Interpretation (X-rays, CTs, rhythm strip):  No pleural effusion, infiltrate, widened mediastinum, cardiomegaly or pneumothorax.    Consultations/Discussion of Management or Tests:  0920 I staffed the patient with Dr. Ashby.    1204  I spoke with   Young from [facility], who accepts the patient for admission. She is going to see if the patient can get a Stress Test.   1219 I spoke with Dr. Hare from [facility], who confirms that the patient is going to get a Stress Test.   1238 I spoke with Dr. Hare from [facility]. She notes that the patient's blood pressure was too high, so the Stress Test is going to be scheduled for tomorrow.  Accepted her for Obs admission.    Social Determinants of Health affecting care:   None    Disposition:  The patient was admitted to the hospital under the care of Dr. Hare.     Impression & Plan    Medical Decision Making:     Krystyna Peña is a 68 year old female with a history of early COPD (no longer smoking), cirrhosis, hypertension, hyperlipidemia, and alcohol abuse who presents with ongoing intermittent chest pain since 2 weeks ago.  On arrival here she has an elevated blood pressure.  Vital signs otherwise within normal limits.  Initial EKG without ischemic changes or arrhythmia.  Serial troponins are flat and not elevated.  D-dimer test not elevated, reassuring against PE.  BNP not elevated, reassuring against acute heart failure.  Chest x-ray without pneumothorax, widened mediastinum, pleural effusion, infiltrate or cardiomegaly.  Basic labs unremarkable save for a mildly elevated AST consistent with her history of alcoholic cirrhosis.  At this juncture, the patient has a moderate risk heart score and in the setting of her intermittent chest pain is appropriate for observation admission.  Initially we were going to see if we could get a stress test today in the hospital, but she will stay overnight for blood pressure control prior to stress test.  I staffed this patient with Dr. Ashby and Dr. Hare graciously accepted her for admission.      Diagnosis:    ICD-10-CM    1. Chest pain, unspecified type  R07.9       2. Elevated AST (SGOT)  R74.01       3. Alcoholic cirrhosis, unspecified  whether ascites present (H)  K70.30           Scribe Disclosure:  I, Pa Eliel, am serving as a scribe at 9:20 AM on 11/9/2023 to document services personally performed by Luciana Hargrove PA-C based on my observations and the provider's statements to me.   11/9/2023   Luciana Hargrove PA-C Dewing, Jennifer C, PA-C  12/10/23 1456

## 2023-11-09 NOTE — H&P
"[facility]    History and Physical - Hospitalist Service       Date of Admission:  11/9/2023    Assessment & Plan      Krystyna HUMPHREYS White is a 68 year old female with history of alcoholic liver disease (now sober x 1 year), HTN, HLP, GERD, depression admitted on 11/9/2023 with chest pain.    Chest pain    - story not terribly concerning for cardiac chest pain    - EKG normal, trops normal    - no further trops needed    - will have stress test in am    - will need a darlene as she state she would need rails on both side in order to walk on a treadmill    - as we are admitting for a stress test, will start ASA 81mg daily for now    Hypertension    - BPs elevated in the ED    - may be contributing to her symptoms    - started lisinopril 10mg/hydrochlorothiazide 12.5mg    - states has had variable BPs in the past    - gained weight this past year, which could be contributing    HLP    - not on meds    GERD    - cont protonix    Depression    - not on meds    Full code       Observation Goals: -diagnostic tests and consults completed and resulted, -vital signs normal or at patient baseline, Nurse to notify provider when observation goals have been met and patient is ready for discharge.  Diet: Combination Diet Low Saturated Fat Na <2400mg Diet, No Caffeine Diet    DVT Prophylaxis: Low Risk/Ambulatory with no VTE prophylaxis indicated  To Catheter: Not present  Lines: None     Cardiac Monitoring: None  Code Status: Full Code      Clinically Significant Risk Factors Present on Admission                       # Overweight: Estimated body mass index is 27.98 kg/m  as calculated from the following:    Height as of this encounter: 1.575 m (5' 2\").    Weight as of this encounter: 69.4 kg (153 lb).              Disposition Plan      Expected Discharge Date: 11/10/2023                  Raheel Hare MD  Hospitalist Service  [facility]  Securely message with [email or web address] (more info)  Text " page via [facility] Paging/Directory     ______________________________________________________________________    Chief Complaint   Chest pain    History is obtained from the patient    History of Present Illness   Krystyna Peña is a 68 year old female with history of alcoholic liver disease (now sober x 1 year), HTN, HLP, GERD, depression admitted on 11/9/2023 with chest pain.  Patient states that she has been getting substernal chest pain for the past 2 weeks.  She states it comes on in the late afternoon and last through the night.  When she wakes up in the morning it is gone.  There is no radiation of the pain.  No associated shortness of breath or nausea.  She denies any abdominal pain, nausea, vomiting, diarrhea.  No cough, runny nose, sore throat.  No fevers or chills.  No urinary symptoms.  She states she actually has not had the pain yet today.  She came in because she felt like she needed to get it looked at since she has been having it daily for the last 2 weeks.  She does note that she has gained some weight in the last year.  She used to check her blood pressures at home, but no longer does.      Past Medical History    Past Medical History:   Diagnosis Date    Arthritis     Asymptomatic varicose veins     Benign neoplasm of colon 11/06, 7/13    Adenoma    Family history of colon cancer     dad    Gastroesophageal reflux disease     History of colonic polyps     Hypertension     Major depression     Syncope        Past Surgical History   Past Surgical History:   Procedure Laterality Date    AMPUTATE TOE(S) Left 04/12/2018    Procedure: AMPUTATE TOE(S);  Amputation left third digit;  Surgeon: Herb Michael DPM;  Location: [facility] OR    ARTHROPLASTY HIP Right 03/28/2016    Procedure: ARTHROPLASTY HIP;  Surgeon: Perez Meza MD;  Location: [facility] OR    ARTHROPLASTY REVISION HIP Right 10/28/2019    Procedure: Revision right total hip arthroplasty;  Surgeon: Perez Meza MD;  Location:  OR     CLOSED REDUCTION HIP Right 07/15/2019    Procedure: Closed reduction, right total hip arthroplasty dislocation.;  Surgeon: Perez Meza MD;  Location: [facility] OR    COLONOSCOPY Left 04/16/2021    Procedure: COLONOSCOPY;  Surgeon: Rell Cisneros MD;  Location: [facility] GI    HC CORRECT BUNION,SIMPLE  01/01/1998    RT    HC CORRECT BUNION,SIMPLE  01/01/2001    LT    HC KNEE SCOPE, DIAGNOSTIC      LT    IR CERVICAL EPIDURAL STEROID INJECTION  06/18/2007    REVERSE ARTHROPLASTY SHOULDER Left 10/30/2018    Procedure: Left reverse total shoulder arthroplasty;  Surgeon: Aaron Christianson MD;  Location: [facility] OR    REVERSE ARTHROPLASTY SHOULDER Right 06/12/2019    Procedure: Right reverse total shoulder arthroplasty;  Surgeon: Aaron Christianson MD;  Location: [facility] OR    ROTATOR CUFF REPAIR RT/LT  07/01/2007    right    VASCULAR SURGERY  left leg bypass 2004    [facility] NONSPECIFIC PROCEDURE  01/01/1972    Right arm plastic surgery    [facility] NONSPECIFIC PROCEDURE  01/01/1972    Right knee surgery    [facility] NONSPECIFIC PROCEDURE  10/01/2003    L popliteal AVM repair    [facility] NONSPECIFIC PROCEDURE  11/01/2004    Removal bone spur left foot    [facility] NONSPECIFIC PROCEDURE  04/01/2007    amputation of toes left & right toes    [facility] REPAIR SPIEGELIAN HERNIA  01/01/2002    [facility] TOTAL KNEE ARTHROPLASTY  10/01/2003    LT       Prior to Admission Medications   Prior to Admission Medications   Prescriptions Last Dose Informant Patient Reported? Taking?   cetirizine (ZYRTEC) 10 MG tablet 11/8/2023 at am  Yes Yes   Sig: Take 1 tablet by mouth 2 times daily   ferrous sulfate (FE TABS) 325 (65 Fe) MG EC tablet 11/8/2023 at am  Yes Yes   Sig: Take 325 mg by mouth daily   lactobacillus rhamnosus, GG, (CULTURELL) capsule 11/8/2023 at am  Yes Yes   Sig: Take 1 capsule by mouth daily   montelukast (SINGULAIR) 10 MG tablet 11/8/2023 at am  Yes Yes   Sig: Take 1 tablet by mouth every morning   pantoprazole (PROTONIX) 40 MG EC tablet 11/8/2023 at am  Yes Yes   Sig: Take 1  tablet by mouth 2 times daily      Facility-Administered Medications: None        Review of Systems    The 10 point Review of Systems is negative other than noted in the HPI or here.     Social History   I have reviewed this patient's social history and updated it with pertinent information if needed.  Social History     Tobacco Use    Smoking status: Former     Packs/day: 1     Types: Cigarettes, Cigars     Quit date: 4/1/2013     Years since quitting: 10.6     Passive exposure: Past    Smokeless tobacco: Never   Vaping Use    Vaping Use: Never used   Substance Use Topics    Alcohol use: Not Currently     Alcohol/week: 70.0 standard drinks of alcohol     Types: 70 Shots of liquor per week    Drug use: No         Family History   I have reviewed this patient's family history and updated it with pertinent information if needed.  Family History   Problem Relation Age of Onset    Breast Cancer Mother     Cancer - colorectal Father         66    Colon Cancer Father     Diabetes Brother    Mom CAD in her 90's      Allergies   Allergies   Allergen Reactions    Morphine      respiratory distress    Cephalexin Rash        Physical Exam   Vital Signs: Temp: 98.2  F (36.8  C) Temp src: Oral BP: (!) 155/84 Pulse: 89   Resp: 17 SpO2: 97 % O2 Device: None (Room air)    Weight: 153 lbs 0 oz    Constitutional: awake, alert, cooperative, no apparent distress, and appears stated age  Eyes: Lids and lashes normal, pupils equal, round and reactive to light, extra ocular muscles intact, sclera clear, conjunctiva normal  ENT: Normocephalic, without obvious abnormality, atraumatic, sinuses nontender on palpation, external ears without lesions, oral pharynx with moist mucous membranes, tonsils without erythema or exudates, gums normal and good dentition.  Respiratory: No increased work of breathing, good air exchange, clear to auscultation bilaterally, no crackles or wheezing  Cardiovascular: Normal apical impulse, regular rate and  rhythm, normal S1 and S2, no S3 or S4, and no murmur noted. No chest wall tenderness  GI: No scars, normal bowel sounds, soft, non-distended, non-tender, no masses palpated, no hepatosplenomegally  Skin: no bruising or bleeding  Musculoskeletal: no lower extremity pitting edema present    Medical Decision Making       60 MINUTES SPENT BY ME on the date of service doing chart review, history, exam, documentation & further activities per the note.      Data     I have personally reviewed the following data over the past 24 hrs:    4.7  \   13.0   / 146 (L)     139 101 12.7 /  114 (H)   3.8 23 0.85 \     ALT: 19 AST: 59 (H) AP: 111 (H) TBILI: 0.9   ALB: 4.5 TOT PROTEIN: 7.7 LIPASE: N/A     Trop: 13 BNP: 151     INR:  N/A PTT:  N/A   D-dimer:  0.48 Fibrinogen:  N/A       Imaging results reviewed over the past 24 hrs:   Recent Results (from the past 24 hour(s))   Chest XR,  PA & LAT    Narrative    XR CHEST 2 VIEWS 11/9/2023 10:15 AM    HISTORY: chest heaviness, sob    COMPARISON: CT chest 4/12/2018      Impression    IMPRESSION: Thoracic spinal stimulator leads and bilateral total  shoulder arthroplasties. No acute findings. The lungs are clear and  there are no pleural effusions. Normal heart size. Spinal degenerative  changes.    ZAINAB SMITH MD         SYSTEM ID:  [id number]

## 2023-11-09 NOTE — ED PROVIDER NOTES
"ED ATTENDING PHYSICIAN NOTE:   I evaluated this patient in conjunction with Luciana Hargrove PA-C  I have participated in the care of the patient and personally performed key elements of the history, exam, and medical decision making.      HPI:   Krystyna Peña is a 68 year old female who presents with chest pressure \"like an elephant sitting on my chest\" for the past 2 weeks.      Independent Historian:   None - Patient Only    Review of External Notes:       EXAM:   Gen: Resting comfortably   Eyes: Clear conjunctiva, no discharge  Ears: External ears normal without swelling or drainage  Mouth: Mucous membranes moist  CV: regular rate  Resp: speaking in full sentences without any resp distress  Skin: warm dry well perfused  Neuro: Alert, no gross motor or sensory deficits,   Psych: pleasant, affect appropriate      Independent Interpretation (X-rays, CTs, rhythm strip):  None    Consultations/Discussion of Management or Tests:  None     Social Determinants of Health affecting care:   None     MEDICAL DECISION MAKING/ASSESSMENT AND PLAN:   Patient presents with episodes of chest pain, worse in the evening.  Initial work-up in the ED negative for ACS, she is remained pain-free during her time in the ED.  Her heart score is moderately elevated, given her risk factor profile we discussed inpatient versus outpatient management patient feels uncomfortable with discharge plan given recurrence of pain recently.  We will plan on [facility] admission for further evaluation of chest pain hospitalist team contacted.     DIAGNOSIS:     ICD-10-CM    1. Chest pain, unspecified type  R07.9       2. Elevated AST (SGOT)  R74.01       3. Alcoholic cirrhosis, unspecified whether ascites present (H)  K70.30           DISPOSITION:   admission     Mansoor Ashby MD    11/9/2023  [facility] EMERGENCY DEPT       Mansoor Ashby MD  11/09/23 1304    "

## 2023-11-09 NOTE — PLAN OF CARE
ROOM # 202-1    Living Situation (if not independent, order SW consult):  Facility name:  : [facility]    Activity level at baseline: independent  Activity level on admit: independent    Who will be transporting you at discharge:     Patient registered to observation; given Patient Bill of Rights; given the opportunity to ask questions about observation status and their plan of care.  Patient has been oriented to the observation room, bathroom and call light is in place.    Discussed discharge goals and expectations with patient/family.

## 2023-11-09 NOTE — ED NOTES
"[facility]    ED Boarding Nurse Handoff Addendum Report:    Date/time: 11/9/2023, 1:25 PM    Activity Level: standby    Fall Risk: Yes:  arm band in place and activity supervised    Active Infusions: none    Current Meds Due: see MAR    Current care needs: BP monitoring    Oxygen requirements (liters/min and/or FiO2): none    Respiratory status: Room air    Vital signs (within last 30 minutes):    Vitals:    11/09/23 1130 11/09/23 1132 11/09/23 1232 11/09/23 1312   BP: (!) 163/75  (!) 177/87 (!) 167/88   BP Location:    Right arm   Pulse: 95  96 84   Resp:       Temp:       TempSrc:       SpO2: 97% 97%  98%   Weight:       Height:    1.575 m (5' 2\")       Focused assessment within last 30 minutes:    Patient alert and oriented x4. Denies any chest pain or SOB. Noted cat bite on left upper arm reported by patient.     ED Boarding Nurse name: Aida Narvaez RN   "

## 2023-11-09 NOTE — ED NOTES
[facility]  ED Nurse Handoff Report    ED Chief complaint: Chest Pain  . ED Diagnosis:   Final diagnoses:   Elevated AST (SGOT)   Alcoholic cirrhosis, unspecified whether ascites present (H)   Chest pain, unspecified type       Allergies:   Allergies   Allergen Reactions    Morphine      respiratory distress    Cephalexin Rash       Code Status: Full Code    Activity level - Baseline/Home:  independent.  Activity Level - Current:   standby.   Lift room needed: No.   Bariatric: No   Needed: No   Isolation: No.   Infection: Not Applicable.     Respiratory status: Room air    Vital Signs (within 30 minutes):   Vitals:    11/09/23 1045 11/09/23 1100 11/09/23 1115 11/09/23 1130   BP: (!) 155/75 (!) 161/79 (!) 154/70 (!) 163/75   Pulse: 86 88 89 95   Resp:       Temp:       TempSrc:       SpO2: 95% 96% 96% 97%   Weight:       Height:           Cardiac Rhythm:  ,      Pain level:    Patient confused: No.   Patient Falls Risk: nonskid shoes/slippers when out of bed, arm band in place, and patient and family education.   Elimination Status: Has voided     Patient Report - Initial Complaint: Chest pain.   Focused Assessment: 68 year old female with a history of early COPD (no longer smoking), cirrhosis, hypertension, hyperlipidemia, and alcohol abuse who presents with ongoing intermittent chest pain since 2 weeks ago. She describes the pain as a heaviness/pressure, and it is exacerbated by exertion. Additionally, the pain is usually better in the morning, but will worsen throughout the day and become a 10/10 at night. The pain rizo not radiate anywhere else. Other symptoms mentioned include shortness of breath, slight nausea, and loss of appetite. She does not have a past medical history of blood clots or heart disease. Only pertinent family history is her mother who passed away from heart disease at the age of 96. She is a former smoker, and quit drinking about 10 years ago. She also does not  currently drink alcohol. She denies recent long distance travels, surgeries, hormone therapies, or prolonged periods of immobilization. She denies diaphoresis, vomiting, abdominal pain, leg swelling/pain, fever, cough, congestion, or any other cold symptoms.      Abnormal Results:   Labs Ordered and Resulted from Time of ED Arrival to Time of ED Departure   BASIC METABOLIC PANEL - Abnormal       Result Value    Sodium 139      Potassium 3.8      Chloride 101      Carbon Dioxide (CO2) 23      Anion Gap 15      Urea Nitrogen 12.7      Creatinine 0.85      GFR Estimate 74      Calcium 9.0      Glucose 114 (*)    CBC WITH PLATELETS AND DIFFERENTIAL - Abnormal    WBC Count 4.7      RBC Count 3.97      Hemoglobin 13.0      Hematocrit 40.0       (*)     MCH 32.7      MCHC 32.5      RDW 14.0      Platelet Count 146 (*)     % Neutrophils 63      % Lymphocytes 23      % Monocytes 10      % Eosinophils 3      % Basophils 1      % Immature Granulocytes 0      NRBCs per 100 WBC 0      Absolute Neutrophils 3.0      Absolute Lymphocytes 1.1      Absolute Monocytes 0.5      Absolute Eosinophils 0.1      Absolute Basophils 0.0      Absolute Immature Granulocytes 0.0      Absolute NRBCs 0.0     HEPATIC FUNCTION PANEL - Abnormal    Protein Total 7.7      Albumin 4.5      Bilirubin Total 0.9      Alkaline Phosphatase 111 (*)     AST 59 (*)     ALT 19      Bilirubin Direct 0.31 (*)    TROPONIN T, HIGH SENSITIVITY - Normal    Troponin T, High Sensitivity 14     D DIMER QUANTITATIVE - Normal    D-Dimer Quantitative 0.48     NT PROBNP INPATIENT - Normal    N terminal Pro BNP Inpatient 151     TROPONIN T, HIGH SENSITIVITY - Normal    Troponin T, High Sensitivity 13          Chest XR,  PA & LAT   Final Result   IMPRESSION: Thoracic spinal stimulator leads and bilateral total   shoulder arthroplasties. No acute findings. The lungs are clear and   there are no pleural effusions. Normal heart size. Spinal degenerative   changes.       ZAINAB SMITH MD            SYSTEM ID:  [id number]      Echo Stress Echocardiogram    (Results Pending)       Treatments provided: Labs, xray, EKG  Family Comments: No family here  OBS brochure/video discussed/provided to patient:  Yes  ED Medications:   Medications   lisinopril (ZESTRIL) tablet 10 mg (has no administration in time range)   hydrochlorothiazide (MICROZIDE) capsule 12.5 mg (has no administration in time range)       Drips infusing:  No  For the majority of the shift this patient was Green.   Interventions performed were N/A.    Sepsis treatment initiated: No    Cares/treatment/interventions/medications to be completed following ED care: Stress test tomorrow, needs BP meds, chest pain workup     ED Nurse Name: Jacey Gilbert RN  12:50 PM    RECEIVING UNIT ED HANDOFF REVIEW    Above ED Nurse Handoff Report was reviewed: Yes  Reviewed by: Cely Mac RN on November 9, 2023 at 1:17 PM

## 2023-11-09 NOTE — CARE PLAN
"PRIMARY DIAGNOSIS: CHEST PAIN  OUTPATIENT/OBSERVATION GOALS TO BE MET BEFORE DISCHARGE:  1. Ruled out acute coronary syndrome (negative or stable Troponin):  Yes  2. Pain Status: Pain free.  3. Appropriate provocative testing performed: N/A  - Stress Test Procedure: Lesiscan Tomorrow  - Interpretation of cardiac rhythm per telemetry tech: NSR 90s    4. Cleared by Consultants (if applicable):No  5. Return to near baseline physical activity: Yes  Discharge Planner Nurse   Safe discharge environment identified: Yes  Barriers to discharge: Yes       Entered by: Vishnu Munoz RN 11/09/2023      Please review provider order for any additional goals.   Nurse to notify provider when observation goals have been met and patient is ready for discharge.  BP (!) 142/76 (BP Location: Right arm)   Pulse 79   Temp 98.3  F (36.8  C) (Oral)   Resp 17   Ht 1.575 m (5' 2\")   Wt 69.4 kg (153 lb)   LMP 10/01/2003 (Exact Date)   SpO2 97%   BMI 27.98 kg/m     "

## 2023-11-09 NOTE — PHARMACY-ADMISSION MEDICATION HISTORY
Pharmacist Admission Medication History    Admission medication history is complete. The information provided in this note is only as accurate as the sources available at the time of the update.    Information Source(s): Patient via in-person    Pertinent Information:      Changes made to PTA medication list:  Added: None  Deleted: None  Changed: None    Medication Affordability:  Not including over the counter (OTC) medications, was there a time in the past 3 months when you did not take your medications as prescribed because of cost?: No    Allergies reviewed with patient and updates made in EHR: yes    Medication History Completed By: Raven Donohue RP 11/9/2023 1:23 PM    PTA Med List   Medication Sig Last Dose    cetirizine (ZYRTEC) 10 MG tablet Take 1 tablet by mouth 2 times daily 11/8/2023 at am    ferrous sulfate (FE TABS) 325 (65 Fe) MG EC tablet Take 325 mg by mouth daily 11/8/2023 at am    lactobacillus rhamnosus, GG, (CULTURELL) capsule Take 1 capsule by mouth daily 11/8/2023 at am    montelukast (SINGULAIR) 10 MG tablet Take 1 tablet by mouth every morning 11/8/2023 at am    pantoprazole (PROTONIX) 40 MG EC tablet Take 1 tablet by mouth 2 times daily 11/8/2023 at am

## 2023-11-09 NOTE — ED TRIAGE NOTES
Pt arrives ambulatory for congestion and chest pressure/pain that has been ongoing for 1.5 weeks. No appetite last night and had a protein shake this morning. Reports coughing up white phlegm. No appetite last night and had a small protein shake this morning.

## 2023-11-10 ENCOUNTER — APPOINTMENT (OUTPATIENT)
Dept: CARDIOLOGY | Facility: CLINIC | Age: 68
End: 2023-11-10
Attending: HOSPITALIST
Payer: COMMERCIAL

## 2023-11-10 ENCOUNTER — APPOINTMENT (OUTPATIENT)
Dept: NUCLEAR MEDICINE | Facility: CLINIC | Age: 68
End: 2023-11-10
Attending: HOSPITALIST
Payer: COMMERCIAL

## 2023-11-10 VITALS
WEIGHT: 153 LBS | HEART RATE: 89 BPM | RESPIRATION RATE: 17 BRPM | DIASTOLIC BLOOD PRESSURE: 75 MMHG | BODY MASS INDEX: 28.16 KG/M2 | HEIGHT: 62 IN | OXYGEN SATURATION: 98 % | TEMPERATURE: 98 F | SYSTOLIC BLOOD PRESSURE: 138 MMHG

## 2023-11-10 LAB
CV STRESS MAX HR HE: 106
NUC STRESS EJECTION FRACTION: 68 %
RATE PRESSURE PRODUCT: NORMAL
STRESS ECHO BASELINE DIASTOLIC HE: 81
STRESS ECHO BASELINE HR: 81 BPM
STRESS ECHO BASELINE SYSTOLIC BP: 168
STRESS ECHO CALCULATED PERCENT HR: 70 %
STRESS ECHO LAST STRESS DIASTOLIC BP: 74
STRESS ECHO LAST STRESS SYSTOLIC BP: 119
STRESS ECHO TARGET HR: 151

## 2023-11-10 PROCEDURE — 250N000013 HC RX MED GY IP 250 OP 250 PS 637: Performed by: HOSPITALIST

## 2023-11-10 PROCEDURE — 78452 HT MUSCLE IMAGE SPECT MULT: CPT

## 2023-11-10 PROCEDURE — G0378 HOSPITAL OBSERVATION PER HR: HCPCS

## 2023-11-10 PROCEDURE — 250N000011 HC RX IP 250 OP 636

## 2023-11-10 PROCEDURE — 78452 HT MUSCLE IMAGE SPECT MULT: CPT | Mod: 26 | Performed by: INTERNAL MEDICINE

## 2023-11-10 PROCEDURE — A9500 TC99M SESTAMIBI: HCPCS | Performed by: EMERGENCY MEDICINE

## 2023-11-10 PROCEDURE — 93005 ELECTROCARDIOGRAM TRACING: CPT | Mod: XU

## 2023-11-10 PROCEDURE — 99285 EMERGENCY DEPT VISIT HI MDM: CPT | Mod: 25

## 2023-11-10 PROCEDURE — 99239 HOSP IP/OBS DSCHRG MGMT >30: CPT | Performed by: HOSPITALIST

## 2023-11-10 PROCEDURE — 343N000001 HC RX 343: Performed by: EMERGENCY MEDICINE

## 2023-11-10 PROCEDURE — 93016 CV STRESS TEST SUPVJ ONLY: CPT | Performed by: INTERNAL MEDICINE

## 2023-11-10 PROCEDURE — 93017 CV STRESS TEST TRACING ONLY: CPT

## 2023-11-10 PROCEDURE — 93018 CV STRESS TEST I&R ONLY: CPT | Performed by: INTERNAL MEDICINE

## 2023-11-10 RX ORDER — LISINOPRIL/HYDROCHLOROTHIAZIDE 10-12.5 MG
1 TABLET ORAL DAILY
Qty: 30 TABLET | Refills: 3 | Status: SHIPPED | OUTPATIENT
Start: 2023-11-10 | End: 2024-07-18

## 2023-11-10 RX ORDER — ACYCLOVIR 200 MG/1
0-1 CAPSULE ORAL
Status: DISCONTINUED | OUTPATIENT
Start: 2023-11-10 | End: 2023-11-10 | Stop reason: HOSPADM

## 2023-11-10 RX ORDER — ALBUTEROL SULFATE 90 UG/1
2 AEROSOL, METERED RESPIRATORY (INHALATION) EVERY 6 HOURS PRN
Status: DISCONTINUED | OUTPATIENT
Start: 2023-11-10 | End: 2023-11-10 | Stop reason: HOSPADM

## 2023-11-10 RX ORDER — ALBUTEROL SULFATE 90 UG/1
2 AEROSOL, METERED RESPIRATORY (INHALATION) EVERY 6 HOURS PRN
Qty: 18 G | Refills: 0 | Status: SHIPPED | OUTPATIENT
Start: 2023-11-10

## 2023-11-10 RX ORDER — REGADENOSON 0.08 MG/ML
INJECTION, SOLUTION INTRAVENOUS
Status: COMPLETED
Start: 2023-11-10 | End: 2023-11-10

## 2023-11-10 RX ORDER — ALBUTEROL SULFATE 90 UG/1
2 AEROSOL, METERED RESPIRATORY (INHALATION) EVERY 5 MIN PRN
Status: DISCONTINUED | OUTPATIENT
Start: 2023-11-10 | End: 2023-11-10

## 2023-11-10 RX ORDER — AMINOPHYLLINE 25 MG/ML
50-100 INJECTION, SOLUTION INTRAVENOUS
Status: DISCONTINUED | OUTPATIENT
Start: 2023-11-10 | End: 2023-11-10

## 2023-11-10 RX ORDER — REGADENOSON 0.08 MG/ML
0.4 INJECTION, SOLUTION INTRAVENOUS ONCE
Status: DISCONTINUED | OUTPATIENT
Start: 2023-11-10 | End: 2023-11-10

## 2023-11-10 RX ADMIN — Medication 33 MILLICURIE: at 08:52

## 2023-11-10 RX ADMIN — MONTELUKAST 10 MG: 10 TABLET, FILM COATED ORAL at 08:00

## 2023-11-10 RX ADMIN — REGADENOSON 0.4 MG: 0.08 INJECTION, SOLUTION INTRAVENOUS at 08:49

## 2023-11-10 RX ADMIN — PANTOPRAZOLE SODIUM 40 MG: 40 TABLET, DELAYED RELEASE ORAL at 08:00

## 2023-11-10 RX ADMIN — HYDROCHLOROTHIAZIDE 12.5 MG: 12.5 CAPSULE ORAL at 08:00

## 2023-11-10 RX ADMIN — LISINOPRIL 10 MG: 10 TABLET ORAL at 08:00

## 2023-11-10 RX ADMIN — FERROUS SULFATE TAB 325 MG (65 MG ELEMENTAL FE) 325 MG: 325 (65 FE) TAB at 08:00

## 2023-11-10 RX ADMIN — Medication 11 MILLICURIE: at 07:20

## 2023-11-10 RX ADMIN — CETIRIZINE HYDROCHLORIDE 10 MG: 10 TABLET, FILM COATED ORAL at 08:00

## 2023-11-10 ASSESSMENT — ACTIVITIES OF DAILY LIVING (ADL)
ADLS_ACUITY_SCORE: 20

## 2023-11-10 NOTE — CARE PLAN
"Patient's After Visit Summary was reviewed with patient and/or spouse.   Patient verbalized understanding of After Visit Summary, recommended follow up and was given an opportunity to ask questions.   Discharge medications sent home with patient/family: will  at [facility] pharmacy   Discharged with spouse with all belongings. Patient medically cleared to discharge. Denies pain.  will transport her home.   /60 (BP Location: Right arm)   Pulse 59   Temp 98  F (36.7  C) (Oral)   Resp 16   Ht 1.727 m (5' 8\")   Wt 56.4 kg (124 lb 5.4 oz)   SpO2 97%   BMI 18.91 kg/m           "

## 2023-11-10 NOTE — DISCHARGE SUMMARY
"[facility]  Hospitalist Discharge Summary      Date of Admission:  11/9/2023  Date of Discharge:  11/10/2023  Discharging Provider: Raheel Hare MD  Discharge Service: Hospitalist Service    Discharge Diagnoses   Chest pain  Hypertensive urgency    Clinically Significant Risk Factors     # Overweight: Estimated body mass index is 27.98 kg/m  as calculated from the following:    Height as of this encounter: 1.575 m (5' 2\").    Weight as of this encounter: 69.4 kg (153 lb).       Follow-ups Needed After Discharge   Follow-up Appointments     Follow-up and recommended labs and tests       Follow up with primary care provider, Viri Savage, within 7 days for   hospital follow- up.  The following labs/tests are recommended: follow-up   on blood pressure. Check a chem 7 after started new BP meds.            Unresulted Labs Ordered in the Past 30 Days of this Admission       No orders found for last 31 day(s).        These results will be followed up by NA    Discharge Disposition   Discharged to home  Condition at discharge: Stable    Hospital Course   Krystyna Peña is a 68 year old female with history of alcoholic liver disease (now sober x 1 year), HTN, HLP, GERD, depression admitted on 11/9/2023 with chest pain.  Patient states that she has been getting substernal chest pain for the past 2 weeks.  She states it comes on in the late afternoon and last through the night.  When she wakes up in the morning it is gone.  There is no radiation of the pain.  No associated shortness of breath or nausea.  She denies any abdominal pain, nausea, vomiting, diarrhea.  No cough, runny nose, sore throat.  No fevers or chills.  No urinary symptoms.  She states she actually has not had the pain yet today.  She came in because she felt like she needed to get it looked at since she has been having it daily for the last 2 weeks.  She does note that she has gained some weight in the last year.  She used to " check her blood pressures at home, but no longer does.     Patient has completed her Lexiscan.  This was negative for inducible ischemia.  She has not had recurrence of her chest pain since she was started on blood pressure medications and her blood pressure has come down.  Likely her chest pain was due to hypertensive urgency/undiagnosed hypertension.  She has been started on lisinopril/hydrochlorothiazide.  She should follow-up with her primary care doctor for up titration of her medications.  She also needs a Chem-7 in the future to check her potassium and kidney function levels.  I did call and update her son.    Consultations This Hospital Stay   None    Code Status   Full Code    Time Spent on this Encounter   I, Raheel Hare MD, personally saw the patient today and spent greater than 30 minutes discharging this patient.       Raheel Hare MD  [facility] OBSERVATION DEPT  [address] [facility] [address]  Phone: [phone]  ______________________________________________________________________    Physical Exam   Vital Signs: Temp: 98  F (36.7  C) Temp src: Oral BP: 138/75 Pulse: 89   Resp: 17 SpO2: 98 % O2 Device: None (Room air)    Weight: 153 lbs 0 oz  Constitutional: awake, alert, cooperative, no apparent distress, and appears stated age  Eyes: Lids and lashes normal, pupils equal, round and reactive to light, extra ocular muscles intact, sclera clear, conjunctiva normal  ENT: Normocephalic, without obvious abnormality, atraumatic, sinuses nontender on palpation, external ears without lesions, oral pharynx with moist mucous membranes, tonsils without erythema or exudates, gums normal and good dentition.  Respiratory: No increased work of breathing, good air exchange, clear to auscultation bilaterally, no crackles or wheezing  Cardiovascular: Normal apical impulse, regular rate and rhythm, normal S1 and S2, no S3 or S4, and no murmur noted       Primary Care Physician    Viri Savage    Discharge Orders      Reason for your hospital stay    Chest pain  Undiagnosed hypertention     Follow-up and recommended labs and tests     Follow up with primary care provider, Viri Savage, within 7 days for hospital follow- up.  The following labs/tests are recommended: follow-up on blood pressure. Check a chem 7 after started new BP meds.     Activity    Your activity upon discharge: activity as tolerated     Diet    Follow this diet upon discharge: Orders Placed This Encounter      Regular diet       Significant Results and Procedures   Most Recent 3 CBC's:  Recent Labs   Lab Test 11/09/23  0833 07/03/23  0941 12/08/22  0848   WBC 4.7 3.8* 7.6   HGB 13.0 11.9 8.7*   * 94 128*   * 160 180     Most Recent 3 BMP's:  Recent Labs   Lab Test 11/09/23  0833 07/03/23  0941 12/09/22  0618    140 138   POTASSIUM 3.8 4.1 3.7  3.6   CHLORIDE 101 102 100   CO2 23 22 25   BUN 12.7 12.8 9.6   CR 0.85 0.90 0.71   ANIONGAP 15 16* 13   YANA 9.0 9.6 8.1*   * 100* 105*     Most Recent 2 LFT's:  Recent Labs   Lab Test 11/09/23  0833 07/03/23  0941   AST 59* 39  39   ALT 19 19  19   ALKPHOS 111* 101  101   BILITOTAL 0.9 0.8  0.8   ,   Results for orders placed or performed during the hospital encounter of 11/09/23   Chest XR,  PA & LAT    Narrative    XR CHEST 2 VIEWS 11/9/2023 10:15 AM    HISTORY: chest heaviness, sob    COMPARISON: CT chest 4/12/2018      Impression    IMPRESSION: Thoracic spinal stimulator leads and bilateral total  shoulder arthroplasties. No acute findings. The lungs are clear and  there are no pleural effusions. Normal heart size. Spinal degenerative  changes.    ZAINAB SMITH MD         SYSTEM ID:  [id number]   NM MPI w Lexiscan     Value    Target     Baseline Systolic     Baseline Diastolic BP 81    Last Stress Systolic     Last Stress Diastolic BP 74    Baseline HR 81    Max HR  106    Max Predicted HR  70    Rate Pressure Product  12614.0    Left Ventricular EF 68    Narrative      The nuclear stress test is probably negative for inducible myocardial   ischemia or infarction.    Left ventricular function is normal.    The left ventricular ejection fraction at stress is 68%.    There is no prior study for comparison.         Discharge Medications   Current Discharge Medication List        START taking these medications    Details   albuterol (PROAIR HFA/PROVENTIL HFA/VENTOLIN HFA) 108 (90 Base) MCG/ACT inhaler Inhale 2 puffs into the lungs every 6 hours as needed for shortness of breath, wheezing or cough  Qty: 18 g, Refills: 0    Comments: Pharmacy may dispense brand covered by insurance (Proair, or proventil or ventolin or generic albuterol inhaler)  Associated Diagnoses: Wheezing      lisinopril-hydrochlorothiazide (ZESTORETIC) 10-12.5 MG tablet Take 1 tablet by mouth daily  Qty: 30 tablet, Refills: 3    Associated Diagnoses: Benign essential hypertension           CONTINUE these medications which have NOT CHANGED    Details   cetirizine (ZYRTEC) 10 MG tablet Take 1 tablet by mouth 2 times daily      ferrous sulfate (FE TABS) 325 (65 Fe) MG EC tablet Take 325 mg by mouth daily      lactobacillus rhamnosus, GG, (CULTURELL) capsule Take 1 capsule by mouth daily      montelukast (SINGULAIR) 10 MG tablet Take 1 tablet by mouth every morning      pantoprazole (PROTONIX) 40 MG EC tablet Take 1 tablet by mouth 2 times daily           Allergies   Allergies   Allergen Reactions    Morphine      respiratory distress    Cephalexin Rash

## 2023-11-10 NOTE — PLAN OF CARE
PRIMARY DIAGNOSIS: CHEST PAIN  OUTPATIENT/OBSERVATION GOALS TO BE MET BEFORE DISCHARGE:  1. Ruled out acute coronary syndrome (negative or stable Troponin):  Yes  2. Pain Status: Pain free.  3. Appropriate provocative testing performed: No  - Stress Test Procedure: Lexiscan  - Interpretation of cardiac rhythm per telemetry tech: SR w/ PVCs    4. Cleared by Consultants (if applicable):N/A  5. Return to near baseline physical activity: Yes  Discharge Planner Nurse   Safe discharge environment identified: Yes  Barriers to discharge: Yes       Entered by: Lety Rosales RN 11/10/2023 4:29 AM     Please review provider order for any additional goals.   Nurse to notify provider when observation goals have been met and patient is ready for discharge.  Vitals are Temp: 99  F (37.2  C) Temp src: Oral BP: (!) 152/94 Pulse: 86   Resp: 16 SpO2: 97 %.  Patient is Alert and Oriented x4. They are independent with no assistive devices .  Pt is a Cardiac diet.  They are denying pain.  Patient is Saline locked. Denies chest pain. Denies nausea/dizziness/SOB. Plan is for Lexiscan this morning.

## 2023-11-10 NOTE — CARE PLAN
"Patient is alert and oriented x4. VS WNL and documented on the FS. Lung sounds clear and patient is on RA. Denies SOB. Active bowel sounds. Patient voiding spontaneously. Denies chest pain.Tele in place. Independent in room. Combination no caffeine diet. Trops have been 13 and 14.     Plan: Lexiscan this morning     BP (!) 144/74 (BP Location: Right arm, Patient Position: Semi-Henriquez's, Cuff Size: Adult Regular)   Pulse 79   Temp 98.9  F (37.2  C) (Oral)   Resp 18   Ht 1.575 m (5' 2\")   Wt 69.4 kg (153 lb)   LMP 10/01/2003 (Exact Date)   SpO2 100%   BMI 27.98 kg/m      "

## 2023-11-10 NOTE — CARE PLAN
"PRIMARY DIAGNOSIS: CHEST PAIN  OUTPATIENT/OBSERVATION GOALS TO BE MET BEFORE DISCHARGE:  1. Ruled out acute coronary syndrome (negative or stable Troponin):  Yes  2. Pain Status: Pain free.  3. Appropriate provocative testing performed: N/A  - Stress Test Procedure: Lesiscan Tomorrow  - Interpretation of cardiac rhythm per telemetry tech: NSR 90s    4. Cleared by Consultants (if applicable):No  5. Return to near baseline physical activity: Yes  Discharge Planner Nurse   Safe discharge environment identified: Yes  Barriers to discharge: Yes       Entered by: Vishnu Munoz RN 11/09/2023      Please review provider order for any additional goals.   Nurse to notify provider when observation goals have been met and patient is ready for discharge.  BP (!) 144/80 (BP Location: Right arm)   Pulse 78   Temp 98.2  F (36.8  C) (Oral)   Resp 16   Ht 1.575 m (5' 2\")   Wt 69.4 kg (153 lb)   LMP 10/01/2003 (Exact Date)   SpO2 97%   BMI 27.98 kg/m       Pt alert and oriented x4.VSS on RA.denies pain.Cardiac diet tolerated well.  "

## 2023-11-10 NOTE — PLAN OF CARE
PRIMARY DIAGNOSIS: CHEST PAIN  OUTPATIENT/OBSERVATION GOALS TO BE MET BEFORE DISCHARGE:  1. Ruled out acute coronary syndrome (negative or stable Troponin):  Yes  2. Pain Status: Pain free.  3. Appropriate provocative testing performed: No  - Stress Test Procedure: Lexiscan  - Interpretation of cardiac rhythm per telemetry tech: SR w/ PVCs    4. Cleared by Consultants (if applicable):N/A  5. Return to near baseline physical activity: Yes  Discharge Planner Nurse   Safe discharge environment identified: Yes  Barriers to discharge: Yes       Entered by: Lety Rosales RN 11/10/2023 1:08 AM     Please review provider order for any additional goals.   Nurse to notify provider when observation goals have been met and patient is ready for discharge.  Vitals are Temp: 99  F (37.2  C) Temp src: Oral BP: (!) 152/94 Pulse: 86   Resp: 16 SpO2: 97 %.  Patient is Alert and Oriented x4. They are independent with no assistive devices .  Pt is a Cardiac diet.  They are denying pain.  Patient is Saline locked. Denies chest pain. Denies nausea/dizziness/SOB. Plan is for Lexiscan in the morning.

## 2023-12-07 ENCOUNTER — VIRTUAL VISIT (OUTPATIENT)
Dept: FAMILY MEDICINE | Facility: CLINIC | Age: 68
End: 2023-12-07
Payer: COMMERCIAL

## 2023-12-07 ENCOUNTER — ANCILLARY PROCEDURE (OUTPATIENT)
Dept: BONE DENSITY | Facility: CLINIC | Age: 68
End: 2023-12-07
Attending: INTERNAL MEDICINE
Payer: COMMERCIAL

## 2023-12-07 DIAGNOSIS — I10 BENIGN ESSENTIAL HYPERTENSION: Primary | ICD-10-CM

## 2023-12-07 DIAGNOSIS — J20.9 ACUTE BRONCHITIS, UNSPECIFIED ORGANISM: ICD-10-CM

## 2023-12-07 DIAGNOSIS — Z78.0 ASYMPTOMATIC POSTMENOPAUSAL STATUS: ICD-10-CM

## 2023-12-07 PROCEDURE — 99214 OFFICE O/P EST MOD 30 MIN: CPT | Mod: 95 | Performed by: PHYSICIAN ASSISTANT

## 2023-12-07 PROCEDURE — 77081 DXA BONE DENSITY APPENDICULR: CPT | Mod: TC | Performed by: PHYSICIAN ASSISTANT

## 2023-12-07 PROCEDURE — 77080 DXA BONE DENSITY AXIAL: CPT | Mod: TC | Performed by: PHYSICIAN ASSISTANT

## 2023-12-07 RX ORDER — PREDNISONE 20 MG/1
40 TABLET ORAL DAILY
Qty: 10 TABLET | Refills: 0 | Status: SHIPPED | OUTPATIENT
Start: 2023-12-07 | End: 2023-12-12

## 2023-12-07 ASSESSMENT — ENCOUNTER SYMPTOMS: COUGH: 1

## 2023-12-07 NOTE — PATIENT INSTRUCTIONS
NOT SEEN 
 Take course of prednisone  Schedule nonfasting lab only appointment to check your metabolic panel (electrolytes and kidney function)   Follow up with primary care if no improvement in cough over the next week  Return urgently if any change in symptoms like increasing cough, wheezing, shortness of breath or other change in symptoms.

## 2023-12-07 NOTE — PROGRESS NOTES
"    Instructions Relayed to Patient by Virtual Roomer:     Patient is active on [employer]:   Relayed following to patient: \"It looks like you are active on [employer], are you able to join the visit this way? If not, do you need us to send you a link now or would you like your provider to send a link via text or email when they are ready to initiate the visit?\"    Reminded patient to ensure they were logged on to virtual visit by arrival time listed. Documented in appointment notes if patient had flexibility to initiate visit sooner than arrival time. If pediatric virtual visit, ensured pediatric patient along with parent/guardian will be present for video visit.     Patient offered the website [email or web address] and/or phone number to [employer] Help line: [phone]    Nancy is a 68 year old who is being evaluated via a billable telephone visit.      What phone number would you like to be contacted at? [phone]   How would you like to obtain your AVS? [facility]    Distant Location (provider location):  Off-site    Assessment & Plan     Benign essential hypertension  Blood pressure has been at goal.  Leaving for [place] in less than a month  - Basic metabolic panel  (Ca, Cl, CO2, Creat, Gluc, K, Na, BUN)    Acute bronchitis, unspecified organism  Saw ID in hospital.  No shortness of breath.  Chronic cough worse in AM and at night.  Will treat with course of prednisone   - predniSONE (DELTASONE) 20 MG tablet  Dispense: 10 tablet; Refill: 0      Ordering of each unique test  Prescription drug management  30 minutes spent by me on the date of the encounter doing chart review, history and exam, documentation and further activities per the note     MED REC REQUIRED  Post Medication Reconciliation Status:  Discharge medications reconciled, continue medications without change  Patient Instructions   Take course of prednisone  Schedule nonfasting lab only appointment to check your metabolic panel " (electrolytes and kidney function)   Follow up with primary care if no improvement in cough over the next week  Return urgently if any change in symptoms like increasing cough, wheezing, shortness of breath or other change in symptoms.     Jacinta Lovelace PA-C  [facility] FAITH Cruz is a 68 year old, presenting for the following health issues:  HOSP D/C, Cough, Hypertension, and Results (Bone density test results- [facility] Lab Results (calcium high and createn was down))  - moving to [place], needs to talk about a care plan for refills       12/7/2023    12:47 PM   Additional Questions   Roomed by Randy CÁRDENAS   Accompanied by Self         12/7/2023    12:47 PM   Patient Reported Additional Medications   Patient reports taking the following new medications None       Cough    History of Present Illness       Hypertension: She presents for follow up of hypertension.  She does check blood pressure  regularly outside of the clinic. Outpatient blood pressures have not been over 140/90. She follows a low salt diet.     Reason for visit:  Blood Pressure plus my lab results from [facility] plus my bone density results I had today and I am coughing at night and in the morning    She eats 2-3 servings of fruits and vegetables daily.She consumes 4 sweetened beverage(s) daily.She exercises with enough effort to increase her heart rate 9 or less minutes per day.  She exercises with enough effort to increase her heart rate 3 or less days per week.   She is taking medications regularly.         Hospital Follow-up Visit:    Hospital/Nursing Home/IP Rehab Facility: [facility]  Date of Admission: 11/09/2023  Date of Discharge: 11/10/2023  Reason(s) for Admission: Chest pain and High BP    Was your hospitalization related to COVID-19? No   Problems taking medications regularly:  None  Medication changes since discharge: Lisinopril Hydrochlorothiazide and Inhaler  Problems adhering to  non-medication therapy:  None    Summary of hospitalization:  [facility] hospital discharge summary reviewed  Diagnostic Tests/Treatments reviewed.  Follow up needed: basic metabolic panel   Other Healthcare Providers Involved in Patient s Care:         None  Update since discharge: improved.         Plan of care communicated with patient           Hypertension Follow-up    Do you check your blood pressure regularly outside of the clinic? Yes   Are you following a low salt diet? Yes  Are your blood pressures ever more than 140 on the top number (systolic) OR more   than 90 on the bottom number (diastolic), for example 140/90? Yes    Patient unfamiliar to me with history of alcoholic cirrhosis of liver, ckd, hypertension and recent hospitalization with chest pain presumed to be related to hypertension presents for hospital followup .  PCP retired so doesn't have provider   Coughing like crazy with Clear sputum.  Worse at night and in AM and voice changes at night.  Deep cough.  No shortness of breath.   Never been on prednisone.  On albuterol - thinks she has had bronchitis for 3 weeks. Given Inhaler from hospital.  Wonders if can take 10 mg cetirizine  Blood pressure on Tuesday 124/73 wed 130/? And 125/63 this am   Going to [place] after [place] for the winter   Called to make an appointment with primary care in [facility] and can't get in until1/4/24 - and will be in [place]  Bone density study done and wondering about results   Believes she gets plenty of calcium and vitaminD   Protein drinks every day  Does not have a primary in [place]- does have a     gastroenterologist doctor down there as well.  Has appointment January 5 with gastroenterologist in [place]   Review of Systems   Respiratory:  Positive for cough.       Constitutional, HEENT, cardiovascular, pulmonary, gi and gu systems are negative, except as otherwise noted.      Objective           Vitals:  No vitals were obtained today due to  virtual visit.    Physical Exam   healthy, alert, and no distress  PSYCH: Alert and oriented times 3; coherent speech, normal   rate and volume, able to articulate logical thoughts, able   to abstract reason, no tangential thoughts, no hallucinations   or delusions  Her affect is normal  RESP:dry  cough, no audible wheezing, able to talk in full sentences  Remainder of exam unable to be completed due to telephone visits                Phone call duration: 13 minutes

## 2024-03-04 ENCOUNTER — HOSPITAL ENCOUNTER (OUTPATIENT)
Dept: CT IMAGING | Facility: CLINIC | Age: 69
Discharge: HOME | End: 2024-03-04
Attending: INTERNAL MEDICINE | Admitting: INTERNAL MEDICINE
Payer: COMMERCIAL

## 2024-03-04 DIAGNOSIS — R91.1 SOLITARY PULMONARY NODULE: ICD-10-CM

## 2024-03-04 PROCEDURE — 71250 CT THORAX DX C-: CPT

## 2024-03-06 ENCOUNTER — TRANSFERRED RECORDS (OUTPATIENT)
Dept: HEALTH INFORMATION MANAGEMENT | Facility: CLINIC | Age: 69
End: 2024-03-06

## 2024-05-07 ENCOUNTER — HOSPITAL ENCOUNTER (OUTPATIENT)
Dept: ULTRASOUND IMAGING | Facility: CLINIC | Age: 69
Discharge: HOME | End: 2024-05-07
Attending: PHYSICIAN ASSISTANT | Admitting: PHYSICIAN ASSISTANT
Payer: COMMERCIAL

## 2024-05-07 DIAGNOSIS — K70.30 ALCOHOLIC CIRRHOSIS OF LIVER (H): ICD-10-CM

## 2024-05-07 PROCEDURE — 76705 ECHO EXAM OF ABDOMEN: CPT

## 2024-05-16 ENCOUNTER — HOSPITAL ENCOUNTER (OUTPATIENT)
Dept: MAMMOGRAPHY | Facility: CLINIC | Age: 69
Discharge: HOME | End: 2024-05-16
Attending: INTERNAL MEDICINE | Admitting: INTERNAL MEDICINE
Payer: COMMERCIAL

## 2024-05-16 DIAGNOSIS — Z12.31 VISIT FOR SCREENING MAMMOGRAM: ICD-10-CM

## 2024-05-16 PROCEDURE — 77063 BREAST TOMOSYNTHESIS BI: CPT

## 2024-06-22 NOTE — PROGRESS NOTES
Patient Transfer Information    Patient tolerated transfer: Yes    Patient connected to monitoring equipment on arrival (if yes, what equipment): Yes: Bed alarm, bed lowered, call light given to patient.      Safety equipment at bedside (if applicable): Yes Bed alarm, call light, Tele phone given to patient.      Patient connected to wall oxygen on arrival (if applicable): No - patient on Room air.  Wall oxygen is set should patient be needing it in future.     Belongings: Transferred with patient    Report received from  Bijal RICE RN.   This RN was not sure who physically transported the patient because this RN was admitting a new patient at the same time this patient was transferred to the room.  (transporter) physically handed patient off to receiving RN: No -RN was busy admitting a new patient that transferred from post partum. Support staff Memo Guzman  received this patient to the room.     I have reviewed the Medications, Allergies, Past Medical and Surgical History, and Social History in the Epic system. I have reviewed pending test results and orders. No further questions at this time.      *See flowsheets for vital signs and focused assessment of admission/transfer*        22-Jun-2024 23:52

## 2024-07-18 ENCOUNTER — OFFICE VISIT (OUTPATIENT)
Dept: INTERNAL MEDICINE | Facility: CLINIC | Age: 69
End: 2024-07-18
Payer: COMMERCIAL

## 2024-07-18 VITALS
WEIGHT: 168.3 LBS | DIASTOLIC BLOOD PRESSURE: 77 MMHG | TEMPERATURE: 98.5 F | BODY MASS INDEX: 30.97 KG/M2 | SYSTOLIC BLOOD PRESSURE: 161 MMHG | HEIGHT: 62 IN | HEART RATE: 89 BPM | OXYGEN SATURATION: 97 % | RESPIRATION RATE: 14 BRPM

## 2024-07-18 DIAGNOSIS — J30.89 SEASONAL ALLERGIC RHINITIS DUE TO OTHER ALLERGIC TRIGGER: ICD-10-CM

## 2024-07-18 DIAGNOSIS — K21.9 GASTROESOPHAGEAL REFLUX DISEASE WITHOUT ESOPHAGITIS: ICD-10-CM

## 2024-07-18 DIAGNOSIS — I10 BENIGN ESSENTIAL HYPERTENSION: Primary | ICD-10-CM

## 2024-07-18 DIAGNOSIS — N18.31 STAGE 3A CHRONIC KIDNEY DISEASE (H): ICD-10-CM

## 2024-07-18 DIAGNOSIS — I78.1 TELANGIECTASIA OF FACE: ICD-10-CM

## 2024-07-18 DIAGNOSIS — K70.31 ALCOHOLIC CIRRHOSIS OF LIVER WITH ASCITES (H): ICD-10-CM

## 2024-07-18 PROBLEM — F10.20 UNCOMPLICATED ALCOHOL DEPENDENCE (H): Status: RESOLVED | Noted: 2022-12-15 | Resolved: 2024-07-18

## 2024-07-18 PROCEDURE — 82043 UR ALBUMIN QUANTITATIVE: CPT | Performed by: INTERNAL MEDICINE

## 2024-07-18 PROCEDURE — 80048 BASIC METABOLIC PNL TOTAL CA: CPT | Performed by: INTERNAL MEDICINE

## 2024-07-18 PROCEDURE — 82570 ASSAY OF URINE CREATININE: CPT | Performed by: INTERNAL MEDICINE

## 2024-07-18 PROCEDURE — 99214 OFFICE O/P EST MOD 30 MIN: CPT | Performed by: INTERNAL MEDICINE

## 2024-07-18 PROCEDURE — 36415 COLL VENOUS BLD VENIPUNCTURE: CPT | Performed by: INTERNAL MEDICINE

## 2024-07-18 RX ORDER — LISINOPRIL/HYDROCHLOROTHIAZIDE 10-12.5 MG
1 TABLET ORAL DAILY
Qty: 90 TABLET | Refills: 3 | Status: SHIPPED | OUTPATIENT
Start: 2024-07-18

## 2024-07-18 NOTE — PATIENT INSTRUCTIONS
elangiectasias are common lesions that present as vascular dilatations (0.1 to 1 mm in diameter) that are visible on the skin Telangiectasias occur spontaneously or arise in the setting of other conditions, such as cutaneous photodamage, rosacea, connective tissue or liver disease, radiation, hereditary hemorrhagic telangiectasia, and long-term topical corticosteroid therapy. Patients with numerous telangiectasias on the face frequently present with a complaint of facial redness.  Telangiectasias and secondary facial redness do not require treatment, but lesions that are cosmetically distressing for patients can be removed with electrocautery or lasers

## 2024-07-18 NOTE — PROGRESS NOTES
"  Assessment & Plan     Benign essential hypertension  Blood pressure readings elevated in office. Above target, target of <140/90. Has been out of lisinopril-hydrochlorothiazide for some time. Resume medication. Monitor blood pressure at home. Update electrolyte/creatinine check.  - lisinopril-hydrochlorothiazide (ZESTORETIC) 10-12.5 MG tablet; Take 1 tablet by mouth daily  - Basic metabolic panel; Future  - Basic metabolic panel    Alcoholic cirrhosis of liver with ascites (H)  Stable,continues to follow with GI     Stage 3a chronic kidney disease (H)  Labs have been stable.understands on the limitation of NSAID intake for pain symptoms due to ckd.  - Albumin Random Urine Quantitative with Creat Ratio; Future  - Albumin Random Urine Quantitative with Creat Ratio    Gastroesophageal reflux disease without esophagitis  Symptoms stable, continue on pantoprazole.    Seasonal allergic rhinitis due to other allergic trigger  Use of cetrizine medication daily during periods of increased seasonal allergies.    Telangiectasia of face  Presence of telangiectasia lesion at the forehead area-around 1 mm in diameter.  Nontender and asymptomatic.  Discussed with the patient that telangiectasia lesions may occur due to multiple conditions with one of them being underlying liver disease.  Recommendation to continue with monitoring symptoms since the current lesion is solitary and asymptomatic.    BMI  Estimated body mass index is 30.78 kg/m  as calculated from the following:    Height as of this encounter: 1.575 m (5' 2\").    Weight as of this encounter: 76.3 kg (168 lb 4.8 oz).             Anna Cruz is a 68 year old, presenting for the following health issues:  Hypertension, Imm/Inj (Want Hepatitis Vaccine.), Mass (Check bump on forehead.), and Weight Loss (Discuss weight loss diet pill.)      7/18/2024    12:53 PM   Additional Questions   Roomed by Laura Bailey MA   Accompanied by Self     History of Present Illness " "      Reason for visit:  Hepatitis shots, blood pressure,  lose weight with pills, go over my mamogram    She eats 2-3 servings of fruits and vegetables daily.She consumes 0 sweetened beverage(s) daily.She exercises with enough effort to increase her heart rate 20 to 29 minutes per day.  She exercises with enough effort to increase her heart rate 5 days per week.   She is taking medications regularly.         Hypertension Follow-up    Do you check your blood pressure regularly outside of the clinic? No   Are you following a low salt diet? No  Are your blood pressures ever more than 140 on the top number (systolic) OR more   than 90 on the bottom number (diastolic), for example 140/90? Yes  How many servings of fruits and vegetables do you eat daily?  4 or more  On average, how many sweetened beverages do you drink each day (Examples: soda, juice, sweet tea, etc.  Do NOT count diet or artificially sweetened beverages)?   0  How many days per week do you exercise enough to make your heart beat faster? 7  How many minutes a day do you exercise enough to make your heart beat faster? 30 - 60  How many days per week do you miss taking your medication? She was out of her blood pressure for a while.  What makes it hard for you to take your medications?   Out of medication and was hard to get in for a appointment.        Review of Systems  Constitutional, HEENT, cardiovascular, pulmonary, gi and gu systems are negative, except as otherwise noted.      Objective    BP (!) 161/77 (BP Location: Right arm, Patient Position: Sitting, Cuff Size: Adult Large)   Pulse 89   Temp 98.5  F (36.9  C) (Oral)   Resp 14   Ht 1.575 m (5' 2\")   Wt 76.3 kg (168 lb 4.8 oz)   LMP 10/01/2003 (Exact Date)   SpO2 97%   BMI 30.78 kg/m    Body mass index is 30.78 kg/m .  Physical Exam   GENERAL: alert and no distress  RESP: lungs clear to auscultation - no rales, rhonchi or wheezes  CV: regular rate and rhythm, normal S1 S2  MS: no gross " musculoskeletal defects noted, no edema  NEURO: Normal strength and tone, mentation intact and speech normal  PSYCH: mentation appears normal, affect normal.            Signed Electronically by: Lucille Miller MD

## 2024-07-19 LAB
ANION GAP SERPL CALCULATED.3IONS-SCNC: 13 MMOL/L (ref 7–15)
BUN SERPL-MCNC: 10.1 MG/DL (ref 8–23)
CALCIUM SERPL-MCNC: 9.2 MG/DL (ref 8.8–10.4)
CHLORIDE SERPL-SCNC: 98 MMOL/L (ref 98–107)
CREAT SERPL-MCNC: 0.9 MG/DL (ref 0.51–0.95)
CREAT UR-MCNC: 353 MG/DL
EGFRCR SERPLBLD CKD-EPI 2021: 69 ML/MIN/1.73M2
GLUCOSE SERPL-MCNC: 105 MG/DL (ref 70–99)
HCO3 SERPL-SCNC: 26 MMOL/L (ref 22–29)
MICROALBUMIN UR-MCNC: 199 MG/L
MICROALBUMIN/CREAT UR: 56.37 MG/G CR (ref 0–25)
POTASSIUM SERPL-SCNC: 4.6 MMOL/L (ref 3.4–5.3)
SODIUM SERPL-SCNC: 137 MMOL/L (ref 135–145)

## 2024-07-22 ENCOUNTER — TELEPHONE (OUTPATIENT)
Dept: INTERNAL MEDICINE | Facility: CLINIC | Age: 69
End: 2024-07-22
Payer: COMMERCIAL

## 2024-07-22 NOTE — TELEPHONE ENCOUNTER
Patient Quality Outreach    Patient is due for the following:   Physical Annual Wellness Visit    Next Steps:   Schedule a Annual Wellness Visit    Type of outreach:    Sent [employer] message.      Questions for provider review:    None           Ana Engle MA

## 2024-07-24 ENCOUNTER — TELEPHONE (OUTPATIENT)
Dept: INTERNAL MEDICINE | Facility: CLINIC | Age: 69
End: 2024-07-24
Payer: COMMERCIAL

## 2024-07-24 NOTE — TELEPHONE ENCOUNTER
Request patient make appointment to further discuss this.   Please let patient know that I do not normally prescribe opioids, I send my patients to pain clinic for escalated pain management.thank you.

## 2024-07-24 NOTE — TELEPHONE ENCOUNTER
Patient calls, state she is wondering what medications she is able to take with the arthritis pain she is having with her lower back. She is concerned as she has Cirrhosis of the Liver. Patient states she previously has taken Tramadol with good success.   Forwarding to PCP

## 2024-07-26 NOTE — TELEPHONE ENCOUNTER
Called and spoke with patient. Relayed Dr Miller's message. Patient stated she has a pain clinic in [place] and will contact them for the tramadol

## 2024-08-04 ENCOUNTER — HEALTH MAINTENANCE LETTER (OUTPATIENT)
Age: 69
End: 2024-08-04

## 2024-10-03 ENCOUNTER — HOSPITAL ENCOUNTER (EMERGENCY)
Facility: CLINIC | Age: 69
Discharge: HOME | End: 2024-10-03
Attending: EMERGENCY MEDICINE | Admitting: EMERGENCY MEDICINE
Payer: COMMERCIAL

## 2024-10-03 ENCOUNTER — APPOINTMENT (OUTPATIENT)
Dept: GENERAL RADIOLOGY | Facility: CLINIC | Age: 69
End: 2024-10-03
Attending: EMERGENCY MEDICINE
Payer: COMMERCIAL

## 2024-10-03 ENCOUNTER — PATIENT OUTREACH (OUTPATIENT)
Dept: INTERNAL MEDICINE | Facility: CLINIC | Age: 69
End: 2024-10-03

## 2024-10-03 VITALS
BODY MASS INDEX: 30.54 KG/M2 | SYSTOLIC BLOOD PRESSURE: 116 MMHG | OXYGEN SATURATION: 97 % | WEIGHT: 167 LBS | RESPIRATION RATE: 18 BRPM | DIASTOLIC BLOOD PRESSURE: 68 MMHG | HEART RATE: 94 BPM | TEMPERATURE: 97.8 F

## 2024-10-03 DIAGNOSIS — J06.9 VIRAL UPPER RESPIRATORY TRACT INFECTION: ICD-10-CM

## 2024-10-03 LAB
FLUAV RNA SPEC QL NAA+PROBE: NEGATIVE
FLUBV RNA RESP QL NAA+PROBE: NEGATIVE
RSV RNA SPEC NAA+PROBE: NEGATIVE
SARS-COV-2 RNA RESP QL NAA+PROBE: NEGATIVE

## 2024-10-03 PROCEDURE — 94640 AIRWAY INHALATION TREATMENT: CPT

## 2024-10-03 PROCEDURE — 250N000013 HC RX MED GY IP 250 OP 250 PS 637: Performed by: EMERGENCY MEDICINE

## 2024-10-03 PROCEDURE — 99284 EMERGENCY DEPT VISIT MOD MDM: CPT | Mod: 25

## 2024-10-03 PROCEDURE — 87637 SARSCOV2&INF A&B&RSV AMP PRB: CPT | Performed by: EMERGENCY MEDICINE

## 2024-10-03 PROCEDURE — 71046 X-RAY EXAM CHEST 2 VIEWS: CPT

## 2024-10-03 RX ORDER — GUAIFENESIN/DEXTROMETHORPHAN 100-10MG/5
5 SYRUP ORAL 4 TIMES DAILY PRN
Qty: 118 ML | Refills: 0 | Status: SHIPPED | OUTPATIENT
Start: 2024-10-03

## 2024-10-03 RX ORDER — BENZONATATE 200 MG/1
200 CAPSULE ORAL 3 TIMES DAILY PRN
Qty: 15 CAPSULE | Refills: 0 | Status: SHIPPED | OUTPATIENT
Start: 2024-10-03 | End: 2024-10-08

## 2024-10-03 RX ORDER — ACETAMINOPHEN 325 MG/1
325 TABLET ORAL ONCE
Status: COMPLETED | OUTPATIENT
Start: 2024-10-03 | End: 2024-10-03

## 2024-10-03 RX ORDER — ALBUTEROL SULFATE 90 UG/1
2 INHALANT RESPIRATORY (INHALATION) ONCE
Status: COMPLETED | OUTPATIENT
Start: 2024-10-03 | End: 2024-10-03

## 2024-10-03 RX ADMIN — ACETAMINOPHEN 325 MG: 325 TABLET, FILM COATED ORAL at 07:30

## 2024-10-03 RX ADMIN — ALBUTEROL SULFATE 2 PUFF: 108 INHALANT RESPIRATORY (INHALATION) at 07:30

## 2024-10-03 ASSESSMENT — ACTIVITIES OF DAILY LIVING (ADL)
ADLS_ACUITY_SCORE: 37

## 2024-10-03 ASSESSMENT — COLUMBIA-SUICIDE SEVERITY RATING SCALE - C-SSRS
1. IN THE PAST MONTH, HAVE YOU WISHED YOU WERE DEAD OR WISHED YOU COULD GO TO SLEEP AND NOT WAKE UP?: NO
2. HAVE YOU ACTUALLY HAD ANY THOUGHTS OF KILLING YOURSELF IN THE PAST MONTH?: NO
6. HAVE YOU EVER DONE ANYTHING, STARTED TO DO ANYTHING, OR PREPARED TO DO ANYTHING TO END YOUR LIFE?: NO

## 2024-10-03 NOTE — ED PROVIDER NOTES
Emergency Department Note      History of Present Illness     Chief Complaint   Flu Symptoms    HPI   Krystyna Peña is a 69-year-old female here with her  for evaluation of fever rhinorrhea sinus congestion sore throat now cough and shortness of breath associated with bodyaches chills over the last 2 days.      recently sick with a respiratory tract infectious syndrome.     No vomiting or diarrhea.  No new skin rash.  No dysuria frequency urgency    Independent Historian   None    Review of External Notes       Past Medical History     Medical History and Problem List   Arthritis  Colon neoplasm  GERD  Hypertension  MDD  Varicose veins    Medications   albuterol (PROAIR HFA/PROVENTIL HFA/VENTOLIN HFA) 108 (90 Base) MCG/ACT inhaler  cetirizine (ZYRTEC) 10 MG tablet  lactobacillus rhamnosus, GG, (CULTURELL) capsule  lisinopril-hydrochlorothiazide (ZESTORETIC) 10-12.5 MG tablet  pantoprazole (PROTONIX) 40 MG EC tablet    Surgical History   Amputate left toes  R DAE with revision  Correct bilateral bunions  Cervical epidural  Bilateral shoulder arthroplasty  Vascular surgery  L TKA  Hernia repair    Physical Exam     Patient Vitals for the past 24 hrs:   BP Temp Temp src Pulse Resp SpO2 Weight   10/03/24 0630 116/68 97.8  F (36.6  C) Temporal 94 18 97 % 75.8 kg (167 lb)     Physical Exam  HEENT: Oropharynx without significant erythema, hypertrophy or exudates  Neck: No stridor  CV: ppi, regular   Resp: Lungs clear without wheezing rhonchi or rales  Skin: warm dry well perfused  Neuro: Alert, no gross motor or sensory deficits    Diagnostics     Lab Results   Labs Ordered and Resulted from Time of ED Arrival to Time of ED Departure   INFLUENZA A/B, RSV, & SARS-COV2 PCR - Normal       Result Value    Influenza A PCR Negative      Influenza B PCR Negative      RSV PCR Negative      SARS CoV2 PCR Negative       Imaging   XR Chest 2 Views   Preliminary Result   IMPRESSION: No significant interval change.  Thoracic spinal stimulator   leads. Bilateral shoulder arthroplasties. Lungs clear. No pleural   effusions.        Independent Interpretation   hest radiograph to my read shows no evidence of pneumothorax, pleural effusion, concerning cardiomegaly or vascular congestion, no sizable lobar pneumonia.     ED Course      Medications Administered   Medications   albuterol (PROVENTIL HFA/VENTOLIN HFA) inhaler (2 puffs Inhalation $Given 10/3/24 0730)   acetaminophen (TYLENOL) tablet 325 mg (325 mg Oral $Given 10/3/24 0730)     Discussion of Management   None    ED Course   ED Course as of 10/03/24 0753   Thu Oct 03, 2024   0710 I obtained history and examined the patient as noted above.      0745 I rechecked the patient. I discussed findings and discharge with the patient. All questions answered.        Additional Documentation  None    Medical Decision Making / Diagnosis     CMS Diagnoses: None    MIPS   None    [facility]   Krystyna HUMPHREYS White is a 69 year old female presenting with respiratory tract infectious symptoms.  No exudates pharyngitis, chest radiograph no evidence of lobar pneumonia.  No significant respiratory distress.  Likely viral in origin.  Discharged home with supportive management. Patient comfortable and agreeable with the plan.    Disposition   The patient was discharged.     Diagnosis     ICD-10-CM    1. Viral upper respiratory tract infection  J06.9          Discharge Medications   New Prescriptions    BENZONATATE (TESSALON) 200 MG CAPSULE    Take 1 capsule (200 mg) by mouth 3 times daily as needed for cough.    GUAIFENESIN-DEXTROMETHORPHAN (ROBITUSSIN DM) 100-10 MG/5ML SYRUP    Take 5 mLs by mouth 4 times daily as needed for cough.     Scribe Disclosure:  SUSANNAH, Srinivasan Deutsch, am serving as a scribe at 7:21 AM on 10/3/2024 to document services personally performed by Willy Rowell MD based on my observations and the provider's statements to me.        Willy Rowell MD  10/03/24 0821

## 2024-10-03 NOTE — DISCHARGE INSTRUCTIONS
You can use Tylenol for fever, body aches, discomfort.  For the next 3 to 5 days if you need additional fever/pain control you can use 400 mg of ibuprofen every 6 hours as needed.    Albuterol inhaler you can use 2 puffs every 4 hours as needed for cough or shortness of breath

## 2024-10-04 NOTE — TELEPHONE ENCOUNTER
"ED / Discharge Outreach Protocol    Patient Contact    Attempt # 2    Was call answered?  Yes.  \"May I please speak with <patient name>\"  Is patient available?   Yes    Patient requesting antibiotics from primary care provider. Patient declined follow up visit.       Transitions of Care Outreach  Chief Complaint   Patient presents with    Hospital F/U     Viral URI       Most Recent Admission Date: 10/3/2024   Most Recent Admission Diagnosis:      Most Recent Discharge Date: 10/3/2024   Most Recent Discharge Diagnosis: Viral upper respiratory tract infection - J06.9     Transitions of Care Assessment    Discharge Assessment  How are your symptoms? (Red Flag symptoms escalate to triage hotline per guidelines): Worsening  Do you know how to contact your clinic care team if you have future questions or changes to your health status? : Yes  Does the patient have their discharge instructions? : Yes  Does the patient have questions regarding their discharge instructions? : No  Were you started on any new medications or were there changes to any of your previous medications? : Yes (Tessalon, Robitussin DM)  Does the patient have all of their medications?: Yes  Do you have questions regarding any of your medications? : No  Do you have all of your needed medical supplies or equipment (DME)?  (i.e. oxygen tank, CPAP, cane, etc.): Yes    Follow up Plan     Discharge Follow-Up  Discharge follow up appointment scheduled in alignment with recommended follow up timeframe or Transitions of Risk Category? (Low = within 30 days; Moderate= within 14 days; High= within 7 days): No  Patient's follow up appointment not scheduled: Patient declined scheduling support. Education on the importance of transitions of care follow up. Provided scheduling phone number.    Future Appointments   Date Time Provider Department Center   11/8/2024 11:00 AM [facility]       Outpatient Plan as outlined on AVS reviewed with patient.    For any " urgent concerns, please contact our 24 hour nurse triage line: [phone] ([phone])       Alysa Olivera RN

## 2024-10-05 NOTE — TELEPHONE ENCOUNTER
Based on ED evaluation, symptoms most likely due to viral infection. If symptoms do not improve, to follow up and antibiotics can be prescribed only if appropriate.

## 2024-10-07 NOTE — TELEPHONE ENCOUNTER
Patient returned call.  Has a humidifier now and seems a little better, but is thinking she would like to discuss getting an antibiotic.        Advised patient should be seen to evaluate her.  Patient stated I don't want to come into the clinic.      Scheduled patient for a telephone visit with Marisela for tomorrow.  Patient wondering if she could do a Telephone visit(doesn't have a smart phone)?      She will come in if provider is requiring it.      Scheduled appointment  Next 5 appointments (look out 90 days)      Oct 08, 2024 1:30 PM  Provider Visit with JOCELYN Garcia CNP  [facility] ([facility] ) [address]  [address]  [facility] [address] [phone]

## 2024-10-07 NOTE — TELEPHONE ENCOUNTER
Called patient and left message to call the clinic back.     Upon call back, please relay provider message below.  Thanks!    Per Dr. Miller:  Based on ED evaluation, symptoms most likely due to viral infection. If symptoms do not improve, to follow up and antibiotics can be prescribed only if appropriate.

## 2024-10-08 ENCOUNTER — VIRTUAL VISIT (OUTPATIENT)
Dept: INTERNAL MEDICINE | Facility: CLINIC | Age: 69
End: 2024-10-08
Payer: COMMERCIAL

## 2024-10-08 DIAGNOSIS — J22 LOWER RESPIRATORY INFECTION: Primary | ICD-10-CM

## 2024-10-08 DIAGNOSIS — R06.2 WHEEZING: ICD-10-CM

## 2024-10-08 PROCEDURE — 99213 OFFICE O/P EST LOW 20 MIN: CPT | Mod: 95

## 2024-10-08 RX ORDER — BENZONATATE 200 MG/1
200 CAPSULE ORAL 3 TIMES DAILY PRN
Qty: 15 CAPSULE | Refills: 0 | Status: SHIPPED | OUTPATIENT
Start: 2024-10-08

## 2024-10-08 RX ORDER — ALBUTEROL SULFATE 90 UG/1
2 INHALANT RESPIRATORY (INHALATION) EVERY 6 HOURS PRN
Qty: 18 G | Refills: 0 | Status: SHIPPED | OUTPATIENT
Start: 2024-10-08

## 2024-10-08 RX ORDER — PREDNISONE 10 MG/1
TABLET ORAL
Qty: 20 TABLET | Refills: 0 | Status: SHIPPED | OUTPATIENT
Start: 2024-10-08

## 2024-10-08 NOTE — PROGRESS NOTES
"Nancy is a 69 year old who is being evaluated via a billable video visit.    What phone number would you like to be contacted at? [phone]  How would you like to obtain your AVS? MyChart      Assessment & Plan     (J22) Lower respiratory infection  (primary encounter diagnosis)  Comment: Pt reports that her coughing and voice gets worse in the evening. Pt reports coughing up white secretions. Pt denies any MURPHY pain, pt reports nasal congestion in the morning.   Plan: benzonatate (TESSALON) 200 MG capsule,         predniSONE (DELTASONE) 10 MG tablet        Prescription sent to pharmacy      (R06.2) Wheezing  Comment: pt experiencing wheezing and upper airway constriction that the inhaler facilitates bronchodilation so she can cough up the secretions  Plan: albuterol (PROAIR HFA/PROVENTIL HFA/VENTOLIN         HFA) 108 (90 Base) MCG/ACT inhaler, predniSONE         (DELTASONE) 10 MG tablet        Prescription sent to pharmacy          BMI  Estimated body mass index is 30.54 kg/m  as calculated from the following:    Height as of 7/18/24: 1.575 m (5' 2\").    Weight as of 10/3/24: 75.8 kg (167 lb).             Subjective   Nancy is a 69 year old, presenting for the following health issues: Pt reports her  has a virus that she also has that causes her to cough and takes robitussin and benzonatate for symptomatic relief. Pt uses Vicks vaporizer. Pt reporting that she has been having for 7 days. Pt reports that her coughing and voice gets worse in the evening. Pt reports coughing up white secretions. Pt denies any MURPHY pain, pt reports nasal congestion in the morning. Pt denies any SOB. Pt states that deep breathing stimulates coughing. Pt reports feeling cold, but no fevers. Pt reports a hoarse throat.     Pt insists on wanting an antibiotic. I explained to her that if her symptoms are persisting and are still present in 5-7 days to reach out to me and I will prescribe an antibiotic. Discussed taking prednisone to " open up her bronchioles so she can cough up the secretions more effectively. Instructed pt to take in the morning with food to reduce jitteriness and insomnia.  RECHECK ([facility] ER follow up.)      10/8/2024    10:29 AM   Additional Questions   Roomed by Laura Bailey MA   Accompanied by Self     HPI     ED/UC Followup:    Facility:  [facility]  Date of visit: 10/3/24  Reason for visit: Viral upper respiratory tract infection  Current Status: She feels pretty good during the day & more tired during the night        Review of Systems  Constitutional, HEENT, cardiovascular, pulmonary, gi and gu systems are negative, except as otherwise noted.      Objective           Vitals:  No vitals were obtained today due to virtual visit.    Physical Exam   GENERAL: alert and no distress  RESP: No audible wheeze, cough, or visible cyanosis.    NEURO: Cranial nerves grossly intact.  Mentation and speech appropriate for age.  PSYCH: Appropriate affect, tone, and pace of words          Video-Visit Details    Type of service:  Video Visit   Originating Location (pt. Location): Home    Distant Location (provider location):  On-site  Platform used for Video Visit: Carol Ann  Signed Electronically by: JOCELYN Fuller CNP

## 2024-10-14 ENCOUNTER — TELEPHONE (OUTPATIENT)
Dept: INTERNAL MEDICINE | Facility: CLINIC | Age: 69
End: 2024-10-14
Payer: COMMERCIAL

## 2024-10-14 NOTE — TELEPHONE ENCOUNTER
Patient calling to see when she can get the Covid and Flu vaccinations.    Advised when her symptoms from recent URI are improved and patient feeling better.  Patient verbalized understanding.

## 2024-11-04 ENCOUNTER — TELEPHONE (OUTPATIENT)
Dept: INTERNAL MEDICINE | Facility: CLINIC | Age: 69
End: 2024-11-04
Payer: COMMERCIAL

## 2024-11-04 ENCOUNTER — TRANSFERRED RECORDS (OUTPATIENT)
Dept: HEALTH INFORMATION MANAGEMENT | Facility: CLINIC | Age: 69
End: 2024-11-04
Payer: COMMERCIAL

## 2024-11-04 DIAGNOSIS — K70.31 ALCOHOLIC CIRRHOSIS OF LIVER WITH ASCITES (H): Primary | ICD-10-CM

## 2024-11-04 NOTE — TELEPHONE ENCOUNTER
Pt calling asking for a different referral for hepatology in [place] that is apart of [facility] hepatology clinic   Clinic phone # [phone]   Please review and advise     Thank you     Breanna Smith RN, BSN  [facility]

## 2024-11-07 ENCOUNTER — TRANSFERRED RECORDS (OUTPATIENT)
Dept: HEALTH INFORMATION MANAGEMENT | Facility: CLINIC | Age: 69
End: 2024-11-07
Payer: COMMERCIAL

## 2024-11-08 ENCOUNTER — HOSPITAL ENCOUNTER (OUTPATIENT)
Dept: ULTRASOUND IMAGING | Facility: CLINIC | Age: 69
Discharge: HOME | End: 2024-11-08
Attending: PHYSICIAN ASSISTANT | Admitting: PHYSICIAN ASSISTANT
Payer: COMMERCIAL

## 2024-11-08 DIAGNOSIS — R63.5 WEIGHT GAIN: ICD-10-CM

## 2024-11-08 DIAGNOSIS — K70.30 ALCOHOLIC CIRRHOSIS OF LIVER WITHOUT ASCITES (H): ICD-10-CM

## 2024-11-08 PROCEDURE — 76705 ECHO EXAM OF ABDOMEN: CPT

## 2024-11-11 ENCOUNTER — APPOINTMENT (OUTPATIENT)
Dept: CT IMAGING | Facility: CLINIC | Age: 69
End: 2024-11-11
Attending: EMERGENCY MEDICINE
Payer: COMMERCIAL

## 2024-11-11 ENCOUNTER — HOSPITAL ENCOUNTER (EMERGENCY)
Facility: CLINIC | Age: 69
Discharge: HOME | End: 2024-11-11
Attending: EMERGENCY MEDICINE | Admitting: EMERGENCY MEDICINE
Payer: COMMERCIAL

## 2024-11-11 VITALS
HEART RATE: 94 BPM | WEIGHT: 173 LBS | HEIGHT: 62 IN | OXYGEN SATURATION: 99 % | TEMPERATURE: 97.2 F | DIASTOLIC BLOOD PRESSURE: 62 MMHG | RESPIRATION RATE: 16 BRPM | BODY MASS INDEX: 31.83 KG/M2 | SYSTOLIC BLOOD PRESSURE: 127 MMHG

## 2024-11-11 DIAGNOSIS — E83.42 HYPOMAGNESEMIA: ICD-10-CM

## 2024-11-11 DIAGNOSIS — E87.1 HYPONATREMIA: ICD-10-CM

## 2024-11-11 DIAGNOSIS — K70.30 ALCOHOLIC CIRRHOSIS, UNSPECIFIED WHETHER ASCITES PRESENT (H): Primary | ICD-10-CM

## 2024-11-11 DIAGNOSIS — R91.1 NODULE OF APEX OF RIGHT LUNG: ICD-10-CM

## 2024-11-11 DIAGNOSIS — N17.9 AKI (ACUTE KIDNEY INJURY) (H): ICD-10-CM

## 2024-11-11 DIAGNOSIS — R14.0 ABDOMINAL DISTENTION: ICD-10-CM

## 2024-11-11 LAB
ALBUMIN SERPL BCG-MCNC: 4 G/DL (ref 3.5–5.2)
ALBUMIN UR-MCNC: 10 MG/DL
ALP SERPL-CCNC: 125 U/L (ref 40–150)
ALT SERPL W P-5'-P-CCNC: 32 U/L (ref 0–50)
ANION GAP SERPL CALCULATED.3IONS-SCNC: 17 MMOL/L (ref 7–15)
ANION GAP SERPL CALCULATED.3IONS-SCNC: 18 MMOL/L (ref 7–15)
APPEARANCE UR: ABNORMAL
AST SERPL W P-5'-P-CCNC: 75 U/L (ref 0–45)
AST SERPL W P-5'-P-CCNC: ABNORMAL U/L
ATRIAL RATE - MUSE: 86 BPM
BACTERIA #/AREA URNS HPF: ABNORMAL /HPF
BASOPHILS # BLD AUTO: 0 10E3/UL (ref 0–0.2)
BASOPHILS NFR BLD AUTO: 0 %
BILIRUB DIRECT SERPL-MCNC: 0.5 MG/DL (ref 0–0.3)
BILIRUB SERPL-MCNC: 1.4 MG/DL
BILIRUB UR QL STRIP: NEGATIVE
BUN SERPL-MCNC: 16.1 MG/DL (ref 8–23)
BUN SERPL-MCNC: 16.9 MG/DL (ref 8–23)
CALCIUM SERPL-MCNC: 8.3 MG/DL (ref 8.8–10.4)
CALCIUM SERPL-MCNC: 8.4 MG/DL (ref 8.8–10.4)
CHLORIDE SERPL-SCNC: 91 MMOL/L (ref 98–107)
CHLORIDE SERPL-SCNC: 92 MMOL/L (ref 98–107)
COLOR UR AUTO: YELLOW
CREAT SERPL-MCNC: 1.11 MG/DL (ref 0.51–0.95)
CREAT SERPL-MCNC: 1.2 MG/DL (ref 0.51–0.95)
DIASTOLIC BLOOD PRESSURE - MUSE: NORMAL MMHG
EGFRCR SERPLBLD CKD-EPI 2021: 48 ML/MIN/1.73M2
EGFRCR SERPLBLD CKD-EPI 2021: 53 ML/MIN/1.73M2
EOSINOPHIL # BLD AUTO: 0 10E3/UL (ref 0–0.7)
EOSINOPHIL NFR BLD AUTO: 1 %
ERYTHROCYTE [DISTWIDTH] IN BLOOD BY AUTOMATED COUNT: 14 % (ref 10–15)
FLUAV RNA SPEC QL NAA+PROBE: NEGATIVE
FLUBV RNA RESP QL NAA+PROBE: NEGATIVE
GLUCOSE SERPL-MCNC: 92 MG/DL (ref 70–99)
GLUCOSE SERPL-MCNC: 97 MG/DL (ref 70–99)
GLUCOSE UR STRIP-MCNC: NEGATIVE MG/DL
HCO3 SERPL-SCNC: 20 MMOL/L (ref 22–29)
HCO3 SERPL-SCNC: 20 MMOL/L (ref 22–29)
HCT VFR BLD AUTO: 33.8 % (ref 35–47)
HGB BLD-MCNC: 11.6 G/DL (ref 11.7–15.7)
HGB UR QL STRIP: NEGATIVE
IMM GRANULOCYTES # BLD: 0.1 10E3/UL
IMM GRANULOCYTES NFR BLD: 1 %
INTERPRETATION ECG - MUSE: NORMAL
KETONES UR STRIP-MCNC: ABNORMAL MG/DL
LEUKOCYTE ESTERASE UR QL STRIP: ABNORMAL
LYMPHOCYTES # BLD AUTO: 0.6 10E3/UL (ref 0.8–5.3)
LYMPHOCYTES NFR BLD AUTO: 11 %
MAGNESIUM SERPL-MCNC: 1 MG/DL (ref 1.7–2.3)
MAGNESIUM SERPL-MCNC: 2.9 MG/DL (ref 1.7–2.3)
MCH RBC QN AUTO: 33.7 PG (ref 26.5–33)
MCHC RBC AUTO-ENTMCNC: 34.3 G/DL (ref 31.5–36.5)
MCV RBC AUTO: 98 FL (ref 78–100)
MONOCYTES # BLD AUTO: 1.2 10E3/UL (ref 0–1.3)
MONOCYTES NFR BLD AUTO: 23 %
MUCOUS THREADS #/AREA URNS LPF: PRESENT /LPF
NEUTROPHILS # BLD AUTO: 3.4 10E3/UL (ref 1.6–8.3)
NEUTROPHILS NFR BLD AUTO: 64 %
NITRATE UR QL: NEGATIVE
NRBC # BLD AUTO: 0 10E3/UL
NRBC BLD AUTO-RTO: 0 /100
NT-PROBNP SERPL-MCNC: 114 PG/ML (ref 0–900)
P AXIS - MUSE: 6 DEGREES
PH UR STRIP: 5.5 [PH] (ref 5–7)
PLATELET # BLD AUTO: 149 10E3/UL (ref 150–450)
POTASSIUM SERPL-SCNC: 5 MMOL/L (ref 3.4–5.3)
POTASSIUM SERPL-SCNC: 5.2 MMOL/L (ref 3.4–5.3)
PR INTERVAL - MUSE: 112 MS
PROT SERPL-MCNC: 6.9 G/DL (ref 6.4–8.3)
QRS DURATION - MUSE: 84 MS
QT - MUSE: 364 MS
QTC - MUSE: 435 MS
R AXIS - MUSE: -40 DEGREES
RBC # BLD AUTO: 3.44 10E6/UL (ref 3.8–5.2)
RBC URINE: 1 /HPF
RSV RNA SPEC NAA+PROBE: NEGATIVE
SARS-COV-2 RNA RESP QL NAA+PROBE: NEGATIVE
SODIUM SERPL-SCNC: 129 MMOL/L (ref 135–145)
SODIUM SERPL-SCNC: 129 MMOL/L (ref 135–145)
SP GR UR STRIP: 1.01 (ref 1–1.03)
SQUAMOUS EPITHELIAL: 21 /HPF
SYSTOLIC BLOOD PRESSURE - MUSE: NORMAL MMHG
T AXIS - MUSE: 8 DEGREES
TROPONIN T SERPL HS-MCNC: 19 NG/L
TROPONIN T SERPL HS-MCNC: 20 NG/L
UROBILINOGEN UR STRIP-MCNC: NORMAL MG/DL
VENTRICULAR RATE- MUSE: 86 BPM
WBC # BLD AUTO: 5.3 10E3/UL (ref 4–11)
WBC URINE: 32 /HPF

## 2024-11-11 PROCEDURE — 87086 URINE CULTURE/COLONY COUNT: CPT | Performed by: EMERGENCY MEDICINE

## 2024-11-11 PROCEDURE — 36415 COLL VENOUS BLD VENIPUNCTURE: CPT | Performed by: EMERGENCY MEDICINE

## 2024-11-11 PROCEDURE — 80048 BASIC METABOLIC PNL TOTAL CA: CPT | Performed by: EMERGENCY MEDICINE

## 2024-11-11 PROCEDURE — 250N000011 HC RX IP 250 OP 636: Performed by: EMERGENCY MEDICINE

## 2024-11-11 PROCEDURE — 87637 SARSCOV2&INF A&B&RSV AMP PRB: CPT | Performed by: EMERGENCY MEDICINE

## 2024-11-11 PROCEDURE — 84484 ASSAY OF TROPONIN QUANT: CPT | Performed by: EMERGENCY MEDICINE

## 2024-11-11 PROCEDURE — 82248 BILIRUBIN DIRECT: CPT | Performed by: EMERGENCY MEDICINE

## 2024-11-11 PROCEDURE — 81001 URINALYSIS AUTO W/SCOPE: CPT | Performed by: EMERGENCY MEDICINE

## 2024-11-11 PROCEDURE — 84155 ASSAY OF PROTEIN SERUM: CPT | Performed by: EMERGENCY MEDICINE

## 2024-11-11 PROCEDURE — 85004 AUTOMATED DIFF WBC COUNT: CPT | Performed by: EMERGENCY MEDICINE

## 2024-11-11 PROCEDURE — 93005 ELECTROCARDIOGRAM TRACING: CPT

## 2024-11-11 PROCEDURE — 96366 THER/PROPH/DIAG IV INF ADDON: CPT

## 2024-11-11 PROCEDURE — 96365 THER/PROPH/DIAG IV INF INIT: CPT | Mod: 59

## 2024-11-11 PROCEDURE — 99285 EMERGENCY DEPT VISIT HI MDM: CPT | Mod: 25

## 2024-11-11 PROCEDURE — 83880 ASSAY OF NATRIURETIC PEPTIDE: CPT | Performed by: EMERGENCY MEDICINE

## 2024-11-11 PROCEDURE — 74177 CT ABD & PELVIS W/CONTRAST: CPT

## 2024-11-11 PROCEDURE — 83735 ASSAY OF MAGNESIUM: CPT | Performed by: EMERGENCY MEDICINE

## 2024-11-11 RX ORDER — IOPAMIDOL 755 MG/ML
500 INJECTION, SOLUTION INTRAVASCULAR ONCE
Status: COMPLETED | OUTPATIENT
Start: 2024-11-11 | End: 2024-11-11

## 2024-11-11 RX ORDER — MAGNESIUM SULFATE HEPTAHYDRATE 40 MG/ML
4 INJECTION, SOLUTION INTRAVENOUS ONCE
Status: COMPLETED | OUTPATIENT
Start: 2024-11-11 | End: 2024-11-11

## 2024-11-11 RX ADMIN — IOPAMIDOL 90 ML: 755 INJECTION, SOLUTION INTRAVENOUS at 15:18

## 2024-11-11 RX ADMIN — MAGNESIUM SULFATE HEPTAHYDRATE 4 G: 4 INJECTION, SOLUTION INTRAVENOUS at 15:42

## 2024-11-11 ASSESSMENT — ACTIVITIES OF DAILY LIVING (ADL)
ADLS_ACUITY_SCORE: 0

## 2024-11-11 ASSESSMENT — COLUMBIA-SUICIDE SEVERITY RATING SCALE - C-SSRS
2. HAVE YOU ACTUALLY HAD ANY THOUGHTS OF KILLING YOURSELF IN THE PAST MONTH?: NO
1. IN THE PAST MONTH, HAVE YOU WISHED YOU WERE DEAD OR WISHED YOU COULD GO TO SLEEP AND NOT WAKE UP?: NO
6. HAVE YOU EVER DONE ANYTHING, STARTED TO DO ANYTHING, OR PREPARED TO DO ANYTHING TO END YOUR LIFE?: NO

## 2024-11-11 NOTE — ED PROVIDER NOTES
Emergency Department Note      History of Present Illness     Chief Complaint   Diarrhea and Bloated      HPI   Krystyna Peña is a 69 year old female with history of breast cancer alcoholic liver cirrhosis presents to the emergency department for multiple complaints.  Patient reports that primary complaint of arriving to the ER is increasing abdominal distention since June.  Furthermore, patient has been having increasing exertional dyspnea and generalized weakness.  Patient was evaluated at [facility] and had lab test drawn and recent ultrasound of the liver.  They were notified of the results, but family member at bedside reports that they were given specific directions in terms of how to improve patient's symptoms, so when they reach back out to [facility], they were instructed to come to the ER for further evaluation.  Per family, patient has had intermittent diarrhea in the past week that has since stopped.  Increased fatigue.  Patient denies any fever, chills, chest pain, vomiting, abdominal pain, dysuria/hematuria, or bloody or black stools.  Has required paracentesis in the past.    Independent Historian   Patient and patient's family as above    Review of External Notes   10/8/24 Office visit note    Past Medical History     Medical History and Problem List   Past Medical History:   Diagnosis Date    Arthritis     Asymptomatic varicose veins     Benign neoplasm of colon 11/06, 7/13    Family history of colon cancer     Gastroesophageal reflux disease     History of colonic polyps     Hypertension     Major depression     Syncope        Medications   albuterol (PROAIR HFA/PROVENTIL HFA/VENTOLIN HFA) 108 (90 Base) MCG/ACT inhaler  benzonatate (TESSALON) 200 MG capsule  cetirizine (ZYRTEC) 10 MG tablet  ferrous sulfate (FE TABS) 325 (65 Fe) MG EC tablet  guaiFENesin-dextromethorphan (ROBITUSSIN DM) 100-10 MG/5ML syrup  lactobacillus rhamnosus, GG, (CULTURELL) capsule  lisinopril-hydrochlorothiazide  (ZESTORETIC) 10-12.5 MG tablet  pantoprazole (PROTONIX) 40 MG EC tablet  predniSONE (DELTASONE) 10 MG tablet        Surgical History   Past Surgical History:   Procedure Laterality Date    AMPUTATE TOE(S) Left 04/12/2018    Procedure: AMPUTATE TOE(S);  Amputation left third digit;  Surgeon: Herb Michael DPM;  Location: [facility] OR    ARTHROPLASTY HIP Right 03/28/2016    Procedure: ARTHROPLASTY HIP;  Surgeon: Perez Meza MD;  Location: [facility] OR    ARTHROPLASTY REVISION HIP Right 10/28/2019    Procedure: Revision right total hip arthroplasty;  Surgeon: Perez Meza MD;  Location: [facility] OR    CLOSED REDUCTION HIP Right 07/15/2019    Procedure: Closed reduction, right total hip arthroplasty dislocation.;  Surgeon: Perez Meza MD;  Location: [facility] OR    COLONOSCOPY Left 04/16/2021    Procedure: COLONOSCOPY;  Surgeon: Rell Cisneros MD;  Location:  GI    HC CORRECT BUNION,SIMPLE  01/01/1998    RT    HC CORRECT BUNION,SIMPLE  01/01/2001    LT    HC KNEE SCOPE, DIAGNOSTIC      LT    IR CERVICAL EPIDURAL STEROID INJECTION  06/18/2007    REVERSE ARTHROPLASTY SHOULDER Left 10/30/2018    Procedure: Left reverse total shoulder arthroplasty;  Surgeon: Aaron Christianson MD;  Location: [facility] OR    REVERSE ARTHROPLASTY SHOULDER Right 06/12/2019    Procedure: Right reverse total shoulder arthroplasty;  Surgeon: Aaron Christianson MD;  Location: [facility] OR    ROTATOR CUFF REPAIR RT/LT  07/01/2007    right    VASCULAR SURGERY  left leg bypass 2004    [facility] NONSPECIFIC PROCEDURE  01/01/1972    Right arm plastic surgery    [facility] NONSPECIFIC PROCEDURE  01/01/1972    Right knee surgery    [facility] NONSPECIFIC PROCEDURE  10/01/2003    L popliteal AVM repair    [facility] NONSPECIFIC PROCEDURE  11/01/2004    Removal bone spur left foot    [facility] NONSPECIFIC PROCEDURE  04/01/2007    amputation of toes left & right toes    [facility] REPAIR SPIEGELIAN HERNIA  01/01/2002    [facility] TOTAL KNEE ARTHROPLASTY  10/01/2003    LT       Physical Exam     Patient Vitals for  "the past 24 hrs:   BP Temp Temp src Pulse Resp SpO2 Height Weight   11/11/24 1926 127/62 -- -- 94 16 99 % -- --   11/11/24 1116 -- -- -- -- -- -- 1.575 m (5' 2\") --   11/11/24 1113 134/77 97.2  F (36.2  C) Temporal 113 18 97 % -- 78.5 kg (173 lb)     Physical Exam  Constitutional:       General: Not in acute distress.        Appearance: Normal appearance.   HENT:      Head: Normocephalic and atraumatic.   Eyes:      Extraocular Movements: Extraocular movements intact.      Conjunctiva/sclera: Conjunctivae normal.   Cardiovascular:      Rate and Rhythm: Normal rate and regular rhythm.   Pulmonary:      Effort: Pulmonary effort is normal. No respiratory distress.      Breath sounds: Normal breath sounds.   Abdominal:      General: Abdomen is flat. There is mild distension.      Palpations: Abdomen is soft.      Tenderness: There is no abdominal tenderness.   Musculoskeletal:      Cervical back: Normal range of motion. No rigidity.      Right lower leg: No edema.      Left lower leg: No edema.   Skin:     General: Skin is warm and dry.   Neurological:      General: No focal deficit present.      Mental Status: Alert and oriented to person, place, and time.   Psychiatric:         Mood and Affect: Mood normal.         Behavior: Behavior normal.    Diagnostics     Lab Results   Labs Ordered and Resulted from Time of ED Arrival to Time of ED Departure   BASIC METABOLIC PANEL - Abnormal       Result Value    Sodium 129 (*)     Potassium 5.2      Chloride 92 (*)     Carbon Dioxide (CO2) 20 (*)     Anion Gap 17 (*)     Urea Nitrogen 16.1      Creatinine 1.11 (*)     GFR Estimate 53 (*)     Calcium 8.4 (*)     Glucose 97     HEPATIC FUNCTION PANEL - Abnormal    Protein Total 6.9      Albumin 4.0      Bilirubin Total 1.4 (*)     Alkaline Phosphatase 125      AST        ALT 32      Bilirubin Direct 0.50 (*)    MAGNESIUM - Abnormal    Magnesium 1.0 (*)    ROUTINE UA WITH MICROSCOPIC REFLEX TO CULTURE - Abnormal    Color Urine " Yellow      Appearance Urine Slightly Cloudy (*)     Glucose Urine Negative      Bilirubin Urine Negative      Ketones Urine Trace (*)     Specific Gravity Urine 1.010      Blood Urine Negative      pH Urine 5.5      Protein Albumin Urine 10 (*)     Urobilinogen Urine Normal      Nitrite Urine Negative      Leukocyte Esterase Urine Large (*)     Bacteria Urine Few (*)     Mucus Urine Present (*)     RBC Urine 1      WBC Urine 32 (*)     Squamous Epithelials Urine 21 (*)    CBC WITH PLATELETS AND DIFFERENTIAL - Abnormal    WBC Count 5.3      RBC Count 3.44 (*)     Hemoglobin 11.6 (*)     Hematocrit 33.8 (*)     MCV 98      MCH 33.7 (*)     MCHC 34.3      RDW 14.0      Platelet Count 149 (*)     % Neutrophils 64      % Lymphocytes 11      % Monocytes 23      % Eosinophils 1      % Basophils 0      % Immature Granulocytes 1      NRBCs per 100 WBC 0      Absolute Neutrophils 3.4      Absolute Lymphocytes 0.6 (*)     Absolute Monocytes 1.2      Absolute Eosinophils 0.0      Absolute Basophils 0.0      Absolute Immature Granulocytes 0.1      Absolute NRBCs 0.0     AST - Abnormal    AST 75 (*)    BASIC METABOLIC PANEL - Abnormal    Sodium 129 (*)     Potassium 5.0      Chloride 91 (*)     Carbon Dioxide (CO2) 20 (*)     Anion Gap 18 (*)     Urea Nitrogen 16.9      Creatinine 1.20 (*)     GFR Estimate 48 (*)     Calcium 8.3 (*)     Glucose 92     TROPONIN T, HIGH SENSITIVITY - Abnormal    Troponin T, High Sensitivity 19 (*)    TROPONIN T, HIGH SENSITIVITY - Abnormal    Troponin T, High Sensitivity 20 (*)    MAGNESIUM - Abnormal    Magnesium 2.9 (*)    NT PROBNP INPATIENT - Normal    N terminal Pro BNP Inpatient 114     INFLUENZA A/B, RSV, & SARS-COV2 PCR - Normal    Influenza A PCR Negative      Influenza B PCR Negative      RSV PCR Negative      SARS CoV2 PCR Negative     URINE CULTURE       Imaging   CT Chest (PE) Abdomen Pelvis w Contrast   Final Result   IMPRESSION:   1.  No pulmonary emboli. No acute findings in the  chest, abdomen   pelvis.   2.  Hepatic steatosis and cirrhotic morphology of the liver.   3.  Stable right apical 6 mm linear nodule, likely a focus of   scarring.   4.  Colonic diverticulosis.      MALCOLM BATES MD            SYSTEM ID:  [id number]        EKG   ECG results from 11/11/24   EKG 12-lead, tracing only     Value    Systolic Blood Pressure     Diastolic Blood Pressure     Ventricular Rate 86    Atrial Rate 86    NH Interval 112    QRS Duration 84        QTc 435    P Axis 6    R AXIS -40    T Axis 8    Interpretation ECG      Sinus rhythm  Left axis deviation  Abnormal ECG  When compared with ECG of 10-Nov-2023 09:02, (unconfirmed)  T wave inversion now evident in Inferior leads  Unconfirmed report - interpretation of this ECG is computer generated - see medical record for final interpretation  Confirmed by - EMERGENCY ROOM, PHYSICIAN ([id number]),  Shree León ([id number]) on 11/11/2024 2:21:10 PM       See ED course for independent interpretation of EKG.    Independent Interpretation   None    ED Course      Medications Administered   Medications   magnesium sulfate 4 g in 100 mL sterile water intermittent infusion (0 g Intravenous Stopped 11/11/24 9865)   iopamidol (ISOVUE-370) solution 500 mL (90 mLs Intravenous $Given 11/11/24 1518)   sodium chloride (PF) 0.9% PF flush 100 mL (90 mLs Intravenous $Given 11/11/24 1518)       Procedures   Procedures     Discussion of Management   See ED course    ED Course   ED Course as of 11/11/24 2203   Mon Nov 11, 2024   1322 EKG 12-lead, tracing only  Normal sinus rhythm. Rate of 86. Normal NH and QRS. Normal QTc. No acute ST elevation or depression as compared with 11/9/2023 EKG.   1447 Sodium(!): 129   1447 Creatinine(!): 1.11   1831 Magnesium(!): 2.9  Improved   1951 Patient updated       Additional Documentation  None    Medical Decision Making / Diagnosis     CMS Diagnoses: None    MIPS      CT for PE was ordered because the patient is moderate/high risk for  "pulmonary embolism.    [facility]   Krystyna Peña is a 69 year old female as described above presents to the emergency department with multiple complaints, but predominantly increasing abdominal distention since June.  History of alcoholic liver cirrhosis for which she follows up with [facility].  Patient hemodynamically stable at time of evaluation.  Afebrile.  Primary reason for coming to the ER today is what family reports as a \"lack of direction\" in regards to how to improve patient's symptoms.  At time of evaluation, patient is nonseptic and nontoxic appearing.  Abdominal examination benign without abdomen soft, and nontender, low suspicion for spontaneous bacterial peritonitis.  Patient reports exertional dyspnea and increased weakness in addition to the abdominal distention.  Suspect potential developing ascites.  Additional differential diagnosis considered includes, but not limited to, pneumonia, CHF, pleural effusions, metastatic disease to the lung and abdomen, pulmonary embolism, bowel obstruction, and colitis.  Will obtain CT PE chest with abdomen and pelvis for evaluation of above discussed differentials.  Discussed care plan with patient who voiced understanding and agreement with plan.  Answered all questions.  Additional workup and orders as listed in the chart.    Ultimately, work up shows mild increase in creatinine of 1.2.  Mild hyponatremia of 129.  Non-anion gap elevation of 18.  Suspect mild dehydration.  Hypomagnesemia of 1.0.  This could certainly explain patient's increased generalized weakness and fatigue.  Patient received 4 g of IV knees and repletion with improvement in magnesium to 2.9.  Patient reports some improvement in her fatigue.  Cardiac enzyme flat at 19 and 20 with increase from baseline likely secondary to mild JORJE.  No evidence of CHF.  Advised for patient to continue sufficient oral fluid hydration with Pedialyte/electrolyte containing drinks such as Gatorade zero.  Patient " is to follow-up outpatient with PCP for repeat lab work including sodium, magnesium, and renal function testing.  Patient will also follow-up with gastroenterology regarding her liver cirrhosis and abdominal distention.  No indication for emergent paracentesis today. Patient is already aware of incidental finding of right apex lung nodule for which it is stable.  Discussed return precautions.  Answered all questions.  Patient comfortable with discharge home.    Please refer to ED course above as part of continuation of MDM for details on the patient's treatment course and any potential changes or updates beyond my initial evaluation and MDM creation.    Disposition   The patient was discharged.     Diagnosis     ICD-10-CM    1. Alcoholic cirrhosis, unspecified whether ascites present (H)  K70.30       2. Hypomagnesemia  E83.42       3. Nodule of apex of right lung  R91.1       4. Abdominal distention  R14.0       5. Hyponatremia  E87.1       6. JORJE (acute kidney injury) (H)  N17.9            Discharge Medications   Discharge Medication List as of 11/11/2024  7:54 PM            DO Chas FRANCIS Ferris, DO  11/11/24 2203

## 2024-11-11 NOTE — DISCHARGE INSTRUCTIONS
Please follow-up with your primary care provider and/or specialist regarding your visit to the ER today.    Please follow-up with your primary care doctor regarding your electrolyte imbalance in addition to your slight evidence of dehydration.  You will need repeat sodium, magnesium, and kidney function testing.    Please follow-up with gastroenterology.    Please return to the emergency department should you experience any of the symptoms we specifically discussed, including but not limited to recurrence or worsening of your symptoms, or development of any new and concerning symptoms such as chest pain, shortness of breath, worsening abdominal pain, fever, nausea, or vomiting.    Please continue to stay hydrated orally.  Please keep up with fluids such as Pedialyte or Gatorade 0 which contains electrolytes for hydration.  Please avoid only drinking soda as there is not a lot of electrolytes or fluids sufficient for hydration.

## 2024-11-11 NOTE — ED TRIAGE NOTES
Pt presents with concern for abd distention. Recently had multiple days of diarrhea, decreased appetite and increased weakness. Hx paracentesis. A & Ox4     Triage Assessment (Adult)       Row Name 11/11/24 1114          Triage Assessment    Airway WDL WDL        Respiratory WDL    Respiratory WDL WDL        Cognitive/Neuro/Behavioral WDL    Cognitive/Neuro/Behavioral WDL WDL

## 2024-11-12 ENCOUNTER — TRANSFERRED RECORDS (OUTPATIENT)
Dept: HEALTH INFORMATION MANAGEMENT | Facility: CLINIC | Age: 69
End: 2024-11-12
Payer: COMMERCIAL

## 2024-11-12 LAB — BACTERIA UR CULT: NORMAL

## 2024-11-12 NOTE — TELEPHONE ENCOUNTER
Writer does not see that a hepatology referral was ever entered. Pended referral. Please complete and sign if in agreement.

## 2024-11-12 NOTE — TELEPHONE ENCOUNTER
Patient calling to have referral send to  [facility] hepatology clinic. Patient has not receive a call from them to schedule.       Please order referral

## 2024-11-15 ENCOUNTER — OFFICE VISIT (OUTPATIENT)
Dept: INTERNAL MEDICINE | Facility: CLINIC | Age: 69
End: 2024-11-15
Payer: COMMERCIAL

## 2024-11-15 VITALS
WEIGHT: 173.7 LBS | DIASTOLIC BLOOD PRESSURE: 72 MMHG | BODY MASS INDEX: 31.96 KG/M2 | HEIGHT: 62 IN | RESPIRATION RATE: 16 BRPM | SYSTOLIC BLOOD PRESSURE: 118 MMHG | HEART RATE: 114 BPM | TEMPERATURE: 97.4 F | OXYGEN SATURATION: 97 %

## 2024-11-15 DIAGNOSIS — N18.31 STAGE 3A CHRONIC KIDNEY DISEASE (H): ICD-10-CM

## 2024-11-15 DIAGNOSIS — E83.42 HYPOMAGNESEMIA: ICD-10-CM

## 2024-11-15 DIAGNOSIS — G47.09 OTHER INSOMNIA: ICD-10-CM

## 2024-11-15 DIAGNOSIS — M62.81 GENERALIZED MUSCLE WEAKNESS: Primary | ICD-10-CM

## 2024-11-15 DIAGNOSIS — I10 BENIGN ESSENTIAL HYPERTENSION: ICD-10-CM

## 2024-11-15 PROCEDURE — 80048 BASIC METABOLIC PNL TOTAL CA: CPT | Performed by: INTERNAL MEDICINE

## 2024-11-15 PROCEDURE — 83735 ASSAY OF MAGNESIUM: CPT | Performed by: INTERNAL MEDICINE

## 2024-11-15 PROCEDURE — 36415 COLL VENOUS BLD VENIPUNCTURE: CPT | Performed by: INTERNAL MEDICINE

## 2024-11-15 PROCEDURE — 99215 OFFICE O/P EST HI 40 MIN: CPT | Performed by: INTERNAL MEDICINE

## 2024-11-15 RX ORDER — TRAZODONE HYDROCHLORIDE 50 MG/1
50-100 TABLET, FILM COATED ORAL AT BEDTIME
Qty: 60 TABLET | Refills: 0 | Status: SHIPPED | OUTPATIENT
Start: 2024-11-15

## 2024-11-15 NOTE — PROGRESS NOTES
"  Assessment & Plan     Generalized muscle weakness  Hypomagnesemia  - Magnesium; Future  - Magnesium    Benign essential hypertension  - Basic metabolic panel; Future  - Basic metabolic panel    Other insomnia  - traZODone (DESYREL) 50 MG tablet; Take 1-2 tablets ( mg) by mouth at bedtime.    Stage 3a chronic kidney disease (H)    Presented to Ed with symptoms of generalized weakness, work up significant for hypomagnesemia and hyponatremia. Magnesium replaced and improvement in levels and resultant improvement in fatigue.  Not on magnesium supplements.update magnesium lab work after today's office visit.  Has an upcoming visit with GI provider in around 1 week with [facility]. Wants to move care to a different hepatologist due to dissatisfaction with current care, has a scheduled visit with [facility] hepatology in march next year. Encouraged to continue care with current GI team to avoid care gap until patient establishes care with new team.  Overall feeling well, does endorse having lack of sleep for a few days. Try trazodone at 50mg to 100 mg at bedtime PRN.  Blood pressure has been within target, continue current antihypertensive medication.    47 minutes spent by me on the date of the encounter doing chart review, history and exam, documentation and further activities per the note    MED REC REQUIRED  Post Medication Reconciliation Status: completed.  BMI  Estimated body mass index is 31.77 kg/m  as calculated from the following:    Height as of this encounter: 1.575 m (5' 2\").    Weight as of this encounter: 78.8 kg (173 lb 11.2 oz).             Anna Cruz is a 69 year old, presenting for the following health issues:  ER F/U        11/15/2024     9:16 AM   Additional Questions   Roomed by Erlin   Accompanied by       HPI       ED/UC Followup:    Facility:  [facility] Emergency Dept  Date of visit: 11/11/2024  Reason for visit: Diarrhea & Bloated   Current Status: " "improved.    Presented to Ed with symptoms of generalized weakness, work up significant for hypomagnesemia and hyponatremia. Magnesium replaced and improvement in levels and resultant improvement in fatigue.    Review of Systems  Constitutional, HEENT, cardiovascular, pulmonary, gi and gu systems are negative, except as otherwise noted.      Objective    /72 (BP Location: Right arm, Patient Position: Sitting, Cuff Size: Adult Regular)   Pulse 114   Temp 97.4  F (36.3  C) (Tympanic)   Resp 16   Ht 1.575 m (5' 2\")   Wt 78.8 kg (173 lb 11.2 oz)   LMP 10/01/2003 (Exact Date)   SpO2 97%   BMI 31.77 kg/m    Body mass index is 31.77 kg/m .  Physical Exam   GENERAL: alert and no distress  RESP: lungs clear to auscultation - no rales, rhonchi or wheezes  CV: regular rate and rhythm, normal S1 S2  ABDOMEN: soft, nontender, no hepatosplenomegaly, no masses and mild abdominal distension.  MS: no gross musculoskeletal defects noted, no edema  NEURO: Normal strength and tone, mentation intact and speech normal  PSYCH: mentation appears normal, affect normal/bright            Signed Electronically by: Lucille Miller MD    "

## 2024-11-16 LAB
ANION GAP SERPL CALCULATED.3IONS-SCNC: 15 MMOL/L (ref 7–15)
BUN SERPL-MCNC: 14.3 MG/DL (ref 8–23)
CALCIUM SERPL-MCNC: 9.8 MG/DL (ref 8.8–10.4)
CHLORIDE SERPL-SCNC: 94 MMOL/L (ref 98–107)
CREAT SERPL-MCNC: 1.24 MG/DL (ref 0.51–0.95)
EGFRCR SERPLBLD CKD-EPI 2021: 47 ML/MIN/1.73M2
GLUCOSE SERPL-MCNC: 129 MG/DL (ref 70–99)
HCO3 SERPL-SCNC: 22 MMOL/L (ref 22–29)
MAGNESIUM SERPL-MCNC: 1.3 MG/DL (ref 1.7–2.3)
POTASSIUM SERPL-SCNC: 4.7 MMOL/L (ref 3.4–5.3)
SODIUM SERPL-SCNC: 131 MMOL/L (ref 135–145)

## 2024-11-18 ENCOUNTER — TELEPHONE (OUTPATIENT)
Dept: INTERNAL MEDICINE | Facility: CLINIC | Age: 69
End: 2024-11-18
Payer: COMMERCIAL

## 2024-11-18 DIAGNOSIS — E83.42 HYPOMAGNESEMIA: Primary | ICD-10-CM

## 2024-11-18 NOTE — TELEPHONE ENCOUNTER
S-(situation): patient inquiring about low magnesium and reports trazodone did not help     B-(background): patient was seen 1/15/24, mag came back low at 1.3, was a 2.9 7 days prior.     Trazodone was prescribed for patient for insomnia  A-(assessment): patient reports that she attempted to use the trazodone for 2 nights. Took a pill at 8 PM, then 9 PM then 10 PM and it did not help at all.     R-(recommendations): Please advise next steps for magnesium and if there is anything else that can be used to help with patient's sleep.     Summer RN 12:20 PM November 18, 2024   [facility]

## 2024-11-19 ENCOUNTER — TELEPHONE (OUTPATIENT)
Dept: INTERNAL MEDICINE | Facility: CLINIC | Age: 69
End: 2024-11-19
Payer: COMMERCIAL

## 2024-11-19 RX ORDER — MAGNESIUM OXIDE 400 MG/1
400 TABLET ORAL DAILY
Qty: 30 TABLET | Refills: 1 | Status: SHIPPED | OUTPATIENT
Start: 2024-11-19

## 2024-11-19 NOTE — TELEPHONE ENCOUNTER
Can follow-up in clinic in around 2 to 3 weeks for repeat lab work and further discussion of alternative medications for sleep.  Magnesium supplement has been sent to the pharmacy.  Please use provider approval slot if no available slots.

## 2024-11-19 NOTE — TELEPHONE ENCOUNTER
Reason for Call:  Appointment Request    Patient requesting this type of appt: Chronic Diease Management/Medication/Follow-Up    Requested provider: Lucille Miller    Reason patient unable to be scheduled: Not within requested timeframe    When does patient want to be seen/preferred time: 1-2 weeks    Comments: See provider comments ..  Can follow-up in clinic in around 2 to 3 weeks for repeat lab work and further discussion of alternative medications for sleep.  Magnesium supplement has been sent to the pharmacy.  Please use provider approval slot if no available slots.          Could we send this information to you in [employer] or would you prefer to receive a phone call?:   Patient would prefer a phone call   Okay to leave a detailed message?: Yes at Cell number on file:    Telephone Information:   Mobile [phone]       Call taken on 11/19/2024 at 1:10 PM by Ana Luisa Ferraro

## 2024-12-08 ENCOUNTER — MYC MEDICAL ADVICE (OUTPATIENT)
Dept: INTERNAL MEDICINE | Facility: CLINIC | Age: 69
End: 2024-12-08
Payer: COMMERCIAL

## 2024-12-08 DIAGNOSIS — K70.31 ALCOHOLIC CIRRHOSIS OF LIVER WITH ASCITES (H): ICD-10-CM

## 2024-12-08 DIAGNOSIS — R63.8 INCREASED BMI: Primary | ICD-10-CM

## 2024-12-09 NOTE — TELEPHONE ENCOUNTER
Patient had appointment with primary care provider on 12/6/2024. Labs still need to be reviewed by primary care provider.    Thank you,  Marvin, Triage RN Latrice Hilton    2:17 PM 12/9/2024

## 2024-12-16 ENCOUNTER — TRANSFERRED RECORDS (OUTPATIENT)
Dept: HEALTH INFORMATION MANAGEMENT | Facility: CLINIC | Age: 69
End: 2024-12-16
Payer: COMMERCIAL

## 2024-12-21 ENCOUNTER — MYC MEDICAL ADVICE (OUTPATIENT)
Dept: INTERNAL MEDICINE | Facility: CLINIC | Age: 69
End: 2024-12-21
Payer: COMMERCIAL

## 2024-12-22 ENCOUNTER — MYC MEDICAL ADVICE (OUTPATIENT)
Dept: INTERNAL MEDICINE | Facility: CLINIC | Age: 69
End: 2024-12-22
Payer: COMMERCIAL

## 2024-12-23 NOTE — TELEPHONE ENCOUNTER
"See also [employer] message sent 12/22/24. Call to spouse. States shortness of breath is getting slightly worse. States no one knows what is going on or what is causing the bloating. States patient gets out of breath with any activity. States she wasn't like this 5 months ago. Patient's weight has gone from 148-172 in 5 months. States patient looks like she is \"12 months pregnant\". Patient has a virtual visit scheduled with nutrition with [place]. Spouse expresses frustration because they are never able to see a doctor at [facility] and are only able to see a PA because appointments with a doctor schedule out months    Per other [employer] message family is asking for patient to be \"checked into a hospital\" so she can be seen by a specialist.    Patient and spouse hope to go to [place] on 1/2/24 for a couple of months but they will cancel this if needed.     Advised our physicians do not have admitting privileges to the hospital. Advised the only way for a patient to be hospitalized is to go thought the ER but there is no way to guarantee that patient would be admitted.    Next 5 appointments (look out 90 days)      Dec 31, 2024 11:30 AM  (Arrive by 11:25 AM)  Provider Visit with Lucille Miller MD  [facility] ([facility] ) [address]  [address]  [facility] [address] [phone]          Dr. Miller - you have a couple of approval required appointments on 12/26/24. Could patient be seen sooner in one of these spots? Spouse is open to either an in person or virtual visit.    "

## 2024-12-26 NOTE — TELEPHONE ENCOUNTER
Per Dina, patient could be evaluated at [facility] either today or tomorrow, but wants patient to be aware of the limited scope of [facility] and that they would not see a specialist here.     Called and spoke to patient's -he handles appointments for patient. Discussed [facility] clinic option with him, they would be open to this, but today is too hectic at home to come in. Accepted appointment for 9:30 am on Friday 12/27.  Directions provided for [facility] clinic.    Rosey Gusman RN  [facility]

## 2024-12-26 NOTE — TELEPHONE ENCOUNTER
[facility] provider, Dina reviewing patient chart to see if she would be appropriate.     Rosey Gusman RN  [facility]

## 2024-12-26 NOTE — TELEPHONE ENCOUNTER
Called patient. She states symptoms have not improved since Tuesday. Advised her that Dr. Miller recommends going to ER for evaluation. Patient declines going to ER as they haven't done anything during past visits. She states they plan to go to [place] as planned and see her GI doctor there, then plan to see specialist at [facility] when they return to [place]. Patient advised to go to ER if having shortness of breath, difficulty breathing or worsening pain.     Update forwarded to provider for awareness. Patient does have Virtual Visit appointment with PCP on 12/31/24.     Rosey Gusman RN  [facility]

## 2024-12-26 NOTE — TELEPHONE ENCOUNTER
Do recommend follow-up in the ER for an evaluation sooner.  if patient agreeable to following up in [facility], please see if [facility] can accept patient.

## 2024-12-27 ENCOUNTER — HOSPITAL ENCOUNTER (INPATIENT)
Facility: CLINIC | Age: 69
LOS: 3 days | Discharge: HOME | DRG: 433 | End: 2024-12-30
Attending: HOSPITALIST | Admitting: HOSPITALIST
Payer: MEDICARE

## 2024-12-27 ENCOUNTER — HOSPITAL ENCOUNTER (OUTPATIENT)
Dept: CT IMAGING | Facility: CLINIC | Age: 69
Discharge: HOME | End: 2024-12-27
Attending: PREVENTIVE MEDICINE | Admitting: PREVENTIVE MEDICINE
Payer: COMMERCIAL

## 2024-12-27 DIAGNOSIS — I10 BENIGN ESSENTIAL HYPERTENSION: ICD-10-CM

## 2024-12-27 DIAGNOSIS — K74.60 DECOMPENSATION OF CIRRHOSIS OF LIVER (H): Primary | ICD-10-CM

## 2024-12-27 DIAGNOSIS — K72.90 DECOMPENSATION OF CIRRHOSIS OF LIVER (H): Primary | ICD-10-CM

## 2024-12-27 DIAGNOSIS — R06.02 SOB (SHORTNESS OF BREATH): ICD-10-CM

## 2024-12-27 PROCEDURE — 99223 1ST HOSP IP/OBS HIGH 75: CPT | Mod: AI | Performed by: PHYSICIAN ASSISTANT

## 2024-12-27 PROCEDURE — 120N000001 HC R&B MED SURG/OB

## 2024-12-27 PROCEDURE — 74177 CT ABD & PELVIS W/CONTRAST: CPT

## 2024-12-27 PROCEDURE — 250N000011 HC RX IP 250 OP 636: Performed by: PREVENTIVE MEDICINE

## 2024-12-27 PROCEDURE — 250N000009 HC RX 250: Performed by: PREVENTIVE MEDICINE

## 2024-12-27 RX ORDER — AMOXICILLIN 250 MG
1 CAPSULE ORAL 2 TIMES DAILY PRN
Status: DISCONTINUED | OUTPATIENT
Start: 2024-12-27 | End: 2024-12-30 | Stop reason: HOSPADM

## 2024-12-27 RX ORDER — IOPAMIDOL 755 MG/ML
500 INJECTION, SOLUTION INTRAVASCULAR ONCE
Status: COMPLETED | OUTPATIENT
Start: 2024-12-27 | End: 2024-12-27

## 2024-12-27 RX ORDER — LIDOCAINE 40 MG/G
CREAM TOPICAL
Status: DISCONTINUED | OUTPATIENT
Start: 2024-12-27 | End: 2024-12-30 | Stop reason: HOSPADM

## 2024-12-27 RX ORDER — ONDANSETRON 2 MG/ML
4 INJECTION INTRAMUSCULAR; INTRAVENOUS EVERY 6 HOURS PRN
Status: DISCONTINUED | OUTPATIENT
Start: 2024-12-27 | End: 2024-12-30 | Stop reason: HOSPADM

## 2024-12-27 RX ORDER — PANTOPRAZOLE SODIUM 40 MG/1
40 TABLET, DELAYED RELEASE ORAL
Status: DISCONTINUED | OUTPATIENT
Start: 2024-12-28 | End: 2024-12-30 | Stop reason: HOSPADM

## 2024-12-27 RX ORDER — ACETAMINOPHEN 650 MG/1
650 SUPPOSITORY RECTAL EVERY 4 HOURS PRN
Status: DISCONTINUED | OUTPATIENT
Start: 2024-12-27 | End: 2024-12-30 | Stop reason: HOSPADM

## 2024-12-27 RX ORDER — VITAMIN E 268 MG
400 CAPSULE ORAL EVERY MORNING
COMMUNITY

## 2024-12-27 RX ORDER — PROCHLORPERAZINE MALEATE 5 MG/1
5 TABLET ORAL EVERY 6 HOURS PRN
Status: DISCONTINUED | OUTPATIENT
Start: 2024-12-27 | End: 2024-12-30 | Stop reason: HOSPADM

## 2024-12-27 RX ORDER — ONDANSETRON 4 MG/1
4 TABLET, ORALLY DISINTEGRATING ORAL EVERY 6 HOURS PRN
Status: DISCONTINUED | OUTPATIENT
Start: 2024-12-27 | End: 2024-12-30 | Stop reason: HOSPADM

## 2024-12-27 RX ORDER — AMOXICILLIN 250 MG
2 CAPSULE ORAL 2 TIMES DAILY PRN
Status: DISCONTINUED | OUTPATIENT
Start: 2024-12-27 | End: 2024-12-30 | Stop reason: HOSPADM

## 2024-12-27 RX ORDER — POLYETHYLENE GLYCOL 3350 17 G/17G
17 POWDER, FOR SOLUTION ORAL 2 TIMES DAILY PRN
Status: DISCONTINUED | OUTPATIENT
Start: 2024-12-27 | End: 2024-12-30 | Stop reason: HOSPADM

## 2024-12-27 RX ORDER — SIMETHICONE 80 MG
80 TABLET,CHEWABLE ORAL EVERY 6 HOURS PRN
Status: DISCONTINUED | OUTPATIENT
Start: 2024-12-27 | End: 2024-12-30 | Stop reason: HOSPADM

## 2024-12-27 RX ORDER — CALCIUM CARBONATE 500 MG/1
1000 TABLET, CHEWABLE ORAL 4 TIMES DAILY PRN
Status: DISCONTINUED | OUTPATIENT
Start: 2024-12-27 | End: 2024-12-30 | Stop reason: HOSPADM

## 2024-12-27 RX ORDER — ACETAMINOPHEN 325 MG/1
650 TABLET ORAL EVERY 4 HOURS PRN
Status: DISCONTINUED | OUTPATIENT
Start: 2024-12-27 | End: 2024-12-30 | Stop reason: HOSPADM

## 2024-12-27 RX ADMIN — IOPAMIDOL 86 ML: 755 INJECTION, SOLUTION INTRAVENOUS at 10:55

## 2024-12-27 RX ADMIN — SODIUM CHLORIDE 90 ML: 9 INJECTION, SOLUTION INTRAVENOUS at 10:55

## 2024-12-27 ASSESSMENT — ACTIVITIES OF DAILY LIVING (ADL)
ADLS_ACUITY_SCORE: 31
ADLS_ACUITY_SCORE: 46
ADLS_ACUITY_SCORE: 46
ADLS_ACUITY_SCORE: 31
ADLS_ACUITY_SCORE: 31

## 2024-12-27 NOTE — PROGRESS NOTES
Transfer Type: [facility]  Transfer Triage Note    Date of call: 12/27/24  Time of call: 12:51 PM    Current Patient Location:  [facility]  Current Level of Care: Outpatient    Vitals: BP:100/60 HR: 90 O2 Sats: 99% on RA, TEMP -   Diagnosis: Exertional shortness of breath, JORJE  Reason for requested transfer: Further diagnostic work up, management, and consultation for specialized care   Isolation Needs: None    Care everywhere has been updated and reviewed: Yes  Necessary images have been sent through PACS: Yes    If patient is transferring for specialty care or specific procedure, the specialist required has participated in the transfer call and agreed with need for transfer and anticipated timeline: No    Transfer accepted: Yes  Stability of Patient: Patient is vitally stable, with no critical labs, and will likely remain stable throughout the transfer process  Is the patient appropriate for [facility]? Yes  Level of Care Needed: Med Surg  Telemetry Needed:  Cardiac Telemetry  Expected Time of Arrival for Transfer: 0-8 hours  Arrival Location:  [facility]     Recommendations for Management and Stabilization: Not needed    Additional Comments:     Ricardo Peralta MD

## 2024-12-27 NOTE — H&P
[facility]    History and Physical - Hospitalist Service       Date of Admission:  12/27/2024    Assessment & Plan   Krystyna Peña is a 69 year old female with PMHx of alcohol associated cirrhosis, hypertension, hyperlipidemia, GERD, and depression admitted on 12/27/2024. She presented as a direct admission from clinic for further evaluation of progressive abdominal distention, weight gain and associated VILLAVICENCIO over the past 5 months. Registered under inpatient status for further evaluation and treatment.     Abdominal bloating, weight gain, VILLAVICENCIO, suspect decompensated liver cirrhosis   Alcohol associated cirrhosis: Sober >1 year, increasing abdominal distention since 6/2024. Established with [facility], but not on any cirrhosis medications. No recent EGD, reports hx of variceal bleeding years ago for which she underwent endoscopy in [place], [place]. Has appointment with [facility] 3/6/2025.   *SpO2 97% on room air, slightly hypertensive, remainder of vitals WNL   *Outpatient work-up 12/27: D-dimer and BNP WNL, trop 24>21. UA unremarkable. CT C/A/P with PE study negative for PE or dissection, no acute process demonstrated.   *Lexiscan 11/10/23 negative for inducible ischemia   *Hepatic panel unremarkable. Lipase WNL. No acute intraabdominal pathology.   *US paracentesis 12/8/22 with 250 ccs fluid removed   - Admit under inpatient status   - [facility] consulted. Pt and family would like second opinion (previously established with [facility] and has appt with [facility] in 3/2025). Appreciate input regarding initiation of cirrhosis specific medications and outpatient screening endoscopy vs alternative GI work-up for increased abdominal distention/bloating. Family requesting discussion with GI physician.   - Hold on initiation of Spironolactone and Lasix on admission given mild JORJE, though this could also be hepatorenal   - Consider BB initiation 12/27 AM   - RUQ US to reeval liver and assess for ascites, though CT A/P  did not comment on any overt ascites. If any evidence of ascites, would recommend paracentesis   - Echocardiogram, last in 2020 was unremarkable   - Protonix 40 mg daily, PRN simethicone   - 2 g sodium diet, outpatient nutrition referral (not available on the weekend). NPO in the event GI wants to perform screening endoscopy, but this can likely be deferred to the outpatient setting.     Wt Readings from Last 10 Encounters:   12/27/24 77.6 kg (171 lb)   12/06/24 79 kg (174 lb 3.2 oz)   11/15/24 78.8 kg (173 lb 11.2 oz)   11/11/24 78.5 kg (173 lb)   10/03/24 75.8 kg (167 lb)   07/18/24 76.3 kg (168 lb 4.8 oz)   11/09/23 69.4 kg (153 lb)   08/11/23 68.3 kg (150 lb 9.6 oz)   07/03/23 66.2 kg (146 lb)   06/23/23 66.4 kg (146 lb 6.4 oz)     MELD 3.0: 19 at 12/27/2024 10:16 AM  MELD-Na: 18 at 12/27/2024 10:16 AM  Calculated from:  Serum Creatinine: 1.6 mg/dL at 12/27/2024 10:16 AM  Serum Sodium: 131 mmol/L at 12/27/2024 10:16 AM  Total Bilirubin: 1.2 mg/dL at 12/27/2024 10:16 AM  Serum Albumin: 4.2 g/dL (Using max of 3.5 g/dL) at 12/27/2024 10:16 AM  INR(ratio): 1.09 at 12/27/2024 10:16 AM  Age at listing (hypothetical): 69 years  Sex: Female at 12/27/2024 10:16 AM    Possible JORJE vs progression of CKD IIIb: Creatinine 1.60 on day of admission, was 1.43 on 12/6, was 0.7-0.9 in 2023, up to 1.1-1.2 in 11/2024. Could be component of hepatorenal syndrome.   - Bladder scan to ensure no retention   - Hold PTA Lisinopril/hydrochlorothiazide   - BMP in the AM     Elevated troponin, flat: trop 24>21. EKG in clinic without acute ischemia. Suspect demand in the setting of above. No recent chest pain.   - Telemetry   - Monitor for cardiac sx   - Echo as above     Borderline macrocytic anemia  Mild leukopenia  Mild thrombocytopenia: WBC 3.7, Hgb 10.1 (was 11.6 one month PTA), platelets 149. INR WNL. No active signs of bleeding. No recent illness. Attribute pancytopenia to chronic liver disease.   - Monitor     Chronic hyponatremia:  "Sodium 131 on admission, baseline range 129-132. Also attribute to chronic liver disease. Pt asymptomatic.   - Monitor     Ventral hernia: Noted on exam. Easily reducible. No abdominal pain.     Chronic, stable medical conditions: Continue PTA meds as appropriate  Hypertension: Hold lisinopril/hydrochlorothiazide given mild JORJE as above   Hyperlipidemia   GERD   Depression   RUL pulmonary nodule: PET 5/2/23 notes irregular opacity in the right upper lobe apex measuring up to 0.8 cm, likely corresponding to the finding described on outside CT, demonstrates no FDG activity. Its morphology with convex margins is suggestive of an area of focal scarring rather than an actual nodule.         Diet: 2 Gram Sodium Diet  NPO per Anesthesia Guidelines for Procedure/Surgery Except for: Meds  DVT Prophylaxis: Pneumatic Compression Devices  To Catheter: Not present  Lines: None     Cardiac Monitoring: ACTIVE order. Indication: VILLAVICENCIO  Code Status: Full Code    Clinically Significant Risk Factors Present on Admission         # Hyponatremia: Lowest Na = 131 mmol/L in last 2 days, will monitor as appropriate  # Hypochloremia: Lowest Cl = 95 mmol/L in last 2 days, will monitor as appropriate          # Hypertension: Home medication list includes antihypertensive(s)        # Anemia: based on hgb <11       # Obesity: Estimated body mass index is 31.28 kg/m  as calculated from the following:    Height as of 12/6/24: 1.575 m (5' 2\").    Weight as of an earlier encounter on 12/27/24: 77.6 kg (171 lb).              Disposition Plan     Medically Ready for Discharge: Anticipated in 2-4 Days       The patient's care was discussed with the Attending Physician, Dr. Fischer, Bedside Nurse, Patient, and Patient's Family, , Buzz, at bedside.     Marta Salas PA-C  Hospitalist Service  [facility]  Securely message with [employer] (more info)  Text page via [employer] Paging/Directory "     ______________________________________________________________________    Chief Complaint   Abdominal distention, bloating, weight gain, VILLAVICENCIO     History is obtained from the patient    History of Present Illness   Krystyna Peña is a 69 year old female with PMHx of alcohol associated cirrhosis, hypertension, hyperlipidemia, GERD, and depression admitted on 12/27/2024. She presented as a direct admission from clinic for further evaluation of progressive abdominal distention, weight gain and associated VILLAVICENCIO over the past 5 months. Registered under inpatient status for further evaluation and treatment.     Refer to outpatient work-up [facility]. SpO2 97% on room air, slightly hypertensive, remainder of vitals WNL. Outpatient work-up 12/27: D-dimer and BNP WNL, trop 24>21. UA unremarkable. CT C/A/P with PE study negative for PE or dissection, no acute process demonstrated. Hepatic panel unremarkable. Lipase WNL. No acute intraabdominal pathology.     Pt reports sobriety for >1 year, increasing abdominal distention since 6/2024. Established with [facility], but not on any cirrhosis medications and pt and family frustrated with lack of answers for why her abdomen is distended and she has been gaining weight.  has dramatically changed pt's diet and minimize salt intake. Pt denies chest pain, dizziness. No CAROL, orthopnea. No recent illness. Does not describe overconsumption of fluids. No melena, hematochezia. No urinary sx. No recent EGD, reports hx of variceal bleeding years ago for which she underwent endoscopy in [place], [place]. Has appointment with [facility] 3/6/2025. Per further chart review. Pt had a Lexiscan 11/10/23 negative for inducible ischemia. US paracentesis 12/8/22 with 250 ccs fluid removed. Denies dysphagia.  Colonoscopy in 10/2023 with noted polpyps, diverticulosis, and internal hemorrhoids.     Past Medical History    Past Medical History:    Diagnosis Date    Arthritis     Asymptomatic varicose veins     Benign neoplasm of colon 11/06, 7/13    Adenoma    Family history of colon cancer     dad    Gastroesophageal reflux disease     History of colonic polyps     Hypertension     Major depression     Syncope        Past Surgical History   Past Surgical History:   Procedure Laterality Date    AMPUTATE TOE(S) Left 04/12/2018    Procedure: AMPUTATE TOE(S);  Amputation left third digit;  Surgeon: Herb Micheal DPM;  Location: [facility] OR    ARTHROPLASTY HIP Right 03/28/2016    Procedure: ARTHROPLASTY HIP;  Surgeon: Perez Meza MD;  Location: [facility] OR    ARTHROPLASTY REVISION HIP Right 10/28/2019    Procedure: Revision right total hip arthroplasty;  Surgeon: Perez Meza MD;  Location: [facility] OR    CLOSED REDUCTION HIP Right 07/15/2019    Procedure: Closed reduction, right total hip arthroplasty dislocation.;  Surgeon: Perez Meza MD;  Location: [facility] OR    COLONOSCOPY Left 04/16/2021    Procedure: COLONOSCOPY;  Surgeon: Rell Cisneros MD;  Location:  GI    HC CORRECT BUNION,SIMPLE  01/01/1998    RT    HC CORRECT BUNION,SIMPLE  01/01/2001    LT    HC KNEE SCOPE, DIAGNOSTIC      LT    IR CERVICAL EPIDURAL STEROID INJECTION  06/18/2007    REVERSE ARTHROPLASTY SHOULDER Left 10/30/2018    Procedure: Left reverse total shoulder arthroplasty;  Surgeon: Aaron Christianson MD;  Location: [facility] OR    REVERSE ARTHROPLASTY SHOULDER Right 06/12/2019    Procedure: Right reverse total shoulder arthroplasty;  Surgeon: Aaron Christianson MD;  Location:  OR    ROTATOR CUFF REPAIR RT/LT  07/01/2007    right    VASCULAR SURGERY  left leg bypass 2004    [facility] NONSPECIFIC PROCEDURE  01/01/1972    Right arm plastic surgery    [facility] NONSPECIFIC PROCEDURE  01/01/1972    Right knee surgery    [facility] NONSPECIFIC PROCEDURE  10/01/2003    L popliteal AVM repair    [facility] NONSPECIFIC PROCEDURE  11/01/2004    Removal bone spur left foot    [facility] NONSPECIFIC PROCEDURE  04/01/2007     amputation of toes left & right toes    [facility] REPAIR SPIEGELIAN HERNIA  2002    [facility] TOTAL KNEE ARTHROPLASTY  10/01/2003    LT       Prior to Admission Medications   Prior to Admission Medications   Prescriptions Last Dose Informant Patient Reported? Taking?   cetirizine (ZYRTEC) 10 MG tablet 2024 Morning  Yes Yes   Sig: Take 1 tablet by mouth every morning.   hydrOXYzine HCl (ATARAX) 25 MG tablet Unknown  No Yes   Sig: Take 1-2 tablets (25-50 mg) by mouth nightly as needed for anxiety.   lisinopril-hydrochlorothiazide (ZESTORETIC) 10-12.5 MG tablet 2024 Morning  No Yes   Sig: Take 1 tablet by mouth daily   magnesium oxide (MAG-OX) 400 MG tablet 2024 Morning  No Yes   Sig: Take 1 tablet (400 mg) by mouth daily.   pantoprazole (PROTONIX) 40 MG EC tablet 2024 Morning  Yes Yes   Sig: Take 1 tablet by mouth every morning.   vitamin E (TOCOPHEROL) 400 units (180 mg) capsule 2024 Morning  Yes Yes   Sig: Take 400 Units by mouth every morning.      Facility-Administered Medications: None        Review of Systems    The 10 point Review of Systems is negative other than noted in the HPI.    Social History   I have reviewed this patient's social history and updated it with pertinent information if needed.  Social History     Tobacco Use    Smoking status: Former     Current packs/day: 0.00     Types: Cigarettes, Cigars     Quit date: 2013     Years since quittin.7     Passive exposure: Past    Smokeless tobacco: Never   Vaping Use    Vaping status: Never Used   Substance Use Topics    Alcohol use: Not Currently     Alcohol/week: 70.0 standard drinks of alcohol     Types: 70 Shots of liquor per week    Drug use: No         Family History   I have reviewed this patient's family history and updated it with pertinent information if needed.  Family History   Problem Relation Age of Onset    Breast Cancer Mother     Cancer - colorectal Father         66    Colon Cancer Father     Bone Cancer  Brother     Cancer Brother     Diabetes Brother     Kidney Disease Brother         20% function    Breast Cancer Niece         bilateral mastectomy         Allergies   Allergies   Allergen Reactions    Morphine      respiratory distress    Cephalexin Rash        Physical Exam   Vital Signs: Temp: 98.8  F (37.1  C) Temp src: Oral BP: (!) 149/76 Pulse: 89   Resp: 16 SpO2: 97 % O2 Device: None (Room air)    Weight: 0 lbs 0 oz    CONSTITUTIONAL: Pt laying in bed, dressed in hospital garb. Appears comfortable. Cooperative with interview. Accompanied by  at bedside.   HEENT: Normocephalic, atraumatic.   CARDIOVASCULAR: RRR, no murmurs, rubs, or extra heart sounds appreciated. Pulses +2/4 and regular in upper and lower extremities, bilaterally.   RESPIRATORY: No increased work of breathing.  CTA, bilat; no wheezes, rales, or rhonchi appreciated.  GASTROINTESTINAL:  Abdomen soft, distended. BS auscultated in all four quadrants. Negative for tenderness to palpation.    MUSCULOSKELETAL: No gross deformities noted. Normal muscle tone.   HEMATOLOGIC/LYMPHATIC/IMMUNOLOGIC: Negative for lower extremity edema, bilaterally.  NEUROLOGIC: Alert and oriented to person, place, and time. No focal neuro deficits.   SKIN: Warm, dry, intact. No jaundice noted. Lesion noted on R forehead and dorsum of L hand.     Medical Decision Making       75 MINUTES SPENT BY ME on the date of service doing chart review, history, exam, documentation & further activities per the note.      Data     I have personally reviewed the following data over the past 24 hrs:    3.7 (L)  \   10.1 (L)   / 149 (L)     131 (L) 95 (L) 32.9 (H) /  112 (H)   4.5 22 1.60 (H) \     ALT: 19 AST: 39 AP: 75 TBILI: 1.2   ALB: 4.2 TOT PROTEIN: 6.7 LIPASE: 29     Trop: 21 (H) BNP: 168     INR:  1.09 PTT:  N/A   D-dimer:  <0.27 Fibrinogen:  N/A       Imaging results reviewed over the past 24 hrs:   Recent Results (from the past 24 hours)   CT Chest PE Abdomen Pelvis w  Contrast    Narrative    CT CHEST PULMONARY EMBOLUS ABDOMEN PELVIS WITH CONTRAST 12/27/2024  11:07 AM    CLINICAL HISTORY: Chest pain. Shortness of breath, abdominal bloating.  SOB (shortness of breath).    TECHNIQUE: CT scan of the chest, abdomen, and pelvis was performed  following injection of IV contrast. Multiplanar reformats were  obtained. Dose reduction techniques were used.   CONTRAST: 86mL Isovue-370    COMPARISON: November 11, 2024    FINDINGS:   ANGIOGRAM CHEST: Pulmonary arteries are normal caliber and negative  for pulmonary emboli. Thoracic aorta is negative for dissection. No CT  evidence of right heart strain.    LUNGS AND PLEURA: Stable right apical scarring. No discrete round or  oval nodules. No acute infiltrates. No effusions.    MEDIASTINUM/AXILLAE: No lymphadenopathy. No thoracic aortic aneurysms.    CORONARY ARTERY CALCIFICATIONS: Moderate.    HEPATOBILIARY: No significant mass or bile duct dilatation. No  calcified gallstones. Nodular contour compatible with cirrhosis.    PANCREAS: No significant mass, duct dilatation, or inflammatory  change.    SPLEEN: Normal size.    ADRENAL GLANDS: No significant nodules.    KIDNEYS/BLADDER: No significant mass, stones, or hydronephrosis.    BOWEL: Diverticulosis in the colon. No acute inflammatory change. No  obstruction. Normal appendix.    PELVIC ORGANS: No pelvic masses.    ADDITIONAL FINDINGS: Mild upper abdominal/tavo hepatis adenopathy  similar to previous. Representative node centrally measures 1.4 cm in  short axis image 60 series 13. Nonaneurysmal abdominal aorta. No free  fluid.    MUSCULOSKELETAL: No frankly destructive bony lesions.      Impression    IMPRESSION:  1.  No acute process demonstrated.    JOY SALGADO MD         SYSTEM ID:  [id number]

## 2024-12-27 NOTE — PHARMACY-ADMISSION MEDICATION HISTORY
Pharmacist Admission Medication History    Admission medication history is complete. The information provided in this note is only as accurate as the sources available at the time of the update.    Information Source(s): Patient and CareEverywhere/SureScripts via in-person    Pertinent Information: none    Changes made to PTA medication list:  Added: vit e  Deleted: None  Changed: zyrtec and protonix to daily    Allergies reviewed with patient and updates made in EHR: yes    Medication History Completed By: Galilea Moctezuma RPH 12/27/2024 4:42 PM    PTA Med List   Medication Sig Last Dose/Taking    cetirizine (ZYRTEC) 10 MG tablet Take 1 tablet by mouth every morning. 12/27/2024 Morning    hydrOXYzine HCl (ATARAX) 25 MG tablet Take 1-2 tablets (25-50 mg) by mouth nightly as needed for anxiety. Unknown    lisinopril-hydrochlorothiazide (ZESTORETIC) 10-12.5 MG tablet Take 1 tablet by mouth daily 12/27/2024 Morning    magnesium oxide (MAG-OX) 400 MG tablet Take 1 tablet (400 mg) by mouth daily. 12/27/2024 Morning    pantoprazole (PROTONIX) 40 MG EC tablet Take 1 tablet by mouth every morning. 12/27/2024 Morning    vitamin E (TOCOPHEROL) 400 units (180 mg) capsule Take 400 Units by mouth every morning. 12/27/2024 Morning

## 2024-12-27 NOTE — ED NOTES
Bed: ED16  Expected date:   Expected time:   Means of arrival:   Comments:  Yobani   [facility]

## 2024-12-28 ENCOUNTER — APPOINTMENT (OUTPATIENT)
Dept: ULTRASOUND IMAGING | Facility: CLINIC | Age: 69
DRG: 433 | End: 2024-12-28
Attending: PHYSICIAN ASSISTANT
Payer: MEDICARE

## 2024-12-28 ENCOUNTER — APPOINTMENT (OUTPATIENT)
Dept: CARDIOLOGY | Facility: CLINIC | Age: 69
DRG: 433 | End: 2024-12-28
Attending: PHYSICIAN ASSISTANT
Payer: MEDICARE

## 2024-12-28 LAB
ALBUMIN SERPL BCG-MCNC: 3.8 G/DL (ref 3.5–5.2)
ALP SERPL-CCNC: 68 U/L (ref 40–150)
ALT SERPL W P-5'-P-CCNC: 18 U/L (ref 0–50)
ANION GAP SERPL CALCULATED.3IONS-SCNC: 12 MMOL/L (ref 7–15)
AST SERPL W P-5'-P-CCNC: 37 U/L (ref 0–45)
BILIRUB SERPL-MCNC: 1.2 MG/DL
BUN SERPL-MCNC: 27.2 MG/DL (ref 8–23)
CALCIUM SERPL-MCNC: 9.9 MG/DL (ref 8.8–10.4)
CHLORIDE SERPL-SCNC: 95 MMOL/L (ref 98–107)
CREAT SERPL-MCNC: 1.15 MG/DL (ref 0.51–0.95)
EGFRCR SERPLBLD CKD-EPI 2021: 51 ML/MIN/1.73M2
ERYTHROCYTE [DISTWIDTH] IN BLOOD BY AUTOMATED COUNT: 12.6 % (ref 10–15)
GLUCOSE SERPL-MCNC: 119 MG/DL (ref 70–99)
HCO3 SERPL-SCNC: 24 MMOL/L (ref 22–29)
HCT VFR BLD AUTO: 28.7 % (ref 35–47)
HGB BLD-MCNC: 9.8 G/DL (ref 11.7–15.7)
LVEF ECHO: NORMAL
MCH RBC QN AUTO: 32.5 PG (ref 26.5–33)
MCHC RBC AUTO-ENTMCNC: 34.1 G/DL (ref 31.5–36.5)
MCV RBC AUTO: 95 FL (ref 78–100)
PLATELET # BLD AUTO: 152 10E3/UL (ref 150–450)
POTASSIUM SERPL-SCNC: 4.1 MMOL/L (ref 3.4–5.3)
PROT SERPL-MCNC: 6.3 G/DL (ref 6.4–8.3)
RBC # BLD AUTO: 3.02 10E6/UL (ref 3.8–5.2)
SODIUM SERPL-SCNC: 131 MMOL/L (ref 135–145)
TSH SERPL DL<=0.005 MIU/L-ACNC: 1.62 UIU/ML (ref 0.3–4.2)
WBC # BLD AUTO: 3 10E3/UL (ref 4–11)

## 2024-12-28 PROCEDURE — 120N000001 HC R&B MED SURG/OB

## 2024-12-28 PROCEDURE — 99233 SBSQ HOSP IP/OBS HIGH 50: CPT | Performed by: INTERNAL MEDICINE

## 2024-12-28 PROCEDURE — 36415 COLL VENOUS BLD VENIPUNCTURE: CPT | Performed by: PHYSICIAN ASSISTANT

## 2024-12-28 PROCEDURE — 76705 ECHO EXAM OF ABDOMEN: CPT

## 2024-12-28 PROCEDURE — 85027 COMPLETE CBC AUTOMATED: CPT | Performed by: PHYSICIAN ASSISTANT

## 2024-12-28 PROCEDURE — 250N000013 HC RX MED GY IP 250 OP 250 PS 637: Performed by: INTERNAL MEDICINE

## 2024-12-28 PROCEDURE — 250N000013 HC RX MED GY IP 250 OP 250 PS 637: Performed by: PHYSICIAN ASSISTANT

## 2024-12-28 PROCEDURE — 80053 COMPREHEN METABOLIC PANEL: CPT | Performed by: PHYSICIAN ASSISTANT

## 2024-12-28 PROCEDURE — 84443 ASSAY THYROID STIM HORMONE: CPT | Performed by: INTERNAL MEDICINE

## 2024-12-28 PROCEDURE — 255N000002 HC RX 255 OP 636: Performed by: PHYSICIAN ASSISTANT

## 2024-12-28 PROCEDURE — 999N000208 ECHOCARDIOGRAM COMPLETE

## 2024-12-28 RX ADMIN — PERFLUTREN 10 ML: 6.52 INJECTION, SUSPENSION INTRAVENOUS at 09:13

## 2024-12-28 RX ADMIN — PANTOPRAZOLE SODIUM 40 MG: 40 TABLET, DELAYED RELEASE ORAL at 06:41

## 2024-12-28 RX ADMIN — COCOA BUTTER, PHENYLEPHRINE HCL 1 SUPPOSITORY: 2211; 6.5 SUPPOSITORY RECTAL at 16:44

## 2024-12-28 ASSESSMENT — ACTIVITIES OF DAILY LIVING (ADL)
ADLS_ACUITY_SCORE: 35
ADLS_ACUITY_SCORE: 31
ADLS_ACUITY_SCORE: 35
ADLS_ACUITY_SCORE: 31
ADLS_ACUITY_SCORE: 35
ADLS_ACUITY_SCORE: 31
ADLS_ACUITY_SCORE: 35
ADLS_ACUITY_SCORE: 35
ADLS_ACUITY_SCORE: 31
ADLS_ACUITY_SCORE: 35
ADLS_ACUITY_SCORE: 35
ADLS_ACUITY_SCORE: 31
ADLS_ACUITY_SCORE: 35
ADLS_ACUITY_SCORE: 31
ADLS_ACUITY_SCORE: 35

## 2024-12-28 NOTE — PLAN OF CARE
[id number] - 1962    Orientation: A&Ox4    Aggression Stop Light: Green    Activity: Independent    Diet/BS Checks: NPO ex meds    Tele: NSR    IV Access/Drains: L PIV SL    Pain Management: Denies     Abnormal VS/Results: VSS on RA    Bowel/Bladder: Continent    Skin/Wounds: Intact    Consults: GI, SW    D/C Disposition: Pending improvement    Other Info:   - Abd US completed - see results  - Plan for ECHO today

## 2024-12-28 NOTE — PROGRESS NOTES
[facility]    Medicine Progress Note - Hospitalist Service        Date of Admission:  12/27/2024  4:05 PM    Assessment & Plan:   Krystyna Peña is a 69 year old female with PMHx of alcohol associated cirrhosis, hypertension, hyperlipidemia, GERD, and depression admitted on 12/27/2024. She presented as a direct admission from clinic for further evaluation of progressive abdominal distention, weight gain and associated VILLAVICENCIO over the past 5 months.     Dyspnea on exertion  -Etiology unclear, CT chest negative for PE  -Normal BNP  -Echo with normal EF of 60-65%, RV is normal in structure and function and size, no valvular heart disease  -Patient has had at least 25 pounds within the past year and a half, this could all be related to her excess weight gain  -Check TSH  -If workup is unremarkable then we will proceed with Lexiscan  and Monday morning    Abdominal bloating, weight gain   Alcohol associated cirrhosis  -Sober >1 year, increasing abdominal distention since 6/2024. Established with [facility], but not on any cirrhosis medications. No recent EGD, reports hx of variceal bleeding years ago for which she underwent endoscopy in [place], [place]. Has appointment with [facility] 3/6/2025.   *SpO2 97% on room air, slightly hypertensive, remainder of vitals WNL   *Outpatient work-up 12/27: D-dimer and BNP WNL, trop 24>21. UA unremarkable. CT C/A/P with PE study negative for PE or dissection, no acute process demonstrated.   *Hepatic panel unremarkable. Lipase WNL. No acute intraabdominal pathology.   *US paracentesis 12/8/22 with 250 ccs fluid removed   -[facility] consulted. Pt and family would like second opinion (previously established with [facility] and has appt with [facility] in 3/2025).    -RUQ US shows cirrhotic changes otherwise unremarkable.  No ascites.    JORJE on CKD IIIb: Creatinine 1.60 on day of admission, was 1.43 on 12/6, was 0.7-0.9 in 2023  -Creatinine improved to 1.15  -Hold PTA  Lisinopril/hydrochlorothiazide     Elevated troponin, flat: trop 24>21. EKG in clinic without acute ischemia. Suspect demand in the setting of above. No recent chest pain.   -As mentioned above if all of her workup is unremarkable then we will offer nuclear stress test prior to discharge    Borderline macrocytic anemia  Mild leukopenia  Mild thrombocytopenia  WBC 3.7, Hgb 10.1 (was 11.6 one month PTA), platelets 149. INR WNL. No active signs of bleeding. No recent illness. Attribute pancytopenia to chronic liver disease.   -Monitor     Chronic hyponatremia  -Sodium 131 on admission, baseline range 129-132. Also attribute to chronic liver disease. Pt asymptomatic.   -Monitor     Ventral hernia:   -No abdominal pain.  Follow-up outpatient    Chronic, stable medical conditions: Continue PTA meds as appropriate  Hypertension: Hold lisinopril/hydrochlorothiazide given mild JORJE as above   Hyperlipidemia   GERD   Depression   RUL pulmonary nodule: PET 5/2/23 notes irregular opacity in the right upper lobe apex measuring up to 0.8 cm, likely corresponding to the finding described on outside CT, demonstrates no FDG activity. Its morphology with convex margins is suggestive of an area of focal scarring rather than an actual nodule.       Diet: Regular Diet Adult     DVT Prophylaxis: Pneumatic Compression Devices   To Catheter: Not present  Code Status: Full Code     Disposition Plan       Expected Discharge Date: 12/29/2024              Entered: Francisco Javier Smith MD 12/28/2024, 10:41 AM        Medically Ready for Discharge: Anticipated Tomorrow       Clinically Significant Risk Factors Present on Admission         # Hyponatremia: Lowest Na = 131 mmol/L in last 2 days, will monitor as appropriate  # Hypochloremia: Lowest Cl = 95 mmol/L in last 2 days, will monitor as appropriate          # Hypertension: Home medication list includes antihypertensive(s)      # Anemia: based on hgb <11       # Obesity: Estimated body mass  "index is 30.93 kg/m  as calculated from the following:    Height as of 12/6/24: 1.575 m (5' 2\").    Weight as of this encounter: 76.7 kg (169 lb 1.6 oz).                   The patient's care was discussed with the Bedside Nurse, Patient, and Patient's Family.    Medical Decision Making       **CLEAR ALL SELECTIONS**      Labs/Imaging Reviewed:  See Information above and Data section below  Time SPENT BY ME on the date of service doing chart review, history, exam, documentation & further activities per the note:  50 MINUTES    Chart documentation was completed, in part, with [employer] voice-recognition software. Even though reviewed, some grammatical, spelling, and word errors may remain.    Francisco Javier Smith MD  Hospitalist Service  [facility]  Text Page 7AM-6PM  Securely message with the Vocera Web Console (learn more here)  Text page via [employer] Paging/Directory    ______________________________________________________________________    Interval History   Patient seen and examined.  Extensive discussion undertaken at the bedside with the patient and her .  Renal function is better.  She denies chest pain or dyspnea at rest.  We discussed at length about how she has gained weight which could all be nutritional.  There is no evidence of ascites or mass that was seen on ultrasound or CT.    Data reviewed today: I reviewed all medications, new labs and imaging results over the last 24 hours. I personally reviewed no images or EKG's today.    Physical Exam   Vital signs:  Temp: 98.1  F (36.7  C) Temp src: Oral BP: 116/74 Pulse: 94   Resp: 16 SpO2: 97 % O2 Device: None (Room air)     Weight: 76.7 kg (169 lb 1.6 oz)  Estimated body mass index is 30.93 kg/m  as calculated from the following:    Height as of 12/6/24: 1.575 m (5' 2\").    Weight as of this encounter: 76.7 kg (169 lb 1.6 oz).      Wt Readings from Last 2 Encounters:   12/28/24 76.7 kg (169 lb 1.6 oz)   12/27/24 77.6 kg (171 lb) "       Gen: AAOX3, NAD, comfortable  HEENT: Supple neck, moist oral mucosa, no pallor  Resp: CTA B/L, normal WOB  CVS: RRR, no murmur  Abd/GI: Soft, non-tender. BS- normoactive.   Skin: Warm, dry no rashes  MSK: no pedal edema  Neuro- CN- intact. No focal deficits.       Data   Recent Labs   Lab 12/27/24  1016   WBC 3.7*   HGB 10.1*   MCV 97   *   INR 1.09   *   POTASSIUM 4.5   CHLORIDE 95*   CO2 22   BUN 32.9*   CR 1.60*   ANIONGAP 14   YANA 9.8   *   ALBUMIN 4.2   PROTTOTAL 6.7   BILITOTAL 1.2   ALKPHOS 75   ALT 19   AST 39   LIPASE 29       Recent Results (from the past 24 hours)   CT Chest PE Abdomen Pelvis w Contrast    Narrative    CT CHEST PULMONARY EMBOLUS ABDOMEN PELVIS WITH CONTRAST 12/27/2024  11:07 AM    CLINICAL HISTORY: Chest pain. Shortness of breath, abdominal bloating.  SOB (shortness of breath).    TECHNIQUE: CT scan of the chest, abdomen, and pelvis was performed  following injection of IV contrast. Multiplanar reformats were  obtained. Dose reduction techniques were used.   CONTRAST: 86mL [id number]-[id number]    COMPARISON: November 11, 2024    FINDINGS:   ANGIOGRAM CHEST: Pulmonary arteries are normal caliber and negative  for pulmonary emboli. Thoracic aorta is negative for dissection. No CT  evidence of right heart strain.    LUNGS AND PLEURA: Stable right apical scarring. No discrete round or  oval nodules. No acute infiltrates. No effusions.    MEDIASTINUM/AXILLAE: No lymphadenopathy. No thoracic aortic aneurysms.    CORONARY ARTERY CALCIFICATIONS: Moderate.    HEPATOBILIARY: No significant mass or bile duct dilatation. No  calcified gallstones. Nodular contour compatible with cirrhosis.    PANCREAS: No significant mass, duct dilatation, or inflammatory  change.    SPLEEN: Normal size.    ADRENAL GLANDS: No significant nodules.    KIDNEYS/BLADDER: No significant mass, stones, or hydronephrosis.    BOWEL: Diverticulosis in the colon. No acute inflammatory change. No  obstruction.  Normal appendix.    PELVIC ORGANS: No pelvic masses.    ADDITIONAL FINDINGS: Mild upper abdominal/tavo hepatis adenopathy  similar to previous. Representative node centrally measures 1.4 cm in  short axis image 60 series 13. Nonaneurysmal abdominal aorta. No free  fluid.    MUSCULOSKELETAL: No frankly destructive bony lesions.      Impression    IMPRESSION:  1.  No acute process demonstrated.    JOY SALGADO MD         SYSTEM ID:  [id number]   US Abdomen Limited    Narrative    EXAM: US ABDOMEN LIMITED  LOCATION: [facility]  DATE: 2024    INDICATION: Known hx of alcohol associated cirrhosis, varices  COMPARISON: CT 2024.  TECHNIQUE: Limited abdominal ultrasound.    FINDINGS:    GALLBLADDER: There are no gallstones. There are few gallbladder polyps measuring up to 8mm. No gallbladder wall thickening. No sonographic Bernal's sign.    BILE DUCTS: No biliary dilatation. The common duct measures 5 mm.    LIVER: Cirrhotic configuration. No focal mass.    RIGHT KIDNEY: No hydronephrosis.    PANCREAS: The visualized portions are normal.    No ascites.      Impression    IMPRESSION:  1.  No gallstone or biliary dilatation.  2.  Few gallbladder polyps measuring up to 8mm.    Gallbladder polyp 7-9 mm: Follow-up US at 12 months. If on follow-up increase of >= 4 mm in <= 12 months - recommend surgical consult. If decrease of >= 4 mm - stop following.    REFERENCE:  Management of Incidentally Detected Gallbladder Polyps: Society of Radiologists in Ultrasound Consensus Conference Recommendations. Radiology ; 000:1-12         Echocardiogram Complete   Result Value    LVEF  60-65%    Narrative    [id number]  [id number]  [id number]  [id number]^BETH^TIMMY^JEANETTE     [facility]  Echocardiography Laboratory  [address] [place] [address]     Name: JOSHUA REYNOLDS  MRN: [id number]  : 1955  Study Date: 2024 07:42 AM  Age: 69 yrs  Gender: Female  Patient  Location: [facility]  Reason For Study: Edema  Ordering Physician: TIMMY CAMPOS  Referring Physician: SYSTEM, PROVIDER NOT IN  Performed By: Pennie Jacinto     BSA: 1.8 m2  Height: 62 in  Weight: 169 lb  HR: 77  BP: 149/76 mmHg  ______________________________________________________________________________  Procedure  Echocardiogram with two-dimensional, color and spectral Doppler. Definity (NDC  #[id number]) given intravenously.  ______________________________________________________________________________  Interpretation Summary     Left ventricular systolic function is normal.  The visual ejection fraction is 60-65%.  The right ventricle is normal in structure, function and size.  Doppler interrogation rizo snot demonstrate signficant stenosis or  insufficidncy involving cardiac valves     NO change since 7/27/2020  ______________________________________________________________________________  Left Ventricle  The left ventricle is normal in size. There is normal left ventricular wall  thickness. Left ventricular systolic function is normal. The visual ejection  fraction is 60-65%. Grade I or early diastolic dysfunction. No regional wall  motion abnormalities noted. There is no thrombus seen in the left ventricle.     Right Ventricle  The right ventricle is normal in structure, function and size. There is no  mass or thrombus in the right ventricle.     Atria  Normal left atrial size. Right atrial size is normal. There is no atrial shunt  seen. The left atrial appendage is not well visualized.     Mitral Valve  The mitral valve leaflets appear normal. There is no evidence of stenosis,  fluttering, or prolapse. There is no mitral regurgitation noted. There is no  mitral valve stenosis.     Tricuspid Valve  Normal tricuspid valve. No tricuspid regurgitation. There is no tricuspid  stenosis.     Aortic Valve  The aortic valve is trileaflet. No aortic regurgitation is present.     Pulmonic Valve  The pulmonic  valve is not well visualized. There is no pulmonic valvular  regurgitation. There is no pulmonic valvular stenosis.     Vessels  The aortic root is normal size. Normal size ascending aorta. The inferior vena  cava is normal. The pulmonary artery is normal size.     Pericardium  The pericardium appears normal. There is no pleural effusion.     Rhythm  Sinus rhythm was noted.  ______________________________________________________________________________  MMode/2D Measurements & Calculations  IVSd: 0.99 cm     LVIDd: 4.0 cm  LVIDs: 2.3 cm  LVPWd: 0.98 cm  FS: 44.3 %  LV mass(C)d: 126.4 grams  LV mass(C)dI: 71.0 grams/m2  Ao root diam: 3.0 cm  LA dimension: 3.7 cm  asc Aorta Diam: 2.9 cm  LA/Ao: 1.2  LVOT diam: 1.8 cm  LVOT area: 2.5 cm2  Ao root diam index Ht(cm/m): 1.9  Ao root diam index BSA (cm/m2): 1.7  Asc Ao diam index BSA (cm/m2): 1.6  Asc Ao diam index Ht(cm/m): 1.9  LA Volume (BP): 35.0 ml     LA Volume Index (BP): 19.7 ml/m2  RV Base: 3.7 cm  RWT: 0.48  TAPSE: 1.8 cm     Doppler Measurements & Calculations  MV E max dennis: 67.7 cm/sec  MV A max dennis: 93.0 cm/sec  MV E/A: 0.73  MV dec slope: 384.5 cm/sec2  MV dec time: 0.18 sec  Ao V2 max: 132.6 cm/sec  Ao max P.0 mmHg  Ao V2 mean: 101.8 cm/sec  Ao mean P.4 mmHg  Ao V2 VTI: 30.8 cm  YINKA(I,D): 2.1 cm2  YINKA(V,D): 2.0 cm2  LV V1 max P.3 mmHg  LV V1 max: 103.5 cm/sec  LV V1 VTI: 25.8 cm  SV(LVOT): 65.5 ml  SI(LVOT): 36.8 ml/m2  PA acc time: 0.13 sec  AV Dennis Ratio (DI): 0.78  YINKA Index (cm2/m2): 1.2  E/E' av.2     Lateral E/e': 6.6  Medial E/e': 9.9  RV S Dennis: 12.5 cm/sec     ______________________________________________________________________________  Report approved by: Dr. Hugo Whitman on 2024 09:24 AM           Medications   Current Facility-Administered Medications   Medication Dose Route Frequency Provider Last Rate Last Admin    Daily 2 GRAM acetaminophen limit, unless fulminent liver failure or transaminases greater than or equal to  300 - 400, then none   Does not apply Continuous PRN Marta Salas PA-C         Current Facility-Administered Medications   Medication Dose Route Frequency Provider Last Rate Last Admin    pantoprazole (PROTONIX) EC tablet 40 mg  40 mg Oral QAM AC Marta Salas PA-C   40 mg at 12/28/24 0641    sodium chloride (PF) 0.9% PF flush 3 mL  3 mL Intracatheter Q8H Marta Salas PA-C   3 mL at 12/28/24 0258

## 2024-12-28 NOTE — PLAN OF CARE
Goal Outcome Evaluation:         12/27 0700-1930    Orientation: A&Ox4  Aggression Stop Light: Green  Activity: Independent  Diet/BS Checks: 2gm Na  Tele: Ordered, not placed yet  IV Access/Drains: L PIV SL  Pain Management: Denies pain  Abnormal VS/Results: VSS on RA  -Na 131  -Creat 1.60  -Troponin 21  Bowel/Bladder: Continent of B/B  Skin/Wounds: Intact, distended abdomen   Consults: [facility]  D/C Disposition: TBD  Other Info:     -NPO at midnight for abdominal ultrasound tomorrow   -PVR ordered after urination

## 2024-12-28 NOTE — CONSULTS
[facility]  Gastroenterology Consultation         Krystyna Peña  [address] [place] [address]  69 year old female    Admission Date/Time: 12/27/2024  Primary Care Provider: Lucille Miller  Referring / Attending Physician:  Dr. Santo    We were asked to see the patient in consultation by Dr. Santo for evaluation of EtOH cirrhosis.      CC: Cirrhosis    HPI:  Krystyna Peña is a 69 year old female who was admittedNo history of weight gain history of hypertension insomnia chronic kidney disease cirrhosis of liver due to heavy alcohol use patient has been sober for last many years patient has longstanding history of anxiety and rheumatoid arthritis significant chronic back problem.  Patient has been is significantly concerned due to significant increase in abdominal girth.  Patient was evaluated with blood work and CT scan of the abdomen patient does not have any abdominal ascites.  Patient has minimally elevated creatinine which is down to 1.15 today.  Patient liver function tests are completely normal patient's albumin is normal patient's MELD score is less than 10.  Patient is hemodynamically stable patient is denying any other significant systemic complaint.  Patient's family has been working on the diet modification patient has lost 6 pounds over the last couple weeks.  Overall patient is denying any new significant GI complaints.  Patient's last endoscopy was more than 2 to 3 years ago.  Patient is scheduled for back injection next week.  Patient is denying any history of fever chills abdominal pain or any other significant systemic complaint rest of review of system is negative.    ROS: A comprehensive ten point review of systems was negative aside from those in mentioned in the HPI.      PAST MED HX:  I have reviewed this patient's medical history and updated it with pertinent information if needed.   Past Medical History:   Diagnosis Date    Arthritis      Asymptomatic varicose veins     Benign neoplasm of colon ,     Adenoma    Family history of colon cancer     dad    Gastroesophageal reflux disease     History of colonic polyps     Hypertension     Major depression     Syncope        MEDICATIONS:   Prior to Admission Medications   Prescriptions Last Dose Informant Patient Reported? Taking?   cetirizine (ZYRTEC) 10 MG tablet 2024 Morning  Yes Yes   Sig: Take 1 tablet by mouth every morning.   hydrOXYzine HCl (ATARAX) 25 MG tablet Unknown  No Yes   Sig: Take 1-2 tablets (25-50 mg) by mouth nightly as needed for anxiety.   lisinopril-hydrochlorothiazide (ZESTORETIC) 10-12.5 MG tablet 2024 Morning  No Yes   Sig: Take 1 tablet by mouth daily   magnesium oxide (MAG-OX) 400 MG tablet 2024 Morning  No Yes   Sig: Take 1 tablet (400 mg) by mouth daily.   pantoprazole (PROTONIX) 40 MG EC tablet 2024 Morning  Yes Yes   Sig: Take 1 tablet by mouth every morning.   vitamin E (TOCOPHEROL) 400 units (180 mg) capsule 2024 Morning  Yes Yes   Sig: Take 400 Units by mouth every morning.      Facility-Administered Medications: None       ALLERGIES:   Allergies   Allergen Reactions    Morphine      respiratory distress    Cephalexin Rash       SOCIAL HISTORY:  Social History     Tobacco Use    Smoking status: Former     Current packs/day: 0.00     Types: Cigarettes, Cigars     Quit date: 2013     Years since quittin.7     Passive exposure: Past    Smokeless tobacco: Never   Vaping Use    Vaping status: Never Used   Substance Use Topics    Alcohol use: Not Currently     Alcohol/week: 70.0 standard drinks of alcohol     Types: 70 Shots of liquor per week    Drug use: No       FAMILY HISTORY:  Family History   Problem Relation Age of Onset    Breast Cancer Mother     Cancer - colorectal Father         66    Colon Cancer Father     Bone Cancer Brother     Cancer Brother     Diabetes Brother     Kidney Disease Brother         20% function     Breast Cancer Niece         bilateral mastectomy       PHYSICAL EXAM:   General awake alert oriented  Vital Signs with Ranges  Temp: 98.1  F (36.7  C) Temp src: Oral BP: 116/74 Pulse: 94   Resp: 16 SpO2: 97 % O2 Device: None (Room air)    I/O last 3 completed shifts:  In: -   Out: 150 [Urine:150]    Constitutional: healthy, alert, and no distress   Cardiovascular: negative, PMI normal. No lifts, heaves, or thrills. RRR. No murmurs, clicks gallops or rub  Respiratory: negative, Percussion normal. Good diaphragmatic excursion. Lungs clear  Neck: Neck supple. No adenopathy. Thyroid symmetric, normal size,, Carotids without bruits.  Abdomen: Abdomen soft, non-tender. BS normal. No masses, organomegaly  SKIN: no suspicious lesions or rashes          ADDITIONAL COMMENTS:   I reviewed the patient's new clinical lab test results.   Recent Labs   Lab Test 12/28/24  1054 12/27/24  1016 11/11/24  1345 11/09/23  0833 07/03/23  0941 03/06/23  1255   WBC 3.0* 3.7* 5.3   < > 3.8*  --    HGB 9.8* 10.1* 11.6*   < > 11.9  --    MCV 95 97 98   < > 94  --     149* 149*   < > 160  --    INR  --  1.09  --   --  1.02 1.1    < > = values in this interval not displayed.     Recent Labs   Lab Test 12/28/24  1054 12/27/24  1016 12/06/24  1005   POTASSIUM 4.1 4.5 4.7   CHLORIDE 95* 95* 92*   CO2 24 22 23   BUN 27.2* 32.9* 28.5*   ANIONGAP 12 14 17*     Recent Labs   Lab Test 12/28/24  1054 12/27/24  1126 12/27/24  1016 12/06/24  1005 11/11/24  1809 11/09/23  0833 07/03/23  0953 12/08/22  0848 12/07/22  1350 01/02/19  1341 12/11/18  1123   ALBUMIN 3.8  --  4.2 4.1  --    < >  --    < > 3.5   < > 3.4   BILITOTAL 1.2  --  1.2 1.0  --    < >  --    < > 9.4*   < > 0.5   ALT 18  --  19 24  --    < >  --    < > 40*   < > 39   AST 37  --  39 54*  --    < >  --    < > 171*   < > 71*   PROTEIN  --  Negative  --   --  10*  --  10*  --   --    < >  --    LIPASE  --   --  29  --   --   --   --   --  30  --  114    < > = values in this interval not  displayed.       I reviewed the patient's new imaging results.        CONSULTATION ASSESSMENT AND PLAN:    Active Problems:  Very pleasant 69-year-old female with history of abdominal distention and weight gain patient's family significantly concerned for possible fluid retention however patient workup has been completely unremarkable patient workup and labs are consistent with compensated cirrhosis patient's MELD score is less than 10.  Patient does not have any ascites patient's albumin is normal patient's synthetic function of the liver is also intact patient's INR is normal.  Finding explained to the patient and family.  Patient was reassured I will recommend further evaluation with upper GI endoscopy to rule out esophageal varices and portal hypertension.  In the meantime continue on current treatment plan continue on low-sodium diet continue to monitor calories and monitor patient's weight.  Thank you very much for letting me participate in her care.        Enrike Joel MD, FACP  [facility].  Office: [phone]  Cell : [phone]      [facility]  Ph: [phone] Fax: [phone]

## 2024-12-29 LAB — UPPER GI ENDOSCOPY: NORMAL

## 2024-12-29 PROCEDURE — 0DB68ZX EXCISION OF STOMACH, VIA NATURAL OR ARTIFICIAL OPENING ENDOSCOPIC, DIAGNOSTIC: ICD-10-PCS | Performed by: INTERNAL MEDICINE

## 2024-12-29 PROCEDURE — 88305 TISSUE EXAM BY PATHOLOGIST: CPT | Mod: TC | Performed by: INTERNAL MEDICINE

## 2024-12-29 PROCEDURE — 0DB98ZX EXCISION OF DUODENUM, VIA NATURAL OR ARTIFICIAL OPENING ENDOSCOPIC, DIAGNOSTIC: ICD-10-PCS | Performed by: INTERNAL MEDICINE

## 2024-12-29 PROCEDURE — 250N000011 HC RX IP 250 OP 636: Performed by: INTERNAL MEDICINE

## 2024-12-29 PROCEDURE — 88305 TISSUE EXAM BY PATHOLOGIST: CPT | Mod: 26 | Performed by: PATHOLOGY

## 2024-12-29 PROCEDURE — 999N000099 HC STATISTIC MODERATE SEDATION < 10 MIN: Performed by: INTERNAL MEDICINE

## 2024-12-29 PROCEDURE — 99232 SBSQ HOSP IP/OBS MODERATE 35: CPT | Performed by: INTERNAL MEDICINE

## 2024-12-29 PROCEDURE — 120N000001 HC R&B MED SURG/OB

## 2024-12-29 PROCEDURE — 43239 EGD BIOPSY SINGLE/MULTIPLE: CPT | Performed by: INTERNAL MEDICINE

## 2024-12-29 PROCEDURE — 250N000009 HC RX 250: Performed by: INTERNAL MEDICINE

## 2024-12-29 RX ORDER — FLUMAZENIL 0.1 MG/ML
0.2 INJECTION, SOLUTION INTRAVENOUS
Status: CANCELLED | OUTPATIENT
Start: 2024-12-29 | End: 2024-12-29

## 2024-12-29 RX ORDER — NALOXONE HYDROCHLORIDE 0.4 MG/ML
0.2 INJECTION, SOLUTION INTRAMUSCULAR; INTRAVENOUS; SUBCUTANEOUS
Status: CANCELLED | OUTPATIENT
Start: 2024-12-29

## 2024-12-29 RX ORDER — NALOXONE HYDROCHLORIDE 0.4 MG/ML
0.4 INJECTION, SOLUTION INTRAMUSCULAR; INTRAVENOUS; SUBCUTANEOUS
Status: CANCELLED | OUTPATIENT
Start: 2024-12-29

## 2024-12-29 RX ORDER — FENTANYL CITRATE 50 UG/ML
INJECTION, SOLUTION INTRAMUSCULAR; INTRAVENOUS PRN
Status: DISCONTINUED | OUTPATIENT
Start: 2024-12-29 | End: 2024-12-29 | Stop reason: HOSPADM

## 2024-12-29 NOTE — PLAN OF CARE
Goal Outcome Evaluation:         12/28 0700-1930    Orientation: A&Ox4  Aggression Stop Light: Green  Activity: Independent   Diet/BS Checks: Regular  Tele: NSR  IV Access/Drains: L PIV SL  Pain Management: Denies  Abnormal VS/Results: VSS on RA;  -Creat 1.15  -Na 131  Bowel/Bladder: Continent of B/B  Skin/Wounds: Intact  Consults: GI, SW  D/C Disposition: 1-2 days  Other Info:     -Negative abdominal ultrasound  -Normal ECHO  -NPO at midnight for EGD tomorrow

## 2024-12-29 NOTE — PLAN OF CARE
Goal Outcome Evaluation:    Orientation: A&Ox4  Aggression Stop Light: Green  Activity: Ind  Diet/BS Checks: NPO for EGD today  Tele:  NSR  IV Access/Drains: L PIV SL  Pain Management: Denies  Abnormal VS/Results: VSS on RA  Bowel/Bladder: Cont b/b  Skin/Wounds: Intact  Consults: GI, SW  D/C Disposition: Monday?

## 2024-12-29 NOTE — PROGRESS NOTES
[facility]    Medicine Progress Note - Hospitalist Service        Date of Admission:  12/27/2024  4:05 PM    Assessment & Plan:   Krystyna Peña is a 69 year old female with PMHx of alcohol associated cirrhosis, hypertension, hyperlipidemia, GERD, and depression admitted on 12/27/2024. She presented as a direct admission from clinic for further evaluation of progressive abdominal distention, weight gain and associated VILLAVICENCIO over the past 5 months.      Dyspnea on exertion  -Etiology unclear, CT chest negative for PE, effusions or infiltrate  -Normal BNP  -Echo with normal EF of 60-65%, RV is normal in structure and function and size, no valvular heart disease  -Patient has had at least 25 pounds within the past year and a half, therefore symptoms could be related to weight gain TSH is unremarkable  -TSH is unremarkable  -Lexiscan in the morning pending EGD findings today    Abdominal bloating, weight gain   Alcohol associated cirrhosis  -Sober >1 year, increasing abdominal distention since 6/2024. Established with [facility], but not on any cirrhosis medications. No recent EGD, reports hx of variceal bleeding years ago for which she underwent endoscopy in [place], [place]. Has appointment with [facility] 3/6/2025.   *SpO2 97% on room air, slightly hypertensive, remainder of vitals WNL   *Outpatient work-up 12/27: D-dimer and BNP WNL, trop 24>21. UA unremarkable. CT C/A/P with PE study negative for PE or dissection, no acute process demonstrated.   *Hepatic panel unremarkable. Lipase WNL. No acute intraabdominal pathology.   -[facility] consulted. Pt and family would like second opinion (previously established with [facility] and has appt with [facility] in 3/2025).    -RUQ US shows cirrhotic changes otherwise unremarkable.  No ascites.  -No obvious cause for abdominal bloating identified.  This could all be due to weight gain  -Dr. Joel planning upper GI endoscopy today     JORJE on CKD IIIb: Creatinine 1.60 on day of  admission, was 1.43 on 12/6, was 0.7-0.9 in 2023  -Creatinine improved to 1.15  -Hold PTA Lisinopril/hydrochlorothiazide     Hypertension  -Hold lisinopril/hydrochlorothiazide given mild JORJE as above   -Blood pressure soft, continue to monitor off antihypertensives, if need to resume, with switch to amlodipine     Elevated troponin, flat: trop 24>21. EKG in clinic without acute ischemia. Suspect demand in the setting of above. No recent chest pain.   -As mentioned above, her workup is mainly unremarkable thus far.  Will proceed with Lexiscan in the morning.    Borderline macrocytic anemia  Mild leukopenia  Mild thrombocytopenia  WBC 3.7, Hgb 10.1 (was 11.6 one month PTA), platelets 149. INR WNL. No active signs of bleeding. No recent illness. Attribute pancytopenia to chronic liver disease.   -Monitor      Chronic hyponatremia  -Sodium 131 on admission, baseline range 129-132. Also attribute to chronic liver disease. Pt asymptomatic.   -Monitor      Ventral hernia:   -No abdominal pain.  Follow-up outpatient     Chronic, stable medical conditions: Continue PTA meds as appropriate  Hyperlipidemia   GERD   Depression   RUL pulmonary nodule: PET 5/2/23 notes irregular opacity in the right upper lobe apex measuring up to 0.8 cm, likely corresponding to the finding described on outside CT, demonstrates no FDG activity. Its morphology with convex margins is suggestive of an area of focal scarring rather than an actual nodule.          Diet: Regular Diet Adult     DVT Prophylaxis: Pneumatic Compression Devices   To Catheter: Not present  Code Status: Full Code     Disposition Plan       Expected Discharge Date: 12/30/2024              Entered: Francisco Javier Smith MD 12/29/2024, 11:21 AM        Medically Ready for Discharge: Anticipated Tomorrow pending EGD today and negative Lexiscan tomorrow       Clinically Significant Risk Factors         # Hyponatremia: Lowest Na = 131 mmol/L in last 2 days, will monitor as  "appropriate  # Hypochloremia: Lowest Cl = 95 mmol/L in last 2 days, will monitor as appropriate                     # Obesity: Estimated body mass index is 30.93 kg/m  as calculated from the following:    Height as of 12/6/24: 1.575 m (5' 2\").    Weight as of this encounter: 76.7 kg (169 lb 1.6 oz)., PRESENT ON ADMISSION                 The patient's care was discussed with the Bedside Nurse, Patient, and Dr Joel .    Medical Decision Making       **CLEAR ALL SELECTIONS**      Labs/Imaging Reviewed:  See Information above and Data section below  Time SPENT BY ME on the date of service doing chart review, history, exam, documentation & further activities per the note:  35 MINUTES    Chart documentation was completed, in part, with [employer] voice-recognition software. Even though reviewed, some grammatical, spelling, and word errors may remain.    Francisco Javier Smith MD  Hospitalist Service  [facility]  Text Page 7AM-6PM  Securely message with the Vocera Web Console (learn more here)  Text page via [employer] Paging/Directory    ______________________________________________________________________    Interval History   No acute events overnight.  She will be going for upper GI endoscopy today.  Denies dyspnea at rest.  No chest pain.    Data reviewed today: I reviewed all medications, new labs and imaging results over the last 24 hours. I personally reviewed no images or EKG's today.    Physical Exam   Vital signs:  Temp: 98.1  F (36.7  C) Temp src: Oral BP: 93/53 Pulse: 81   Resp: (!) 9 SpO2: 95 % O2 Device: None (Room air)     Weight: 76.7 kg (169 lb 1.6 oz)  Estimated body mass index is 30.93 kg/m  as calculated from the following:    Height as of 12/6/24: 1.575 m (5' 2\").    Weight as of this encounter: 76.7 kg (169 lb 1.6 oz).      Wt Readings from Last 2 Encounters:   12/28/24 76.7 kg (169 lb 1.6 oz)   12/27/24 77.6 kg (171 lb)       Gen: AAOX3, NAD, comfortable  HEENT: Supple neck, moist " oral mucosa, no pallor  Resp: CTA B/L, normal WOB, no crackles, no wheezes  CVS: RRR, no murmur  Abd/GI: Soft, non-tender. BS- normoactive.  No G/R/R  Skin: Warm, dry no rashes  MSK: No joint deformities, no pedal edema  Neuro- CN- intact. No focal deficits.  Spontaneously moving all 4 extremities      Data   Recent Labs   Lab 12/28/24  1054 12/27/24  1016   WBC 3.0* 3.7*   HGB 9.8* 10.1*   MCV 95 97    149*   INR  --  1.09   * 131*   POTASSIUM 4.1 4.5   CHLORIDE 95* 95*   CO2 24 22   BUN 27.2* 32.9*   CR 1.15* 1.60*   ANIONGAP 12 14   YANA 9.9 9.8   * 112*   ALBUMIN 3.8 4.2   PROTTOTAL 6.3* 6.7   BILITOTAL 1.2 1.2   ALKPHOS 68 75   ALT 18 19   AST 37 39   LIPASE  --  29       No results found for this or any previous visit (from the past 24 hours).  Medications   Current Facility-Administered Medications   Medication Dose Route Frequency Provider Last Rate Last Admin    Daily 2 GRAM acetaminophen limit, unless fulminent liver failure or transaminases greater than or equal to 300 - 400, then none   Does not apply Continuous PRN Marta Salas PA-C         Current Facility-Administered Medications   Medication Dose Route Frequency Provider Last Rate Last Admin    pantoprazole (PROTONIX) EC tablet 40 mg  40 mg Oral QAM AC Marta Salas PA-C   40 mg at 12/28/24 0641    sodium chloride (PF) 0.9% PF flush 3 mL  3 mL Intracatheter Q8H Marta Salas PA-C   3 mL at 12/28/24 0250

## 2024-12-29 NOTE — PROVIDER NOTIFICATION
MD Notification    Notified Person: MD    Notified Person Name: Dr. Wilson    Notification Date/Time: 12/29/24 2:25 PM     Notification Interaction: Idalmis    Purpose of Notification: Pt has been NSR, do you still want tele on?    Orders Received: Can discontinue     Comments:

## 2024-12-30 ENCOUNTER — APPOINTMENT (OUTPATIENT)
Dept: NUCLEAR MEDICINE | Facility: CLINIC | Age: 69
DRG: 433 | End: 2024-12-30
Attending: INTERNAL MEDICINE
Payer: MEDICARE

## 2024-12-30 VITALS
BODY MASS INDEX: 30.93 KG/M2 | TEMPERATURE: 98.6 F | RESPIRATION RATE: 16 BRPM | SYSTOLIC BLOOD PRESSURE: 128 MMHG | WEIGHT: 169.1 LBS | DIASTOLIC BLOOD PRESSURE: 73 MMHG | HEART RATE: 96 BPM | OXYGEN SATURATION: 99 %

## 2024-12-30 LAB
ANION GAP SERPL CALCULATED.3IONS-SCNC: 14 MMOL/L (ref 7–15)
BUN SERPL-MCNC: 21 MG/DL (ref 8–23)
CALCIUM SERPL-MCNC: 10 MG/DL (ref 8.8–10.4)
CHLORIDE SERPL-SCNC: 96 MMOL/L (ref 98–107)
CREAT SERPL-MCNC: 0.99 MG/DL (ref 0.51–0.95)
CV BLOOD PRESSURE: 75 MMHG
CV STRESS MAX HR HE: 112
EGFRCR SERPLBLD CKD-EPI 2021: 61 ML/MIN/1.73M2
ERYTHROCYTE [DISTWIDTH] IN BLOOD BY AUTOMATED COUNT: 12.6 % (ref 10–15)
GLUCOSE SERPL-MCNC: 103 MG/DL (ref 70–99)
HCO3 SERPL-SCNC: 23 MMOL/L (ref 22–29)
HCT VFR BLD AUTO: 32.8 % (ref 35–47)
HGB BLD-MCNC: 10.9 G/DL (ref 11.7–15.7)
MCH RBC QN AUTO: 32.1 PG (ref 26.5–33)
MCHC RBC AUTO-ENTMCNC: 33.2 G/DL (ref 31.5–36.5)
MCV RBC AUTO: 97 FL (ref 78–100)
PLATELET # BLD AUTO: 186 10E3/UL (ref 150–450)
POTASSIUM SERPL-SCNC: 3.9 MMOL/L (ref 3.4–5.3)
RATE PRESSURE PRODUCT: NORMAL
RBC # BLD AUTO: 3.4 10E6/UL (ref 3.8–5.2)
SODIUM SERPL-SCNC: 133 MMOL/L (ref 135–145)
STRESS ECHO BASELINE DIASTOLIC HE: 80
STRESS ECHO BASELINE HR: 85 BPM
STRESS ECHO BASELINE SYSTOLIC BP: 136
STRESS ECHO CALCULATED PERCENT HR: 75 %
STRESS ECHO LAST STRESS DIASTOLIC BP: 67
STRESS ECHO LAST STRESS SYSTOLIC BP: 131
STRESS ECHO TARGET HR: 150
WBC # BLD AUTO: 5.6 10E3/UL (ref 4–11)

## 2024-12-30 PROCEDURE — 99239 HOSP IP/OBS DSCHRG MGMT >30: CPT | Performed by: INTERNAL MEDICINE

## 2024-12-30 PROCEDURE — 999N000049 HC STATISTIC ECHO STRESS OR NM NPI

## 2024-12-30 PROCEDURE — 85027 COMPLETE CBC AUTOMATED: CPT | Performed by: INTERNAL MEDICINE

## 2024-12-30 PROCEDURE — A9500 TC99M SESTAMIBI: HCPCS | Performed by: INTERNAL MEDICINE

## 2024-12-30 PROCEDURE — 80048 BASIC METABOLIC PNL TOTAL CA: CPT | Performed by: INTERNAL MEDICINE

## 2024-12-30 PROCEDURE — 250N000013 HC RX MED GY IP 250 OP 250 PS 637: Performed by: PHYSICIAN ASSISTANT

## 2024-12-30 PROCEDURE — 36415 COLL VENOUS BLD VENIPUNCTURE: CPT | Performed by: INTERNAL MEDICINE

## 2024-12-30 PROCEDURE — 80051 ELECTROLYTE PANEL: CPT | Performed by: INTERNAL MEDICINE

## 2024-12-30 PROCEDURE — 343N000001 HC RX 343 MED OP 636: Performed by: INTERNAL MEDICINE

## 2024-12-30 PROCEDURE — 93016 CV STRESS TEST SUPVJ ONLY: CPT | Performed by: INTERNAL MEDICINE

## 2024-12-30 PROCEDURE — 93017 CV STRESS TEST TRACING ONLY: CPT

## 2024-12-30 PROCEDURE — 250N000011 HC RX IP 250 OP 636: Performed by: INTERNAL MEDICINE

## 2024-12-30 PROCEDURE — 78452 HT MUSCLE IMAGE SPECT MULT: CPT

## 2024-12-30 RX ORDER — CAFFEINE 200 MG
200 TABLET ORAL
Status: DISCONTINUED | OUTPATIENT
Start: 2024-12-30 | End: 2024-12-30

## 2024-12-30 RX ORDER — NITROGLYCERIN 0.4 MG/1
0.4 TABLET SUBLINGUAL EVERY 5 MIN PRN
Status: ACTIVE | OUTPATIENT
Start: 2024-12-30 | End: 2024-12-30

## 2024-12-30 RX ORDER — ALBUTEROL SULFATE 90 UG/1
2 INHALANT RESPIRATORY (INHALATION) EVERY 5 MIN PRN
Status: DISCONTINUED | OUTPATIENT
Start: 2024-12-30 | End: 2024-12-30

## 2024-12-30 RX ORDER — LIDOCAINE 40 MG/G
CREAM TOPICAL
Status: DISCONTINUED | OUTPATIENT
Start: 2024-12-30 | End: 2024-12-30

## 2024-12-30 RX ORDER — CAFFEINE CITRATE 20 MG/ML
60 SOLUTION INTRAVENOUS
Status: DISCONTINUED | OUTPATIENT
Start: 2024-12-30 | End: 2024-12-30

## 2024-12-30 RX ORDER — REGADENOSON 0.08 MG/ML
0.4 INJECTION, SOLUTION INTRAVENOUS ONCE
Status: COMPLETED | OUTPATIENT
Start: 2024-12-30 | End: 2024-12-30

## 2024-12-30 RX ORDER — AMINOPHYLLINE 25 MG/ML
50-100 INJECTION, SOLUTION INTRAVENOUS
Status: DISCONTINUED | OUTPATIENT
Start: 2024-12-30 | End: 2024-12-30

## 2024-12-30 RX ORDER — AMLODIPINE BESYLATE 5 MG/1
5 TABLET ORAL EVERY EVENING
Qty: 90 TABLET | Refills: 0 | Status: SHIPPED | OUTPATIENT
Start: 2024-12-30

## 2024-12-30 RX ADMIN — Medication 32 MILLICURIE: at 10:09

## 2024-12-30 RX ADMIN — PANTOPRAZOLE SODIUM 40 MG: 40 TABLET, DELAYED RELEASE ORAL at 06:40

## 2024-12-30 RX ADMIN — REGADENOSON 0.4 MG: 0.08 INJECTION, SOLUTION INTRAVENOUS at 09:57

## 2024-12-30 RX ADMIN — Medication 11 MILLICURIE: at 07:20

## 2024-12-30 ASSESSMENT — ACTIVITIES OF DAILY LIVING (ADL)
ADLS_ACUITY_SCORE: 35

## 2024-12-30 NOTE — DISCHARGE SUMMARY
Patient discharged at 4:35 PM to home  IV was discontinued. Pain at time of discharge was 0/10. Belongings returned to patient.  Discharge instructions and medications reviewed with patient.  Patient verbalized understanding and all questions were answered.  Prescriptions given to patient.  At time of discharge, patient condition was stable and left the unit on wheelchair escorted by unit staff.

## 2024-12-30 NOTE — DISCHARGE SUMMARY
"[facility]  Hospitalist Discharge Summary      Date of Admission:  12/27/2024  Date of Discharge:  12/30/2024  Discharging Provider: David Reeves MD  Discharge Service: Hospitalist Service    Discharge Diagnoses   See below    Clinically Significant Risk Factors     # Obesity: Estimated body mass index is 30.93 kg/m  as calculated from the following:    Height as of 12/6/24: 1.575 m (5' 2\").    Weight as of this encounter: 76.7 kg (169 lb 1.6 oz).       Follow-ups Needed After Discharge   Follow-up Appointments       Hospital Follow-up with Existing Primary Care Provider (PCP)      Please see details below         Schedule Primary Care visit within: 14 Days   Recommended labs and Imaging (to be ordered by Primary Care Provider): BMP               Unresulted Labs Ordered in the Past 30 Days of this Admission       Date and Time Order Name Status Description    12/29/2024 11:09 AM Surgical Pathology Exam In process         These results will be followed up by Gamaliel JONES    Discharge Disposition   Discharged to home  Condition at discharge: Stable    Hospital Course    Krystyna Peña is a 69 year old female with PMHx of alcohol associated cirrhosis, hypertension, hyperlipidemia, GERD, and depression admitted on 12/27/2024. She presented as a direct admission from clinic for further evaluation of progressive abdominal distention, weight gain and associated VILLAVICENCIO over the past 5 months.      Dyspnea on exertion  -Etiology unclear, CT chest negative for PE, effusions or infiltrate  -Normal BNP  -Echo with normal EF of 60-65%, RV is normal in structure and function and size, no valvular heart disease  -Patient has had at least 25 pounds within the past year and a half, therefore symptoms could be related to weight gain TSH is unremarkable  -TSH is unremarkable  -Lexiscan negative for inducible ischemia     Abdominal bloating, weight gain   Alcohol associated cirrhosis  -Sober >1 year, " increasing abdominal distention since 6/2024. Established with [facility], but not on any cirrhosis medications. No recent EGD, reports hx of variceal bleeding years ago for which she underwent endoscopy in [place], [place]. Has appointment with [facility] 3/6/2025.   *SpO2 97% on room air, slightly hypertensive, remainder of vitals WNL   *Outpatient work-up 12/27: D-dimer and BNP WNL, trop 24>21. UA unremarkable. CT C/A/P with PE study negative for PE or dissection, no acute process demonstrated.   *Hepatic panel unremarkable. Lipase WNL. No acute intraabdominal pathology.   -[facility] consulted. Pt and family would like second opinion (previously established with [facility] and has appt with [facility] in 3/2025).    -RUQ US shows cirrhotic changes otherwise unremarkable.  No ascites.  -No obvious cause for abdominal bloating identified.  This could all be due to weight gain.   -EGD completed--Normal esophagus. 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  - Erosive gastropathy with no bleeding and no     stigmata of recent bleeding. Biopsied. Normal duodenal bulb, first portion of the duodenum and second portion of the duodenum.  GI will follow up biopsy and reach out to patient.   - will continue PTA PPI       JORJE on CKD IIIb: Creatinine 1.60 on day of admission, was 1.43 on 12/6, was 0.7-0.9 in 2023  -Creatinine 0.99 on 12/30/2024  -stopped PTA Lisinopril/hydrochlorothiazide   - follow up with PCP for BMP in 1-2 weeks. Discussed with patient and . They spent the winter in [place]. Advised them to establish local PCP when they get there to check blood work and for blood pressure check.      Hypertension  - stopped lisinopril/hydrochlorothiazide given JORJE as above   - Will discharge on amlodipine 5mg q evening     Elevated troponin, flat: trop 24>21. EKG in clinic without acute ischemia. Suspect demand in the setting of above. No recent chest pain.   -As mentioned above, her workup is mainly unremarkable thus far.  Lexiscan  negative for inducible ischemia as above     Borderline macrocytic anemia  Mild leukopenia-resolved  Mild thrombocytopenia-resolved  WBC 3.7, Hgb 10.1 (was 11.6 one month PTA), platelets 149. INR WNL. No active signs of bleeding. No recent illness. Attribute pancytopenia to chronic liver disease.   - hgb count stable at 10.9 on day of discharge     Chronic hyponatremia  -Sodium 131 on admission, baseline range 129-132. Also attribute to chronic liver disease. Pt asymptomatic.   - hydrochlorothiazide stopped as above as this likely contributing.   - Na 133 on day of discharge  - recommend BMP in 1-2 weeks     Ventral hernia:   -No abdominal pain.  Follow-up outpatient     Chronic, stable medical conditions: Continue PTA meds as appropriate  Hyperlipidemia   GERD   Depression   RUL pulmonary nodule: PET 5/2/23 notes irregular opacity in the right upper lobe apex measuring up to 0.8 cm, likely corresponding to the finding described on outside CT, demonstrates no FDG activity. Its morphology with convex margins is suggestive of an area of focal scarring rather than an actual nodule.     Consultations This Hospital Stay   GASTROENTEROLOGY IP CONSULT  CARE MANAGEMENT / SOCIAL WORK IP CONSULT  NUTRITION SERVICES ADULT IP CONSULT    Code Status   Full Code    Time Spent on this Encounter   I, David Reeves MD, personally saw the patient today and spent 40 minutes discharging this patient.       David Reeves MD  [place] [id number] [facility]  [address] [place] [address] AVE, [address] [address]  Phone: [phone]  ______________________________________________________________________    Physical Exam   Vital Signs: Temp: 98.6  F (37  C) Temp src: Oral BP: 128/73 Pulse: 96   Resp: 16 SpO2: 99 % O2 Device: None (Room air)    Weight: 169 lbs 1.6 oz  General Appearance: Alert, awake and no apparent distress  Respiratory: clear to auscultation bilaterally  Cardiovascular: regular rate and rhythm  GI: soft  and non-tender  Skin: warm and dry         Primary Care Physician   Lucille Miller    Discharge Orders      Primary Care - Care Coordination Referral      Reason for your hospital stay    You were admitted to     Activity    Your activity upon discharge: activity as tolerated     Discharge Instructions    1. Follow up with [facility] regarding the liver. Call the  Office at  to make your appointment  2. You will need follow up with primary care provider in 1-2 weeks to follow up on your blood pressure and medication changes either here or when your go to [place]. The following lab tests are recommended: basic metabolic panel.     Diet    Follow this diet upon discharge: Current Diet:Orders Placed This Encounter      Regular Diet Adult     Hospital Follow-up with Existing Primary Care Provider (PCP)    Please see details below            Significant Results and Procedures   Most Recent 3 CBC's:  Recent Labs   Lab Test 12/30/24  1243 12/28/24  1054 12/27/24  1016   WBC 5.6 3.0* 3.7*   HGB 10.9* 9.8* 10.1*   MCV 97 95 97    152 149*     Most Recent 3 BMP's:  Recent Labs   Lab Test 12/30/24  1243 12/28/24  1054 12/27/24  1016   * 131* 131*   POTASSIUM 3.9 4.1 4.5   CHLORIDE 96* 95* 95*   CO2 23 24 22   BUN 21.0 27.2* 32.9*   CR 0.99* 1.15* 1.60*   ANIONGAP 14 12 14   YANA 10.0 9.9 9.8   * 119* 112*   ,   Results for orders placed or performed during the hospital encounter of 12/27/24   US Abdomen Limited    Narrative    EXAM: US ABDOMEN LIMITED  LOCATION: [facility]  DATE: 12/28/2024    INDICATION: Known hx of alcohol associated cirrhosis, varices  COMPARISON: CT 12/27/2024.  TECHNIQUE: Limited abdominal ultrasound.    FINDINGS:    GALLBLADDER: There are no gallstones. There are few gallbladder polyps measuring up to 8mm. No gallbladder wall thickening. No sonographic Bernal's sign.    BILE DUCTS: No biliary dilatation. The common duct measures 5  mm.    LIVER: Cirrhotic configuration. No focal mass.    RIGHT KIDNEY: No hydronephrosis.    PANCREAS: The visualized portions are normal.    No ascites.      Impression    IMPRESSION:  1.  No gallstone or biliary dilatation.  2.  Few gallbladder polyps measuring up to 8mm.    Gallbladder polyp 7-9 mm: Follow-up US at 12 months. If on follow-up increase of >= 4 mm in <= 12 months - recommend surgical consult. If decrease of >= 4 mm - stop following.    REFERENCE:  Management of Incidentally Detected Gallbladder Polyps: Society of Radiologists in Ultrasound Consensus Conference Recommendations. Radiology ; 000:1-12         Echocardiogram Complete     Value    LVEF  60-65%    Narrative    [id number]  [id number]  [id number]  [id number]^BETH^TIMMY^JEANETTE     [facility]  Echocardiography Laboratory  [address] [place] [address]     Name: JOSHUA REYNOLDS  MRN: [id number]  : 1955  Study Date: 2024 07:42 AM  Age: 69 yrs  Gender: Female  Patient Location: [facility]  Reason For Study: Edema  Ordering Physician: TIMMY CAMPOS  Referring Physician: SYSTEM, PROVIDER NOT IN  Performed By: Pennie Jacinto     BSA: 1.8 m2  Height: 62 in  Weight: 169 lb  HR: 77  BP: 149/76 mmHg  ______________________________________________________________________________  Procedure  Echocardiogram with two-dimensional, color and spectral Doppler. Definity (NDC  #42002-395) given intravenously.  ______________________________________________________________________________  Interpretation Summary     Left ventricular systolic function is normal.  The visual ejection fraction is 60-65%.  The right ventricle is normal in structure, function and size.  Doppler interrogation rizo snot demonstrate signficant stenosis or  insufficidncy involving cardiac valves     NO change since 2020  ______________________________________________________________________________  Left Ventricle  The left ventricle  is normal in size. There is normal left ventricular wall  thickness. Left ventricular systolic function is normal. The visual ejection  fraction is 60-65%. Grade I or early diastolic dysfunction. No regional wall  motion abnormalities noted. There is no thrombus seen in the left ventricle.     Right Ventricle  The right ventricle is normal in structure, function and size. There is no  mass or thrombus in the right ventricle.     Atria  Normal left atrial size. Right atrial size is normal. There is no atrial shunt  seen. The left atrial appendage is not well visualized.     Mitral Valve  The mitral valve leaflets appear normal. There is no evidence of stenosis,  fluttering, or prolapse. There is no mitral regurgitation noted. There is no  mitral valve stenosis.     Tricuspid Valve  Normal tricuspid valve. No tricuspid regurgitation. There is no tricuspid  stenosis.     Aortic Valve  The aortic valve is trileaflet. No aortic regurgitation is present.     Pulmonic Valve  The pulmonic valve is not well visualized. There is no pulmonic valvular  regurgitation. There is no pulmonic valvular stenosis.     Vessels  The aortic root is normal size. Normal size ascending aorta. The inferior vena  cava is normal. The pulmonary artery is normal size.     Pericardium  The pericardium appears normal. There is no pleural effusion.     Rhythm  Sinus rhythm was noted.  ______________________________________________________________________________  MMode/2D Measurements & Calculations  IVSd: 0.99 cm     LVIDd: 4.0 cm  LVIDs: 2.3 cm  LVPWd: 0.98 cm  FS: 44.3 %  LV mass(C)d: 126.4 grams  LV mass(C)dI: 71.0 grams/m2  Ao root diam: 3.0 cm  LA dimension: 3.7 cm  asc Aorta Diam: 2.9 cm  LA/Ao: 1.2  LVOT diam: 1.8 cm  LVOT area: 2.5 cm2  Ao root diam index Ht(cm/m): 1.9  Ao root diam index BSA (cm/m2): 1.7  Asc Ao diam index BSA (cm/m2): 1.6  Asc Ao diam index Ht(cm/m): 1.9  LA Volume (BP): 35.0 ml     LA Volume Index (BP): 19.7 ml/m2  RV  Base: 3.7 cm  RWT: 0.48  TAPSE: 1.8 cm     Doppler Measurements & Calculations  MV E max dennis: 67.7 cm/sec  MV A max dennis: 93.0 cm/sec  MV E/A: 0.73  MV dec slope: 384.5 cm/sec2  MV dec time: 0.18 sec  Ao V2 max: 132.6 cm/sec  Ao max P.0 mmHg  Ao V2 mean: 101.8 cm/sec  Ao mean P.4 mmHg  Ao V2 VTI: 30.8 cm  YINKA(I,D): 2.1 cm2  YINKA(V,D): 2.0 cm2  LV V1 max P.3 mmHg  LV V1 max: 103.5 cm/sec  LV V1 VTI: 25.8 cm  SV(LVOT): 65.5 ml  SI(LVOT): 36.8 ml/m2  PA acc time: 0.13 sec  AV Dennis Ratio (DI): 0.78  YINKA Index (cm2/m2): 1.2  E/E' av.2     Lateral E/e': 6.6  Medial E/e': 9.9  RV S Dennis: 12.5 cm/sec     ______________________________________________________________________________  Report approved by: Dr. Hugo Whitman on 2024 09:24 AM         NM Lexiscan stress test (nuc card)     Value    Target     Baseline Systolic     Baseline Diastolic BP 80    Last Stress Systolic     Last Stress Diastolic BP 67    Baseline HR 85    Max HR  112    Max Predicted HR  75    Rate Pressure Product 14,672.0    BP 75    Narrative      The nuclear stress test is negative for inducible myocardial ischemia   or infarction.    Left ventricular function is hyperdynamic.    The left ventricular ejection fraction at rest is 75%.    Compared to prior study dated 11/10/2023 no significant changes.         Discharge Medications   Current Discharge Medication List        START taking these medications    Details   amLODIPine (NORVASC) 5 MG tablet Take 1 tablet (5 mg) by mouth every evening.  Qty: 90 tablet, Refills: 0    Comments: Future refills by PCP Dr. Lucille Miller with phone number [phone].  Associated Diagnoses: Benign essential hypertension           CONTINUE these medications which have NOT CHANGED    Details   cetirizine (ZYRTEC) 10 MG tablet Take 1 tablet by mouth every morning.      hydrOXYzine HCl (ATARAX) 25 MG tablet Take 1-2 tablets (25-50 mg) by mouth nightly as needed for anxiety.  Qty: 60  tablet, Refills: 0    Associated Diagnoses: Other insomnia; Anxiety      magnesium oxide (MAG-OX) 400 MG tablet Take 1 tablet (400 mg) by mouth daily.  Qty: 30 tablet, Refills: 1    Associated Diagnoses: Hypomagnesemia      pantoprazole (PROTONIX) 40 MG EC tablet Take 1 tablet by mouth every morning.      vitamin E (TOCOPHEROL) 400 units (180 mg) capsule Take 400 Units by mouth every morning.           STOP taking these medications       lisinopril-hydrochlorothiazide (ZESTORETIC) 10-12.5 MG tablet Comments:   Reason for Stopping:             Allergies   Allergies   Allergen Reactions    Morphine      respiratory distress    Cephalexin Rash

## 2024-12-30 NOTE — CONSULTS
"NUTRITION EDUCATION    REASON FOR ASSESSMENT:  RN Consult - \"Pt and spouse would like to discuss special dietary needs\"     PMH: alcohol associated cirrhosis, hypertension, hyperlipidemia, GERD, and depression     NUTRITION HISTORY:  Information obtained from , Buzz - primary caretaker of patient. Writer answered list of questions provided by . Patient was not present for visit.     PTA Intakes:   Breakfast: 8 oz smoothie made with Premier Protein shake, Fairlife milk, mixed berries, greens, carrots, celery, lemon/lime concentrate, olive oil, beet and cranberry powders  Lunch: usually around 2 pm - enjoys mac & cheese  Dinner: 5:30-6 pm - protein (ex. Chicken, pork, steak, fish) + starch (ex. Williamson squash)    Writer discussed the following topics with patient's :   - Smoothie modifications, reading food labels (specifically supplement powders)  - Energy/carbohydrate needs: provided estimated energy/protein needs and guidance for consistent carbohydrate and protein intake throughout the day (grams/meal or snack)  - Recent weight gain and small, sustainable changes to gradually lose the weight  - Current physical activity limitations  - Handouts provided: Nutrition Therapy for GERD, Cirrhosis, Heart Health    CURRENT DIET:  Regular   *No Caffeine for Lexiscan Stress Test    NUTRITION DIAGNOSIS:  Food- and nutrition-related knowledge deficit R/t no prior nutrition education as evidence by family request to meet with a dietitian for nutritional guidance     INTERVENTIONS:    Nutrition Prescription:  Recommend patient to follow nutrition therapy guidelines for cirrhosis and heart health. Recommend patient (w/ family assistance) to make dietary changes as discussed (as needed).     Implementation:      *  Nutrition Education (Content):   A)  Provided handouts: Nutrition Therapy for Cirrhosis, Heart Health, GERD   B)  Discussed list of questions provided and handouts       *  Nutrition Education " (Application):   A)  Discussed current eating habits and recommended alternative food choices      *  Anticipate good compliance      *  Diet Education - refer to Education Flowsheet    Goals:      *  Patient/caretaker verbalizes understanding of diet; questions answered      *  All of the above goals met during the education session    Follow Up/Monitoring:      *  Recommended Out-Patient Nutrition Referral, if further diet instructions are needed    Roxie Horn RD, LD

## 2024-12-30 NOTE — PROGRESS NOTES
Care Suites Procedure Nursing Note    Patient Information  Name: Krystyna HUMPHREYS White  Age: 69 year old    Procedure  Procedure: [place] Stress Test  Procedure start time: 9:56 am  Procedure complete time: 10:03 am  Pt tolerated well. VSS. Pt denied chest pain or pressure prior to injection. After injection denied chest pain or any other symptoms.    Pt transferred back to Rm 830 per w/c.

## 2024-12-30 NOTE — PLAN OF CARE
Goal Outcome Evaluation:    Orientation: A&Ox4  Aggression Stop Light: Green  Activity: Ind  Diet/BS Checks: Reg  Tele:  N/A  IV Access/Drains: L PIV  SL  Pain Management: Denies  Abnormal VS/Results: VSS on RA  Bowel/Bladder: Cont b/b  Skin/Wounds: Intact  Consults: CHIQUIS  D/C Disposition: 12/30  Other Info:   -Lexiscan today

## 2024-12-30 NOTE — PLAN OF CARE
Goal Outcome Evaluation:         12/29 [id number]    Orientation: A&Ox4  Aggression Stop Light: Green  Activity: Independent   Diet/BS Checks: Regular  Tele: N/A  IV Access/Drains: L PIV SL  Pain Management: Denies  Abnormal VS/Results: VSS on RA  Bowel/Bladder: Continent of B/B  Skin/Wounds: Intact  Consults: GI  D/C Disposition: Possibly tomorrow  Other Info:     -EGD completed this morning, no abnormal findings  -Stress test ordered

## 2024-12-31 ENCOUNTER — OFFICE VISIT (OUTPATIENT)
Dept: URGENT CARE | Facility: URGENT CARE | Age: 69
End: 2024-12-31
Payer: COMMERCIAL

## 2024-12-31 ENCOUNTER — PATIENT OUTREACH (OUTPATIENT)
Dept: CARE COORDINATION | Facility: CLINIC | Age: 69
End: 2024-12-31

## 2024-12-31 VITALS
RESPIRATION RATE: 16 BRPM | SYSTOLIC BLOOD PRESSURE: 126 MMHG | OXYGEN SATURATION: 98 % | BODY MASS INDEX: 30.91 KG/M2 | HEART RATE: 95 BPM | TEMPERATURE: 97.5 F | WEIGHT: 169 LBS | DIASTOLIC BLOOD PRESSURE: 60 MMHG

## 2024-12-31 DIAGNOSIS — K64.4 EXTERNAL HEMORRHOIDS: Primary | ICD-10-CM

## 2024-12-31 DIAGNOSIS — K64.8 INTERNAL HEMORRHOIDS: ICD-10-CM

## 2024-12-31 LAB
PATH REPORT.COMMENTS IMP SPEC: NORMAL
PATH REPORT.COMMENTS IMP SPEC: NORMAL
PATH REPORT.FINAL DX SPEC: NORMAL
PATH REPORT.GROSS SPEC: NORMAL
PATH REPORT.MICROSCOPIC SPEC OTHER STN: NORMAL
PATH REPORT.RELEVANT HX SPEC: NORMAL
PHOTO IMAGE: NORMAL

## 2024-12-31 NOTE — PATIENT INSTRUCTIONS
Warm sitz baths with epsom salts.    Preparation H with Lidocaine Gel to apply externally.    Next step would be to see colorectal surgery for removal

## 2024-12-31 NOTE — PROGRESS NOTES
Clinic Care Coordination Contact  [facility]/Voicemail    Clinical Data: Care Coordinator Outreach    Outreach Documentation Number of Outreach Attempt   12/31/2024   9:25 AM 1     Left message on patient's voicemail with call back information and requested return call.    Plan: Care Coordinator will try to reach patient again in 1-2 business days.    FINESSE Keenan  [facility]   Primary Care Social Work Care Coordinator  [facility] [place]   Phone: [phone]

## 2024-12-31 NOTE — PROGRESS NOTES
Chief Complaint   Patient presents with    Rectal Problem     Pt has been having rectal pain for a month and uses a suppository every day. Used one last night and noticed blood          ICD-10-CM    1. External hemorrhoids  K64.4 hydrocortisone-pramoxine (PROCTOFOAM-HC) 1-1 % rectal foam     DISCONTINUED: hydrocortisone-pramoxine (PROCTOFOAM-HC) 1-1 % rectal foam      2. Internal hemorrhoids  K64.8 hydrocortisone-pramoxine (PROCTOFOAM-HC) 1-1 % rectal foam     DISCONTINUED: hydrocortisone-pramoxine (PROCTOFOAM-HC) 1-1 % rectal foam      Will change pression to Proctofoam with topical Preparation H with lidocaine.  Recommended ice, donut for sitting, sitz bath's.  If symptoms fail to improve she will follow-up with colorectal surgery.  She has had them remove hemorrhoids previously.    Red flag warning signs and when to go to the emergency room discussed.  Reviewed potential adverse reactions to medications.    Subjective     Krystyna Peña is an 69 year old female who presents to clinic today for painful bowel movements, pain around the rectal area, lump around the rectal area and bleeding noted today when she placed suppository.  She has been using Preparation H suppositories for about 3 weeks without relief of her symptoms.  She does have a history of hemorrhoids.  She denies any lightheadedness, dizziness, melena, hematemesis.  She was just discharged from the hospital yesterday after a stay for decompensated liver cirrhosis.      ROS: 10 point ROS neg other than the symptoms noted above in the HPI.       Objective    /60   Pulse 95   Temp 97.5  F (36.4  C) (Tympanic)   Resp 16   Wt 76.7 kg (169 lb)   LMP 10/01/2003 (Exact Date)   SpO2 98%   BMI 30.91 kg/m    Nurses notes and VS have been reviewed.    Physical Exam       GENERAL APPEARANCE: healthy appearing, alert     RESP: lungs clear to auscultation - no rales, rhonchi or wheezes     CV: regular rates and rhythm, no murmurs, rubs, or gallop      ABDOMEN:  soft, nontender, no HSM or masses and bowel sounds normal     Rectal exam: Internal and external hemorrhoids noted, thrombosed hemorrhoid externally     MS: extremities normal- no gross deformities noted; normal muscle tone.      JOCELYN Snow, CNP  [facility] Provider    The use of Dragon/[employer]Smart[employer] dictation services may have been used to construct the content in this note; any grammatical or spelling errors are non-intentional. Please contact the author of this note directly if you are in need of any clarification.

## 2024-12-31 NOTE — PROGRESS NOTES
"Clinic Care Coordination Contact  Transitions of Care Outreach  Chief Complaint   Patient presents with    Clinic Care Coordination - Post Hospital     Most Recent Admission Date: 12/27/2024   Most Recent Admission Diagnosis:      Most Recent Discharge Date: 12/30/2024   Most Recent Discharge Diagnosis: Decompensation of cirrhosis of liver (H) - K72.90, K74.60  Benign essential hypertension - I10     Transitions of Care Assessment    Discharge Assessment  How are you doing now that you are home?: \"Fine, we are leaving Thursday (01/02/25) for [facility] for 2 months\" per pt report  How are your symptoms? (Red Flag symptoms escalate to triage hotline per guidelines): Unchanged (Went to Urgent Care this morning)  Do you know how to contact your clinic care team if you have future questions or changes to your health status? : Yes  Does the patient have their discharge instructions? : Yes  Does the patient have questions regarding their discharge instructions? : No  Were you started on any new medications or were there changes to any of your previous medications? : Yes  Does the patient have all of their medications?: Yes (Got some refills today and going to pick them up this afternoon)  Do you have questions regarding any of your medications? : No  Do you have all of your needed medical supplies or equipment (DME)?  (i.e. oxygen tank, CPAP, cane, etc.): Yes  Post-op (Clinicians Only)  Fever: No  Chills: No  Eating & Drinking: eating and drinking without complaints/concerns  Date of last BM: 12/31/24  Urinary Status: voiding without complaint/concerns    Pt returned CC call. Post discharge assessment questions completed and listed above. Pt had a PCP follow-up later today but had to cancel it due to going to Urgent Care this morning. Apt already cancelled. Pt stated that she is leaving on Thursday 01/02/25 with her  and going to [place] for 2 months. Pt plans to follow-up with her provider in [place]. Denied questions, " concerns, or needs at this time. CC intro letter sent to pt via [employer]. No further outreaches planned at this time.     Follow up Plan     Discharge Follow-Up  Discharge follow up appointment scheduled in alignment with recommended follow up timeframe or Transitions of Risk Category? (Low = within 30 days; Moderate= within 14 days; High= within 7 days): No  Patient's follow up appointment not scheduled: Patient declined scheduling support. Education on the importance of transitions of care follow up. Provided scheduling phone number. (Planning to schedule f/u with provider in [place])    Future Appointments   Date Time Provider Department Center   3/6/2025 12:00 PM [facility]   3/6/2025  1:00 PM Alem Beltran MD [facility]       Outpatient Plan as outlined on AVS reviewed with patient.    For any urgent concerns, please contact our 24 hour nurse triage line: [phone] ([phone])       FINESSE Crews                 27 F with no signficant PMHx presenting to the ED for worsening back pain that started around 5-6pm today. Describes pain as sharp and constant. Radiates under the ribs around to abdomen and associated with nausea. Denies trauma/injury. Denies chest pain, sob, fevers/chills, nausea/vomiting/diarrhea, dizziness, weaknews, numbness, tingling, weakness, urinary symptoms. AOx4, MAEx4, respirations even and unlabored, abd soft nontender/nondistended, skin warm dry and normal for race. Patient safety maintained, bed is in lowest position, wheels locked, and side rails raised.

## 2024-12-31 NOTE — LETTER
[facility] CARE COORDINATION  [address] [place] [address]    December 31, 2024    Krystyna Peña  [id number]   [place] [address]      Dear Nancy,    I am a clinic care coordinator who works with Lucille Miller MD with the [facility]. I wanted to thank you for spending the time to talk with me.  Below is a description of clinic care coordination and how I can further assist you.       The clinic care coordination team is made up of a registered nurse, , financial resource worker and community health worker who understand the health care system. The goal of clinic care coordination is to help you manage your health and improve access to the health care system. Our team works alongside your provider to assist you in determining your health and social needs. We can help you obtain health care and community resources, providing you with necessary information and education. We can work with you through any barriers and develop a care plan that helps coordinate and strengthen the communication between you and your care team.  Our services are voluntary and are offered without charge to you personally.    Please feel free to contact me with any questions or concerns regarding care coordination and what we can offer.      We are focused on providing you with the highest-quality healthcare experience possible.    Sincerely,     FINESSE Keenan  [facility]   Primary Care Social Work Care Coordinator  [facility] [place]   Phone: [phone]

## 2025-01-08 NOTE — CONFIDENTIAL NOTE
DIAGNOSIS:  Alcoholic cirrhosis of liver with ascites    Appt Date:  03.06.2025     NOTES STATUS DETAILS   OFFICE NOTE from referring provider Internal 11.04.2024 Lucille Miller MD    OFFICE NOTES from other specialists Internal  Care Everywhere 12.27.2024 Chemo Zuniga MD    11.26.2024 Breanna Martin [facility]    MEDICATION LIST Internal    IMAGING     ENDOSCOPY (IF AVAILABLE) Internal 12.27.2024   COLONOSCOPY (IF AVAILABLE) Received 10.13.2023   ULTRASOUND LIVER Internal 12.28.2024, 11.08.2024, 05.07.2024   US ABDOMEN LIMITED    LABS     HEPATIC PANEL (LIVER PANEL) Internal 11.11.2024   BASIC METABOLIC PANEL Internal 12.30.2024   COMPLETE METABOLIC PANEL Internal 12.30.2024   COMPLETE BLOOD COUNT (CBC) Internal 12.30.2024   INTERNATIONAL NORMALIZED RATIO (INR) Internal 12.27.2024

## 2025-02-25 DIAGNOSIS — K70.31 ALCOHOLIC CIRRHOSIS OF LIVER WITH ASCITES (H): Primary | ICD-10-CM

## 2025-02-25 DIAGNOSIS — Z11.59 ENCOUNTER FOR SCREENING FOR OTHER VIRAL DISEASES: ICD-10-CM

## 2025-03-03 ENCOUNTER — TRANSFERRED RECORDS (OUTPATIENT)
Dept: HEALTH INFORMATION MANAGEMENT | Facility: CLINIC | Age: 70
End: 2025-03-03
Payer: COMMERCIAL

## 2025-03-04 NOTE — PROGRESS NOTES
[facility] Hepatology    New Patient Visit    Referring provider:  Lucille Miller  Chief complaint:  Cirrhosis    Assessment  69 year old female with PMHx of decompensated alcohol related cirrhosis complicated by ascites + ?hx of variceal bleeding. PMHx includes grade 1 diastolic dysfunction, HTN, hyperlipidemia and CKD.      #. Alcohol-related cirrhosis  #. Hx of ascites  #. ?hx of variceal bleeding  #. Alcohol use disorder - last use 2022   MELD 3.0: 8    Patient with a history of alcohol related cirrhosis.  She had a history of ascites when she initially presented in December 2022.  She has been off of diuretics for at least 6 months.  Of note the patient has issues with chronic kidney disease and diastolic dysfunction -suspect those 2 comorbid conditions along with the patient's cirrhosis are contributing to patient's lower extremity edema    No history of hepatic encephalopathy.  There was a concern for possible history of variceal bleeding but recent upper endoscopy did not demonstrate any varices.  The patient is up-to-date on liver cancer screening.    The patient has been sober from alcohol since 2022.    #. Iron deficiency  Patient underwent a colonoscopy in 2023 that demonstrated 1 sessile serrated adenoma that was less than 1 cm.  This was resected.  Internal hemorrhoids and diverticulosis was also noted.  It is unlikely that the patient's diverticulosis is contributing to the patient's iron deficiency.  Upper endoscopy that was performed in December demonstrates GAVE and erosive gastropathy.  A single duodenal polyp was removed.  It is possible that the patient's erosive gastropathy and GAVE are causing oozing.  Furthermore the patient has issues with epistaxis.    #. Elevated BP -follow-up with PCP  #. Obesity    Plan  -- IV iron given iron deficiency; plan for repeat iron studies in 8 to 12 weeks.  If ongoing iron deficiency will plan for repeat EGD  --Start Lasix 20 mg/day for lower extremity  edema in the setting of cirrhosis, CKD and grade 1 diastolic dysfunction  -- HCC Screening: abdominal US + AFP q6 months  -- Variceal Screening next due 12/2025  -- Colon cancer screening: due 2028  -- Continue complete alcohol cessation  -- Recommend patient establish care with a rheumatologist closer to home given her rheumatoid arthritis    Nutrition & Physical Activity:  -- Low sodium (< 2 grams per day) + high protein diet   -- Follow-up with primary care doctor regarding obesity management    RTC: 6 months    Alem Beltran MD (Lizzie)  Advanced [facility] Transplant Hepatology  [facility]    I spent 60 minutes on this encounter performing the following: reviewing the patient's medical record (clinic visits, hospital records, lab results, imaging and procedural documentation), history taking, physical exam and documentation on the date of the encounter. I also spent part of the time in coordination of care and counseling.    The longitudinal plan of care for the diagnosis(es)/condition(s) as documented were addressed during this visit. Due to the added complexity in care, I will continue to support Nancy in the subsequent management and with ongoing continuity of care.    HPI:  Alcohol related Cirrhosis  - no hx HE  - hx ascites, resolved   - ?hx variceal bleed  - last EGD 12/2024 - no varices  - HCC screening US 12/2024 - no lesions    Patient presented with her  Buzz    Followed with [facility], last seen 11/26/2024    Admission 12/27-12/30/24: Admission for bloating.     She was first told that she had alcohol-related cirrhosis in December 2022.  She underwent paracentesis at that time.  That was her only paracentesis.  Previously she was on furosemide and spironolactone but she has been off of any diuretics for at least 6 months.  She has lower extremity swelling but denies any abdominal swelling.  She is currently on amlodipine and has been on amlodipine for some time.  Her TSH was  checked recently and was within normal limits.  She has diastolic dysfunction on her echo but that was not reviewed previously with her so this was news to her.  She tries to be adherent to a low-sodium diet.    She denies any slowness of thought or confusion.    She has intermittent epistaxis for the last few days.  This has been more often since returning from [place].    She has gained a little bit of weight setting of increased fruit consumption.  She eats sweets on occasion.    She has not had any alcohol since 2022.    She tries to exercise 20 to 30 minutes/day.  She does this most days per week.    She has rheumatoid arthritis but does not follow with a rheumatologist    Medical hx Surgical hx   Past Medical History:   Diagnosis Date    Arthritis     Asymptomatic varicose veins     Benign neoplasm of colon 11/06, 7/13    Adenoma    Family history of colon cancer     dad    Gastroesophageal reflux disease     History of colonic polyps     Hypertension     Major depression     Syncope       Past Surgical History:   Procedure Laterality Date    AMPUTATE TOE(S) Left 04/12/2018    Procedure: AMPUTATE TOE(S);  Amputation left third digit;  Surgeon: Herb Michael DPM;  Location: [facility] OR    ARTHROPLASTY HIP Right 03/28/2016    Procedure: ARTHROPLASTY HIP;  Surgeon: Perez Meza MD;  Location: [facility] OR    ARTHROPLASTY REVISION HIP Right 10/28/2019    Procedure: Revision right total hip arthroplasty;  Surgeon: Perez Meza MD;  Location:  OR    CLOSED REDUCTION HIP Right 07/15/2019    Procedure: Closed reduction, right total hip arthroplasty dislocation.;  Surgeon: Perez Meza MD;  Location:  OR    COLONOSCOPY Left 04/16/2021    Procedure: COLONOSCOPY;  Surgeon: Rell Cisneros MD;  Location:  GI    ESOPHAGOSCOPY, GASTROSCOPY, DUODENOSCOPY (EGD), COMBINED N/A 12/29/2024    Procedure: Esophagoscopy, gastroscopy, duodenoscopy (EGD), combined;  Surgeon: Enrike Joel MD;   Location: [facility] GI    HC CORRECT BUNION,SIMPLE  01/01/1998    RT    HC CORRECT BUNION,SIMPLE  01/01/2001    LT    HC KNEE SCOPE, DIAGNOSTIC      LT    IR CERVICAL EPIDURAL STEROID INJECTION  06/18/2007    REVERSE ARTHROPLASTY SHOULDER Left 10/30/2018    Procedure: Left reverse total shoulder arthroplasty;  Surgeon: Aaron Christianson MD;  Location: [facility] OR    REVERSE ARTHROPLASTY SHOULDER Right 06/12/2019    Procedure: Right reverse total shoulder arthroplasty;  Surgeon: Aaron Christianson MD;  Location: [facility] OR    ROTATOR CUFF REPAIR RT/LT  07/01/2007    right    VASCULAR SURGERY  left leg bypass 2004    [facility] NONSPECIFIC PROCEDURE  01/01/1972    Right arm plastic surgery    [facility] NONSPECIFIC PROCEDURE  01/01/1972    Right knee surgery    [facility] NONSPECIFIC PROCEDURE  10/01/2003    L popliteal AVM repair    [facility] NONSPECIFIC PROCEDURE  11/01/2004    Removal bone spur left foot    [facility] NONSPECIFIC PROCEDURE  04/01/2007    amputation of toes left & right toes    [facility] REPAIR SPIEGELIAN HERNIA  01/01/2002    [facility] TOTAL KNEE ARTHROPLASTY  10/01/2003    LT          Medications  Current Outpatient Medications   Medication Sig Dispense Refill    amLODIPine (NORVASC) 5 MG tablet Take 1 tablet (5 mg) by mouth every evening. 90 tablet 0    cetirizine (ZYRTEC) 10 MG tablet Take 1 tablet by mouth every morning.      furosemide (LASIX) 20 MG tablet Take 1 tablet (20 mg) by mouth daily. 30 tablet 3    magnesium oxide (MAG-OX) 400 MG tablet Take 1 tablet (400 mg) by mouth daily. 30 tablet 1       Allergies  Allergies   Allergen Reactions    Morphine      respiratory distress    Cephalexin Rash       Family hx Social hx   Family History   Problem Relation Age of Onset    Breast Cancer Mother     Cancer - colorectal Father         66    Colon Cancer Father     Bone Cancer Brother     Cancer Brother     Diabetes Brother     Kidney Disease Brother         20% function    Breast Cancer Niece         bilateral mastectomy      Social History     Tobacco Use     Smoking status: Former     Current packs/day: 0.00     Types: Cigarettes, Cigars     Quit date: 2013     Years since quittin.9     Passive exposure: Past    Smokeless tobacco: Never   Vaping Use    Vaping status: Never Used   Substance Use Topics    Alcohol use: Not Currently     Alcohol/week: 70.0 standard drinks of alcohol     Types: 70 Shots of liquor per week    Drug use: No     - Lives with  Buzz  - Alcohol: hx of overuse. Last   - Tobacco: Former     Review of systems  A 10-point review of systems was negative.    Examination  BP (!) 143/80   Pulse 94   Temp 98.5  F (36.9  C) (Oral)   Wt 76.2 kg (167 lb 14.4 oz)   LMP 10/01/2003 (Exact Date)   SpO2 100%   BMI 30.71 kg/m     Body mass index is 30.71 kg/m .    Gen-NAD  Eye-no conjunctival icterus  ENT-no oropharyngeal lesions  CVS- RRR, no murmurs  RS- CTA bilaterally  Abd-obese, soft, nontender  Extr- 2+ radial pulses bilaterally, 1+ lower extremity edema to ankles bilaterally  MS- hands without clubbing  Neuro- A+Ox3, no asterixis  Skin- no rash or jaundice  Psych- normal mood    Laboratory  BMP  Recent Labs   Lab Test 25  1152 24  1243 24  1054 24  1016    133* 131* 131*   POTASSIUM 4.0 3.9 4.1 4.5   CHLORIDE 106 96* 95* 95*   YANA 9.3 10.0 9.9 9.8   CO2 26 23 24 22   BUN 13.2 21.0 27.2* 32.9*   CR 0.85 0.99* 1.15* 1.60*   * 103* 119* 112*     CBC  Recent Labs   Lab Test 25  1152 24  1243 24  1054 24  1016   WBC 4.1 5.6 3.0* 3.7*   RBC 3.31* 3.40* 3.02* 3.15*   HGB 8.5* 10.9* 9.8* 10.1*   HCT 26.9* 32.8* 28.7* 30.5*   MCV 81 97 95 97   MCH 25.7* 32.1 32.5 32.1   MCHC 31.6 33.2 34.1 33.1   RDW 14.3 12.6 12.6 12.8    186 152 149*     Liver Enzymes   Recent Labs   Lab Test 25  1152   PROTTOTAL 6.9   ALBUMIN 4.1   BILITOTAL 0.6   ALKPHOS 76   AST 34   ALT 12      INR   INR   Date Value Ref Range Status   2025 1.06 0.85 - 1.15 Final   2016 0.96 0.86 -  1.14 Final     INR (External)   Date Value Ref Range Status   03/06/2023 1.1 0.9 - 1.2 Final       Radiology  Abdominal US Limited 12/2024  1.  No gallstone or biliary dilatation.  2.  Few gallbladder polyps measuring up to 8mm.    CT Chest PE Abdomen Pelvis w/Contrast 12/27/24 - Reviewed   HEPATOBILIARY: No significant mass or bile duct dilatation. No  calcified gallstones. Nodular contour compatible with cirrhosis.  SPLEEN: Normal size.    Endoscopy  EGD 12/2024  - Normal esophagus.                             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                             - Erosive gastropathy with no bleeding and no                             stigmata of recent bleeding. Biopsied.                             - Normal duodenal bulb, first portion of the                             duodenum and second portion of the duodenum.                             - A single duodenal polyp. Resected and retrieved.         Colonoscopy 2023    Path: SSA 7mm

## 2025-03-06 ENCOUNTER — PRE VISIT (OUTPATIENT)
Dept: GASTROENTEROLOGY | Facility: CLINIC | Age: 70
End: 2025-03-06

## 2025-03-06 ENCOUNTER — OFFICE VISIT (OUTPATIENT)
Dept: GASTROENTEROLOGY | Facility: CLINIC | Age: 70
End: 2025-03-06
Attending: INTERNAL MEDICINE
Payer: COMMERCIAL

## 2025-03-06 ENCOUNTER — LAB (OUTPATIENT)
Dept: LAB | Facility: CLINIC | Age: 70
End: 2025-03-06
Attending: INTERNAL MEDICINE
Payer: COMMERCIAL

## 2025-03-06 VITALS
BODY MASS INDEX: 30.71 KG/M2 | HEART RATE: 94 BPM | OXYGEN SATURATION: 100 % | SYSTOLIC BLOOD PRESSURE: 143 MMHG | DIASTOLIC BLOOD PRESSURE: 80 MMHG | WEIGHT: 167.9 LBS | TEMPERATURE: 98.5 F

## 2025-03-06 DIAGNOSIS — I51.89 DIASTOLIC DYSFUNCTION: Primary | ICD-10-CM

## 2025-03-06 DIAGNOSIS — N18.9 CHRONIC KIDNEY DISEASE, UNSPECIFIED CKD STAGE: ICD-10-CM

## 2025-03-06 DIAGNOSIS — Z11.59 ENCOUNTER FOR SCREENING FOR OTHER VIRAL DISEASES: ICD-10-CM

## 2025-03-06 DIAGNOSIS — K70.31 ALCOHOLIC CIRRHOSIS OF LIVER WITH ASCITES (H): ICD-10-CM

## 2025-03-06 DIAGNOSIS — D64.9 ANEMIA, UNSPECIFIED TYPE: ICD-10-CM

## 2025-03-06 DIAGNOSIS — D64.9 ANEMIA, UNSPECIFIED TYPE: Primary | ICD-10-CM

## 2025-03-06 LAB
AFP SERPL-MCNC: 3.3 NG/ML
ALBUMIN SERPL BCG-MCNC: 4.1 G/DL (ref 3.5–5.2)
ALP SERPL-CCNC: 76 U/L (ref 40–150)
ALT SERPL W P-5'-P-CCNC: 12 U/L (ref 0–50)
ANION GAP SERPL CALCULATED.3IONS-SCNC: 12 MMOL/L (ref 7–15)
AST SERPL W P-5'-P-CCNC: 34 U/L (ref 0–45)
BILIRUB DIRECT SERPL-MCNC: 0.32 MG/DL (ref 0–0.3)
BILIRUB SERPL-MCNC: 0.6 MG/DL
BUN SERPL-MCNC: 13.2 MG/DL (ref 8–23)
CALCIUM SERPL-MCNC: 9.3 MG/DL (ref 8.8–10.4)
CHLORIDE SERPL-SCNC: 106 MMOL/L (ref 98–107)
CREAT SERPL-MCNC: 0.85 MG/DL (ref 0.51–0.95)
CYSTATIN C (ROCHE): 1.7 MG/L (ref 0.6–1)
EGFRCR SERPLBLD CKD-EPI 2021: 73 ML/MIN/1.73M2
ERYTHROCYTE [DISTWIDTH] IN BLOOD BY AUTOMATED COUNT: 14.3 % (ref 10–15)
FERRITIN SERPL-MCNC: 16 NG/ML (ref 11–328)
GFR/BSA.PRED SERPLBLD CYS-BASED-ARV: 34 ML/MIN/1.73M2
GLUCOSE SERPL-MCNC: 112 MG/DL (ref 70–99)
HAPTOGLOB SERPL-MCNC: 103 MG/DL (ref 30–200)
HBV CORE AB SERPL QL IA: NONREACTIVE
HBV SURFACE AB SERPL IA-ACNC: 21.2 M[IU]/ML
HBV SURFACE AB SERPL IA-ACNC: REACTIVE M[IU]/ML
HBV SURFACE AG SERPL QL IA: NONREACTIVE
HCO3 SERPL-SCNC: 26 MMOL/L (ref 22–29)
HCT VFR BLD AUTO: 26.9 % (ref 35–47)
HCV AB SERPL QL IA: NONREACTIVE
HGB BLD-MCNC: 8.5 G/DL (ref 11.7–15.7)
INR PPP: 1.06 (ref 0.85–1.15)
IRON BINDING CAPACITY (ROCHE): 435 UG/DL (ref 240–430)
IRON SATN MFR SERPL: 5 % (ref 15–46)
IRON SERPL-MCNC: 20 UG/DL (ref 37–145)
LDH SERPL L TO P-CCNC: 186 U/L (ref 0–250)
MAGNESIUM SERPL-MCNC: 1.4 MG/DL (ref 1.7–2.3)
MCH RBC QN AUTO: 25.7 PG (ref 26.5–33)
MCHC RBC AUTO-ENTMCNC: 31.6 G/DL (ref 31.5–36.5)
MCV RBC AUTO: 81 FL (ref 78–100)
PLATELET # BLD AUTO: 228 10E3/UL (ref 150–450)
POTASSIUM SERPL-SCNC: 4 MMOL/L (ref 3.4–5.3)
PROT SERPL-MCNC: 6.9 G/DL (ref 6.4–8.3)
RBC # BLD AUTO: 3.31 10E6/UL (ref 3.8–5.2)
RETICS # AUTO: 0.07 10E6/UL (ref 0.03–0.1)
RETICS/RBC NFR AUTO: 2 % (ref 0.5–2)
SODIUM SERPL-SCNC: 144 MMOL/L (ref 135–145)
WBC # BLD AUTO: 4.1 10E3/UL (ref 4–11)

## 2025-03-06 PROCEDURE — 99000 SPECIMEN HANDLING OFFICE-LAB: CPT | Performed by: PATHOLOGY

## 2025-03-06 PROCEDURE — 86704 HEP B CORE ANTIBODY TOTAL: CPT | Performed by: INTERNAL MEDICINE

## 2025-03-06 PROCEDURE — 82610 CYSTATIN C: CPT | Performed by: INTERNAL MEDICINE

## 2025-03-06 PROCEDURE — 83550 IRON BINDING TEST: CPT | Performed by: PATHOLOGY

## 2025-03-06 PROCEDURE — 36415 COLL VENOUS BLD VENIPUNCTURE: CPT | Performed by: PATHOLOGY

## 2025-03-06 PROCEDURE — 83540 ASSAY OF IRON: CPT | Performed by: PATHOLOGY

## 2025-03-06 PROCEDURE — 82248 BILIRUBIN DIRECT: CPT | Performed by: PATHOLOGY

## 2025-03-06 PROCEDURE — 87340 HEPATITIS B SURFACE AG IA: CPT | Performed by: INTERNAL MEDICINE

## 2025-03-06 PROCEDURE — 83615 LACTATE (LD) (LDH) ENZYME: CPT | Performed by: PATHOLOGY

## 2025-03-06 PROCEDURE — 99213 OFFICE O/P EST LOW 20 MIN: CPT | Performed by: INTERNAL MEDICINE

## 2025-03-06 PROCEDURE — 83010 ASSAY OF HAPTOGLOBIN QUANT: CPT | Performed by: INTERNAL MEDICINE

## 2025-03-06 PROCEDURE — 82105 ALPHA-FETOPROTEIN SERUM: CPT | Performed by: INTERNAL MEDICINE

## 2025-03-06 PROCEDURE — 85610 PROTHROMBIN TIME: CPT | Performed by: PATHOLOGY

## 2025-03-06 PROCEDURE — 82728 ASSAY OF FERRITIN: CPT | Performed by: PATHOLOGY

## 2025-03-06 PROCEDURE — 85045 AUTOMATED RETICULOCYTE COUNT: CPT | Performed by: PATHOLOGY

## 2025-03-06 PROCEDURE — 86803 HEPATITIS C AB TEST: CPT | Performed by: INTERNAL MEDICINE

## 2025-03-06 PROCEDURE — 80053 COMPREHEN METABOLIC PANEL: CPT | Performed by: PATHOLOGY

## 2025-03-06 PROCEDURE — 83735 ASSAY OF MAGNESIUM: CPT | Performed by: PATHOLOGY

## 2025-03-06 PROCEDURE — 86706 HEP B SURFACE ANTIBODY: CPT | Performed by: INTERNAL MEDICINE

## 2025-03-06 PROCEDURE — 85027 COMPLETE CBC AUTOMATED: CPT | Performed by: PATHOLOGY

## 2025-03-06 RX ORDER — FUROSEMIDE 20 MG/1
20 TABLET ORAL DAILY
Qty: 30 TABLET | Refills: 3 | Status: SHIPPED | OUTPATIENT
Start: 2025-03-06

## 2025-03-06 NOTE — PATIENT INSTRUCTIONS
It was a pleasure taking care of you today.  I've included a brief summary of our discussion and care plan from today's visit below.  Please review this information with your primary care provider.  _______________________________________________________________________    My recommendations are summarized as follows:    - Recommend high protein diet, low sodium (< 2000mg per day)  - Lasix 20mg per day for lower extremity swelling  - IV iron supplementation   - Ultrasound of abdomen + labs every 6 months    Rheumatology - [facility]  [place]  [address], [address]  Phone: [phone]; [phone]  Mansoor Diaz MD    Return to Liver/Hepatology Clinic in 6 months.    _______________________________________________________________________    Cirrhosis Education  Nutrition  - For patients with cirrhosis, it is very important to eat the right types and amounts of foods. We recommend a diet low in carbohydrates/sugars and high in fresh fruits/vegetables, with the right amount of protein. We typically recommend 1.5gm/kg/day of protein, or  grams of protein every day.  - In regard to protein intake, you can focus on: meats (but limit red meat), cooked fish and seafood, vegetable-based protein meat products, tofu, eggs, legumes, dairy products (including milk and yogurt and cheese), unsalted nuts.  - You should eat at least three meals a day and three to four snacks between meals. Bedtime snacks are especially important (preferrably something with some protein)    - Patients with malnutrition and/or loss of muscular mass can improve their nutrition and muscular mass with protein supplementation, particularly at bedtime. Protein supplements can come in many different forms, including shakes, protein power, bars and pudding. Boost, Ensure, Cypress Instant Milk, etc.)   - Please avoid eating raw seafood, especially shellfish, because of risk of serious illness  - We recommend all patients with  cirrhosis limit their daily sodium intake to less than 2,000mg (2 grams)    General Liver Health  - Continue to avoid the use of all non-steroid anti-inflammatory medicines (ibuprofen, Aleve, naproxen, aspirin, etc.) as this can cause serious injury to your kidneys in the setting of liver disease.   - If you have pain, you can safely take up to 2 grams (aka 2,000 mg) of tylenol (aka acetaminophen) per day  - If you see a doctor and they prescribe you a new medication, please contact our clinic to let us know what changes are being made  - If you become acutely ill and present to an ER or are admitted to a hospital, please let us know as soon as you are able.  - We recommend that all patients with chronic liver disease be vaccinated against hepatitis A and B.  Please discuss with your primary provider the need for these vaccines  - We recommend screening for Vitamin D deficiency (at least yearly) and replacement/supplementation as warranted.  - Make sure to bring all of your medications (including over the counter supplements, vitamins, etc) once a year for a review with your liver specialist to ensure appropriate use, dosages, drug interactions, as well as verify ongoing medical necessity. We do not recommend taking herbal supplements, teas, weight loss supplements without consulting your liver specialist first    Sleep Troubles  - Sleep issues are very common in patients with chronic liver disease  - Recommend strict avoidance of medications such as opioids, sedatives and sleep aids.    - Low dose melatonin can be used for insomnia, if needed    Alcohol  - No amount of alcohol is safe in chronic liver disease. If you are interested in medications to help with alcohol cravings or a support program to help remain abstinence, feel free to reach out to your medical team who can connect you with resources.     Mental Health  - Take care of your mental health by learning about your liver disease and the many things you  can do to enhance your health and well-being.  - If interested, attend support groups or visit a health psychologist regarding positive coping with a chronic health condition.  - Cirrhosis can cause cognitive and mood changes, especially confusion. Let us know and we can assess this further and likely offer effective treatment options.  - Regularly assess your driving safety for yourself and others.    Liver Related Screening Tests  - We recommend liver cancer screening twice yearly with either ultrasound, CT scan or MRI   - Esophageal varices screening: initial upper endoscopy (EGD) procedure and then every 1-3 years, depending on initial exam findings.    Health Maintenance   - We recommend that all individuals with chronic liver disease establish care with a primary doctor to ensure all your general medical concerns, age-appropriate cancer screening and vaccinations are up to date  - We recommend dental visits every 6-12 months    For other tips go to the Cirrhosis Care website: https://cirrhosiscare.ca/  - Click on the tab at the top 'patients & families' and then use the tabs at the top to navigate through resources from healthy living tips to symptom management      _______________________________________________________________________    Scheduling Procedures or Tests  - To schedule endoscopic procedures call: [phone]  - To schedule radiology tests call: [phone] (for [facility]) or [phone] (for [facility])   _______________________________________________________________________    Who do I call with any questions after my visit?  Please be in touch if there are any further questions that arise following today's visit.  There are multiple ways to contact your gastroenterology care team.      To schedule or reschedule an appointment, please call [phone] and choose option 2 (for hepatology) and then option 1 (for scheduling).    During business hours, you may reach a nurse by  calling the appointment line ([phone]) and asking to speak with a nurse    You can send a secure message through [employer] for non-urgent questions. [employer] messages are answered by your nurse or doctor typically within 24 to 48 hours. Please allow extra time on weekends and holidays.      For urgent/emergent questions after business hours, you may reach the on-call GI Fellow by contacting the [facility]  at [phone].     How will I get the results of any tests ordered?    - If you have signed up for MyChart, any tests ordered at your visit will be available to you after your physician reviews them.  Typically this takes 1-2 weeks.    - If you do not have MyChart, your results will either be mailed to you as a letter or via a telephone call.  - If there are urgent results that require a change in your care plan, your physician or nurse will call you to discuss the next steps.     What is [employer]?  [employer] is a secure way for you to access all of your healthcare records from the [facility].  It is a web based computer program, so you can sign on to it from any location.  It also allows you to send secure messages to your care team.  I recommend signing up for [employer] access if you have not already done so and are comfortable with using a computer.      How to I schedule a follow-up visit?  If you did not schedule a follow-up visit today, please call [phone] to schedule a follow-up office visit.     Sincerely,    Alem Beltran MD (Lizzie)   of Medicine  Advanced & Transplant Hepatology  [facility]

## 2025-03-06 NOTE — LETTER
3/6/2025      Krystyna HUMPHREYS Casey  [address] [place] [address]      Dear Colleague,    Thank you for referring your patient, Krystyna Peña, to the [facility] [place]. Please see a copy of my visit note below.    [facility] Hepatology    New Patient Visit    Referring provider:  Lucille Miller  Chief complaint:  Cirrhosis    Assessment  69 year old female with PMHx of decompensated alcohol related cirrhosis complicated by ascites + ?hx of variceal bleeding. PMHx includes grade 1 diastolic dysfunction, HTN, hyperlipidemia and CKD.      #. Alcohol-related cirrhosis  #. Hx of ascites  #. ?hx of variceal bleeding  #. Alcohol use disorder - last use 2022   MELD 3.0: 8    Patient with a history of alcohol related cirrhosis.  She had a history of ascites when she initially presented in December 2022.  She has been off of diuretics for at least 6 months.  Of note the patient has issues with chronic kidney disease and diastolic dysfunction -suspect those 2 comorbid conditions along with the patient's cirrhosis are contributing to patient's lower extremity edema    No history of hepatic encephalopathy.  There was a concern for possible history of variceal bleeding but recent upper endoscopy did not demonstrate any varices.  The patient is up-to-date on liver cancer screening.    The patient has been sober from alcohol since 2022.    #. Iron deficiency  Patient underwent a colonoscopy in 2023 that demonstrated 1 sessile serrated adenoma that was less than 1 cm.  This was resected.  Internal hemorrhoids and diverticulosis was also noted.  It is unlikely that the patient's diverticulosis is contributing to the patient's iron deficiency.  Upper endoscopy that was performed in December demonstrates GAVE and erosive gastropathy.  A single duodenal polyp was removed.  It is possible that the patient's erosive gastropathy and GAVE are causing oozing.  Furthermore the patient has issues  with epistaxis.    #. Elevated BP -follow-up with PCP  #. Obesity    Plan  -- IV iron given iron deficiency; plan for repeat iron studies in 8 to 12 weeks.  If ongoing iron deficiency will plan for repeat EGD  --Start Lasix 20 mg/day for lower extremity edema in the setting of cirrhosis, CKD and grade 1 diastolic dysfunction  -- HCC Screening: abdominal US + AFP q6 months  -- Variceal Screening next due 12/2025  -- Colon cancer screening: due 2028  -- Continue complete alcohol cessation  -- Recommend patient establish care with a rheumatologist closer to home given her rheumatoid arthritis    Nutrition & Physical Activity:  -- Low sodium (< 2 grams per day) + high protein diet   -- Follow-up with primary care doctor regarding obesity management    RTC: 6 months    Alem Beltran MD (Lizzie)  Advanced & Transplant Hepatology  [facility]    I spent 60 minutes on this encounter performing the following: reviewing the patient's medical record (clinic visits, hospital records, lab results, imaging and procedural documentation), history taking, physical exam and documentation on the date of the encounter. I also spent part of the time in coordination of care and counseling.    The longitudinal plan of care for the diagnosis(es)/condition(s) as documented were addressed during this visit. Due to the added complexity in care, I will continue to support Nancy in the subsequent management and with ongoing continuity of care.    HPI:  Alcohol related Cirrhosis  - no hx HE  - hx ascites, resolved   - ?hx variceal bleed  - last EGD 12/2024 - no varices  - HCC screening US 12/2024 - no lesions    Patient presented with her  Buzz    Followed with [facility], last seen 11/26/2024    Admission 12/27-12/30/24: Admission for bloating.     She was first told that she had alcohol-related cirrhosis in December 2022.  She underwent paracentesis at that time.  That was her only paracentesis.  Previously she  was on furosemide and spironolactone but she has been off of any diuretics for at least 6 months.  She has lower extremity swelling but denies any abdominal swelling.  She is currently on amlodipine and has been on amlodipine for some time.  Her TSH was checked recently and was within normal limits.  She has diastolic dysfunction on her echo but that was not reviewed previously with her so this was news to her.  She tries to be adherent to a low-sodium diet.    She denies any slowness of thought or confusion.    She has intermittent epistaxis for the last few days.  This has been more often since returning from [place].    She has gained a little bit of weight setting of increased fruit consumption.  She eats sweets on occasion.    She has not had any alcohol since 2022.    She tries to exercise 20 to 30 minutes/day.  She does this most days per week.    She has rheumatoid arthritis but does not follow with a rheumatologist    Medical hx Surgical hx   Past Medical History:   Diagnosis Date     Arthritis      Asymptomatic varicose veins      Benign neoplasm of colon 11/06, 7/13    Adenoma     Family history of colon cancer     dad     Gastroesophageal reflux disease      History of colonic polyps      Hypertension      Major depression      Syncope       Past Surgical History:   Procedure Laterality Date     AMPUTATE TOE(S) Left 04/12/2018    Procedure: AMPUTATE TOE(S);  Amputation left third digit;  Surgeon: Herb Michael DPM;  Location: [facility] OR     ARTHROPLASTY HIP Right 03/28/2016    Procedure: ARTHROPLASTY HIP;  Surgeon: Perez Meza MD;  Location: [facility] OR     ARTHROPLASTY REVISION HIP Right 10/28/2019    Procedure: Revision right total hip arthroplasty;  Surgeon: Perez Meza MD;  Location: [facility] OR     CLOSED REDUCTION HIP Right 07/15/2019    Procedure: Closed reduction, right total hip arthroplasty dislocation.;  Surgeon: Perez Meza MD;  Location: [facility] OR     COLONOSCOPY Left 04/16/2021     Procedure: COLONOSCOPY;  Surgeon: Rell Cisneros MD;  Location:  GI     ESOPHAGOSCOPY, GASTROSCOPY, DUODENOSCOPY (EGD), COMBINED N/A 12/29/2024    Procedure: Esophagoscopy, gastroscopy, duodenoscopy (EGD), combined;  Surgeon: Enrike Joel MD;  Location: [facility] GI     HC CORRECT BUNION,SIMPLE  01/01/1998    RT     HC CORRECT BUNION,SIMPLE  01/01/2001    LT     HC KNEE SCOPE, DIAGNOSTIC      LT     IR CERVICAL EPIDURAL STEROID INJECTION  06/18/2007     REVERSE ARTHROPLASTY SHOULDER Left 10/30/2018    Procedure: Left reverse total shoulder arthroplasty;  Surgeon: Aaron Christianson MD;  Location: [facility] OR     REVERSE ARTHROPLASTY SHOULDER Right 06/12/2019    Procedure: Right reverse total shoulder arthroplasty;  Surgeon: Aaron Christianson MD;  Location: [facility] OR     ROTATOR CUFF REPAIR RT/LT  07/01/2007    right     VASCULAR SURGERY  left leg bypass 2004     [facility] NONSPECIFIC PROCEDURE  01/01/1972    Right arm plastic surgery     [facility] NONSPECIFIC PROCEDURE  01/01/1972    Right knee surgery     [facility] NONSPECIFIC PROCEDURE  10/01/2003    L popliteal AVM repair     [facility] NONSPECIFIC PROCEDURE  11/01/2004    Removal bone spur left foot     [facility] NONSPECIFIC PROCEDURE  04/01/2007    amputation of toes left & right toes     [facility] REPAIR SPIEGELIAN HERNIA  01/01/2002     [facility] TOTAL KNEE ARTHROPLASTY  10/01/2003    LT          Medications  Current Outpatient Medications   Medication Sig Dispense Refill     amLODIPine (NORVASC) 5 MG tablet Take 1 tablet (5 mg) by mouth every evening. 90 tablet 0     cetirizine (ZYRTEC) 10 MG tablet Take 1 tablet by mouth every morning.       furosemide (LASIX) 20 MG tablet Take 1 tablet (20 mg) by mouth daily. 30 tablet 3     magnesium oxide (MAG-OX) 400 MG tablet Take 1 tablet (400 mg) by mouth daily. 30 tablet 1       Allergies  Allergies   Allergen Reactions     Morphine      respiratory distress     Cephalexin Rash       Family hx Social hx   Family History   Problem Relation Age of Onset     Breast  Cancer Mother      Cancer - colorectal Father         66     Colon Cancer Father      Bone Cancer Brother      Cancer Brother      Diabetes Brother      Kidney Disease Brother         20% function     Breast Cancer Niece         bilateral mastectomy      Social History     Tobacco Use     Smoking status: Former     Current packs/day: 0.00     Types: Cigarettes, Cigars     Quit date: 2013     Years since quittin.9     Passive exposure: Past     Smokeless tobacco: Never   Vaping Use     Vaping status: Never Used   Substance Use Topics     Alcohol use: Not Currently     Alcohol/week: 70.0 standard drinks of alcohol     Types: 70 Shots of liquor per week     Drug use: No     - Lives with  Buzz  - Alcohol: hx of overuse. Last   - Tobacco: Former     Review of systems  A 10-point review of systems was negative.    Examination  BP (!) 143/80   Pulse 94   Temp 98.5  F (36.9  C) (Oral)   Wt 76.2 kg (167 lb 14.4 oz)   LMP 10/01/2003 (Exact Date)   SpO2 100%   BMI 30.71 kg/m     Body mass index is 30.71 kg/m .    Gen-NAD  Eye-no conjunctival icterus  ENT-no oropharyngeal lesions  CVS- RRR, no murmurs  RS- CTA bilaterally  Abd-obese, soft, nontender  Extr- 2+ radial pulses bilaterally, 1+ lower extremity edema to ankles bilaterally  MS- hands without clubbing  Neuro- A+Ox3, no asterixis  Skin- no rash or jaundice  Psych- normal mood    Laboratory  BMP  Recent Labs   Lab Test 25  1152 24  1243 24  1054 24  1016    133* 131* 131*   POTASSIUM 4.0 3.9 4.1 4.5   CHLORIDE 106 96* 95* 95*   YANA 9.3 10.0 9.9 9.8   CO2 26 23 24 22   BUN 13.2 21.0 27.2* 32.9*   CR 0.85 0.99* 1.15* 1.60*   * 103* 119* 112*     CBC  Recent Labs   Lab Test 25  1152 24  1243 24  1054 24  1016   WBC 4.1 5.6 3.0* 3.7*   RBC 3.31* 3.40* 3.02* 3.15*   HGB 8.5* 10.9* 9.8* 10.1*   HCT 26.9* 32.8* 28.7* 30.5*   MCV 81 97 95 97   MCH 25.7* 32.1 32.5 32.1   MCHC 31.6 33.2 34.1  33.1   RDW 14.3 12.6 12.6 12.8    186 152 149*     Liver Enzymes   Recent Labs   Lab Test 03/06/25  1152   PROTTOTAL 6.9   ALBUMIN 4.1   BILITOTAL 0.6   ALKPHOS 76   AST 34   ALT 12      INR   INR   Date Value Ref Range Status   03/06/2025 1.06 0.85 - 1.15 Final   03/22/2016 0.96 0.86 - 1.14 Final     INR (External)   Date Value Ref Range Status   03/06/2023 1.1 0.9 - 1.2 Final       Radiology  Abdominal US Limited 12/2024  1.  No gallstone or biliary dilatation.  2.  Few gallbladder polyps measuring up to 8mm.    CT Chest PE Abdomen Pelvis w/Contrast 12/27/24 - Reviewed   HEPATOBILIARY: No significant mass or bile duct dilatation. No  calcified gallstones. Nodular contour compatible with cirrhosis.  SPLEEN: Normal size.    Endoscopy  EGD 12/2024  - Normal esophagus.                             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                             - Erosive gastropathy with no bleeding and no                             stigmata of recent bleeding. Biopsied.                             - Normal duodenal bulb, first portion of the                             duodenum and second portion of the duodenum.                             - A single duodenal polyp. Resected and retrieved.         Colonoscopy 2023    Path: SSA 7mm           Again, thank you for allowing me to participate in the care of your patient.        Sincerely,        Alem Beltran MD    Electronically signed

## 2025-03-07 PROBLEM — D50.9 IRON DEFICIENCY ANEMIA: Status: ACTIVE | Noted: 2025-03-07

## 2025-03-11 DIAGNOSIS — N18.31 STAGE 3A CHRONIC KIDNEY DISEASE (H): Primary | ICD-10-CM

## 2025-03-12 ENCOUNTER — TELEPHONE (OUTPATIENT)
Dept: INTERNAL MEDICINE | Facility: CLINIC | Age: 70
End: 2025-03-12
Payer: COMMERCIAL

## 2025-03-12 DIAGNOSIS — N18.31 STAGE 3A CHRONIC KIDNEY DISEASE (H): Primary | ICD-10-CM

## 2025-03-12 NOTE — TELEPHONE ENCOUNTER
I will forward to Nurse pool to assist with medication question, from my understanding pcp doesn't have any affect on getting in sooner with specialists, that would be within that department or provider.

## 2025-03-12 NOTE — TELEPHONE ENCOUNTER
Please see messages below.    Refer to different nephrology clinic?  Or ok to wait until November 2025?    Please advise, thanks.

## 2025-03-12 NOTE — TELEPHONE ENCOUNTER
Patient called back, relayed message below, patient will see kidney specialist and then will follow up with PCP.

## 2025-03-12 NOTE — TELEPHONE ENCOUNTER
There is [facility] [place] that may have availability sooner. CARMEN for Dr Miller.       [facility]  [address]  [address]  [place] [address]    Phone: [phone]  Fax: [phone]

## 2025-03-12 NOTE — TELEPHONE ENCOUNTER
General Call    Contacts       Contact Date/Time Type Contact Phone/Fax    03/12/2025 08:56 AM CDT Phone (Outgoing) [place] Nancy A (Self) [phone] (M)    Left Message           Reason for Call:  Patient called back and stated that the kidney specialty appointments are booked out into November and was wondering if she can wait that long or can she get another referral to be seen sooner. Patient also has some questions about her medication.     What are your questions or concerns:  Please contact patient     Date of last appointment with provider: na    Could we send this information to you in [employer] or would you prefer to receive a phone call?:   Patient would prefer a phone call   Okay to leave a detailed message?: Yes at Cell number on file:    Telephone Information:   Mobile [phone]   And Home number too!

## 2025-03-12 NOTE — TELEPHONE ENCOUNTER
Attempted to contact pt. Left message to call clinic.     Please give pt the messages from Dr Miller and her GI doctor.   Also provide pt with the Kidney Clinic phone #.       Lucille Miller MD  3/11/2025 10:07 AM CDT Back to Top      Team - please call patient with results.  Nephrology referral has been placed as per GI recommendations based on recently completed lab work.    Alem Beltran MD  3/6/2025  8:31 PM CST       This specialized testing of your kidney function demonstrates that your kidney function is at about 34% of normal. This is also likely contributing to your lower extremity swelling. Recommend following up with your primary doctor and consider seeing a kidney specialist.     [employer] will call you to coordinate your care as prescribed by the provider.  If you don t hear from a representative within 2 business days, please call [phone].

## 2025-03-13 ENCOUNTER — TELEPHONE (OUTPATIENT)
Dept: NEPHROLOGY | Facility: CLINIC | Age: 70
End: 2025-03-13
Payer: COMMERCIAL

## 2025-03-13 NOTE — TELEPHONE ENCOUNTER
Order/Referral Request    Who is requesting: Patient    Orders being requested: Referral to [facility] listed in pt chart. States she tried to call them but they require a referral & do not on file for this patient.     Reason service is needed/diagnosis:     Stage 3a chronic kidney disease        When are orders needed by: ASAP    Has this been discussed with Provider: Yes    Does patient have a preference on a Group/Provider/Facility?   See above location - phone number is: [phone]    Does patient have an appointment scheduled?: No    Where to send orders: Fax    Could we send this information to you in [employer] or would you prefer to receive a phone call?:   Patient would prefer a phone call   Okay to leave a detailed message?: Yes at Cell number on file:    Telephone Information:   Mobile [phone]

## 2025-03-17 ENCOUNTER — TELEPHONE (OUTPATIENT)
Dept: ONCOLOGY | Facility: CLINIC | Age: 70
End: 2025-03-17
Payer: COMMERCIAL

## 2025-03-17 DIAGNOSIS — K70.31 ALCOHOLIC CIRRHOSIS OF LIVER WITH ASCITES (H): ICD-10-CM

## 2025-03-17 DIAGNOSIS — D50.9 IRON DEFICIENCY ANEMIA, UNSPECIFIED IRON DEFICIENCY ANEMIA TYPE: Primary | ICD-10-CM

## 2025-03-26 ENCOUNTER — INFUSION THERAPY VISIT (OUTPATIENT)
Dept: INFUSION THERAPY | Facility: CLINIC | Age: 70
End: 2025-03-26
Attending: INTERNAL MEDICINE
Payer: COMMERCIAL

## 2025-03-26 VITALS
DIASTOLIC BLOOD PRESSURE: 74 MMHG | OXYGEN SATURATION: 95 % | HEART RATE: 97 BPM | RESPIRATION RATE: 16 BRPM | SYSTOLIC BLOOD PRESSURE: 146 MMHG | TEMPERATURE: 98.2 F

## 2025-03-26 DIAGNOSIS — K70.31 ALCOHOLIC CIRRHOSIS OF LIVER WITH ASCITES (H): Primary | ICD-10-CM

## 2025-03-26 DIAGNOSIS — D50.9 IRON DEFICIENCY ANEMIA, UNSPECIFIED IRON DEFICIENCY ANEMIA TYPE: ICD-10-CM

## 2025-03-26 PROCEDURE — 250N000011 HC RX IP 250 OP 636: Performed by: INTERNAL MEDICINE

## 2025-03-26 PROCEDURE — 96374 THER/PROPH/DIAG INJ IV PUSH: CPT

## 2025-03-26 PROCEDURE — 258N000003 HC RX IP 258 OP 636: Performed by: INTERNAL MEDICINE

## 2025-03-26 RX ORDER — PANTOPRAZOLE SODIUM 40 MG/1
40 TABLET, DELAYED RELEASE ORAL DAILY
COMMUNITY

## 2025-03-26 RX ORDER — DIPHENHYDRAMINE HYDROCHLORIDE 50 MG/ML
50 INJECTION, SOLUTION INTRAMUSCULAR; INTRAVENOUS
Start: 2025-03-28

## 2025-03-26 RX ORDER — HEPARIN SODIUM,PORCINE 10 UNIT/ML
5-20 VIAL (ML) INTRAVENOUS DAILY PRN
OUTPATIENT
Start: 2025-03-28

## 2025-03-26 RX ORDER — METHYLPREDNISOLONE SODIUM SUCCINATE 40 MG/ML
40 INJECTION INTRAMUSCULAR; INTRAVENOUS
Start: 2025-03-28

## 2025-03-26 RX ORDER — HEPARIN SODIUM (PORCINE) LOCK FLUSH IV SOLN 100 UNIT/ML 100 UNIT/ML
5 SOLUTION INTRAVENOUS
OUTPATIENT
Start: 2025-03-28

## 2025-03-26 RX ORDER — ALBUTEROL SULFATE 0.83 MG/ML
2.5 SOLUTION RESPIRATORY (INHALATION)
OUTPATIENT
Start: 2025-03-28

## 2025-03-26 RX ORDER — ALBUTEROL SULFATE 90 UG/1
1-2 INHALANT RESPIRATORY (INHALATION)
Start: 2025-03-28

## 2025-03-26 RX ORDER — DIPHENHYDRAMINE HYDROCHLORIDE 50 MG/ML
25 INJECTION, SOLUTION INTRAMUSCULAR; INTRAVENOUS
Start: 2025-03-28

## 2025-03-26 RX ORDER — EPINEPHRINE 1 MG/ML
0.3 INJECTION, SOLUTION INTRAMUSCULAR; SUBCUTANEOUS EVERY 5 MIN PRN
OUTPATIENT
Start: 2025-03-28

## 2025-03-26 RX ADMIN — IRON SUCROSE 200 MG: 20 INJECTION, SOLUTION INTRAVENOUS at 14:16

## 2025-03-26 RX ADMIN — SODIUM CHLORIDE 250 ML: 0.9 INJECTION, SOLUTION INTRAVENOUS at 14:14

## 2025-03-26 NOTE — PROGRESS NOTES
Infusion Nursing Note:  Krystyna Peña presents today for Venofer #1 of 5.    Patient seen by provider today: No   present during visit today: Not Applicable.    Note: Nancy reports she is feeling well today.  She denies any new or concerning symptoms.  She reports her main symptoms of anemia are fatigue and shortness of breath.    Oriented to infusion center.  Educated on Venofer, including potential side effects, signs/symptoms to monitor for, and when to seek medical attention.  Written material on Venofer given to patient.  All questions answered.  Patient verbalized understanding and is in agreement with this plan.    Intravenous Access:  Peripheral IV placed.    Treatment Conditions:   03/06/25 11:52   Ferritin 16   Iron 20 (L)   Iron Binding Capacity 435 (H)   Iron Sat Index 5 (L)   Hemoglobin 8.5 (L)     Post Infusion Assessment:  Patient tolerated infusion without incident.  Patient observed for 15 minutes post Venofer per protocol.  Blood return noted pre and post infusion.  Site patent and intact, free from redness, edema or discomfort.  No evidence of extravasations.  Access discontinued per protocol.     Discharge Plan:   Discharge instructions reviewed with: Patient.  Patient and/or family verbalized understanding of discharge instructions and all questions answered.  AVS to patient via [employer].  Patient will return 3/28 for next appointment.   Patient discharged in stable condition accompanied by: self.  Departure Mode: Ambulatory.      Des Hughes RN

## 2025-03-27 ENCOUNTER — TELEPHONE (OUTPATIENT)
Dept: INTERNAL MEDICINE | Facility: CLINIC | Age: 70
End: 2025-03-27
Payer: COMMERCIAL

## 2025-03-27 NOTE — TELEPHONE ENCOUNTER
Patient transferred to the red line to speak with a nurse regarding cough for 2 weeks with green phlegm and SOB. Pt declines triage and she wants to speak with her care team at [facility] regarding antibiotics.       Pt was warm transferred to [facility].         Marissa Mendez RN    [facility]

## 2025-03-31 ENCOUNTER — INFUSION THERAPY VISIT (OUTPATIENT)
Dept: INFUSION THERAPY | Facility: CLINIC | Age: 70
End: 2025-03-31
Attending: INTERNAL MEDICINE
Payer: COMMERCIAL

## 2025-03-31 VITALS
DIASTOLIC BLOOD PRESSURE: 79 MMHG | HEART RATE: 84 BPM | RESPIRATION RATE: 18 BRPM | SYSTOLIC BLOOD PRESSURE: 149 MMHG | OXYGEN SATURATION: 97 % | TEMPERATURE: 97 F

## 2025-03-31 DIAGNOSIS — D50.9 IRON DEFICIENCY ANEMIA, UNSPECIFIED IRON DEFICIENCY ANEMIA TYPE: ICD-10-CM

## 2025-03-31 DIAGNOSIS — K70.31 ALCOHOLIC CIRRHOSIS OF LIVER WITH ASCITES (H): Primary | ICD-10-CM

## 2025-03-31 PROCEDURE — 250N000011 HC RX IP 250 OP 636: Performed by: INTERNAL MEDICINE

## 2025-03-31 PROCEDURE — 96374 THER/PROPH/DIAG INJ IV PUSH: CPT

## 2025-03-31 PROCEDURE — 258N000003 HC RX IP 258 OP 636: Performed by: INTERNAL MEDICINE

## 2025-03-31 RX ORDER — ALBUTEROL SULFATE 0.83 MG/ML
2.5 SOLUTION RESPIRATORY (INHALATION)
Status: CANCELLED | OUTPATIENT
Start: 2025-04-01

## 2025-03-31 RX ORDER — DIPHENHYDRAMINE HYDROCHLORIDE 50 MG/ML
25 INJECTION, SOLUTION INTRAMUSCULAR; INTRAVENOUS
Status: CANCELLED
Start: 2025-04-01

## 2025-03-31 RX ORDER — HEPARIN SODIUM,PORCINE 10 UNIT/ML
5-20 VIAL (ML) INTRAVENOUS DAILY PRN
Status: CANCELLED | OUTPATIENT
Start: 2025-04-01

## 2025-03-31 RX ORDER — METHYLPREDNISOLONE SODIUM SUCCINATE 40 MG/ML
40 INJECTION INTRAMUSCULAR; INTRAVENOUS
Status: CANCELLED
Start: 2025-04-01

## 2025-03-31 RX ORDER — ALBUTEROL SULFATE 90 UG/1
1-2 INHALANT RESPIRATORY (INHALATION)
Status: CANCELLED
Start: 2025-04-01

## 2025-03-31 RX ORDER — HEPARIN SODIUM (PORCINE) LOCK FLUSH IV SOLN 100 UNIT/ML 100 UNIT/ML
5 SOLUTION INTRAVENOUS
Status: CANCELLED | OUTPATIENT
Start: 2025-04-01

## 2025-03-31 RX ORDER — EPINEPHRINE 1 MG/ML
0.3 INJECTION, SOLUTION INTRAMUSCULAR; SUBCUTANEOUS EVERY 5 MIN PRN
Status: CANCELLED | OUTPATIENT
Start: 2025-04-01

## 2025-03-31 RX ORDER — DIPHENHYDRAMINE HYDROCHLORIDE 50 MG/ML
50 INJECTION, SOLUTION INTRAMUSCULAR; INTRAVENOUS
Status: CANCELLED
Start: 2025-04-01

## 2025-03-31 RX ADMIN — SODIUM CHLORIDE 250 ML: 0.9 INJECTION, SOLUTION INTRAVENOUS at 13:27

## 2025-03-31 RX ADMIN — IRON SUCROSE 200 MG: 20 INJECTION, SOLUTION INTRAVENOUS at 13:27

## 2025-03-31 NOTE — PROGRESS NOTES
Infusion Nursing Note:  Krystyna Peña presents today for venofer #3 of 5.    Patient seen by provider today: No   present during visit today: Not Applicable.    Note: N/A.      Intravenous Access:  Peripheral IV placed.    Treatment Conditions:  Not Applicable.      Post Infusion Assessment:  Patient tolerated infusion without incident.  Patient observed for 15 minutes post venofer per protocol.  Blood return noted pre and post infusion.  Site patent and intact, free from redness, edema or discomfort.  No evidence of extravasations.  Access discontinued per protocol.       Discharge Plan:   Discharge instructions reviewed with: Patient.  Patient and/or family verbalized understanding of discharge instructions and all questions answered.  AVS to patient via ShowpitchHART.  Patient will return 4/3 for next appointment.   Patient discharged in stable condition accompanied by: self.  Departure Mode: Ambulatory.      Anjelica Gastelum RN

## 2025-04-03 ENCOUNTER — INFUSION THERAPY VISIT (OUTPATIENT)
Dept: INFUSION THERAPY | Facility: CLINIC | Age: 70
End: 2025-04-03
Attending: INTERNAL MEDICINE
Payer: COMMERCIAL

## 2025-04-03 VITALS
HEART RATE: 91 BPM | SYSTOLIC BLOOD PRESSURE: 139 MMHG | RESPIRATION RATE: 18 BRPM | OXYGEN SATURATION: 97 % | TEMPERATURE: 97.3 F | DIASTOLIC BLOOD PRESSURE: 69 MMHG

## 2025-04-03 DIAGNOSIS — D50.9 IRON DEFICIENCY ANEMIA, UNSPECIFIED IRON DEFICIENCY ANEMIA TYPE: ICD-10-CM

## 2025-04-03 DIAGNOSIS — K70.31 ALCOHOLIC CIRRHOSIS OF LIVER WITH ASCITES (H): Primary | ICD-10-CM

## 2025-04-03 PROCEDURE — 258N000003 HC RX IP 258 OP 636: Performed by: INTERNAL MEDICINE

## 2025-04-03 PROCEDURE — 250N000011 HC RX IP 250 OP 636: Mod: JZ | Performed by: INTERNAL MEDICINE

## 2025-04-03 RX ORDER — HEPARIN SODIUM,PORCINE 10 UNIT/ML
5-20 VIAL (ML) INTRAVENOUS DAILY PRN
OUTPATIENT
Start: 2025-04-04

## 2025-04-03 RX ORDER — ALBUTEROL SULFATE 0.83 MG/ML
2.5 SOLUTION RESPIRATORY (INHALATION)
OUTPATIENT
Start: 2025-04-04

## 2025-04-03 RX ORDER — HEPARIN SODIUM (PORCINE) LOCK FLUSH IV SOLN 100 UNIT/ML 100 UNIT/ML
5 SOLUTION INTRAVENOUS
OUTPATIENT
Start: 2025-04-04

## 2025-04-03 RX ORDER — ALBUTEROL SULFATE 90 UG/1
1-2 INHALANT RESPIRATORY (INHALATION)
Start: 2025-04-04

## 2025-04-03 RX ORDER — DIPHENHYDRAMINE HYDROCHLORIDE 50 MG/ML
25 INJECTION, SOLUTION INTRAMUSCULAR; INTRAVENOUS
Start: 2025-04-04

## 2025-04-03 RX ORDER — DIPHENHYDRAMINE HYDROCHLORIDE 50 MG/ML
50 INJECTION, SOLUTION INTRAMUSCULAR; INTRAVENOUS
Start: 2025-04-04

## 2025-04-03 RX ORDER — METHYLPREDNISOLONE SODIUM SUCCINATE 40 MG/ML
40 INJECTION INTRAMUSCULAR; INTRAVENOUS
Start: 2025-04-04

## 2025-04-03 RX ORDER — EPINEPHRINE 1 MG/ML
0.3 INJECTION, SOLUTION INTRAMUSCULAR; SUBCUTANEOUS EVERY 5 MIN PRN
OUTPATIENT
Start: 2025-04-04

## 2025-04-03 RX ADMIN — SODIUM CHLORIDE 250 ML: 0.9 INJECTION, SOLUTION INTRAVENOUS at 11:35

## 2025-04-03 RX ADMIN — IRON SUCROSE 200 MG: 20 INJECTION, SOLUTION INTRAVENOUS at 11:35

## 2025-04-03 ASSESSMENT — PAIN SCALES - GENERAL: PAINLEVEL_OUTOF10: NO PAIN (0)

## 2025-04-03 NOTE — PROGRESS NOTES
Infusion Nursing Note:  Krystyna Peña presents today for Venofer #4 of 5.    Patient seen by provider today: No   present during visit today: Not Applicable.    Note: N/A.      Intravenous Access:  Peripheral IV placed.    Treatment Conditions:  Not Applicable.      Post Infusion Assessment:  Patient tolerated infusion without incident.  Patient observed for 15 minutes post venofer per protocol.  Blood return noted pre and post infusion.  Site patent and intact, free from redness, edema or discomfort.  No evidence of extravasations.  Access discontinued per protocol.       Discharge Plan:   Discharge instructions reviewed with: Patient.  Patient and/or family verbalized understanding of discharge instructions and all questions answered.  AVS to patient via [employer].  Patient will return 4/7 for next appointment.   Patient discharged in stable condition accompanied by: self and .  Departure Mode: Ambulatory.      Anjelcia Gastelum RN

## 2025-04-07 ENCOUNTER — INFUSION THERAPY VISIT (OUTPATIENT)
Dept: INFUSION THERAPY | Facility: CLINIC | Age: 70
End: 2025-04-07
Attending: INTERNAL MEDICINE
Payer: COMMERCIAL

## 2025-04-07 VITALS
OXYGEN SATURATION: 95 % | SYSTOLIC BLOOD PRESSURE: 144 MMHG | RESPIRATION RATE: 16 BRPM | TEMPERATURE: 98.5 F | HEART RATE: 95 BPM | DIASTOLIC BLOOD PRESSURE: 71 MMHG

## 2025-04-07 DIAGNOSIS — K70.31 ALCOHOLIC CIRRHOSIS OF LIVER WITH ASCITES (H): Primary | ICD-10-CM

## 2025-04-07 DIAGNOSIS — D50.9 IRON DEFICIENCY ANEMIA, UNSPECIFIED IRON DEFICIENCY ANEMIA TYPE: ICD-10-CM

## 2025-04-07 PROCEDURE — 250N000011 HC RX IP 250 OP 636: Mod: JZ | Performed by: INTERNAL MEDICINE

## 2025-04-07 PROCEDURE — 96374 THER/PROPH/DIAG INJ IV PUSH: CPT

## 2025-04-07 RX ORDER — DIPHENHYDRAMINE HYDROCHLORIDE 50 MG/ML
50 INJECTION, SOLUTION INTRAMUSCULAR; INTRAVENOUS
Start: 2025-04-07

## 2025-04-07 RX ORDER — EPINEPHRINE 1 MG/ML
0.3 INJECTION, SOLUTION INTRAMUSCULAR; SUBCUTANEOUS EVERY 5 MIN PRN
OUTPATIENT
Start: 2025-04-07

## 2025-04-07 RX ORDER — HEPARIN SODIUM (PORCINE) LOCK FLUSH IV SOLN 100 UNIT/ML 100 UNIT/ML
5 SOLUTION INTRAVENOUS
OUTPATIENT
Start: 2025-04-07

## 2025-04-07 RX ORDER — ALBUTEROL SULFATE 0.83 MG/ML
2.5 SOLUTION RESPIRATORY (INHALATION)
OUTPATIENT
Start: 2025-04-07

## 2025-04-07 RX ORDER — DIPHENHYDRAMINE HYDROCHLORIDE 50 MG/ML
25 INJECTION, SOLUTION INTRAMUSCULAR; INTRAVENOUS
Start: 2025-04-07

## 2025-04-07 RX ORDER — ALBUTEROL SULFATE 90 UG/1
1-2 INHALANT RESPIRATORY (INHALATION)
Start: 2025-04-07

## 2025-04-07 RX ORDER — HEPARIN SODIUM,PORCINE 10 UNIT/ML
5-20 VIAL (ML) INTRAVENOUS DAILY PRN
OUTPATIENT
Start: 2025-04-07

## 2025-04-07 RX ORDER — METHYLPREDNISOLONE SODIUM SUCCINATE 40 MG/ML
40 INJECTION INTRAMUSCULAR; INTRAVENOUS
Start: 2025-04-07

## 2025-04-07 RX ADMIN — IRON SUCROSE 200 MG: 20 INJECTION, SOLUTION INTRAVENOUS at 12:28

## 2025-04-07 ASSESSMENT — PAIN SCALES - GENERAL: PAINLEVEL_OUTOF10: NO PAIN (0)

## 2025-04-07 NOTE — PROGRESS NOTES
Infusion Nursing Note:    Krystyna Peña presents today for Venofer #5 of 5.      Patient seen by provider today: No     present during visit today: Not Applicable.     Note: N/A.        Intravenous Access:  Peripheral IV placed.     Treatment Conditions:  Not Applicable.        Post Infusion Assessment:  Patient tolerated infusion without incident.  Patient observed for 15 minutes post venofer per protocol.  Blood return noted pre and post infusion.  Site patent and intact, free from redness, edema or discomfort.  No evidence of extravasations.  Access discontinued per protocol.         Discharge Plan:   Discharge instructions reviewed with: Patient.  Patient and/or family verbalized understanding of discharge instructions and all questions answered.  AVS to patient via [employer]ART.  Patient will return as needed for next appointment.   Patient discharged in stable condition accompanied by: self and .  Departure Mode: Ambulatory.    Mary Du RN on 4/7/2025 at 3:46 PM

## 2025-04-15 ENCOUNTER — OFFICE VISIT (OUTPATIENT)
Dept: INTERNAL MEDICINE | Facility: CLINIC | Age: 70
End: 2025-04-15
Payer: COMMERCIAL

## 2025-04-15 ENCOUNTER — TELEPHONE (OUTPATIENT)
Dept: INTERNAL MEDICINE | Facility: CLINIC | Age: 70
End: 2025-04-15

## 2025-04-15 VITALS
RESPIRATION RATE: 20 BRPM | TEMPERATURE: 98.9 F | BODY MASS INDEX: 29.81 KG/M2 | HEIGHT: 62 IN | SYSTOLIC BLOOD PRESSURE: 138 MMHG | HEART RATE: 82 BPM | WEIGHT: 162 LBS | DIASTOLIC BLOOD PRESSURE: 85 MMHG | OXYGEN SATURATION: 98 %

## 2025-04-15 DIAGNOSIS — R05.1 ACUTE COUGH: Primary | ICD-10-CM

## 2025-04-15 PROCEDURE — 99213 OFFICE O/P EST LOW 20 MIN: CPT

## 2025-04-15 PROCEDURE — G2211 COMPLEX E/M VISIT ADD ON: HCPCS

## 2025-04-15 PROCEDURE — 3075F SYST BP GE 130 - 139MM HG: CPT

## 2025-04-15 PROCEDURE — 1126F AMNT PAIN NOTED NONE PRSNT: CPT

## 2025-04-15 PROCEDURE — 3079F DIAST BP 80-89 MM HG: CPT

## 2025-04-15 RX ORDER — PREDNISONE 20 MG/1
40 TABLET ORAL DAILY
Qty: 10 TABLET | Refills: 0 | Status: SHIPPED | OUTPATIENT
Start: 2025-04-15 | End: 2025-04-20

## 2025-04-15 ASSESSMENT — PAIN SCALES - GENERAL: PAINLEVEL_OUTOF10: NO PAIN (0)

## 2025-04-15 NOTE — PATIENT INSTRUCTIONS
Prednisone- This medication is a steroid. It can naturally increase your blood sugar, therefore I suggest taking this medication with Protein (egg, sausage, jiménez, protein shake/bar) in the AM. I also recommend taking this in the AM to avoid sleep disturbances. You are given 40 mg (2 Tablets) to be taken at once in the AM for 5 days.

## 2025-04-15 NOTE — TELEPHONE ENCOUNTER
S-(situation): Patient calls to confirm prednisone prescription that was prescribed today.    B-(background): Patient saw Marisela Newman today and was prescribed Prednisone 20mg, take twice daily x 5 days for cough.    A-(assessment): Patient says she has cirrhosis of the liver and kidney disease stage 3. Patient has not picked up the prescription yet, but read on google that she should not take prednisone if she has cirrhosis or kidney issues. Patient wants to make sure she is safe to take the medication.    R-(recommendations): Advised patient she can speak with pharmacist, but patient wanted provider to provide response.    Thank you,  Marvin, Triage ALEXANDER Hilton    1:13 PM 4/15/2025

## 2025-04-15 NOTE — PROGRESS NOTES
"  Assessment & Plan     Acute cough  Clinical picture suggests post viral cough. Will start patient on 5 days of prednisone burst. Medication sent to pharmacy. Discussed side effects of medication. RTC with worsening symptoms or if having no improvement.   - predniSONE (DELTASONE) 20 MG tablet; Take 2 tablets (40 mg) by mouth daily for 5 days.      The longitudinal plan of care for the diagnosis(es)/condition(s) as documented were addressed during this visit. Due to the added complexity in care, I will continue to support Nancy in the subsequent management and with ongoing continuity of care.      20 minutes spent by me on the date of the encounter doing chart review, patient visit, and documentation           Subjective   Nancy is a 69 year old, presenting for the following health issues:  Patient is here for a complaint of ongoing symptoms of coughing and green phlegm. She just wants to get rid of it.   No fevers, overall feeling okay.   Appetite good.   Drinking water.   Albuterol inhaler- has been using that. Has been working. She does it in the AM and at night.       Follow Up (Sinusitis)    History of Present Illness       Reason for visit:  Raspatory still coughing up green phlegmShe consumes 5 sweetened beverage(s) daily.She exercises with enough effort to increase her heart rate 9 or less minutes per day.  She exercises with enough effort to increase her heart rate 3 or less days per week.   She is taking medications regularly.                  Review of Systems  Constitutional, HEENT, cardiovascular, pulmonary, gi and gu systems are negative, except as otherwise noted.      Objective    /85   Pulse 82   Temp 98.9  F (37.2  C)   Resp 20   Ht 1.575 m (5' 2\")   Wt 73.5 kg (162 lb)   LMP 10/01/2003 (Exact Date)   SpO2 98%   Breastfeeding No   BMI 29.63 kg/m    Body mass index is 29.63 kg/m .  Physical Exam   GENERAL: alert and no distress  EYES: Eyes grossly normal to inspection, PERRL and " conjunctivae and sclerae normal  HENT: ear canals and TM's normal, nose and mouth without ulcers or lesions  NECK: no adenopathy, no asymmetry, masses, or scars  RESP: lungs clear to auscultation - no rales, rhonchi or wheezes  CV: regular rate and rhythm, normal S1 S2, no S3 or S4, no murmur, click or rub, no peripheral edema  ABDOMEN: soft, nontender, no hepatosplenomegaly, no masses and bowel sounds normal  MS: no gross musculoskeletal defects noted, no edema            Signed Electronically by: JOCELYN Garcia CNP

## 2025-04-16 NOTE — TELEPHONE ENCOUNTER
Called patient and left a message to call the clinic back.        -------------------------------------------------------------------------  Please relay message below from Marisela:     Yes. This is short term course. Her most recent labs were normal.

## 2025-04-21 DIAGNOSIS — I10 BENIGN ESSENTIAL HYPERTENSION: ICD-10-CM

## 2025-04-21 RX ORDER — AMLODIPINE BESYLATE 5 MG/1
5 TABLET ORAL EVERY EVENING
Qty: 90 TABLET | Refills: 3 | Status: SHIPPED | OUTPATIENT
Start: 2025-04-21

## 2025-05-09 ENCOUNTER — TELEPHONE (OUTPATIENT)
Dept: INTERNAL MEDICINE | Facility: CLINIC | Age: 70
End: 2025-05-09
Payer: COMMERCIAL

## 2025-05-09 NOTE — TELEPHONE ENCOUNTER
Patient calls to ask if she can use liquid tumeric with black pepper for back pain.     Patient has been struggling with back back for a long time due to arthritis     Patient is getting radiotherapy with I-spine for her back and hands.    Due to patient being on multiple prescriptions, will route to primary care provider.    Thank you,  Marvin, Triage RN Latrice Hilton    1:19 PM 5/9/2025

## 2025-05-14 NOTE — TELEPHONE ENCOUNTER
Call to patient to relay primary care provider's message. Patient verbalizes understanding of instructions and indicates no further questions at this time.    Thank you,  Marvin, Triage RN Latrice Hilton   12:16 PM 5/14/2025

## 2025-05-30 ENCOUNTER — HOSPITAL ENCOUNTER (OUTPATIENT)
Dept: ULTRASOUND IMAGING | Facility: CLINIC | Age: 70
Discharge: HOME | End: 2025-05-30
Attending: INTERNAL MEDICINE | Admitting: INTERNAL MEDICINE
Payer: COMMERCIAL

## 2025-05-30 DIAGNOSIS — K70.30 ALCOHOLIC CIRRHOSIS, UNSPECIFIED WHETHER ASCITES PRESENT (H): ICD-10-CM

## 2025-05-30 PROCEDURE — 76705 ECHO EXAM OF ABDOMEN: CPT

## 2025-06-02 ENCOUNTER — HOSPITAL ENCOUNTER (OUTPATIENT)
Dept: MAMMOGRAPHY | Facility: CLINIC | Age: 70
Discharge: HOME | End: 2025-06-02
Attending: INTERNAL MEDICINE | Admitting: INTERNAL MEDICINE
Payer: COMMERCIAL

## 2025-06-02 ENCOUNTER — RESULTS FOLLOW-UP (OUTPATIENT)
Dept: MULTI SPECIALTY CLINIC | Facility: CLINIC | Age: 70
End: 2025-06-02

## 2025-06-02 DIAGNOSIS — Z12.31 VISIT FOR SCREENING MAMMOGRAM: ICD-10-CM

## 2025-06-02 PROCEDURE — 77067 SCR MAMMO BI INCL CAD: CPT

## 2025-06-05 ENCOUNTER — OFFICE VISIT (OUTPATIENT)
Dept: INTERNAL MEDICINE | Facility: CLINIC | Age: 70
End: 2025-06-05
Payer: COMMERCIAL

## 2025-06-05 VITALS
RESPIRATION RATE: 20 BRPM | HEIGHT: 62 IN | HEART RATE: 122 BPM | OXYGEN SATURATION: 98 % | BODY MASS INDEX: 29.17 KG/M2 | TEMPERATURE: 98.4 F | DIASTOLIC BLOOD PRESSURE: 84 MMHG | WEIGHT: 158.5 LBS | SYSTOLIC BLOOD PRESSURE: 134 MMHG

## 2025-06-05 DIAGNOSIS — R09.82 POST-NASAL DRAINAGE: Primary | ICD-10-CM

## 2025-06-05 DIAGNOSIS — K70.30 ALCOHOLIC CIRRHOSIS OF LIVER WITHOUT ASCITES (H): ICD-10-CM

## 2025-06-05 DIAGNOSIS — E83.42 HYPOMAGNESEMIA: ICD-10-CM

## 2025-06-05 DIAGNOSIS — R00.0 TACHYCARDIA: ICD-10-CM

## 2025-06-05 DIAGNOSIS — I10 BENIGN ESSENTIAL HYPERTENSION: ICD-10-CM

## 2025-06-05 RX ORDER — FLUTICASONE PROPIONATE 50 MCG
1 SPRAY, SUSPENSION (ML) NASAL DAILY
Qty: 9.9 ML | Refills: 0 | Status: SHIPPED | OUTPATIENT
Start: 2025-06-05

## 2025-06-05 ASSESSMENT — ENCOUNTER SYMPTOMS: WHEEZING: 1

## 2025-06-05 NOTE — PROGRESS NOTES
"  Assessment & Plan     Post-nasal drainage  Reports having used Flonase spray in the past.  Can restart the spray.  Refill provided.  - fluticasone (FLONASE) 50 MCG/ACT nasal spray; Spray 1 spray into both nostrils daily.    Alcoholic cirrhosis of liver without ascites (H)  Overall, symptoms stable.  Recently completed ultrasound which was negative for ascites.  Continues on diuretic therapy.    Benign essential hypertension  Blood pressure reviewed, within target.  Continue current antihypertensive regimen.    Hypomagnesemia  Has been using magnesium at 1 tab per day.  Can increase magnesium to 2 tabs due to low magnesium levels.    Tachycardia  Sinus tachycardia on EKG and unchanged tracings for the rest of the findings from prior EKG.  Currently, asymptomatic.  Encouraged to continue  on keeping well-hydrated.  - EKG 12-lead complete w/read - Clinics          Subjective   Nancy is a 69 year old, presenting for the following health issues:  Allergies        6/5/2025    11:02 AM   Additional Questions   Roomed by Erlin   Accompanied by Self     Allergies    History of Present Illness       Reason for visit:  Allergies congestionShe consumes 0 sweetened beverage(s) daily. She exercises with enough effort to increase her heart rate 3 or less days per week.   She is taking medications regularly.            Review of Systems  Constitutional, HEENT, cardiovascular, pulmonary, gi and gu systems are negative, except as otherwise noted.      Objective    /84 (BP Location: Right arm, Patient Position: Sitting, Cuff Size: Adult Regular)   Pulse (!) 122   Temp 98.4  F (36.9  C)   Resp 20   Ht 1.575 m (5' 2\")   Wt 71.9 kg (158 lb 8 oz)   LMP 10/01/2003 (Exact Date)   SpO2 98%   BMI 28.99 kg/m    Body mass index is 28.99 kg/m .  Physical Exam   GENERAL: alert and no distress  RESP: lungs clear to auscultation - no rales, rhonchi or wheezes  CV: regular rate and rhythm, normal S1 S2  MS: no gross musculoskeletal " defects noted, no edema  NEURO: Normal strength and tone, mentation intact and speech normal  PSYCH: mentation appears normal, affect normal.            Signed Electronically by: Lucille Miller MD

## 2025-06-11 ENCOUNTER — TELEPHONE (OUTPATIENT)
Dept: INTERNAL MEDICINE | Facility: CLINIC | Age: 70
End: 2025-06-11
Payer: COMMERCIAL

## 2025-06-11 NOTE — TELEPHONE ENCOUNTER
Pt had radio frequency on back pain.   Having some pain and wanting to use tylenol.     She did take 500 mg - 2 tabs (1000 mg) just once and this did help with pain.  Wondering if ok to use this as needed due to her cirrhosis.     Vidhya Shelton RN

## 2025-06-12 NOTE — TELEPHONE ENCOUNTER
Alem Beltran MD Kuchenbaker, Susan, RN; [id number] hours ago (4:03 PM)       Ok for tylenol up to 2 g per day

## 2025-06-23 DIAGNOSIS — K70.31 ALCOHOLIC CIRRHOSIS OF LIVER WITH ASCITES (H): ICD-10-CM

## 2025-06-23 DIAGNOSIS — I51.89 DIASTOLIC DYSFUNCTION: ICD-10-CM

## 2025-06-23 RX ORDER — FUROSEMIDE 20 MG/1
20 TABLET ORAL DAILY
Qty: 90 TABLET | Refills: 1 | Status: SHIPPED | OUTPATIENT
Start: 2025-06-23

## 2025-06-24 ENCOUNTER — TRANSFERRED RECORDS (OUTPATIENT)
Dept: HEALTH INFORMATION MANAGEMENT | Facility: CLINIC | Age: 70
End: 2025-06-24
Payer: COMMERCIAL

## 2025-07-01 ENCOUNTER — TRANSFERRED RECORDS (OUTPATIENT)
Dept: HEALTH INFORMATION MANAGEMENT | Facility: CLINIC | Age: 70
End: 2025-07-01
Payer: COMMERCIAL

## 2025-07-07 ENCOUNTER — MYC MEDICAL ADVICE (OUTPATIENT)
Dept: INTERNAL MEDICINE | Facility: CLINIC | Age: 70
End: 2025-07-07
Payer: COMMERCIAL

## 2025-07-07 DIAGNOSIS — R09.82 POST-NASAL DRAINAGE: Primary | ICD-10-CM

## 2025-07-07 NOTE — TELEPHONE ENCOUNTER
Patient last seen on 06/05/2025 for post nasal drip. ENT referral?    Thank you,  Marvin, Triage RN Latrice La Jara    11:04 AM 7/7/2025

## 2025-07-09 ENCOUNTER — PATIENT OUTREACH (OUTPATIENT)
Dept: CARE COORDINATION | Facility: CLINIC | Age: 70
End: 2025-07-09
Payer: COMMERCIAL

## 2025-07-16 ENCOUNTER — TELEPHONE (OUTPATIENT)
Dept: GASTROENTEROLOGY | Facility: CLINIC | Age: 70
End: 2025-07-16
Payer: COMMERCIAL

## 2025-07-16 NOTE — TELEPHONE ENCOUNTER
Patient confirmed scheduled appointment:     Date: 9/16  Time: 10:30 am  Appointment Type: Return Liver  Provider: Gume Diallo PA-C  Location: [place]  Testing/imaging: Lab & US  Additional Notes:

## 2025-07-20 ENCOUNTER — NURSE TRIAGE (OUTPATIENT)
Dept: INTERNAL MEDICINE | Facility: CLINIC | Age: 70
End: 2025-07-20
Payer: COMMERCIAL

## 2025-07-21 NOTE — TELEPHONE ENCOUNTER
Nurse Triage SBAR    Is this a 2nd Level Triage? YES, LICENSED PRACTITIONER REVIEW IS REQUIRED    Situation: patient reports bilateral ankle swelling    Background: patient is on amlodipine and lasix     Assessment: patient reports swelling in ankles on both feet into mid calf, started 4 days ago. R is worse than the left. No history of  blood clot. No pain or new concerns with mobility. No changes/concerns with skin color.     Protocol Recommended Disposition:   Go To Office Now    Recommendation: writer recommended to call ADS, patient reports she cannot come in for appointment. Writer asked today or at all? Patient reports she cannot come into appointment at all as her  recently was diagnosed with a herniated disc and needs to be home with him. Writer advised patient that she likely is going to need some sort of assessment, this may be inappropriate via telephone/VV.  Patient requested message be sent to PCP. Writer warned patient that PCP may not be able to make a recommendation without an in person appointment, patient verbalized understanding.     Reviewed symptoms to escalate care    Routed to provider    Does the patient meet one of the following criteria for ADS visit consideration? 16+ years old, with an [facility] PCP     TIP  Providers, please consider if this condition is appropriate for management at one of our Acute and Diagnostic Services sites.     If patient is a good candidate, please use dotphrase <dot>triageresponse and select Refer to ADS to document.  Reason for Disposition   Thigh, calf, or ankle swelling and bilateral and 1 side is more swollen    Additional Information   Negative: Chest Pain   Negative: Followed an ankle injury   Negative: Followed a bee sting and has localized swelling (e.g., small area of puffy or swollen skin)   Negative: Followed an insect bite and has localized swelling (e.g., small area of puffy or swollen skin)   Negative: Ankle pain is main symptom   Negative:  Swelling of calf or leg is main symptom   Negative: Leg swelling (e.g., ankle swelling extends up to shins or knees)   Negative: Difficulty breathing   Negative: Entire foot is cool or blue in comparison to other side   Negative: Ankle pain and fever   Negative: Ankle redness and fever   Negative: Patient sounds very sick or weak to the triager   Negative: Thigh or calf pain and only 1 side and present > 1 hour   Negative: Red area or streak > 2 inches (or 5 cm)   Negative: SEVERE ankle pain (e.g., excruciating, unable to walk) and not improved after 2 hours of pain medicine   Negative: Redness and painful when touched and no fever    Protocols used: Ankle Swelling-A-[place]

## 2025-07-22 NOTE — TELEPHONE ENCOUNTER
"Pt notified, declined ADS \"I just can't do that\"  she will go to the  at  today    Vidhya Shelton RN         "

## 2025-07-22 NOTE — TELEPHONE ENCOUNTER
With patient's medical history, this needs an in person evaluation. Agree with ADS recommendation.

## 2025-08-12 ENCOUNTER — APPOINTMENT (OUTPATIENT)
Dept: CT IMAGING | Facility: CLINIC | Age: 70
End: 2025-08-12
Attending: EMERGENCY MEDICINE
Payer: COMMERCIAL

## 2025-08-12 ENCOUNTER — HOSPITAL ENCOUNTER (OUTPATIENT)
Facility: CLINIC | Age: 70
Setting detail: OBSERVATION
Discharge: HOME | End: 2025-08-13
Attending: EMERGENCY MEDICINE | Admitting: INTERNAL MEDICINE
Payer: COMMERCIAL

## 2025-08-12 DIAGNOSIS — K11.20 SIALOADENITIS OF SUBMANDIBULAR GLAND: Primary | ICD-10-CM

## 2025-08-12 DIAGNOSIS — R79.89 ELEVATED LFTS: ICD-10-CM

## 2025-08-12 DIAGNOSIS — E86.0 DEHYDRATION: ICD-10-CM

## 2025-08-12 LAB
ALBUMIN SERPL BCG-MCNC: 4.4 G/DL (ref 3.5–5.2)
ALP SERPL-CCNC: 165 U/L (ref 40–150)
ALT SERPL W P-5'-P-CCNC: 72 U/L (ref 0–50)
ANION GAP SERPL CALCULATED.3IONS-SCNC: 18 MMOL/L (ref 7–15)
AST SERPL W P-5'-P-CCNC: 220 U/L (ref 0–45)
BASOPHILS # BLD AUTO: 0.03 10E3/UL (ref 0–0.2)
BASOPHILS NFR BLD AUTO: 0.3 %
BILIRUB SERPL-MCNC: 0.7 MG/DL
BUN SERPL-MCNC: 27.1 MG/DL (ref 8–23)
CALCIUM SERPL-MCNC: 8.7 MG/DL (ref 8.8–10.4)
CHLORIDE SERPL-SCNC: 95 MMOL/L (ref 98–107)
CREAT SERPL-MCNC: 1.1 MG/DL (ref 0.51–0.95)
EGFRCR SERPLBLD CKD-EPI 2021: 53 ML/MIN/1.73M2
EOSINOPHIL # BLD AUTO: 0.05 10E3/UL (ref 0–0.7)
EOSINOPHIL NFR BLD AUTO: 0.5 %
ERYTHROCYTE [DISTWIDTH] IN BLOOD BY AUTOMATED COUNT: 17 % (ref 10–15)
GLUCOSE SERPL-MCNC: 105 MG/DL (ref 70–99)
HCO3 SERPL-SCNC: 24 MMOL/L (ref 22–29)
HCT VFR BLD AUTO: 28.1 % (ref 35–47)
HGB BLD-MCNC: 8.5 G/DL (ref 11.7–15.7)
HOLD SPECIMEN: NORMAL
HOLD SPECIMEN: NORMAL
IMM GRANULOCYTES # BLD: 0.06 10E3/UL
IMM GRANULOCYTES NFR BLD: 0.6 %
LYMPHOCYTES # BLD AUTO: 1.17 10E3/UL (ref 0.8–5.3)
LYMPHOCYTES NFR BLD AUTO: 10.9 %
MCH RBC QN AUTO: 24.9 PG (ref 26.5–33)
MCHC RBC AUTO-ENTMCNC: 30.2 G/DL (ref 31.5–36.5)
MCV RBC AUTO: 82.2 FL (ref 78–100)
MONOCYTES # BLD AUTO: 1.16 10E3/UL (ref 0–1.3)
MONOCYTES NFR BLD AUTO: 10.8 %
NEUTROPHILS # BLD AUTO: 8.28 10E3/UL (ref 1.6–8.3)
NEUTROPHILS NFR BLD AUTO: 76.9 %
NRBC # BLD AUTO: 0 10E3/UL
NRBC BLD AUTO-RTO: 0 /100
PLATELET # BLD AUTO: 234 10E3/UL (ref 150–450)
POTASSIUM SERPL-SCNC: 3.5 MMOL/L (ref 3.4–5.3)
PROT SERPL-MCNC: 6.9 G/DL (ref 6.4–8.3)
RBC # BLD AUTO: 3.42 10E6/UL (ref 3.8–5.2)
SODIUM SERPL-SCNC: 137 MMOL/L (ref 135–145)
WBC # BLD AUTO: 10.75 10E3/UL (ref 4–11)

## 2025-08-12 PROCEDURE — 99285 EMERGENCY DEPT VISIT HI MDM: CPT | Mod: 25 | Performed by: EMERGENCY MEDICINE

## 2025-08-12 PROCEDURE — 80053 COMPREHEN METABOLIC PANEL: CPT | Performed by: EMERGENCY MEDICINE

## 2025-08-12 PROCEDURE — 36415 COLL VENOUS BLD VENIPUNCTURE: CPT | Performed by: EMERGENCY MEDICINE

## 2025-08-12 PROCEDURE — 250N000009 HC RX 250: Performed by: EMERGENCY MEDICINE

## 2025-08-12 PROCEDURE — 85004 AUTOMATED DIFF WBC COUNT: CPT | Performed by: EMERGENCY MEDICINE

## 2025-08-12 PROCEDURE — 250N000011 HC RX IP 250 OP 636: Performed by: EMERGENCY MEDICINE

## 2025-08-12 PROCEDURE — 85025 COMPLETE CBC W/AUTO DIFF WBC: CPT | Performed by: EMERGENCY MEDICINE

## 2025-08-12 PROCEDURE — 70491 CT SOFT TISSUE NECK W/DYE: CPT

## 2025-08-12 PROCEDURE — 70486 CT MAXILLOFACIAL W/O DYE: CPT

## 2025-08-12 PROCEDURE — 99222 1ST HOSP IP/OBS MODERATE 55: CPT | Performed by: INTERNAL MEDICINE

## 2025-08-12 PROCEDURE — 96375 TX/PRO/DX INJ NEW DRUG ADDON: CPT

## 2025-08-12 RX ORDER — DEXAMETHASONE SODIUM PHOSPHATE 10 MG/ML
10 INJECTION, SOLUTION INTRAMUSCULAR; INTRAVENOUS ONCE
Status: COMPLETED | OUTPATIENT
Start: 2025-08-12 | End: 2025-08-12

## 2025-08-12 RX ORDER — KETOROLAC TROMETHAMINE 15 MG/ML
15 INJECTION, SOLUTION INTRAMUSCULAR; INTRAVENOUS ONCE
Status: COMPLETED | OUTPATIENT
Start: 2025-08-12 | End: 2025-08-12

## 2025-08-12 RX ORDER — HYDROMORPHONE HYDROCHLORIDE 1 MG/ML
0.5 INJECTION, SOLUTION INTRAMUSCULAR; INTRAVENOUS; SUBCUTANEOUS ONCE
Refills: 0 | Status: COMPLETED | OUTPATIENT
Start: 2025-08-12 | End: 2025-08-12

## 2025-08-12 RX ORDER — IOPAMIDOL 755 MG/ML
500 INJECTION, SOLUTION INTRAVASCULAR ONCE
Status: COMPLETED | OUTPATIENT
Start: 2025-08-12 | End: 2025-08-12

## 2025-08-12 RX ORDER — CLINDAMYCIN PHOSPHATE 900 MG/50ML
900 INJECTION, SOLUTION INTRAVENOUS EVERY 8 HOURS
Status: DISCONTINUED | OUTPATIENT
Start: 2025-08-12 | End: 2025-08-13 | Stop reason: HOSPADM

## 2025-08-12 RX ADMIN — IOPAMIDOL 90 ML: 755 INJECTION, SOLUTION INTRAVENOUS at 21:49

## 2025-08-12 RX ADMIN — HYDROMORPHONE HYDROCHLORIDE 0.5 MG: 1 INJECTION, SOLUTION INTRAMUSCULAR; INTRAVENOUS; SUBCUTANEOUS at 22:06

## 2025-08-12 RX ADMIN — KETOROLAC TROMETHAMINE 15 MG: 15 INJECTION, SOLUTION INTRAMUSCULAR; INTRAVENOUS at 21:23

## 2025-08-12 RX ADMIN — SODIUM CHLORIDE 65 ML: 9 INJECTION, SOLUTION INTRAVENOUS at 21:49

## 2025-08-12 RX ADMIN — DEXAMETHASONE SODIUM PHOSPHATE 10 MG: 10 INJECTION, SOLUTION INTRAMUSCULAR; INTRAVENOUS at 22:53

## 2025-08-12 ASSESSMENT — COLUMBIA-SUICIDE SEVERITY RATING SCALE - C-SSRS
1. IN THE PAST MONTH, HAVE YOU WISHED YOU WERE DEAD OR WISHED YOU COULD GO TO SLEEP AND NOT WAKE UP?: NO
6. HAVE YOU EVER DONE ANYTHING, STARTED TO DO ANYTHING, OR PREPARED TO DO ANYTHING TO END YOUR LIFE?: NO
2. HAVE YOU ACTUALLY HAD ANY THOUGHTS OF KILLING YOURSELF IN THE PAST MONTH?: NO

## 2025-08-12 ASSESSMENT — ACTIVITIES OF DAILY LIVING (ADL)
ADLS_ACUITY_SCORE: 58

## 2025-08-13 VITALS
DIASTOLIC BLOOD PRESSURE: 62 MMHG | WEIGHT: 166.3 LBS | RESPIRATION RATE: 16 BRPM | BODY MASS INDEX: 30.6 KG/M2 | HEART RATE: 84 BPM | SYSTOLIC BLOOD PRESSURE: 140 MMHG | OXYGEN SATURATION: 96 % | HEIGHT: 62 IN | TEMPERATURE: 99.3 F

## 2025-08-13 PROBLEM — K11.20 SIALOADENITIS OF SUBMANDIBULAR GLAND: Status: ACTIVE | Noted: 2025-08-13

## 2025-08-13 LAB
ALBUMIN SERPL BCG-MCNC: 4.1 G/DL (ref 3.5–5.2)
ALP SERPL-CCNC: 152 U/L (ref 40–150)
ALT SERPL W P-5'-P-CCNC: 58 U/L (ref 0–50)
ANION GAP SERPL CALCULATED.3IONS-SCNC: 15 MMOL/L (ref 7–15)
AST SERPL W P-5'-P-CCNC: 113 U/L (ref 0–45)
BILIRUB SERPL-MCNC: 1.2 MG/DL
BUN SERPL-MCNC: 23.4 MG/DL (ref 8–23)
CALCIUM SERPL-MCNC: 8.5 MG/DL (ref 8.8–10.4)
CHLORIDE SERPL-SCNC: 98 MMOL/L (ref 98–107)
CREAT SERPL-MCNC: 0.92 MG/DL (ref 0.51–0.95)
EGFRCR SERPLBLD CKD-EPI 2021: 66 ML/MIN/1.73M2
ERYTHROCYTE [DISTWIDTH] IN BLOOD BY AUTOMATED COUNT: 17.1 % (ref 10–15)
GLUCOSE SERPL-MCNC: 200 MG/DL (ref 70–99)
HCO3 SERPL-SCNC: 24 MMOL/L (ref 22–29)
HCT VFR BLD AUTO: 25.9 % (ref 35–47)
HGB BLD-MCNC: 7.8 G/DL (ref 11.7–15.7)
MCH RBC QN AUTO: 25 PG (ref 26.5–33)
MCHC RBC AUTO-ENTMCNC: 30.1 G/DL (ref 31.5–36.5)
MCV RBC AUTO: 83 FL (ref 78–100)
PLATELET # BLD AUTO: 176 10E3/UL (ref 150–450)
POTASSIUM SERPL-SCNC: 4.4 MMOL/L (ref 3.4–5.3)
PROT SERPL-MCNC: 6.6 G/DL (ref 6.4–8.3)
RBC # BLD AUTO: 3.12 10E6/UL (ref 3.8–5.2)
SODIUM SERPL-SCNC: 137 MMOL/L (ref 135–145)
WBC # BLD AUTO: 6.8 10E3/UL (ref 4–11)

## 2025-08-13 PROCEDURE — 258N000003 HC RX IP 258 OP 636: Performed by: INTERNAL MEDICINE

## 2025-08-13 PROCEDURE — 85018 HEMOGLOBIN: CPT | Performed by: PHYSICIAN ASSISTANT

## 2025-08-13 PROCEDURE — 96376 TX/PRO/DX INJ SAME DRUG ADON: CPT

## 2025-08-13 PROCEDURE — 99239 HOSP IP/OBS DSCHRG MGMT >30: CPT | Performed by: PHYSICIAN ASSISTANT

## 2025-08-13 PROCEDURE — 250N000011 HC RX IP 250 OP 636: Performed by: INTERNAL MEDICINE

## 2025-08-13 PROCEDURE — 96365 THER/PROPH/DIAG IV INF INIT: CPT

## 2025-08-13 PROCEDURE — 250N000013 HC RX MED GY IP 250 OP 250 PS 637: Performed by: INTERNAL MEDICINE

## 2025-08-13 PROCEDURE — 82040 ASSAY OF SERUM ALBUMIN: CPT | Performed by: PHYSICIAN ASSISTANT

## 2025-08-13 PROCEDURE — 250N000011 HC RX IP 250 OP 636: Performed by: PHYSICIAN ASSISTANT

## 2025-08-13 PROCEDURE — 36415 COLL VENOUS BLD VENIPUNCTURE: CPT | Performed by: PHYSICIAN ASSISTANT

## 2025-08-13 PROCEDURE — G0378 HOSPITAL OBSERVATION PER HR: HCPCS

## 2025-08-13 PROCEDURE — 250N000011 HC RX IP 250 OP 636: Performed by: EMERGENCY MEDICINE

## 2025-08-13 RX ORDER — NALOXONE HYDROCHLORIDE 0.4 MG/ML
0.2 INJECTION, SOLUTION INTRAMUSCULAR; INTRAVENOUS; SUBCUTANEOUS
Status: DISCONTINUED | OUTPATIENT
Start: 2025-08-13 | End: 2025-08-13 | Stop reason: HOSPADM

## 2025-08-13 RX ORDER — POLYETHYLENE GLYCOL 3350 17 G/17G
17 POWDER, FOR SOLUTION ORAL DAILY PRN
Qty: 510 G | Refills: 0 | Status: SHIPPED | OUTPATIENT
Start: 2025-08-13

## 2025-08-13 RX ORDER — METHYLPREDNISOLONE 4 MG/1
TABLET ORAL
Qty: 21 TABLET | Refills: 0 | Status: SHIPPED | OUTPATIENT
Start: 2025-08-14

## 2025-08-13 RX ORDER — FUROSEMIDE 20 MG/1
20 TABLET ORAL DAILY
Status: DISCONTINUED | OUTPATIENT
Start: 2025-08-13 | End: 2025-08-13 | Stop reason: HOSPADM

## 2025-08-13 RX ORDER — ACETAMINOPHEN 325 MG/1
650 TABLET ORAL EVERY 4 HOURS PRN
Status: DISCONTINUED | OUTPATIENT
Start: 2025-08-13 | End: 2025-08-13 | Stop reason: HOSPADM

## 2025-08-13 RX ORDER — ACETAMINOPHEN 650 MG/1
650 SUPPOSITORY RECTAL EVERY 4 HOURS PRN
Status: DISCONTINUED | OUTPATIENT
Start: 2025-08-13 | End: 2025-08-13 | Stop reason: HOSPADM

## 2025-08-13 RX ORDER — AMLODIPINE BESYLATE 5 MG/1
5 TABLET ORAL EVERY EVENING
Status: DISCONTINUED | OUTPATIENT
Start: 2025-08-13 | End: 2025-08-13 | Stop reason: HOSPADM

## 2025-08-13 RX ORDER — PROCHLORPERAZINE MALEATE 5 MG/1
5 TABLET ORAL EVERY 6 HOURS PRN
Status: DISCONTINUED | OUTPATIENT
Start: 2025-08-13 | End: 2025-08-13 | Stop reason: HOSPADM

## 2025-08-13 RX ORDER — CETIRIZINE HYDROCHLORIDE 10 MG/1
10 TABLET ORAL EVERY MORNING
Status: DISCONTINUED | OUTPATIENT
Start: 2025-08-13 | End: 2025-08-13 | Stop reason: HOSPADM

## 2025-08-13 RX ORDER — LACTOBACILLUS RHAMNOSUS GG 10B CELL
1 CAPSULE ORAL DAILY
COMMUNITY

## 2025-08-13 RX ORDER — PANTOPRAZOLE SODIUM 40 MG/1
40 TABLET, DELAYED RELEASE ORAL DAILY
Status: DISCONTINUED | OUTPATIENT
Start: 2025-08-13 | End: 2025-08-13 | Stop reason: HOSPADM

## 2025-08-13 RX ORDER — ONDANSETRON 4 MG/1
4 TABLET, ORALLY DISINTEGRATING ORAL EVERY 6 HOURS PRN
Status: DISCONTINUED | OUTPATIENT
Start: 2025-08-13 | End: 2025-08-13 | Stop reason: HOSPADM

## 2025-08-13 RX ORDER — OXYCODONE HYDROCHLORIDE 5 MG/1
5 TABLET ORAL EVERY 4 HOURS PRN
Status: DISCONTINUED | OUTPATIENT
Start: 2025-08-13 | End: 2025-08-13 | Stop reason: HOSPADM

## 2025-08-13 RX ORDER — AMOXICILLIN 250 MG
1 CAPSULE ORAL 2 TIMES DAILY PRN
Status: DISCONTINUED | OUTPATIENT
Start: 2025-08-13 | End: 2025-08-13 | Stop reason: HOSPADM

## 2025-08-13 RX ORDER — DEXAMETHASONE SODIUM PHOSPHATE 10 MG/ML
10 INJECTION, SOLUTION INTRAMUSCULAR; INTRAVENOUS EVERY 6 HOURS
Status: DISCONTINUED | OUTPATIENT
Start: 2025-08-13 | End: 2025-08-13 | Stop reason: HOSPADM

## 2025-08-13 RX ORDER — OXYCODONE HYDROCHLORIDE 5 MG/1
5 TABLET ORAL EVERY 6 HOURS PRN
Qty: 12 TABLET | Refills: 0 | Status: SHIPPED | OUTPATIENT
Start: 2025-08-13 | End: 2025-08-16

## 2025-08-13 RX ORDER — POLYETHYLENE GLYCOL 3350 17 G/17G
17 POWDER, FOR SOLUTION ORAL DAILY
Status: DISCONTINUED | OUTPATIENT
Start: 2025-08-13 | End: 2025-08-13 | Stop reason: HOSPADM

## 2025-08-13 RX ORDER — ONDANSETRON 2 MG/ML
4 INJECTION INTRAMUSCULAR; INTRAVENOUS EVERY 6 HOURS PRN
Status: DISCONTINUED | OUTPATIENT
Start: 2025-08-13 | End: 2025-08-13 | Stop reason: HOSPADM

## 2025-08-13 RX ORDER — NALOXONE HYDROCHLORIDE 0.4 MG/ML
0.4 INJECTION, SOLUTION INTRAMUSCULAR; INTRAVENOUS; SUBCUTANEOUS
Status: DISCONTINUED | OUTPATIENT
Start: 2025-08-13 | End: 2025-08-13 | Stop reason: HOSPADM

## 2025-08-13 RX ORDER — AMOXICILLIN 250 MG
2 CAPSULE ORAL 2 TIMES DAILY PRN
Status: DISCONTINUED | OUTPATIENT
Start: 2025-08-13 | End: 2025-08-13 | Stop reason: HOSPADM

## 2025-08-13 RX ORDER — MAGNESIUM OXIDE 400 MG/1
400 TABLET ORAL DAILY
Status: DISCONTINUED | OUTPATIENT
Start: 2025-08-13 | End: 2025-08-13 | Stop reason: HOSPADM

## 2025-08-13 RX ADMIN — OXYCODONE HYDROCHLORIDE 5 MG: 5 TABLET ORAL at 01:38

## 2025-08-13 RX ADMIN — OXYCODONE HYDROCHLORIDE 5 MG: 5 TABLET ORAL at 11:00

## 2025-08-13 RX ADMIN — POLYETHYLENE GLYCOL 3350 17 G: 17 POWDER, FOR SOLUTION ORAL at 09:30

## 2025-08-13 RX ADMIN — OXYCODONE HYDROCHLORIDE 5 MG: 5 TABLET ORAL at 15:02

## 2025-08-13 RX ADMIN — OXYCODONE HYDROCHLORIDE 5 MG: 5 TABLET ORAL at 06:49

## 2025-08-13 RX ADMIN — CLINDAMYCIN PHOSPHATE 900 MG: 900 INJECTION, SOLUTION INTRAVENOUS at 09:30

## 2025-08-13 RX ADMIN — CETIRIZINE HYDROCHLORIDE 10 MG: 10 TABLET, FILM COATED ORAL at 10:28

## 2025-08-13 RX ADMIN — Medication 400 MG: at 10:28

## 2025-08-13 RX ADMIN — SODIUM CHLORIDE 1000 ML: 0.9 INJECTION, SOLUTION INTRAVENOUS at 01:39

## 2025-08-13 RX ADMIN — CLINDAMYCIN PHOSPHATE 900 MG: 900 INJECTION, SOLUTION INTRAVENOUS at 00:01

## 2025-08-13 RX ADMIN — PANTOPRAZOLE SODIUM 40 MG: 40 TABLET, DELAYED RELEASE ORAL at 10:28

## 2025-08-13 RX ADMIN — DEXAMETHASONE SODIUM PHOSPHATE 10 MG: 10 INJECTION, SOLUTION INTRAMUSCULAR; INTRAVENOUS at 10:28

## 2025-08-13 ASSESSMENT — ACTIVITIES OF DAILY LIVING (ADL)
ADLS_ACUITY_SCORE: 61
ADLS_ACUITY_SCORE: 58
ADLS_ACUITY_SCORE: 61

## 2025-08-16 ENCOUNTER — HEALTH MAINTENANCE LETTER (OUTPATIENT)
Age: 70
End: 2025-08-16

## 2025-08-19 ENCOUNTER — OFFICE VISIT (OUTPATIENT)
Dept: INTERNAL MEDICINE | Facility: CLINIC | Age: 70
End: 2025-08-19
Payer: COMMERCIAL

## 2025-08-19 ENCOUNTER — TELEPHONE (OUTPATIENT)
Dept: INTERNAL MEDICINE | Facility: CLINIC | Age: 70
End: 2025-08-19

## 2025-08-19 VITALS
RESPIRATION RATE: 18 BRPM | BODY MASS INDEX: 29.81 KG/M2 | SYSTOLIC BLOOD PRESSURE: 138 MMHG | HEART RATE: 118 BPM | HEIGHT: 62 IN | TEMPERATURE: 97.6 F | OXYGEN SATURATION: 98 % | WEIGHT: 162 LBS | DIASTOLIC BLOOD PRESSURE: 68 MMHG

## 2025-08-19 DIAGNOSIS — I10 BENIGN ESSENTIAL HYPERTENSION: ICD-10-CM

## 2025-08-19 DIAGNOSIS — G89.29 CHRONIC MIDLINE LOW BACK PAIN WITHOUT SCIATICA: ICD-10-CM

## 2025-08-19 DIAGNOSIS — Z01.818 PREOP GENERAL PHYSICAL EXAM: Primary | ICD-10-CM

## 2025-08-19 DIAGNOSIS — N18.31 STAGE 3A CHRONIC KIDNEY DISEASE (H): ICD-10-CM

## 2025-08-19 DIAGNOSIS — D50.9 IRON DEFICIENCY ANEMIA, UNSPECIFIED IRON DEFICIENCY ANEMIA TYPE: ICD-10-CM

## 2025-08-19 DIAGNOSIS — D50.9 IRON DEFICIENCY ANEMIA, UNSPECIFIED IRON DEFICIENCY ANEMIA TYPE: Primary | ICD-10-CM

## 2025-08-19 DIAGNOSIS — M54.50 CHRONIC MIDLINE LOW BACK PAIN WITHOUT SCIATICA: ICD-10-CM

## 2025-08-19 DIAGNOSIS — K70.30 ALCOHOLIC CIRRHOSIS, UNSPECIFIED WHETHER ASCITES PRESENT (H): ICD-10-CM

## 2025-08-19 PROBLEM — K72.90 DECOMPENSATION OF CIRRHOSIS OF LIVER (H): Status: RESOLVED | Noted: 2024-12-27 | Resolved: 2025-08-19

## 2025-08-19 PROBLEM — K74.60 DECOMPENSATION OF CIRRHOSIS OF LIVER (H): Status: RESOLVED | Noted: 2024-12-27 | Resolved: 2025-08-19

## 2025-08-19 LAB
ALBUMIN SERPL BCG-MCNC: 4.1 G/DL (ref 3.5–5.2)
ALP SERPL-CCNC: 124 U/L (ref 40–150)
ALT SERPL W P-5'-P-CCNC: 60 U/L (ref 0–50)
ANION GAP SERPL CALCULATED.3IONS-SCNC: 13 MMOL/L (ref 7–15)
AST SERPL W P-5'-P-CCNC: 114 U/L (ref 0–45)
BILIRUB SERPL-MCNC: 0.6 MG/DL
BUN SERPL-MCNC: 33.2 MG/DL (ref 8–23)
CALCIUM SERPL-MCNC: 9.5 MG/DL (ref 8.8–10.4)
CHLORIDE SERPL-SCNC: 104 MMOL/L (ref 98–107)
CREAT SERPL-MCNC: 0.93 MG/DL (ref 0.51–0.95)
EGFRCR SERPLBLD CKD-EPI 2021: 65 ML/MIN/1.73M2
ERYTHROCYTE [DISTWIDTH] IN BLOOD BY AUTOMATED COUNT: 17.2 % (ref 10–15)
GLUCOSE SERPL-MCNC: 95 MG/DL (ref 70–99)
HCO3 SERPL-SCNC: 26 MMOL/L (ref 22–29)
HCT VFR BLD AUTO: 25.8 % (ref 35–47)
HGB BLD-MCNC: 7.9 G/DL (ref 11.7–15.7)
MCH RBC QN AUTO: 25.2 PG (ref 26.5–33)
MCHC RBC AUTO-ENTMCNC: 30.6 G/DL (ref 31.5–36.5)
MCV RBC AUTO: 82.4 FL (ref 78–100)
PLATELET # BLD AUTO: 220 10E3/UL (ref 150–450)
POTASSIUM SERPL-SCNC: 3.9 MMOL/L (ref 3.4–5.3)
PROT SERPL-MCNC: 6.6 G/DL (ref 6.4–8.3)
RBC # BLD AUTO: 3.13 10E6/UL (ref 3.8–5.2)
SODIUM SERPL-SCNC: 143 MMOL/L (ref 135–145)
WBC # BLD AUTO: 12.25 10E3/UL (ref 4–11)

## 2025-08-19 PROCEDURE — 85027 COMPLETE CBC AUTOMATED: CPT | Performed by: INTERNAL MEDICINE

## 2025-08-19 PROCEDURE — 99214 OFFICE O/P EST MOD 30 MIN: CPT | Performed by: INTERNAL MEDICINE

## 2025-08-19 PROCEDURE — 82043 UR ALBUMIN QUANTITATIVE: CPT | Performed by: INTERNAL MEDICINE

## 2025-08-19 PROCEDURE — 3075F SYST BP GE 130 - 139MM HG: CPT | Performed by: INTERNAL MEDICINE

## 2025-08-19 PROCEDURE — 36415 COLL VENOUS BLD VENIPUNCTURE: CPT | Performed by: INTERNAL MEDICINE

## 2025-08-19 PROCEDURE — 3078F DIAST BP <80 MM HG: CPT | Performed by: INTERNAL MEDICINE

## 2025-08-19 PROCEDURE — 80053 COMPREHEN METABOLIC PANEL: CPT | Performed by: INTERNAL MEDICINE

## 2025-08-19 PROCEDURE — 82570 ASSAY OF URINE CREATININE: CPT | Performed by: INTERNAL MEDICINE

## 2025-08-20 LAB
CREAT UR-MCNC: 129 MG/DL
MICROALBUMIN UR-MCNC: <12 MG/L
MICROALBUMIN/CREAT UR: NORMAL MG/G{CREAT}

## 2025-08-20 RX ORDER — FERROUS SULFATE 325(65) MG
325 TABLET, DELAYED RELEASE (ENTERIC COATED) ORAL DAILY
Qty: 90 TABLET | Refills: 1 | Status: SHIPPED | OUTPATIENT
Start: 2025-08-20

## 2025-08-25 ENCOUNTER — TRANSFERRED RECORDS (OUTPATIENT)
Dept: HEALTH INFORMATION MANAGEMENT | Facility: CLINIC | Age: 70
End: 2025-08-25
Payer: COMMERCIAL

## (undated) DEVICE — GLOVE PROTEXIS W/NEU-THERA 7.0  2D73TE70

## (undated) DEVICE — SU NDL MAYO 1824-4

## (undated) DEVICE — SET HANDPIECE INTERPULSE W/COAXIAL FAN SPRAY TIP 0210118000

## (undated) DEVICE — BLADE SAW SAGITTAL STRK 25X90X1.27MM HD SYS 6 6125-127-090

## (undated) DEVICE — PREP DURAPREP 26ML APL 8630

## (undated) DEVICE — APPLICATOR COTTON TIP 6"X2 STERILE LF 6012

## (undated) DEVICE — LINEN HALF SHEET 5512

## (undated) DEVICE — SU ETHIBOND 0 CT-1 CR 8X18" CX21D

## (undated) DEVICE — GLOVE PROTEXIS BLUE W/NEU-THERA 8.5  2D73EB85

## (undated) DEVICE — GLOVE PROTEXIS POWDER FREE 7.0 ORTHOPEDIC 2D73ET70

## (undated) DEVICE — SUCTION MANIFOLD NEPTUNE 2 SYS 4 PORT 0702-020-000

## (undated) DEVICE — DRAPE IOBAN INCISE 23X17" 6650EZ

## (undated) DEVICE — DRAPE SHOULDER PACK SPLITS 29365

## (undated) DEVICE — SOL WATER IRRIG 1000ML BOTTLE 2F7114

## (undated) DEVICE — IMM SHOULDER LG 79-84017

## (undated) DEVICE — GLOVE PROTEXIS W/NEU-THERA 7.5  2D73TE75

## (undated) DEVICE — STPL SKIN 35W 6.9MM  PXW35

## (undated) DEVICE — BAG CLEAR TRASH 1.3M 39X33" P4040C

## (undated) DEVICE — DRAPE EXTREMITY BILAT

## (undated) DEVICE — ESU PENCIL W/HOLSTER E2350H

## (undated) DEVICE — NDL 18GA 1.5" 305196

## (undated) DEVICE — SU ETHIBOND 1 CT-1 30" X425H

## (undated) DEVICE — Device

## (undated) DEVICE — DRSG AQUACEL AG 3.5X12" HYDROFIBER 420670

## (undated) DEVICE — GLOVE PROTEXIS POWDER FREE 6.5 ORTHOPEDIC 2D73ET65

## (undated) DEVICE — SYR 50ML LL W/O NDL 309653

## (undated) DEVICE — CATH TRAY FOLEY COUDE SURESTEP 16FR W/DRN BAG LATEX A304416A

## (undated) DEVICE — SU MONOCRYL 4-0 PS-2 27" UND Y426H

## (undated) DEVICE — PREP CHLORAPREP 26ML TINTED ORANGE  260815

## (undated) DEVICE — SYR 10ML FINGER CONTROL W/O NDL 309695

## (undated) DEVICE — LINEN ORTHO ACL PACK 5447

## (undated) DEVICE — KIT ENDO TURNOVER/PROCEDURE W/CLEAN A SCOPE LINERS 103888

## (undated) DEVICE — GLOVE PROTEXIS W/NEU-THERA 8.5  2D73TE85

## (undated) DEVICE — LINEN FULL SHEET 5511

## (undated) DEVICE — DECANTER VIAL 2006S

## (undated) DEVICE — PREP SKIN SCRUB TRAY 4461A

## (undated) DEVICE — DRAPE CONVERTORS U-DRAPE 60X72" 8476

## (undated) DEVICE — BLADE KNIFE SURG 10 371110

## (undated) DEVICE — BLADE SAW SAGITTAL STRK 20.7X85X0.89MM 2108-109-000S13

## (undated) DEVICE — ESU GROUND PAD ADULT W/CORD E7507

## (undated) DEVICE — GLOVE PROTEXIS BLUE W/NEU-THERA 6.5  2D73EB65

## (undated) DEVICE — SU VICRYL 0 CT-1 27" J340H

## (undated) DEVICE — SU VICRYL 1 CT-1 27" J341H

## (undated) DEVICE — BNDG ELASTIC 4"X5YDS UNSTERILE 6611-40

## (undated) DEVICE — SYR 10ML LL W/O NDL 302995

## (undated) DEVICE — SU FIBERWIRE 5 CCS-1 BLUE  AR-7211

## (undated) DEVICE — SU FIBERWIRE 2 38" T-8 NDL  AR-7206

## (undated) DEVICE — SU VICRYL 2-0 CT-1 27" UND J259H

## (undated) DEVICE — PACK SHOULDER RIDGES

## (undated) DEVICE — GOWN XXL 9575

## (undated) DEVICE — SOL NACL 0.9% IRRIG 1000ML BOTTLE 2F7124

## (undated) DEVICE — STPL SKIN PROXIMATE 35 WIDE PMW35

## (undated) DEVICE — LINEN DRAPE 54X72" 5467

## (undated) DEVICE — SYR 05ML LL W/O NDL

## (undated) DEVICE — CAST PADDING 4" STERILE 9044S

## (undated) DEVICE — SUCTION TIP YANKAUER W/O VENT K86

## (undated) DEVICE — DRAIN HEMOVAC RESERVOIR KIT 10FR 1/8" MED 00-2550-002-10

## (undated) DEVICE — HOOD FLYTE W/PEELAWAY 408-800-100

## (undated) DEVICE — CATH TRAY FOLEY SURESTEP 16FR DRAIN BAG STATOCK A899916

## (undated) DEVICE — SU ETHIBOND 2 V-37 4X30" MX69G

## (undated) DEVICE — NDL BLUNT 18GA 1" W/O FILTER 305181

## (undated) DEVICE — SYR BULB IRRIG 50ML LATEX FREE 0035280

## (undated) DEVICE — PACK SET-UP STD 9102

## (undated) DEVICE — TOURNIQUET CUFF 12" STERILE 60-7070-102

## (undated) DEVICE — SPONGE BONE WICK FEMORAL W/SUCTION ATTACHMENT 0206-714-000

## (undated) DEVICE — NDL 27GA 1.25" 305136

## (undated) DEVICE — ESU ELEC BLADE 6" COATED E1450-6

## (undated) DEVICE — SPONGE LAP 18X18" X8435

## (undated) DEVICE — PACK TOTAL HIP RIDGES LATEX PO15HIFSG

## (undated) DEVICE — IMM PILLOW ABDUCT HIP MED M60-025-M

## (undated) DEVICE — DEVICE RETRIEVER HEWSON 71111579

## (undated) DEVICE — BLADE KNIFE SURG 15 371115

## (undated) DEVICE — GLOVE PROTEXIS POWDER FREE 8.5 ORTHOPEDIC 2D73ET85

## (undated) DEVICE — IMM SHOULDER  ABDUCT ULTRASLING III MED 11-0449-3

## (undated) DEVICE — GLOVE PROTEXIS POWDER FREE 7.5 ORTHOPEDIC 2D73ET75

## (undated) DEVICE — NDL SPINAL 18GA 3.5" 405184

## (undated) DEVICE — GLOVE PROTEXIS BLUE W/NEU-THERA 7.0  2D73EB70

## (undated) DEVICE — DRSG KERLIX FLUFFS X5

## (undated) DEVICE — SU ETHILON 3-0 PS-1 18" 1663H

## (undated) DEVICE — BRUSH FEMORAL CANAL 210-4 0210004000

## (undated) DEVICE — PREP POVIDONE IODINE SCRUB 7.5% 120ML

## (undated) DEVICE — DRSG AQUACEL AG 3.5X6.0" HYDROFIBER 412010

## (undated) DEVICE — SU ETHILON 2-0 PS 18" 585H

## (undated) DEVICE — PACK LOWER EXTREMITY RIDGES

## (undated) DEVICE — SU VICRYL 2-0 CT-2 27" UND J269H

## (undated) DEVICE — DRSG MEPILEX BORDER SACRUM 7.2X7.2" 282000

## (undated) DEVICE — GLOVE PROTEXIS POWDER FREE 8.0 ORTHOPEDIC 2D73ET80

## (undated) DEVICE — DRSG STERI STRIP 1/2X4" R1547

## (undated) DEVICE — SU VICRYL 0 CT-1 36" J346H

## (undated) DEVICE — DRSG ABDOMINAL 07 1/2X8" 7197D

## (undated) DEVICE — PREP POVIDONE IODINE SOLUTION 10% 120ML

## (undated) RX ORDER — KETAMINE HCL IN 0.9 % NACL 50 MG/5 ML
SYRINGE (ML) INTRAVENOUS
Status: DISPENSED
Start: 2019-10-28

## (undated) RX ORDER — FENTANYL CITRATE 50 UG/ML
INJECTION, SOLUTION INTRAMUSCULAR; INTRAVENOUS
Status: DISPENSED
Start: 2019-06-12

## (undated) RX ORDER — PROPOFOL 10 MG/ML
INJECTION, EMULSION INTRAVENOUS
Status: DISPENSED
Start: 2019-06-12

## (undated) RX ORDER — ONDANSETRON 2 MG/ML
INJECTION INTRAMUSCULAR; INTRAVENOUS
Status: DISPENSED
Start: 2019-07-15

## (undated) RX ORDER — EPINEPHRINE 1 MG/ML(1)
AMPUL (ML) INJECTION
Status: DISPENSED
Start: 2019-06-12

## (undated) RX ORDER — DEXAMETHASONE SODIUM PHOSPHATE 4 MG/ML
INJECTION, SOLUTION INTRA-ARTICULAR; INTRALESIONAL; INTRAMUSCULAR; INTRAVENOUS; SOFT TISSUE
Status: DISPENSED
Start: 2018-10-30

## (undated) RX ORDER — FENTANYL CITRATE 50 UG/ML
INJECTION, SOLUTION INTRAMUSCULAR; INTRAVENOUS
Status: DISPENSED
Start: 2019-10-28

## (undated) RX ORDER — CEFAZOLIN SODIUM 2 G/100ML
INJECTION, SOLUTION INTRAVENOUS
Status: DISPENSED
Start: 2018-04-12

## (undated) RX ORDER — BUPIVACAINE HYDROCHLORIDE AND EPINEPHRINE 5; 5 MG/ML; UG/ML
INJECTION, SOLUTION EPIDURAL; INTRACAUDAL; PERINEURAL
Status: DISPENSED
Start: 2018-10-30

## (undated) RX ORDER — DEXAMETHASONE SODIUM PHOSPHATE 4 MG/ML
INJECTION, SOLUTION INTRA-ARTICULAR; INTRALESIONAL; INTRAMUSCULAR; INTRAVENOUS; SOFT TISSUE
Status: DISPENSED
Start: 2019-07-15

## (undated) RX ORDER — CLINDAMYCIN PHOSPHATE 900 MG/50ML
INJECTION, SOLUTION INTRAVENOUS
Status: DISPENSED
Start: 2019-07-15

## (undated) RX ORDER — DEXAMETHASONE SODIUM PHOSPHATE 4 MG/ML
INJECTION, SOLUTION INTRA-ARTICULAR; INTRALESIONAL; INTRAMUSCULAR; INTRAVENOUS; SOFT TISSUE
Status: DISPENSED
Start: 2019-06-12

## (undated) RX ORDER — GLYCOPYRROLATE 0.2 MG/ML
INJECTION INTRAMUSCULAR; INTRAVENOUS
Status: DISPENSED
Start: 2019-10-28

## (undated) RX ORDER — GLYCOPYRROLATE 0.2 MG/ML
INJECTION INTRAMUSCULAR; INTRAVENOUS
Status: DISPENSED
Start: 2019-07-15

## (undated) RX ORDER — MEPERIDINE HYDROCHLORIDE 50 MG/ML
INJECTION INTRAMUSCULAR; INTRAVENOUS; SUBCUTANEOUS
Status: DISPENSED
Start: 2019-10-28

## (undated) RX ORDER — BUPIVACAINE HYDROCHLORIDE 5 MG/ML
INJECTION, SOLUTION EPIDURAL; INTRACAUDAL
Status: DISPENSED
Start: 2018-04-12

## (undated) RX ORDER — ONDANSETRON 2 MG/ML
INJECTION INTRAMUSCULAR; INTRAVENOUS
Status: DISPENSED
Start: 2019-06-12

## (undated) RX ORDER — FENTANYL CITRATE 50 UG/ML
INJECTION, SOLUTION INTRAMUSCULAR; INTRAVENOUS
Status: DISPENSED
Start: 2019-07-15

## (undated) RX ORDER — ONDANSETRON 2 MG/ML
INJECTION INTRAMUSCULAR; INTRAVENOUS
Status: DISPENSED
Start: 2018-10-30

## (undated) RX ORDER — CEFAZOLIN SODIUM 2 G/100ML
INJECTION, SOLUTION INTRAVENOUS
Status: DISPENSED
Start: 2019-10-28

## (undated) RX ORDER — LIDOCAINE HYDROCHLORIDE 10 MG/ML
INJECTION, SOLUTION EPIDURAL; INFILTRATION; INTRACAUDAL; PERINEURAL
Status: DISPENSED
Start: 2019-06-12

## (undated) RX ORDER — LIDOCAINE HYDROCHLORIDE 10 MG/ML
INJECTION, SOLUTION EPIDURAL; INFILTRATION; INTRACAUDAL; PERINEURAL
Status: DISPENSED
Start: 2018-10-30

## (undated) RX ORDER — CLINDAMYCIN PHOSPHATE 900 MG/50ML
INJECTION, SOLUTION INTRAVENOUS
Status: DISPENSED
Start: 2018-10-30

## (undated) RX ORDER — GINSENG 100 MG
CAPSULE ORAL
Status: DISPENSED
Start: 2018-04-12

## (undated) RX ORDER — ONDANSETRON 2 MG/ML
INJECTION INTRAMUSCULAR; INTRAVENOUS
Status: DISPENSED
Start: 2018-04-12

## (undated) RX ORDER — PROPOFOL 10 MG/ML
INJECTION, EMULSION INTRAVENOUS
Status: DISPENSED
Start: 2019-07-15

## (undated) RX ORDER — CELECOXIB 200 MG/1
CAPSULE ORAL
Status: DISPENSED
Start: 2018-10-30

## (undated) RX ORDER — REGADENOSON 0.08 MG/ML
INJECTION, SOLUTION INTRAVENOUS
Status: DISPENSED
Start: 2024-12-30

## (undated) RX ORDER — PHENYLEPHRINE HCL IN 0.9% NACL 1 MG/10 ML
SYRINGE (ML) INTRAVENOUS
Status: DISPENSED
Start: 2019-06-12

## (undated) RX ORDER — GLYCOPYRROLATE 0.2 MG/ML
INJECTION INTRAMUSCULAR; INTRAVENOUS
Status: DISPENSED
Start: 2018-10-30

## (undated) RX ORDER — LIDOCAINE HYDROCHLORIDE 10 MG/ML
INJECTION, SOLUTION EPIDURAL; INFILTRATION; INTRACAUDAL; PERINEURAL
Status: DISPENSED
Start: 2018-04-12

## (undated) RX ORDER — PROPOFOL 10 MG/ML
INJECTION, EMULSION INTRAVENOUS
Status: DISPENSED
Start: 2018-10-30

## (undated) RX ORDER — VANCOMYCIN HYDROCHLORIDE 1 G/200ML
INJECTION, SOLUTION INTRAVENOUS
Status: DISPENSED
Start: 2018-04-12

## (undated) RX ORDER — FENTANYL CITRATE 50 UG/ML
INJECTION, SOLUTION INTRAMUSCULAR; INTRAVENOUS
Status: DISPENSED
Start: 2021-04-16

## (undated) RX ORDER — SIMETHICONE 40MG/0.6ML
SUSPENSION, DROPS(FINAL DOSAGE FORM)(ML) ORAL
Status: DISPENSED
Start: 2021-04-16

## (undated) RX ORDER — PROPOFOL 10 MG/ML
INJECTION, EMULSION INTRAVENOUS
Status: DISPENSED
Start: 2019-10-28

## (undated) RX ORDER — FENTANYL CITRATE 50 UG/ML
INJECTION, SOLUTION INTRAMUSCULAR; INTRAVENOUS
Status: DISPENSED
Start: 2018-04-12

## (undated) RX ORDER — METOPROLOL TARTRATE 1 MG/ML
INJECTION, SOLUTION INTRAVENOUS
Status: DISPENSED
Start: 2018-10-30

## (undated) RX ORDER — GLYCOPYRROLATE 0.2 MG/ML
INJECTION INTRAMUSCULAR; INTRAVENOUS
Status: DISPENSED
Start: 2019-06-12

## (undated) RX ORDER — FENTANYL CITRATE 50 UG/ML
INJECTION, SOLUTION INTRAMUSCULAR; INTRAVENOUS
Status: DISPENSED
Start: 2024-12-29

## (undated) RX ORDER — NEOSTIGMINE METHYLSULFATE 1 MG/ML
VIAL (ML) INJECTION
Status: DISPENSED
Start: 2018-10-30

## (undated) RX ORDER — PROPOFOL 10 MG/ML
INJECTION, EMULSION INTRAVENOUS
Status: DISPENSED
Start: 2018-04-12

## (undated) RX ORDER — NEOSTIGMINE METHYLSULFATE 1 MG/ML
VIAL (ML) INJECTION
Status: DISPENSED
Start: 2019-10-28

## (undated) RX ORDER — CLINDAMYCIN PHOSPHATE 900 MG/50ML
INJECTION, SOLUTION INTRAVENOUS
Status: DISPENSED
Start: 2019-06-12

## (undated) RX ORDER — PHENYLEPHRINE HCL IN 0.9% NACL 1 MG/10 ML
SYRINGE (ML) INTRAVENOUS
Status: DISPENSED
Start: 2018-10-30

## (undated) RX ORDER — LIDOCAINE HYDROCHLORIDE 10 MG/ML
INJECTION, SOLUTION EPIDURAL; INFILTRATION; INTRACAUDAL; PERINEURAL
Status: DISPENSED
Start: 2019-07-15

## (undated) RX ORDER — NEOSTIGMINE METHYLSULFATE 1 MG/ML
VIAL (ML) INJECTION
Status: DISPENSED
Start: 2019-06-12

## (undated) RX ORDER — GINSENG 100 MG
CAPSULE ORAL
Status: DISPENSED
Start: 2018-10-30

## (undated) RX ORDER — BUPIVACAINE HYDROCHLORIDE AND EPINEPHRINE 5; 5 MG/ML; UG/ML
INJECTION, SOLUTION EPIDURAL; INTRACAUDAL; PERINEURAL
Status: DISPENSED
Start: 2019-06-12

## (undated) RX ORDER — FENTANYL CITRATE 50 UG/ML
INJECTION, SOLUTION INTRAMUSCULAR; INTRAVENOUS
Status: DISPENSED
Start: 2018-10-30